# Patient Record
Sex: MALE | Race: WHITE | NOT HISPANIC OR LATINO | Employment: UNEMPLOYED | URBAN - METROPOLITAN AREA
[De-identification: names, ages, dates, MRNs, and addresses within clinical notes are randomized per-mention and may not be internally consistent; named-entity substitution may affect disease eponyms.]

---

## 2019-05-14 ENCOUNTER — APPOINTMENT (OUTPATIENT)
Dept: RADIOLOGY | Facility: MEDICAL CENTER | Age: 49
DRG: 292 | End: 2019-05-14
Attending: EMERGENCY MEDICINE
Payer: MEDICAID

## 2019-05-14 ENCOUNTER — APPOINTMENT (OUTPATIENT)
Dept: RADIOLOGY | Facility: MEDICAL CENTER | Age: 49
DRG: 292 | End: 2019-05-14
Attending: STUDENT IN AN ORGANIZED HEALTH CARE EDUCATION/TRAINING PROGRAM
Payer: MEDICAID

## 2019-05-14 ENCOUNTER — HOSPITAL ENCOUNTER (INPATIENT)
Facility: MEDICAL CENTER | Age: 49
LOS: 3 days | DRG: 292 | End: 2019-05-17
Attending: EMERGENCY MEDICINE | Admitting: INTERNAL MEDICINE
Payer: MEDICAID

## 2019-05-14 ENCOUNTER — APPOINTMENT (OUTPATIENT)
Dept: CARDIOLOGY | Facility: MEDICAL CENTER | Age: 49
DRG: 292 | End: 2019-05-14
Attending: STUDENT IN AN ORGANIZED HEALTH CARE EDUCATION/TRAINING PROGRAM
Payer: MEDICAID

## 2019-05-14 DIAGNOSIS — F15.10 AMPHETAMINE ABUSE (HCC): ICD-10-CM

## 2019-05-14 DIAGNOSIS — R79.89 ELEVATED TROPONIN: Primary | ICD-10-CM

## 2019-05-14 DIAGNOSIS — R06.02 SOB (SHORTNESS OF BREATH): ICD-10-CM

## 2019-05-14 DIAGNOSIS — I51.7 CARDIOMEGALY: ICD-10-CM

## 2019-05-14 PROBLEM — D69.6 THROMBOCYTOPENIA (HCC): Status: ACTIVE | Noted: 2019-05-14

## 2019-05-14 PROBLEM — D64.9 ANEMIA: Status: ACTIVE | Noted: 2019-05-14

## 2019-05-14 PROBLEM — R74.8 ELEVATED LIVER ENZYMES: Status: ACTIVE | Noted: 2019-05-14

## 2019-05-14 PROBLEM — E87.20 METABOLIC ACIDOSIS: Status: ACTIVE | Noted: 2019-05-14

## 2019-05-14 PROBLEM — E87.5 HYPERKALEMIA: Status: ACTIVE | Noted: 2019-05-14

## 2019-05-14 LAB
ALBUMIN SERPL BCP-MCNC: 3.3 G/DL (ref 3.2–4.9)
ALBUMIN/GLOB SERPL: 1.1 G/DL
ALP SERPL-CCNC: 104 U/L (ref 30–99)
ALT SERPL-CCNC: 92 U/L (ref 2–50)
AMPHET UR QL SCN: POSITIVE
AMPHET UR QL SCN: POSITIVE
ANION GAP SERPL CALC-SCNC: 11 MMOL/L (ref 0–11.9)
ANION GAP SERPL CALC-SCNC: 13 MMOL/L (ref 0–11.9)
AST SERPL-CCNC: 49 U/L (ref 12–45)
BARBITURATES UR QL SCN: NEGATIVE
BARBITURATES UR QL SCN: NEGATIVE
BASOPHILS # BLD AUTO: 0.7 % (ref 0–1.8)
BASOPHILS # BLD: 0.06 K/UL (ref 0–0.12)
BENZODIAZ UR QL SCN: NEGATIVE
BENZODIAZ UR QL SCN: NEGATIVE
BILIRUB SERPL-MCNC: 1 MG/DL (ref 0.1–1.5)
BNP SERPL-MCNC: 1382 PG/ML (ref 0–100)
BNP SERPL-MCNC: 1427 PG/ML (ref 0–100)
BUN SERPL-MCNC: 15 MG/DL (ref 8–22)
BUN SERPL-MCNC: 16 MG/DL (ref 8–22)
BZE UR QL SCN: NEGATIVE
BZE UR QL SCN: NEGATIVE
CALCIUM SERPL-MCNC: 8.6 MG/DL (ref 8.5–10.5)
CALCIUM SERPL-MCNC: 8.7 MG/DL (ref 8.5–10.5)
CANNABINOIDS UR QL SCN: NEGATIVE
CANNABINOIDS UR QL SCN: NEGATIVE
CHLORIDE SERPL-SCNC: 110 MMOL/L (ref 96–112)
CHLORIDE SERPL-SCNC: 110 MMOL/L (ref 96–112)
CHOLEST SERPL-MCNC: 146 MG/DL (ref 100–199)
CO2 SERPL-SCNC: 16 MMOL/L (ref 20–33)
CO2 SERPL-SCNC: 17 MMOL/L (ref 20–33)
COMMENT 1642: NORMAL
CREAT SERPL-MCNC: 1.09 MG/DL (ref 0.5–1.4)
CREAT SERPL-MCNC: 1.14 MG/DL (ref 0.5–1.4)
D DIMER PPP IA.FEU-MCNC: 3.28 UG/ML (FEU) (ref 0–0.5)
EKG IMPRESSION: NORMAL
EOSINOPHIL # BLD AUTO: 0.11 K/UL (ref 0–0.51)
EOSINOPHIL NFR BLD: 1.2 % (ref 0–6.9)
ERYTHROCYTE [DISTWIDTH] IN BLOOD BY AUTOMATED COUNT: 50.3 FL (ref 35.9–50)
EST. AVERAGE GLUCOSE BLD GHB EST-MCNC: 128 MG/DL
GLOBULIN SER CALC-MCNC: 2.9 G/DL (ref 1.9–3.5)
GLUCOSE SERPL-MCNC: 134 MG/DL (ref 65–99)
GLUCOSE SERPL-MCNC: 90 MG/DL (ref 65–99)
HBA1C MFR BLD: 6.1 % (ref 0–5.6)
HCT VFR BLD AUTO: 40 % (ref 42–52)
HDLC SERPL-MCNC: 34 MG/DL
HGB BLD-MCNC: 13 G/DL (ref 14–18)
IMM GRANULOCYTES # BLD AUTO: 0.01 K/UL (ref 0–0.11)
IMM GRANULOCYTES NFR BLD AUTO: 0.1 % (ref 0–0.9)
INR PPP: 1.05 (ref 0.87–1.13)
LDLC SERPL CALC-MCNC: 72 MG/DL
LV EJECT FRACT  99904: 15
LV EJECT FRACT MOD 2C 99903: 15
LV EJECT FRACT MOD 4C 99902: 24.56
LV EJECT FRACT MOD BP 99901: 10
LYMPHOCYTES # BLD AUTO: 2.54 K/UL (ref 1–4.8)
LYMPHOCYTES NFR BLD: 27.9 % (ref 22–41)
MCH RBC QN AUTO: 32.8 PG (ref 27–33)
MCHC RBC AUTO-ENTMCNC: 32.5 G/DL (ref 33.7–35.3)
MCV RBC AUTO: 101 FL (ref 81.4–97.8)
METHADONE UR QL SCN: NEGATIVE
METHADONE UR QL SCN: NEGATIVE
MONOCYTES # BLD AUTO: 0.69 K/UL (ref 0–0.85)
MONOCYTES NFR BLD AUTO: 7.6 % (ref 0–13.4)
MORPHOLOGY BLD-IMP: NORMAL
NEUTROPHILS # BLD AUTO: 5.7 K/UL (ref 1.82–7.42)
NEUTROPHILS NFR BLD: 62.5 % (ref 44–72)
NRBC # BLD AUTO: 0 K/UL
NRBC BLD-RTO: 0 /100 WBC
OPIATES UR QL SCN: NEGATIVE
OPIATES UR QL SCN: NEGATIVE
OXYCODONE UR QL SCN: NEGATIVE
OXYCODONE UR QL SCN: NEGATIVE
PCP UR QL SCN: NEGATIVE
PCP UR QL SCN: NEGATIVE
PLATELET # BLD AUTO: 102 K/UL (ref 164–446)
PMV BLD AUTO: 11.2 FL (ref 9–12.9)
POTASSIUM SERPL-SCNC: 4.4 MMOL/L (ref 3.6–5.5)
POTASSIUM SERPL-SCNC: 5.8 MMOL/L (ref 3.6–5.5)
PROPOXYPH UR QL SCN: NEGATIVE
PROPOXYPH UR QL SCN: NEGATIVE
PROT SERPL-MCNC: 6.2 G/DL (ref 6–8.2)
PROTHROMBIN TIME: 13.8 SEC (ref 12–14.6)
RBC # BLD AUTO: 3.96 M/UL (ref 4.7–6.1)
SODIUM SERPL-SCNC: 138 MMOL/L (ref 135–145)
SODIUM SERPL-SCNC: 139 MMOL/L (ref 135–145)
TRIGL SERPL-MCNC: 198 MG/DL (ref 0–149)
TROPONIN I SERPL-MCNC: 0.06 NG/ML (ref 0–0.04)
TROPONIN I SERPL-MCNC: 0.09 NG/ML (ref 0–0.04)
TSH SERPL DL<=0.005 MIU/L-ACNC: 1.68 UIU/ML (ref 0.38–5.33)
WBC # BLD AUTO: 9.1 K/UL (ref 4.8–10.8)

## 2019-05-14 PROCEDURE — 94760 N-INVAS EAR/PLS OXIMETRY 1: CPT

## 2019-05-14 PROCEDURE — 770020 HCHG ROOM/CARE - TELE (206)

## 2019-05-14 PROCEDURE — 36415 COLL VENOUS BLD VENIPUNCTURE: CPT

## 2019-05-14 PROCEDURE — 700102 HCHG RX REV CODE 250 W/ 637 OVERRIDE(OP): Performed by: EMERGENCY MEDICINE

## 2019-05-14 PROCEDURE — 99223 1ST HOSP IP/OBS HIGH 75: CPT | Mod: GC | Performed by: INTERNAL MEDICINE

## 2019-05-14 PROCEDURE — 80061 LIPID PANEL: CPT

## 2019-05-14 PROCEDURE — 85025 COMPLETE CBC W/AUTO DIFF WBC: CPT

## 2019-05-14 PROCEDURE — 93970 EXTREMITY STUDY: CPT

## 2019-05-14 PROCEDURE — 93005 ELECTROCARDIOGRAM TRACING: CPT | Performed by: EMERGENCY MEDICINE

## 2019-05-14 PROCEDURE — 84443 ASSAY THYROID STIM HORMONE: CPT

## 2019-05-14 PROCEDURE — 84484 ASSAY OF TROPONIN QUANT: CPT

## 2019-05-14 PROCEDURE — 71275 CT ANGIOGRAPHY CHEST: CPT

## 2019-05-14 PROCEDURE — 83880 ASSAY OF NATRIURETIC PEPTIDE: CPT

## 2019-05-14 PROCEDURE — 93306 TTE W/DOPPLER COMPLETE: CPT | Mod: 26 | Performed by: INTERNAL MEDICINE

## 2019-05-14 PROCEDURE — 93005 ELECTROCARDIOGRAM TRACING: CPT

## 2019-05-14 PROCEDURE — 80307 DRUG TEST PRSMV CHEM ANLYZR: CPT

## 2019-05-14 PROCEDURE — 700111 HCHG RX REV CODE 636 W/ 250 OVERRIDE (IP): Performed by: EMERGENCY MEDICINE

## 2019-05-14 PROCEDURE — 80053 COMPREHEN METABOLIC PANEL: CPT

## 2019-05-14 PROCEDURE — 83036 HEMOGLOBIN GLYCOSYLATED A1C: CPT

## 2019-05-14 PROCEDURE — 85379 FIBRIN DEGRADATION QUANT: CPT

## 2019-05-14 PROCEDURE — 700102 HCHG RX REV CODE 250 W/ 637 OVERRIDE(OP): Performed by: STUDENT IN AN ORGANIZED HEALTH CARE EDUCATION/TRAINING PROGRAM

## 2019-05-14 PROCEDURE — 99285 EMERGENCY DEPT VISIT HI MDM: CPT

## 2019-05-14 PROCEDURE — 700117 HCHG RX CONTRAST REV CODE 255: Performed by: EMERGENCY MEDICINE

## 2019-05-14 PROCEDURE — A9270 NON-COVERED ITEM OR SERVICE: HCPCS | Performed by: STUDENT IN AN ORGANIZED HEALTH CARE EDUCATION/TRAINING PROGRAM

## 2019-05-14 PROCEDURE — 96374 THER/PROPH/DIAG INJ IV PUSH: CPT

## 2019-05-14 PROCEDURE — 93306 TTE W/DOPPLER COMPLETE: CPT

## 2019-05-14 PROCEDURE — 80048 BASIC METABOLIC PNL TOTAL CA: CPT

## 2019-05-14 PROCEDURE — 71045 X-RAY EXAM CHEST 1 VIEW: CPT

## 2019-05-14 PROCEDURE — 99255 IP/OBS CONSLTJ NEW/EST HI 80: CPT | Performed by: INTERNAL MEDICINE

## 2019-05-14 PROCEDURE — 85610 PROTHROMBIN TIME: CPT

## 2019-05-14 PROCEDURE — A9270 NON-COVERED ITEM OR SERVICE: HCPCS | Performed by: EMERGENCY MEDICINE

## 2019-05-14 RX ORDER — ENALAPRILAT 1.25 MG/ML
1.25 INJECTION INTRAVENOUS EVERY 6 HOURS PRN
Status: DISCONTINUED | OUTPATIENT
Start: 2019-05-14 | End: 2019-05-17 | Stop reason: HOSPADM

## 2019-05-14 RX ORDER — ASPIRIN 81 MG/1
324 TABLET, CHEWABLE ORAL ONCE
Status: COMPLETED | OUTPATIENT
Start: 2019-05-14 | End: 2019-05-14

## 2019-05-14 RX ORDER — FUROSEMIDE 10 MG/ML
20 INJECTION INTRAMUSCULAR; INTRAVENOUS ONCE
Status: COMPLETED | OUTPATIENT
Start: 2019-05-14 | End: 2019-05-14

## 2019-05-14 RX ORDER — POLYETHYLENE GLYCOL 3350 17 G/17G
1 POWDER, FOR SOLUTION ORAL
Status: DISCONTINUED | OUTPATIENT
Start: 2019-05-14 | End: 2019-05-17 | Stop reason: HOSPADM

## 2019-05-14 RX ORDER — ASPIRIN 81 MG/1
81 TABLET, CHEWABLE ORAL DAILY
Status: DISCONTINUED | OUTPATIENT
Start: 2019-05-15 | End: 2019-05-17 | Stop reason: HOSPADM

## 2019-05-14 RX ORDER — FUROSEMIDE 10 MG/ML
40 INJECTION INTRAMUSCULAR; INTRAVENOUS
Status: DISCONTINUED | OUTPATIENT
Start: 2019-05-15 | End: 2019-05-16

## 2019-05-14 RX ORDER — AMOXICILLIN 250 MG
2 CAPSULE ORAL 2 TIMES DAILY
Status: DISCONTINUED | OUTPATIENT
Start: 2019-05-15 | End: 2019-05-17 | Stop reason: HOSPADM

## 2019-05-14 RX ORDER — BISACODYL 10 MG
10 SUPPOSITORY, RECTAL RECTAL
Status: DISCONTINUED | OUTPATIENT
Start: 2019-05-14 | End: 2019-05-17 | Stop reason: HOSPADM

## 2019-05-14 RX ORDER — CARVEDILOL 3.12 MG/1
3.12 TABLET ORAL 2 TIMES DAILY WITH MEALS
Status: DISCONTINUED | OUTPATIENT
Start: 2019-05-14 | End: 2019-05-14

## 2019-05-14 RX ORDER — LISINOPRIL 5 MG/1
5 TABLET ORAL
Status: DISCONTINUED | OUTPATIENT
Start: 2019-05-14 | End: 2019-05-15

## 2019-05-14 RX ORDER — SPIRONOLACTONE 25 MG/1
25 TABLET ORAL
Status: DISCONTINUED | OUTPATIENT
Start: 2019-05-15 | End: 2019-05-17

## 2019-05-14 RX ORDER — ATORVASTATIN CALCIUM 80 MG/1
80 TABLET, FILM COATED ORAL EVERY EVENING
Status: DISCONTINUED | OUTPATIENT
Start: 2019-05-14 | End: 2019-05-17 | Stop reason: HOSPADM

## 2019-05-14 RX ADMIN — IOHEXOL 72 ML: 350 INJECTION, SOLUTION INTRAVENOUS at 17:50

## 2019-05-14 RX ADMIN — FUROSEMIDE 20 MG: 10 INJECTION, SOLUTION INTRAMUSCULAR; INTRAVENOUS at 17:05

## 2019-05-14 RX ADMIN — LISINOPRIL 5 MG: 5 TABLET ORAL at 23:04

## 2019-05-14 RX ADMIN — ASPIRIN 81 MG 324 MG: 81 TABLET ORAL at 16:38

## 2019-05-14 RX ADMIN — ATORVASTATIN CALCIUM 80 MG: 80 TABLET, FILM COATED ORAL at 20:36

## 2019-05-14 ASSESSMENT — ENCOUNTER SYMPTOMS
SPUTUM PRODUCTION: 0
PND: 1
COUGH: 0
SHORTNESS OF BREATH: 1
ABDOMINAL PAIN: 0
HEMOPTYSIS: 0
VOMITING: 0
FEVER: 0
HEADACHES: 0
ORTHOPNEA: 1
DIAPHORESIS: 0
BLURRED VISION: 0
CHILLS: 0
DOUBLE VISION: 0
PALPITATIONS: 0
DIZZINESS: 0
MYALGIAS: 0
NECK PAIN: 0
WEIGHT LOSS: 0
HEARTBURN: 0
WHEEZING: 0
NAUSEA: 0
DEPRESSION: 0

## 2019-05-14 ASSESSMENT — LIFESTYLE VARIABLES
EVER_SMOKED: YES
ALCOHOL_USE: NO
EVER_SMOKED: YES

## 2019-05-14 ASSESSMENT — PATIENT HEALTH QUESTIONNAIRE - PHQ9
1. LITTLE INTEREST OR PLEASURE IN DOING THINGS: NOT AT ALL
2. FEELING DOWN, DEPRESSED, IRRITABLE, OR HOPELESS: NOT AT ALL
SUM OF ALL RESPONSES TO PHQ9 QUESTIONS 1 AND 2: 0

## 2019-05-14 ASSESSMENT — COPD QUESTIONNAIRES
HAVE YOU SMOKED AT LEAST 100 CIGARETTES IN YOUR ENTIRE LIFE: YES
DURING THE PAST 4 WEEKS HOW MUCH DID YOU FEEL SHORT OF BREATH: NONE/LITTLE OF THE TIME
DO YOU EVER COUGH UP ANY MUCUS OR PHLEGM?: NO/ONLY WITH OCCASIONAL COLDS OR INFECTIONS
COPD SCREENING SCORE: 2

## 2019-05-14 ASSESSMENT — COGNITIVE AND FUNCTIONAL STATUS - GENERAL
DAILY ACTIVITIY SCORE: 24
SUGGESTED CMS G CODE MODIFIER MOBILITY: CH
SUGGESTED CMS G CODE MODIFIER DAILY ACTIVITY: CH
MOBILITY SCORE: 24

## 2019-05-14 NOTE — ED NOTES
"Pt presents c/o SOB and PANDYA x 1 month.  Denies any CP.  Hx of IV meth use and family hx of CHF.  \"I feel like I'm filling up with fluid\".  Lungs diminished in bases, no imelda crackles heard.  Very difficult IV stick.  Placed on cardiac monitor.   "

## 2019-05-14 NOTE — ED PROVIDER NOTES
ED Provider Note    Scribed for Gaviota Henson M.D. by Micky Ascencio. 5/14/2019, 3:53 PM.    Primary care provider: No primary care provider on file.  Means of arrival: Walk-in  History obtained from: Patient  History limited by: None    CHIEF COMPLAINT  Chief Complaint   Patient presents with   • Shortness of Breath     patient reports SOB x 2 weeks though worse x 3 days.    • Fatigue     x 2 weeks       HPI  Lucas Agee is a 48 y.o. male who presents to the Emergency Department for evaluation of shortness of breath onset 2 weeks ago with symptoms worsening 3 days ago. His symptoms fluctuate in severity but are exacerbated by ambulation. The patient states his shortness of breath is sometimes worsened by laying on his back. The patient states he has been having difficulty sleeping secondary to his shortness of breath. The patient affirms an additional symptom of increased fatigue, mild cough, chest tightness, mild bilateral feet swelling, and mild lightheadedness over the past 2 weeks. The patient denies any fevers, palpitations, nausea, vomiting, arm pain, or leg pain. The patient is a current daily tobacco and marijuana smoker. He denies any history of alcohol use. The patient has a history of injected methamphetamine use and last use was one year ago. The patient has a family history of cardiac problems. The patient has a history of hypertension. The patient notes he has not seen a doctor in many years. He has never had a stress test. No history of diagnosed hepatitis.The patient takes no daily medications. He has not taken aspirin today.     REVIEW OF SYSTEMS  Pertinent positives include shortness of breath, fatigue, mild cough, chest tightness, mild bilateral feet swelling, and lightheadedness. Pertinent negatives include no fevers, palpitations, nausea, vomiting, arm pain, or leg pain. As above, all other systems reviewed and are negative.   See HPI for further details.     PAST MEDICAL  "HISTORY  History reviewed. Hypertension    SURGICAL HISTORY  History reviewed. No pertinent surgical history.    SOCIAL HISTORY  Social History   Substance Use Topics   • Smoking status: Current Every Day Smoker     Packs/day: 0.50     Types: Cigarettes   • Smokeless tobacco: Never Used   • Alcohol use No      History   Drug Use   • Types: Inhaled     Comment: marijuana, hx of meth       FAMILY HISTORY  Family history of cardiac problems.     CURRENT MEDICATIONS  Home Medications     Reviewed by Sera Cheng (Pharmacy Tech) on 05/14/19 at 1759  Med List Status: Complete   Medication Last Dose Status        Patient Lewis Taking any Medications                       ALLERGIES  No Known Allergies    PHYSICAL EXAM  VITAL SIGNS: /116   Pulse (!) 116   Temp 36.8 °C (98.3 °F) (Oral)   Resp 14   Ht 1.702 m (5' 7\")   Wt 95.9 kg (211 lb 6.7 oz)   SpO2 98%   BMI 33.11 kg/m²   Vitals reviewed.    Consitutional: Well-developed, well-nourished. Negative for: distress. Sweating.  HENT: Normocephalic, right external ear normal, left external ear normal, oropharynx clear and moist.  Eyes: Conjunctivae normal, extraocular movements normal. Negative for: discharge in right and left eye, icterus.  Neck: Range of motion normal, supple. Negative for cervical adenopathy.  Cardiovascular: Sweaty. Tachycardic, regular rhythm, heart sounds normal, intact distal pulses. Negative for: murmur, rub, gallop.  Pulmonary/Chest Wall: Pulse Oximetry was obtained. It showed a reading of 98%.  I interpreted this as normal. Effort normal, breath sounds normal. Negative for: respiratory distress, wheezes, rales, rhonchi.   Abdominal: Soft, bowel sounds normal. Negative for: distention, tenderness, rebound, guarding.  Musculoskeletal: Normal range of motion. Trace edema to bilateral ankles. No tenderness or warmth to bilateral calves.   Neurological: Alert and oriented x3. No focal deficits.  Skin: Warm, dry. Negative for rash.  Psych: " Anxious. Mildly tearful. Affect normal, behavior normal, judgment normal.      DIAGNOSTIC STUDIES / PROCEDURES    LABS  Results for orders placed or performed during the hospital encounter of 05/14/19   EC-ECHOCARDIOGRAM COMPLETE W/O CONT   Result Value Ref Range    Eject.Frac. MOD BP 10     Eject.Frac. MOD 4C 24.56     Eject.Frac. MOD 2C 15     Left Ventrical Ejection Fraction 15    Complete Metabolic Panel (CMP)   Result Value Ref Range    Sodium 138 135 - 145 mmol/L    Potassium 5.8 (H) 3.6 - 5.5 mmol/L    Chloride 110 96 - 112 mmol/L    Co2 17 (L) 20 - 33 mmol/L    Anion Gap 11.0 0.0 - 11.9    Glucose 134 (H) 65 - 99 mg/dL    Bun 15 8 - 22 mg/dL    Creatinine 1.14 0.50 - 1.40 mg/dL    Calcium 8.7 8.5 - 10.5 mg/dL    AST(SGOT) 49 (H) 12 - 45 U/L    ALT(SGPT) 92 (H) 2 - 50 U/L    Alkaline Phosphatase 104 (H) 30 - 99 U/L    Total Bilirubin 1.0 0.1 - 1.5 mg/dL    Albumin 3.3 3.2 - 4.9 g/dL    Total Protein 6.2 6.0 - 8.2 g/dL    Globulin 2.9 1.9 - 3.5 g/dL    A-G Ratio 1.1 g/dL   Troponin   Result Value Ref Range    Troponin I 0.06 (H) 0.00 - 0.04 ng/mL   CBC WITH DIFFERENTIAL   Result Value Ref Range    WBC 9.1 4.8 - 10.8 K/uL    RBC 3.96 (L) 4.70 - 6.10 M/uL    Hemoglobin 13.0 (L) 14.0 - 18.0 g/dL    Hematocrit 40.0 (L) 42.0 - 52.0 %    .0 (H) 81.4 - 97.8 fL    MCH 32.8 27.0 - 33.0 pg    MCHC 32.5 (L) 33.7 - 35.3 g/dL    RDW 50.3 (H) 35.9 - 50.0 fL    Platelet Count 102 (L) 164 - 446 K/uL    MPV 11.2 9.0 - 12.9 fL    Neutrophils-Polys 62.50 44.00 - 72.00 %    Lymphocytes 27.90 22.00 - 41.00 %    Monocytes 7.60 0.00 - 13.40 %    Eosinophils 1.20 0.00 - 6.90 %    Basophils 0.70 0.00 - 1.80 %    Immature Granulocytes 0.10 0.00 - 0.90 %    Nucleated RBC 0.00 /100 WBC    Neutrophils (Absolute) 5.70 1.82 - 7.42 K/uL    Lymphs (Absolute) 2.54 1.00 - 4.80 K/uL    Monos (Absolute) 0.69 0.00 - 0.85 K/uL    Eos (Absolute) 0.11 0.00 - 0.51 K/uL    Baso (Absolute) 0.06 0.00 - 0.12 K/uL    Immature Granulocytes (abs) 0.01  0.00 - 0.11 K/uL    NRBC (Absolute) 0.00 K/uL   ESTIMATED GFR   Result Value Ref Range    GFR If African American >60 >60 mL/min/1.73 m 2    GFR If Non African American >60 >60 mL/min/1.73 m 2   TROPONIN   Result Value Ref Range    Troponin I 0.09 (H) 0.00 - 0.04 ng/mL   BTYPE NATRIURETIC PEPTIDE   Result Value Ref Range    B Natriuretic Peptide 1427 (H) 0 - 100 pg/mL   PERIPHERAL SMEAR REVIEW   Result Value Ref Range    Peripheral Smear Review see below    DIFFERENTIAL COMMENT   Result Value Ref Range    Comments-Diff see below    URINE DRUG SCREEN   Result Value Ref Range    Amphetamines Urine Positive (A) Negative    Barbiturates Negative Negative    Benzodiazepines Negative Negative    Cocaine Metabolite Negative Negative    Methadone Negative Negative    Opiates Negative Negative    Oxycodone Negative Negative    Phencyclidine -Pcp Negative Negative    Propoxyphene Negative Negative    Cannabinoid Metab Negative Negative   URINE DRUG SCREEN   Result Value Ref Range    Amphetamines Urine Positive (A) Negative    Barbiturates Negative Negative    Benzodiazepines Negative Negative    Cocaine Metabolite Negative Negative    Methadone Negative Negative    Opiates Negative Negative    Oxycodone Negative Negative    Phencyclidine -Pcp Negative Negative    Propoxyphene Negative Negative    Cannabinoid Metab Negative Negative   D-DIMER   Result Value Ref Range    D-Dimer Screen 3.28 (H) 0.00 - 0.50 ug/mL (FEU)   Prothrombin Time   Result Value Ref Range    PT 13.8 12.0 - 14.6 sec    INR 1.05 0.87 - 1.13   EKG (NOW)   Result Value Ref Range    Report       [facility] Emergency Dept.    Test Date:  2019  Pt Name:    EDMUNDO OROPEZA                 Department: ER  MRN:        [id number]                      Room:  Gender:     Male                         Technician: [id number]  :        1970                   Requested By:ER TRIAGE PROTOCOL  Order #:    [id number]                    Urmila MD: ALLEN  LINNEA MEDINA MD    Measurements  Intervals                                Axis  Rate:       119                          P:          65  TX:         140                          QRS:        25  QRSD:       90                           T:          98  QT:         340  QTc:        479    Interpretive Statements  SINUS TACHYCARDIA  NONSPECIFIC T ABNORMALITIES, LATERAL LEADS  BORDERLINE PROLONGED QT INTERVAL  No previous ECG available for comparison    Electronically Signed On 5- 16:09:12 PDT by ALLEN MEDINA MD         All labs reviewed by me.    EKG Interpretation:  Twelve-lead EKG by my interpretation is as above.  No ST or T wave changes to indicate ischemia or infarct    RADIOLOGY  CT-CTA CHEST PULMONARY ARTERY W/ RECONS   Final Result      1.  Limited study due to extensive patient motion artifact as well as a poor contrast bolus. Only large main pulmonary artery emboli are excluded with this examination.      2.  Cardiomegaly.      3.  Fatty liver.      DX-CHEST-PORTABLE (1 VIEW)   Final Result      No pulmonary consolidation.        The radiologist's interpretation of all radiological studies have been reviewed by me.    COURSE & MEDICAL DECISION MAKING  Nursing notes, VS, PMSFHx reviewed in chart.    Obtained and reviewed past medical records but it appears the patient has never been here    3:53 PM Patient seen and examined at bedside. The patient presents with dyspnea and tachycardia and the differential diagnosis includes but is not limited to cardiomyopathy, CHF, PE, pleural effusions. Ordered DX-Chest, CT-CTA Chest Pulmonary Artery, BType Natriuretic peptide, CMP, CBC, and EKG. Patient will be treated with 20 mg Lasix and 324 mg aspirin for his symptoms.    4:19 PM - Ordered Troponin    5:50 PM - Paged [facility] internal Medicine    6:30 PM - I spoke with Dr Cornell ([facility]) who accepts the patient for admission.    6:45 PM - Patient was reevaluated at bedside. Discussed lab and radiology  results with the patient and informed them that they had an elevated troponin. I informed the patient of my plan for admission. Patient verbalizes understanding and agreement.    DISPOSITION:  Patient will be admitted to Dr Cornell in guarded condition.      FINAL IMPRESSION  1. Elevated troponin    2. SOB (shortness of breath)    3. Amphetamine abuse (HCC)    4. Cardiomegaly          Micky ANDERSON (Scribe), am scribing for, and in the presence of, Gaviota Henson M.D..    Electronically signed by: Micky Ascencio (Scribe), 5/14/2019    Gaviota ANDERSON M.D. personally performed the services described in this documentation, as scribed by Micky Ascencio in my presence, and it is both accurate and complete.    The note accurately reflects work and decisions made by me.  Gaviota Henson  5/14/2019  8:45 PM

## 2019-05-14 NOTE — ED TRIAGE NOTES
".Lucas Agee  .  Chief Complaint   Patient presents with   • Shortness of Breath     patient reports SOB x 2 weeks though worse x 3 days.    • Fatigue     x 2 weeks     Patient to triage with above complaint. Patient also c/o swelling to bilateral hands and feet x 2 weeks. Patient reports dry non-productive cough.EKG completed prior to triage. ./111   Pulse (!) 130   Temp 36.7 °C (98.1 °F) (Temporal)   Resp (!) 22   Ht 1.702 m (5' 7\")   Wt 95.9 kg (211 lb 6.7 oz)   SpO2 96%   BMI 33.11 kg/m²     Patient to lobby and instructed to inform staff of any needs.  "

## 2019-05-15 ENCOUNTER — APPOINTMENT (OUTPATIENT)
Dept: RADIOLOGY | Facility: MEDICAL CENTER | Age: 49
DRG: 292 | End: 2019-05-15
Attending: STUDENT IN AN ORGANIZED HEALTH CARE EDUCATION/TRAINING PROGRAM
Payer: MEDICAID

## 2019-05-15 PROBLEM — F19.90 SUBSTANCE USE DISORDER: Status: ACTIVE | Noted: 2019-05-15

## 2019-05-15 PROBLEM — Z72.0 TOBACCO USE: Status: ACTIVE | Noted: 2019-05-15

## 2019-05-15 PROBLEM — I42.0 DILATED CARDIOMYOPATHY (HCC): Status: ACTIVE | Noted: 2019-05-15

## 2019-05-15 PROBLEM — I07.1 TRICUSPID REGURGITATION: Status: ACTIVE | Noted: 2019-05-15

## 2019-05-15 PROBLEM — R79.89 ELEVATED TROPONIN: Status: ACTIVE | Noted: 2019-05-15

## 2019-05-15 PROBLEM — R73.03 PREDIABETES: Status: ACTIVE | Noted: 2019-05-15

## 2019-05-15 PROBLEM — R74.01 TRANSAMINITIS: Status: ACTIVE | Noted: 2019-05-15

## 2019-05-15 PROBLEM — I50.9 ACUTE DECOMPENSATED HEART FAILURE (HCC): Status: ACTIVE | Noted: 2019-05-15

## 2019-05-15 PROBLEM — K76.0 HEPATIC STEATOSIS: Status: ACTIVE | Noted: 2019-05-15

## 2019-05-15 LAB
ALBUMIN SERPL BCP-MCNC: 3.6 G/DL (ref 3.2–4.9)
ALBUMIN/GLOB SERPL: 1.4 G/DL
ALP SERPL-CCNC: 96 U/L (ref 30–99)
ALT SERPL-CCNC: 83 U/L (ref 2–50)
ANION GAP SERPL CALC-SCNC: 9 MMOL/L (ref 0–11.9)
APTT PPP: 25.3 SEC (ref 24.7–36)
AST SERPL-CCNC: 45 U/L (ref 12–45)
BASOPHILS # BLD AUTO: 0.5 % (ref 0–1.8)
BASOPHILS # BLD: 0.08 K/UL (ref 0–0.12)
BILIRUB SERPL-MCNC: 0.9 MG/DL (ref 0.1–1.5)
BUN SERPL-MCNC: 16 MG/DL (ref 8–22)
CALCIUM SERPL-MCNC: 8.6 MG/DL (ref 8.5–10.5)
CHLORIDE SERPL-SCNC: 110 MMOL/L (ref 96–112)
CO2 SERPL-SCNC: 19 MMOL/L (ref 20–33)
CREAT SERPL-MCNC: 1.03 MG/DL (ref 0.5–1.4)
EOSINOPHIL # BLD AUTO: 0.18 K/UL (ref 0–0.51)
EOSINOPHIL NFR BLD: 1.2 % (ref 0–6.9)
ERYTHROCYTE [DISTWIDTH] IN BLOOD BY AUTOMATED COUNT: 48.6 FL (ref 35.9–50)
ETHANOL BLD-MCNC: 0.01 G/DL
FERRITIN SERPL-MCNC: 68.9 NG/ML (ref 22–322)
FOLATE SERPL-MCNC: 19.1 NG/ML
GLOBULIN SER CALC-MCNC: 2.5 G/DL (ref 1.9–3.5)
GLUCOSE SERPL-MCNC: 99 MG/DL (ref 65–99)
HAV IGM SERPL QL IA: NEGATIVE
HBV CORE IGM SER QL: NEGATIVE
HBV SURFACE AG SER QL: NEGATIVE
HCT VFR BLD AUTO: 46.5 % (ref 42–52)
HCV AB SER QL: NEGATIVE
HGB BLD-MCNC: 16 G/DL (ref 14–18)
HIV 1+2 AB+HIV1 P24 AG SERPL QL IA: NON REACTIVE
IMM GRANULOCYTES # BLD AUTO: 0.06 K/UL (ref 0–0.11)
IMM GRANULOCYTES NFR BLD AUTO: 0.4 % (ref 0–0.9)
IRON SATN MFR SERPL: 23 % (ref 15–55)
IRON SERPL-MCNC: 79 UG/DL (ref 50–180)
LYMPHOCYTES # BLD AUTO: 3.02 K/UL (ref 1–4.8)
LYMPHOCYTES NFR BLD: 19.6 % (ref 22–41)
MAGNESIUM SERPL-MCNC: 2.1 MG/DL (ref 1.5–2.5)
MCH RBC QN AUTO: 33.2 PG (ref 27–33)
MCHC RBC AUTO-ENTMCNC: 34.4 G/DL (ref 33.7–35.3)
MCV RBC AUTO: 96.5 FL (ref 81.4–97.8)
MONOCYTES # BLD AUTO: 1.17 K/UL (ref 0–0.85)
MONOCYTES NFR BLD AUTO: 7.6 % (ref 0–13.4)
NEUTROPHILS # BLD AUTO: 10.89 K/UL (ref 1.82–7.42)
NEUTROPHILS NFR BLD: 70.7 % (ref 44–72)
NRBC # BLD AUTO: 0 K/UL
NRBC BLD-RTO: 0 /100 WBC
PHOSPHATE SERPL-MCNC: 3.7 MG/DL (ref 2.5–4.5)
PLATELET # BLD AUTO: 238 K/UL (ref 164–446)
PMV BLD AUTO: 10.4 FL (ref 9–12.9)
POTASSIUM SERPL-SCNC: 4 MMOL/L (ref 3.6–5.5)
PROT SERPL-MCNC: 6.1 G/DL (ref 6–8.2)
RBC # BLD AUTO: 4.82 M/UL (ref 4.7–6.1)
SODIUM SERPL-SCNC: 138 MMOL/L (ref 135–145)
TIBC SERPL-MCNC: 350 UG/DL (ref 250–450)
TROPONIN I SERPL-MCNC: 0.12 NG/ML (ref 0–0.04)
VIT B12 SERPL-MCNC: 832 PG/ML (ref 211–911)
WBC # BLD AUTO: 15.4 K/UL (ref 4.8–10.8)

## 2019-05-15 PROCEDURE — 82746 ASSAY OF FOLIC ACID SERUM: CPT

## 2019-05-15 PROCEDURE — 700111 HCHG RX REV CODE 636 W/ 250 OVERRIDE (IP): Performed by: STUDENT IN AN ORGANIZED HEALTH CARE EDUCATION/TRAINING PROGRAM

## 2019-05-15 PROCEDURE — 80053 COMPREHEN METABOLIC PANEL: CPT

## 2019-05-15 PROCEDURE — 700102 HCHG RX REV CODE 250 W/ 637 OVERRIDE(OP): Performed by: STUDENT IN AN ORGANIZED HEALTH CARE EDUCATION/TRAINING PROGRAM

## 2019-05-15 PROCEDURE — 76705 ECHO EXAM OF ABDOMEN: CPT

## 2019-05-15 PROCEDURE — 82607 VITAMIN B-12: CPT

## 2019-05-15 PROCEDURE — 85025 COMPLETE CBC W/AUTO DIFF WBC: CPT

## 2019-05-15 PROCEDURE — 770020 HCHG ROOM/CARE - TELE (206)

## 2019-05-15 PROCEDURE — 80074 ACUTE HEPATITIS PANEL: CPT

## 2019-05-15 PROCEDURE — 84484 ASSAY OF TROPONIN QUANT: CPT

## 2019-05-15 PROCEDURE — 82728 ASSAY OF FERRITIN: CPT

## 2019-05-15 PROCEDURE — 83735 ASSAY OF MAGNESIUM: CPT

## 2019-05-15 PROCEDURE — 80307 DRUG TEST PRSMV CHEM ANLYZR: CPT

## 2019-05-15 PROCEDURE — A9270 NON-COVERED ITEM OR SERVICE: HCPCS | Performed by: STUDENT IN AN ORGANIZED HEALTH CARE EDUCATION/TRAINING PROGRAM

## 2019-05-15 PROCEDURE — 85730 THROMBOPLASTIN TIME PARTIAL: CPT

## 2019-05-15 PROCEDURE — 99231 SBSQ HOSP IP/OBS SF/LOW 25: CPT | Performed by: INTERNAL MEDICINE

## 2019-05-15 PROCEDURE — A9270 NON-COVERED ITEM OR SERVICE: HCPCS | Performed by: INTERNAL MEDICINE

## 2019-05-15 PROCEDURE — 87389 HIV-1 AG W/HIV-1&-2 AB AG IA: CPT

## 2019-05-15 PROCEDURE — 36415 COLL VENOUS BLD VENIPUNCTURE: CPT

## 2019-05-15 PROCEDURE — 93005 ELECTROCARDIOGRAM TRACING: CPT | Performed by: STUDENT IN AN ORGANIZED HEALTH CARE EDUCATION/TRAINING PROGRAM

## 2019-05-15 PROCEDURE — 84100 ASSAY OF PHOSPHORUS: CPT

## 2019-05-15 PROCEDURE — 83540 ASSAY OF IRON: CPT

## 2019-05-15 PROCEDURE — 700102 HCHG RX REV CODE 250 W/ 637 OVERRIDE(OP): Performed by: INTERNAL MEDICINE

## 2019-05-15 PROCEDURE — 83550 IRON BINDING TEST: CPT

## 2019-05-15 RX ORDER — CARVEDILOL 3.12 MG/1
3.12 TABLET ORAL 2 TIMES DAILY WITH MEALS
Status: DISCONTINUED | OUTPATIENT
Start: 2019-05-15 | End: 2019-05-16

## 2019-05-15 RX ORDER — ENALAPRIL MALEATE 2.5 MG/1
5 TABLET ORAL 4 TIMES DAILY
Status: DISCONTINUED | OUTPATIENT
Start: 2019-05-15 | End: 2019-05-17

## 2019-05-15 RX ORDER — NICOTINE 21 MG/24HR
14 PATCH, TRANSDERMAL 24 HOURS TRANSDERMAL
Status: DISCONTINUED | OUTPATIENT
Start: 2019-05-15 | End: 2019-05-17 | Stop reason: HOSPADM

## 2019-05-15 RX ADMIN — ENALAPRILAT 1.25 MG: 1.25 INJECTION INTRAVENOUS at 00:29

## 2019-05-15 RX ADMIN — ATORVASTATIN CALCIUM 80 MG: 80 TABLET, FILM COATED ORAL at 17:24

## 2019-05-15 RX ADMIN — ENOXAPARIN SODIUM 40 MG: 100 INJECTION SUBCUTANEOUS at 05:55

## 2019-05-15 RX ADMIN — ASPIRIN 81 MG 81 MG: 81 TABLET ORAL at 05:55

## 2019-05-15 RX ADMIN — CARVEDILOL 3.12 MG: 3.12 TABLET, FILM COATED ORAL at 17:24

## 2019-05-15 RX ADMIN — ENALAPRIL MALEATE 5 MG: 2.5 TABLET ORAL at 20:27

## 2019-05-15 RX ADMIN — FUROSEMIDE 40 MG: 10 INJECTION, SOLUTION INTRAMUSCULAR; INTRAVENOUS at 05:56

## 2019-05-15 RX ADMIN — ENALAPRIL MALEATE 5 MG: 2.5 TABLET ORAL at 17:24

## 2019-05-15 RX ADMIN — ENALAPRIL MALEATE 5 MG: 2.5 TABLET ORAL at 11:51

## 2019-05-15 RX ADMIN — SPIRONOLACTONE 25 MG: 25 TABLET ORAL at 05:55

## 2019-05-15 RX ADMIN — CARVEDILOL 3.12 MG: 3.12 TABLET, FILM COATED ORAL at 11:52

## 2019-05-15 ASSESSMENT — ENCOUNTER SYMPTOMS
MYALGIAS: 0
SPUTUM PRODUCTION: 0
SINUS PAIN: 0
PHOTOPHOBIA: 0
NERVOUS/ANXIOUS: 0
VOMITING: 0
HALLUCINATIONS: 0
PALPITATIONS: 0
NECK PAIN: 0
CHILLS: 0
NAUSEA: 0
HEADACHES: 0
HEMOPTYSIS: 0
ABDOMINAL PAIN: 0
DIZZINESS: 0
FEVER: 0
ORTHOPNEA: 1
SORE THROAT: 0
EYE PAIN: 0

## 2019-05-15 ASSESSMENT — PATIENT HEALTH QUESTIONNAIRE - PHQ9
2. FEELING DOWN, DEPRESSED, IRRITABLE, OR HOPELESS: NOT AT ALL
1. LITTLE INTEREST OR PLEASURE IN DOING THINGS: NOT AT ALL
SUM OF ALL RESPONSES TO PHQ9 QUESTIONS 1 AND 2: 0

## 2019-05-15 NOTE — SENIOR ADMIT NOTE
Senior Admit Note     CC; Shortness of breath, fatigue x 2 weeks       Mr. Agee is a pleasant 47 yo male with no known PMHx aside from hx of substance use and tobacco use who presents with 2 weeks of sudden onset of dyspnea that has progressively worsening over the last 3 days. He has worsening dyspnea on exertion and even now at rest, he also admits to chest tightness. He denies palpitations. He admit to occasional methamphetamine use with his last use 6 months ago.   He has orthopnea, PND and some mild edema in his feet. He denies unilateral leg swelling, recent long travel or personal hx of blood clots but does state he has family hx of DVT and PE. He also has a strong family hx of heart disease in his brother and father.     In Ed, Temp 98.1, , RR 22, /111, 96% on RA. Labs remarkable for H/H 13.0/40, , Plt 102, K 5.8, CO2 17, Glucose 134, AST/ALT 49/92, Alk phos 104, Trop 0.06 -> 0.09, BNP 1427. CXR shows cardiomegaly without significant pulmonary edema. CTPA was done but limited due to patients respirations however no large main pulmonary artery emboli were seen.     Physical exam  Non-ill appearing male, appears stated age, in mild respiratory distress   EOMI, PERRL, asymmetry of eyes   Mild JVD appreciated no LAD   Very mild bibasilar crackles, no wheezing or rales   Tachycardic, systolic mumur heard best at left 4th intercostal space with displaced PMI, pulses full b/l   Abdomen  Soft, non-distended, bowel sounds normal   No LE edema, pulses full     Assessment and Plan     47 yo male with no known PMHx presents with acute worsening dyspnea, orthopnea, mildly elevated troponin and elevated BNP concerning for new onset acute heart failure vs possible PE. CTPE negative for large PE but limited study does not rule out segmental or subsegmental PE. Drug screen positive for amphetamines making HF more likley.     # Acute Dyspnea - suspect new onset HF   # Elevated troponin - likely demand   #  elevated BNP   # Substance abuse   # macrocytic anemia   # thrombocytopenia   # Non-anion gap metabolic acidosis - suspect type 4 RTA  # Hyperkalemia -  suspect type 4 RTA  # Transaminitis - possibly due to hepatic congestion     - admit to telemetry   - trend troponin, repeat EKG in AM   - cardiology consulted, appreciate recommendations   - echocardiogram shows severe reduction in EF to 15% with biventricular failure   - start aspirin, statin, lisinopril 5 mg, lasix 40 mg Qday IV, hold BB,   - monitor Is/Os, fluid and Na restriction   - repeat BMP in 6 hours to monitor asymptomatic hyperkalemia   - suspect transaminitis may be due to congestion but will do hepatic US, Hep and HIV panel   - check Vitamin B12, folate, Iron panel to evaluate anemia   -D.dimer elevated however DVT study negative, do not suspect PE as cause of symptoms at this time given  - educate on substance cessation     Full Code   Lovenox   No Abx  No Ulcer prophylaxis     Farida Cornell  PGY 2 [facility]

## 2019-05-15 NOTE — ASSESSMENT & PLAN NOTE
"-As demonstrated on echo.  In setting of meth abuse.  -See \"acute decompensated heart failure\" for details.  "

## 2019-05-15 NOTE — ASSESSMENT & PLAN NOTE
-Mild transaminitis on admission, patient asymptomatic, lipase negative.  -In setting of congestive hepatopathy.  -Improving with diuresis.  -PCP to follow up.

## 2019-05-15 NOTE — ASSESSMENT & PLAN NOTE
-Macrocytic anemia on admission, no baseline on file.  No apparent bleed.  -TSH, B12, folate WNL.  Ferritin low normal.  -PCP to follow up.

## 2019-05-15 NOTE — PROGRESS NOTES
CARDIOLOGY PROGRESS NOTE     Attending: Dr. Jason MD  Resident: Jermain Bear DO (PGY-2)  PATIENT: Lucas Agee; [id number]; 1970    ID: 48 y.o. male admitted for worsening shortness of breath and acute decompensated heart failure thought to be secondary to methamphetamine use.    SUBJECTIVE: No acute events overnight, continued shortness of breath.  Denies difficulty with laying flat.  States last methamphetamine use several months ago and expresses that he wants no additional help to quit and wants to leave the hospital as soon as possible.    OBJECTIVE:     Vitals:    05/14/19 2109 05/15/19 0011 05/15/19 0131 05/15/19 0410   BP: 156/117 (!) 173/125 153/113 146/98   Pulse: (!) 116 (!) 128 (!) 121 (!) 124   Resp: 20 (!) 21 19   Temp: 36.6 °C (97.9 °F) 36.7 °C (98 °F)  36.6 °C (97.9 °F)   TempSrc: Temporal Temporal  Temporal   SpO2: 96% 95%  95%   Weight: 92.7 kg (204 lb 5.9 oz)      Height:           Intake/Output Summary (Last 24 hours) at 05/15/19 0754  Last data filed at 05/14/19 2115   Gross per 24 hour   Intake              360 ml   Output             2500 ml   Net            -2140 ml       PE:   General: No acute distress, resting comfortably in bed.  HEENT: NC/AT. EOMI. MMM  Cardiovascular: regular rhythm, tachycardic, with 1-2/6 holosystolic murmur  Respiratory: Symmetrical chest. CTAB with no adventitious breath sounds   Abdomen: soft, NT/ND, no masses, +BS   EXT:  GARCIA, 5/5 strength,1+ pitting edema B/L LE, 2+ pulses   Neuro: non focal with no numbness, tingling or changes in sensation    LABS:  Recent Labs      05/14/19   1535  05/15/19   0601   WBC  9.1  15.4*   RBC  3.96*  4.82   HEMOGLOBIN  13.0*  16.0   HEMATOCRIT  40.0*  46.5   MCV  101.0*  96.5   MCH  32.8  33.2*   RDW  50.3*  48.6   PLATELETCT  102*  238   MPV  11.2  10.4   NEUTSPOLYS  62.50  70.70   LYMPHOCYTES  27.90  19.60*   MONOCYTES  7.60  7.60   EOSINOPHILS  1.20  1.20   BASOPHILS  0.70  0.50     Recent Labs      05/14/19   1436  05/14/19   2040  05/15/19   0222   SODIUM  138  139  138   POTASSIUM  5.8*  4.4  4.0   CHLORIDE  110  110  110   CO2  17*  16*  19*   BUN  15  16  16   CREATININE  1.14  1.09  1.03   CALCIUM  8.7  8.6  8.6   ALBUMIN  3.3   --   3.6     Estimated GFR/CRCL = Estimated Creatinine Clearance: 97.2 mL/min (by C-G formula based on SCr of 1.03 mg/dL).  Recent Labs      05/14/19   1436  05/14/19   2040  05/15/19   0222   GLUCOSE  134*  90  99     Recent Labs      05/14/19 1436 05/14/19   1535  05/15/19   0222   ASTSGOT  49*   --   45   ALTSGPT  92*   --   83*   TBILIRUBIN  1.0   --   0.9   ALKPHOSPHAT  104*   --   96   GLOBULIN  2.9   --   2.5   INR   --   1.05   --      Recent Labs      05/14/19   1436  05/14/19   1535  05/14/19   2040  05/15/19   0601   TROPONINI  0.06*  0.09*   --   0.12*   BNPBTYPENAT   --   1427*  1382*   --          Recent Labs      05/14/19   1535   INR  1.05       IMAGING:   US-EXTREMITY VENOUS LOWER BILAT   Final Result      EC-ECHOCARDIOGRAM COMPLETE W/O CONT   Final Result      CT-CTA CHEST PULMONARY ARTERY W/ RECONS   Final Result      1.  Limited study due to extensive patient motion artifact as well as a poor contrast bolus. Only large main pulmonary artery emboli are excluded with this examination.      2.  Cardiomegaly.      3.  Fatty liver.      DX-CHEST-PORTABLE (1 VIEW)   Final Result      No pulmonary consolidation.      US-RUQ    (Results Pending)         MEDS:  Current Facility-Administered Medications   Medication Last Dose   • nicotine (NICODERM) 14 MG/24HR 14 mg     • senna-docusate (PERICOLACE or SENOKOT S) 8.6-50 MG per tablet 2 Tab      And   • polyethylene glycol/lytes (MIRALAX) PACKET 1 Packet      And   • magnesium hydroxide (MILK OF MAGNESIA) suspension 30 mL      And   • bisacodyl (DULCOLAX) suppository 10 mg     • Respiratory Care per Protocol     • enoxaparin (LOVENOX) inj 40 mg 40 mg at 05/15/19 0555   • enalaprilat (VASOTEC) injection 1.25 mg 1.25 mg at 05/15/19  0029   • furosemide (LASIX) injection 40 mg 40 mg at 05/15/19 0556   • atorvastatin (LIPITOR) tablet 80 mg 80 mg at 05/14/19 2036   • aspirin (ASA) chewable tab 81 mg 81 mg at 05/15/19 0555   • lisinopril (PRINIVIL) tablet 5 mg 5 mg at 05/14/19 2304   • spironolactone (ALDACTONE) tablet 25 mg 25 mg at 05/15/19 0555       PROBLEM LIST:  No problems updated.    ASSESSMENT/PLAN:   Mr. Agee is a 49 yo M with no significant PMH with worsening SOB, found to have acute decompensated biventricular heart failure and dilated cardiomyopathy.    Acute Decompensated Bventricular Heart Failure  Dilated Cardiomyopathy  Moderate to Severe Tricuspid Regurgitation  EF 15%-20%  Pt with severely dilated cardiomyopathy and bi-v HF, likely 2/2 heavy methamphetamine use.  Pt denies recent use within last 6-12 months, however utox positive for amphetamines.  - continue IV diuresis  - continue 25 mg spironolactone daily  - cardiac diet and 2g sodium restriction  - stop lisinopril, switch to 5 mg enalapril QID  - start 3.125 mg carvedilol daily  - plan for MPI in a few days after improvement in acute failure    Methamphetamine abuse  Poor prognosis given severely dilated cardiomyopathy and HF with EF 15-20% with continued methamphetamine use.  Extensive discussion with pt regarding amphetamine use, pt very tearful and agitated when cessation was discussed.  Denies extra help for quitting at this time.    Transaminitis  Thrombocytopenia  Anemia      Jermain Bear DO PGY-2  [facility]   [phone]

## 2019-05-15 NOTE — ASSESSMENT & PLAN NOTE
Liver enzymes elevated on admission, T Lauro normal.   - Might have some undiagnosed Liver disease process.  - Ordered PT/INR, Hepatitis panel, US hepato-billiary system

## 2019-05-15 NOTE — PROGRESS NOTES
Bedside report from ED RN. Transported Pt on zoll to room 811-1. Tele box on and monitor room notified, ST. Pt oriented to room, POC discussed with verbal understanding, all safety measures in place.

## 2019-05-15 NOTE — ASSESSMENT & PLAN NOTE
"-In setting of acute decompensated heart failure per echo.  CT PE, US Dopper bilateral LE negative for DVT / PE, CXR negative for acute process.  -See \"acute decompensated heart failure\" for details.  "

## 2019-05-15 NOTE — DISCHARGE PLANNING
Anticipated Discharge Disposition: D/C to Shelter/Self-Care    Action: After assessing pt it was determined he will not be able to afford medications that he will need related to his HF. LSW informed pt that [facility] will assist with first month of medication and he will need to be diligent about f/u with [facility] or [facility] to ensure he can access his meds the following months. Pt is pending [place] Medicaid.     Barriers to Discharge: Medications.     Plan: LSW to request DAVE SIMMONS send pt's scripts to the [facility] for filling.

## 2019-05-15 NOTE — ASSESSMENT & PLAN NOTE
-Mildly elevated troponin, likely in setting of demand.  -No chest pain / ischemic cardiac symptoms, no acute ST-T changes on EKG.  -Address acute decompensated heart failure.

## 2019-05-15 NOTE — ASSESSMENT & PLAN NOTE
-Dyspnea, peripheral edema, elevated BNP in setting of acute decompensated heart failure likely 2/2 methamphetamine abuse.  -Echo showed LVEF 15-20%, global hypokinesis, dilated cardiomyopathy, biventricular dysfunction.  -Discussed substance abuse cessation, resources provided for outpatient rehab - PCP to continue counseling.  -Unable to perform inpatient MPI due to hospital policy regarding no MPI until clean UDS, patient also declined IV reinsertion for MPI.  Cards felt appropriate to discharge with outpatient follow-up and MPI.  -Patient has no insurance, information provided for patient to establish PCP with [facility] includes the [facility] and obtain referral to [facility].  -Discharged on Lasix, lisinopril, Coreg, ASA, atorvastatin.  Aldactone discontinued given uncertainty regarding compliance / follow-up, to be restarted as appropriate in outpatient setting.

## 2019-05-15 NOTE — PROGRESS NOTES
· 2 RN skin check complete with MANISH Horne.  · Devices in place n/a.  · Skin assessed under devices n/a.  · Confirmed pressure ulcers found on n/a.  · New potential pressure ulcers noted on n/a.   · The following interventions in place n/a    Sacrum: pink and blanching  Bilateral feet/heels: pink and blanching, dry and flaky.

## 2019-05-15 NOTE — ASSESSMENT & PLAN NOTE
-K elevated, HCO3 decreased, high normal Cl on admission, later did develop slight anion gap - 2/2 ?meth use, ?RTA 4.  -Electrolytes normalized.

## 2019-05-15 NOTE — CONSULTS
Reason for Consult:  Asked by Dr Cornell to see this patient with  Heart failure  Patient's PCP: No primary care provider on file.    CC:   Chief Complaint   Patient presents with   • Shortness of Breath     patient reports SOB x 2 weeks though worse x 3 days.    • Fatigue     x 2 weeks       HPI: 48-year-old male patient with no significant past medical history presented to the hospital for shortness of breath.  Insidious in onset, gradually worsening, associated with fatigue, trouble sleeping, dizziness.  History of drug abuse in the past with methamphetamine, heavy user, have not used in the past 6 months.  No family history of dilated cardiomyopathy.  Patient has no prior cardiovascular history.      Current list of administered Medications:    Current Facility-Administered Medications:   •  [START ON 5/15/2019] senna-docusate (PERICOLACE or SENOKOT S) 8.6-50 MG per tablet 2 Tab, 2 Tab, Oral, BID **AND** polyethylene glycol/lytes (MIRALAX) PACKET 1 Packet, 1 Packet, Oral, QDAY PRN **AND** magnesium hydroxide (MILK OF MAGNESIA) suspension 30 mL, 30 mL, Oral, QDAY PRN **AND** bisacodyl (DULCOLAX) suppository 10 mg, 10 mg, Rectal, QDAY PRN, Aliza Delacruz M.D.  •  Respiratory Care per Protocol, , Nebulization, Continuous RT, Aliza Delacruz M.D.  •  [START ON 5/15/2019] enoxaparin (LOVENOX) inj 40 mg, 40 mg, Subcutaneous, DAILY, Aliza Delacruz M.D.  •  enalaprilat (VASOTEC) injection 1.25 mg, 1.25 mg, Intravenous, Q6HRS PRN, Aliza Delacruz M.D.  •  [START ON 5/15/2019] furosemide (LASIX) injection 40 mg, 40 mg, Intravenous, Q DAY, Aliza Delacruz M.D.  •  atorvastatin (LIPITOR) tablet 80 mg, 80 mg, Oral, Q EVENING, Farida Cornell M.D., 80 mg at 05/14/19 2036  •  [START ON 5/15/2019] aspirin (ASA) chewable tab 81 mg, 81 mg, Oral, DAILY, Farida M Cornell, M.D.  •  lisinopril (PRINIVIL) tablet 5 mg, 5 mg, Oral, Q DAY, Farida Cornell M.D.    History reviewed. No pertinent past medical history.    Past  "Surgical History:   Procedure Laterality Date   • HERNIA REPAIR         Family History   Problem Relation Age of Onset   • Diabetes Mother    • Heart Disease Father    • Blood Clots Brother    • Heart Disease Brother    • Blood Clots Paternal Grandmother    • Heart Disease Paternal Grandmother        Social History     Social History   • Marital status: Single     Spouse name: N/A   • Number of children: N/A   • Years of education: N/A     Occupational History   • Not on file.     Social History Main Topics   • Smoking status: Current Every Day Smoker     Packs/day: 0.50     Types: Cigarettes   • Smokeless tobacco: Never Used   • Alcohol use No   • Drug use: Yes     Types: Inhaled      Comment: marijuana, hx of meth   • Sexual activity: Not on file     Other Topics Concern   • Not on file     Social History Narrative   • No narrative on file       ALLERGIES:  No Known Allergies    Review of systems:  A detailed review of symptoms was reviewed with patient. This is reviewed in H&P and PMH. ALL OTHERS reviewed and negative    Physical exam:  Patient Vitals for the past 24 hrs:   BP Temp Temp src Pulse Resp SpO2 Height Weight   05/14/19 2109 156/117 36.6 °C (97.9 °F) Temporal (!) 116 20 96 % - 92.7 kg (204 lb 5.9 oz)   05/14/19 2043 - - - (!) 118 18 95 % - -   05/14/19 2000 - - - (!) 110 - 96 % - -   05/14/19 1900 - - - (!) 118 (!) 23 95 % - -   05/14/19 1708 - - - (!) 118 (!) 26 94 % - -   05/14/19 1518 147/116 36.8 °C (98.3 °F) Oral (!) 116 14 98 % - -   05/14/19 1340 - - - - - - - 95.9 kg (211 lb 6.7 oz)   05/14/19 1325 154/111 36.7 °C (98.1 °F) Temporal (!) 130 (!) 22 96 % 1.702 m (5' 7\") -       General: No acute distress.   EYES: no jaundice  HEENT: OP clear   Neck: No bruits No JVD.   CVS:   RRR. S1 + S2. No M/R/G  Resp: Basilar Rales  Abdomen: Soft, NT, ND,  Skin: Grossly nothing acute no obvious rashes  Neurological: Alert, Moves all extremities, no cranial nerve defects on limited exam  Extremities: Pulse 2+ " in b/l LE.  no edema. No cyanosis.       Data:  Laboratory studies:  Recent Results (from the past 24 hour(s))   EKG (NOW)    Collection Time: 19  1:38 PM   Result Value Ref Range    Report       [facility] Emergency Dept.    Test Date:  2019  Pt Name:    EDMUNDO OROPEZA                 Department: ER  MRN:        [id number]                      Room:  Gender:     Male                         Technician: [id number]  :        1970                   Requested By:ER TRIAGE PROTOCOL  Order #:    [id number]                    Reading MD: ALLEN MEDINA MD    Measurements  Intervals                                Axis  Rate:       119                          P:          65  NE:         140                          QRS:        25  QRSD:       90                           T:          98  QT:         340  QTc:        479    Interpretive Statements  SINUS TACHYCARDIA  NONSPECIFIC T ABNORMALITIES, LATERAL LEADS  BORDERLINE PROLONGED QT INTERVAL  No previous ECG available for comparison    Electronically Signed On 2019 16:09:12 PDT by ALLEN MEDINA MD     Complete Metabolic Panel (CMP)    Collection Time: 19  2:36 PM   Result Value Ref Range    Sodium 138 135 - 145 mmol/L    Potassium 5.8 (H) 3.6 - 5.5 mmol/L    Chloride 110 96 - 112 mmol/L    Co2 17 (L) 20 - 33 mmol/L    Anion Gap 11.0 0.0 - 11.9    Glucose 134 (H) 65 - 99 mg/dL    Bun 15 8 - 22 mg/dL    Creatinine 1.14 0.50 - 1.40 mg/dL    Calcium 8.7 8.5 - 10.5 mg/dL    AST(SGOT) 49 (H) 12 - 45 U/L    ALT(SGPT) 92 (H) 2 - 50 U/L    Alkaline Phosphatase 104 (H) 30 - 99 U/L    Total Bilirubin 1.0 0.1 - 1.5 mg/dL    Albumin 3.3 3.2 - 4.9 g/dL    Total Protein 6.2 6.0 - 8.2 g/dL    Globulin 2.9 1.9 - 3.5 g/dL    A-G Ratio 1.1 g/dL   Troponin    Collection Time: 19  2:36 PM   Result Value Ref Range    Troponin I 0.06 (H) 0.00 - 0.04 ng/mL   ESTIMATED GFR    Collection Time: 19  2:36 PM   Result Value Ref Range    GFR If  African American >60 >60 mL/min/1.73 m 2    GFR If Non African American >60 >60 mL/min/1.73 m 2   CBC WITH DIFFERENTIAL    Collection Time: 05/14/19  3:35 PM   Result Value Ref Range    WBC 9.1 4.8 - 10.8 K/uL    RBC 3.96 (L) 4.70 - 6.10 M/uL    Hemoglobin 13.0 (L) 14.0 - 18.0 g/dL    Hematocrit 40.0 (L) 42.0 - 52.0 %    .0 (H) 81.4 - 97.8 fL    MCH 32.8 27.0 - 33.0 pg    MCHC 32.5 (L) 33.7 - 35.3 g/dL    RDW 50.3 (H) 35.9 - 50.0 fL    Platelet Count 102 (L) 164 - 446 K/uL    MPV 11.2 9.0 - 12.9 fL    Neutrophils-Polys 62.50 44.00 - 72.00 %    Lymphocytes 27.90 22.00 - 41.00 %    Monocytes 7.60 0.00 - 13.40 %    Eosinophils 1.20 0.00 - 6.90 %    Basophils 0.70 0.00 - 1.80 %    Immature Granulocytes 0.10 0.00 - 0.90 %    Nucleated RBC 0.00 /100 WBC    Neutrophils (Absolute) 5.70 1.82 - 7.42 K/uL    Lymphs (Absolute) 2.54 1.00 - 4.80 K/uL    Monos (Absolute) 0.69 0.00 - 0.85 K/uL    Eos (Absolute) 0.11 0.00 - 0.51 K/uL    Baso (Absolute) 0.06 0.00 - 0.12 K/uL    Immature Granulocytes (abs) 0.01 0.00 - 0.11 K/uL    NRBC (Absolute) 0.00 K/uL   TROPONIN    Collection Time: 05/14/19  3:35 PM   Result Value Ref Range    Troponin I 0.09 (H) 0.00 - 0.04 ng/mL   BTYPE NATRIURETIC PEPTIDE    Collection Time: 05/14/19  3:35 PM   Result Value Ref Range    B Natriuretic Peptide 1427 (H) 0 - 100 pg/mL   PERIPHERAL SMEAR REVIEW    Collection Time: 05/14/19  3:35 PM   Result Value Ref Range    Peripheral Smear Review see below    DIFFERENTIAL COMMENT    Collection Time: 05/14/19  3:35 PM   Result Value Ref Range    Comments-Diff see below    HEMOGLOBIN A1C    Collection Time: 05/14/19  3:35 PM   Result Value Ref Range    Glycohemoglobin 6.1 (H) 0.0 - 5.6 %    Est Avg Glucose 128 mg/dL   TSH WITH REFLEX TO FT4    Collection Time: 05/14/19  3:35 PM   Result Value Ref Range    TSH 1.680 0.380 - 5.330 uIU/mL   D-DIMER    Collection Time: 05/14/19  3:35 PM   Result Value Ref Range    D-Dimer Screen 3.28 (H) 0.00 - 0.50 ug/mL (FEU)    Prothrombin Time    Collection Time: 05/14/19  3:35 PM   Result Value Ref Range    PT 13.8 12.0 - 14.6 sec    INR 1.05 0.87 - 1.13   URINE DRUG SCREEN    Collection Time: 05/14/19  6:41 PM   Result Value Ref Range    Amphetamines Urine Positive (A) Negative    Barbiturates Negative Negative    Benzodiazepines Negative Negative    Cocaine Metabolite Negative Negative    Methadone Negative Negative    Opiates Negative Negative    Oxycodone Negative Negative    Phencyclidine -Pcp Negative Negative    Propoxyphene Negative Negative    Cannabinoid Metab Negative Negative   URINE DRUG SCREEN    Collection Time: 05/14/19  7:03 PM   Result Value Ref Range    Amphetamines Urine Positive (A) Negative    Barbiturates Negative Negative    Benzodiazepines Negative Negative    Cocaine Metabolite Negative Negative    Methadone Negative Negative    Opiates Negative Negative    Oxycodone Negative Negative    Phencyclidine -Pcp Negative Negative    Propoxyphene Negative Negative    Cannabinoid Metab Negative Negative   EC-ECHOCARDIOGRAM COMPLETE W/O CONT    Collection Time: 05/14/19  8:08 PM   Result Value Ref Range    Eject.Frac. MOD BP 10     Eject.Frac. MOD 4C 24.56     Eject.Frac. MOD 2C 15     Left Ventrical Ejection Fraction 15    Basic Metabolic Panel    Collection Time: 05/14/19  8:40 PM   Result Value Ref Range    Sodium 139 135 - 145 mmol/L    Potassium 4.4 3.6 - 5.5 mmol/L    Chloride 110 96 - 112 mmol/L    Co2 16 (L) 20 - 33 mmol/L    Glucose 90 65 - 99 mg/dL    Bun 16 8 - 22 mg/dL    Creatinine 1.09 0.50 - 1.40 mg/dL    Calcium 8.6 8.5 - 10.5 mg/dL    Anion Gap 13.0 (H) 0.0 - 11.9   BTYPE NATRIURETIC PEPTIDE    Collection Time: 05/14/19  8:40 PM   Result Value Ref Range    B Natriuretic Peptide 1382 (H) 0 - 100 pg/mL   Lipid Profile    Collection Time: 05/14/19  8:40 PM   Result Value Ref Range    Cholesterol,Tot 146 100 - 199 mg/dL    Triglycerides 198 (H) 0 - 149 mg/dL    HDL 34 (A) >=40 mg/dL    LDL 72 <100 mg/dL    ESTIMATED GFR    Collection Time: 05/14/19  8:40 PM   Result Value Ref Range    GFR If African American >60 >60 mL/min/1.73 m 2    GFR If Non African American >60 >60 mL/min/1.73 m 2       Imaging:  US-EXTREMITY VENOUS LOWER BILAT         EC-ECHOCARDIOGRAM COMPLETE W/O CONT   Final Result      CT-CTA CHEST PULMONARY ARTERY W/ RECONS   Final Result      1.  Limited study due to extensive patient motion artifact as well as a poor contrast bolus. Only large main pulmonary artery emboli are excluded with this examination.      2.  Cardiomegaly.      3.  Fatty liver.      DX-CHEST-PORTABLE (1 VIEW)   Final Result      No pulmonary consolidation.      US-RUQ    (Results Pending)     Echo 5/14/2019  No prior study is available for comparison.   Dilated cardiomyopathy, biventricular dysfunction.  LVEF 15-20%.  Moderate to severe tricuspid regurgitation.  Estimated right ventricular systolic pressure  is 70 mmHg.      EKG : personally reviewed by me  Sinus tachycardia.    All pertinent features of laboratory and imaging reviewed including primary images where applicable    Assessment / Plan:   1.  Acute decompensated heart failure, biventricular failure with severe LV dysfunction likely related to his drug abuse    -Recommend intravenous diuresis  Trend troponins.  We will start lisinopril 5 mg, Aldactone 25 mg.  Wait 1 or 2 days prior to starting beta-blocker therapy.  His cardiomyopathy is likely related to his drug abuse, will likely need coronary angiogram for ischemia evaluation, keep n.p.o. after midnight.    It is my pleasure to participate in the care of Mr. Agee.  Please do not hesitate to contact me with questions or concerns.    Howie Tao    5/14/2019

## 2019-05-15 NOTE — ASSESSMENT & PLAN NOTE
"-As demonstrated on echo, no evidence of endocarditis on imaging or exam.  -See \"acute decompensated heart failure\" for details.  "

## 2019-05-15 NOTE — H&P
Internal Medicine Admitting History and Physical    Note Author: Aliza Delacruz M.D.       Name Lucas Agee     1970   Age/Sex 48 y.o. male   MRN [id number]   Code Status FULL     After 5PM or if no immediate response to page, please call for cross-coverage  Attending/Team: Dr. Figueroa/[facility] Yellow See Patient List for primary contact information  Call [phone] to page    1st Call - Day Intern (R1):   Dr. Cortes 2nd Call - Day Sr. Resident (R2/R3):   Dr. Vargas       Chief Complaint:   SOB x 2 weeks    HPI:  This is a 48 years old male with no significant past medical history, no visit to any Healthcare providers in recent years came to the ER today for shortness of breath which acutely started 2 weeks ago. Patient states he was doing completely fine until about 2 weeks ago when he started having shortness of breath and fatigue which acutely worsened about 2-3 days ago. SOB is both at rest and exertion, wakes up at night feling short of breath. He also feels tightness around his chest when he feels short of breath.  But no chest pain, palpitation, nausea, vomiting, perspiration, recent weight gain/loss.  No personal history of CAD, blood clots, diabetes, HLD, lung problems or any other other chronic medical problems.  No fever, chills cough, respiratory symptoms, acid reflux complaints.  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 No personal history of cancer, prolonged immobilization.  Denies any calf pain.     He does have strong family history of blood clots (brother: 3 times), grandmother, coronary artery disease in brother, father, paternal uncle and grandmother.  He was never diagnosed with any heart disease/arrhythmias/blood clots.    ER course: Vitals : /116, pulse 116, afebrile, RR 14, room air saturation 98%.  Pertinent labs hemoglobin 13 with MCV of 101, platelet 102, creatinine 1.14 with more than 60 GFR, potassium  5.8, glucose 134, bicarb 17, AST 49, ALT 92, alkaline phosphatase 104, troponin 0 0.06> 0.09.  BNP in 1400s.  CT a pulmonary artery did not show any large main pulmonary artery emboli however was nondiagnostic for distal small emboli, cardiomegaly, fatty liver.  Chest x-ray was unremarkable.  He received 324 mg aspirin, 20 mg IV Lasix in the ER and was admitted to the hospital for further evaluation.    Review of Systems   Constitutional: Positive for malaise/fatigue. Negative for chills, diaphoresis, fever and weight loss.   HENT: Negative for hearing loss and tinnitus.    Eyes: Negative for blurred vision and double vision.   Respiratory: Positive for shortness of breath. Negative for cough, hemoptysis, sputum production and wheezing.    Cardiovascular: Positive for orthopnea and PND. Negative for chest pain, palpitations and leg swelling.   Gastrointestinal: Negative for abdominal pain, heartburn, nausea and vomiting.   Genitourinary: Negative for dysuria and urgency.   Musculoskeletal: Negative for myalgias and neck pain.   Skin: Negative for itching and rash.   Neurological: Negative for dizziness and headaches.   Psychiatric/Behavioral: Negative for depression and suicidal ideas.             Past Medical History (Chronic medical problem, known complications and current treatment)    No significant PMH known to patient, not taking any medication as outpatient     Past Surgical History:  Past Surgical History:   Procedure Laterality Date   • HERNIA REPAIR         Current Outpatient Medications:  Home Medications     Reviewed by Sera Cheng (Pharmacy Tech) on 05/14/19 at 1759  Med List Status: Complete   Medication Last Dose Status        Patient Lewis Taking any Medications                       Medication Allergy/Sensitivities:  No Known Allergies      Family History (mandatory)   Family History   Problem Relation Age of Onset   • Diabetes Mother    • Heart Disease Father    • Blood Clots Brother    •  "Heart Disease Brother    • Blood Clots Paternal Grandmother    • Heart Disease Paternal Grandmother        Social History (mandatory)   Social History     Social History   • Marital status: Single     Spouse name: N/A   • Number of children: N/A   • Years of education: N/A     Occupational History   • Not on file.     Social History Main Topics   • Smoking status: Current Every Day Smoker     Packs/day: 0.50     Types: Cigarettes   • Smokeless tobacco: Never Used   • Alcohol use No   • Drug use: Yes     Types: Inhaled      Comment: marijuana, hx of meth   • Sexual activity: Not on file     Other Topics Concern   • Not on file     Social History Narrative   • No narrative on file     Living situation: Patients states currently he doesn't have any home   PCP : No primary care provider on file.    Physical Exam     Vitals:    05/14/19 1340 05/14/19 1518 05/14/19 1708 05/14/19 1900   BP:  147/116     Pulse:  (!) 116 (!) 118 (!) 118   Resp:  14 (!) 26 (!) 23   Temp:  36.8 °C (98.3 °F)     TempSrc:  Oral     SpO2:  98% 94% 95%   Weight: 95.9 kg (211 lb 6.7 oz)      Height:         Body mass index is 33.11 kg/m².  /116   Pulse (!) 118   Temp 36.8 °C (98.3 °F) (Oral)   Resp (!) 23   Ht 1.702 m (5' 7\")   Wt 95.9 kg (211 lb 6.7 oz)   SpO2 95%   BMI 33.11 kg/m²   O2 therapy: Pulse Oximetry: 95 %, O2 Delivery: None (Room Air)    Physical Exam   Constitutional: He is oriented to person, place, and time.   Obese male lying propped up in bed, breathing in room air, appears distressed.    HENT:   Head: Normocephalic and atraumatic.   Eyes: Pupils are equal, round, and reactive to light. Conjunctivae and EOM are normal. Right eye exhibits no discharge. Left eye exhibits no discharge.   Neck: Normal range of motion. Neck supple. JVD present.   Cardiovascular: Normal rate, regular rhythm and intact distal pulses.  Exam reveals no friction rub.    Murmur (systolic murmur, best heard at left 4 intercostal space) " heard.  Pulmonary/Chest: Effort normal. No respiratory distress. He has rales (Fine crackles in bilateral lung base).   Abdominal: Soft. Bowel sounds are normal. He exhibits distension. There is no tenderness.   No fluid shift   Musculoskeletal: Normal range of motion. He exhibits no edema or tenderness.   Neurological: He is alert and oriented to person, place, and time.   No focal signs   Skin: Skin is warm. He is not diaphoretic. No erythema.   Psychiatric: Affect and judgment normal.         Data Review       Old Records Request:   Completed  Current Records review/summary: Completed    Lab Data Review:  Recent Results (from the past 24 hour(s))   EKG (NOW)    Collection Time: 19  1:38 PM   Result Value Ref Range    Report       [facility] Emergency Dept.    Test Date:  2019  Pt Name:    EDMUNDO OROPEZA                 Department: ER  MRN:        [id number]                      Room:  Gender:     Male                         Technician: [id number]  :        1970                   Requested By:ER TRIAGE PROTOCOL  Order #:    [id number]                    Reading MD: ALLEN MEDINA MD    Measurements  Intervals                                Axis  Rate:       119                          P:          65  NE:         140                          QRS:        25  QRSD:       90                           T:          98  QT:         340  QTc:        479    Interpretive Statements  SINUS TACHYCARDIA  NONSPECIFIC T ABNORMALITIES, LATERAL LEADS  BORDERLINE PROLONGED QT INTERVAL  No previous ECG available for comparison    Electronically Signed On 2019 16:09:12 PDT by ALLEN MEDINA MD     Complete Metabolic Panel (CMP)    Collection Time: 19  2:36 PM   Result Value Ref Range    Sodium 138 135 - 145 mmol/L    Potassium 5.8 (H) 3.6 - 5.5 mmol/L    Chloride 110 96 - 112 mmol/L    Co2 17 (L) 20 - 33 mmol/L    Anion Gap 11.0 0.0 - 11.9    Glucose 134 (H) 65 - 99 mg/dL    Bun 15 8 - 22  mg/dL    Creatinine 1.14 0.50 - 1.40 mg/dL    Calcium 8.7 8.5 - 10.5 mg/dL    AST(SGOT) 49 (H) 12 - 45 U/L    ALT(SGPT) 92 (H) 2 - 50 U/L    Alkaline Phosphatase 104 (H) 30 - 99 U/L    Total Bilirubin 1.0 0.1 - 1.5 mg/dL    Albumin 3.3 3.2 - 4.9 g/dL    Total Protein 6.2 6.0 - 8.2 g/dL    Globulin 2.9 1.9 - 3.5 g/dL    A-G Ratio 1.1 g/dL   Troponin    Collection Time: 05/14/19  2:36 PM   Result Value Ref Range    Troponin I 0.06 (H) 0.00 - 0.04 ng/mL   ESTIMATED GFR    Collection Time: 05/14/19  2:36 PM   Result Value Ref Range    GFR If African American >60 >60 mL/min/1.73 m 2    GFR If Non African American >60 >60 mL/min/1.73 m 2   CBC WITH DIFFERENTIAL    Collection Time: 05/14/19  3:35 PM   Result Value Ref Range    WBC 9.1 4.8 - 10.8 K/uL    RBC 3.96 (L) 4.70 - 6.10 M/uL    Hemoglobin 13.0 (L) 14.0 - 18.0 g/dL    Hematocrit 40.0 (L) 42.0 - 52.0 %    .0 (H) 81.4 - 97.8 fL    MCH 32.8 27.0 - 33.0 pg    MCHC 32.5 (L) 33.7 - 35.3 g/dL    RDW 50.3 (H) 35.9 - 50.0 fL    Platelet Count 102 (L) 164 - 446 K/uL    MPV 11.2 9.0 - 12.9 fL    Neutrophils-Polys 62.50 44.00 - 72.00 %    Lymphocytes 27.90 22.00 - 41.00 %    Monocytes 7.60 0.00 - 13.40 %    Eosinophils 1.20 0.00 - 6.90 %    Basophils 0.70 0.00 - 1.80 %    Immature Granulocytes 0.10 0.00 - 0.90 %    Nucleated RBC 0.00 /100 WBC    Neutrophils (Absolute) 5.70 1.82 - 7.42 K/uL    Lymphs (Absolute) 2.54 1.00 - 4.80 K/uL    Monos (Absolute) 0.69 0.00 - 0.85 K/uL    Eos (Absolute) 0.11 0.00 - 0.51 K/uL    Baso (Absolute) 0.06 0.00 - 0.12 K/uL    Immature Granulocytes (abs) 0.01 0.00 - 0.11 K/uL    NRBC (Absolute) 0.00 K/uL   TROPONIN    Collection Time: 05/14/19  3:35 PM   Result Value Ref Range    Troponin I 0.09 (H) 0.00 - 0.04 ng/mL   BTYPE NATRIURETIC PEPTIDE    Collection Time: 05/14/19  3:35 PM   Result Value Ref Range    B Natriuretic Peptide 1427 (H) 0 - 100 pg/mL   PERIPHERAL SMEAR REVIEW    Collection Time: 05/14/19  3:35 PM   Result Value Ref Range     Peripheral Smear Review see below    DIFFERENTIAL COMMENT    Collection Time: 05/14/19  3:35 PM   Result Value Ref Range    Comments-Diff see below    URINE DRUG SCREEN    Collection Time: 05/14/19  6:41 PM   Result Value Ref Range    Amphetamines Urine Positive (A) Negative    Barbiturates Negative Negative    Benzodiazepines Negative Negative    Cocaine Metabolite Negative Negative    Methadone Negative Negative    Opiates Negative Negative    Oxycodone Negative Negative    Phencyclidine -Pcp Negative Negative    Propoxyphene Negative Negative    Cannabinoid Metab Negative Negative   URINE DRUG SCREEN    Collection Time: 05/14/19  7:03 PM   Result Value Ref Range    Amphetamines Urine Positive (A) Negative    Barbiturates Negative Negative    Benzodiazepines Negative Negative    Cocaine Metabolite Negative Negative    Methadone Negative Negative    Opiates Negative Negative    Oxycodone Negative Negative    Phencyclidine -Pcp Negative Negative    Propoxyphene Negative Negative    Cannabinoid Metab Negative Negative       Imaging/Procedures Review:    Independant Imaging Review: Completed  EC-ECHOCARDIOGRAM COMPLETE W/O CONT         CT-CTA CHEST PULMONARY ARTERY W/ RECONS   Final Result      1.  Limited study due to extensive patient motion artifact as well as a poor contrast bolus. Only large main pulmonary artery emboli are excluded with this examination.      2.  Cardiomegaly.      3.  Fatty liver.      DX-CHEST-PORTABLE (1 VIEW)   Final Result      No pulmonary consolidation.      US-EXTREMITY VENOUS LOWER BILAT    (Results Pending)   US-RUQ    (Results Pending)       EKG:   EKG Independent Review: Completed  HR:119, QTc: 479, Sinus tach, nonspecific T changes     Records reviewed and summarized in current documentation :  Yes  [employer] teaching service handout given to patient:  No         Assessment/Plan     * SOB (shortness of breath)   Assessment & Plan    Acute on set SOB 2 weeks ago, no previous h/o similar  symptoms. No chest pain/ palpitation. No personal h/o any chronic diagnosed condition/ h/o CAD/ blood clots. Has strong family h/o for both blood clots and heart disease. BNP elevated on admission. Differentials include but not limited to Acute exacerbation of undiagnosed CHF vs PE   - Admission under telemetry  - CT PE done in ER, big main pulmonary artery clots was ruled out, however non-diagnostic for distal clots due to artifacts. Ordered US bilateral venous to look for DVT and ECHO for both LV function and Right heart strain.   - Cardiology consulted on admission, recommended Statin, Aspirin and Lasix. Avoid BB for now. They will follow, appreciate help.  - Trend trops, and EKG  - O2 per protocol.   - Continue Lasix 40 IV daily, Lipitor and Aspirin  - Diet with no added salt, 1.5 L water restrictions.  - Daily weight  - Monitor I/O   - UDS ordered, pending  - TSH, HbA1c, Lipid panel ordered to risk stratify     Thrombocytopenia (HCC)   Assessment & Plan    Platelet low on admission, not sure of baseline. No previous records, no known chronic medical problem.  Can be idiopathic vs undiagnosed liver disease (Liver enzymes elevated on admission) vs infectious process like HIV (he has h/o IV drug use)  - Ordered Hepatitis panel and US liver to check for Liver and HIV to screen     Elevated liver enzymes   Assessment & Plan    Liver enzymes elevated on admission, T Lauro normal.   - Might have some undiagnosed Liver disease process.  - Ordered PT/INR, Hepatitis panel, US hepato-billiary system      Anemia   Assessment & Plan    Macrocytic anemia presented on admission, not sure of baseline, no previous records. No active bleeding.   - Ordered Anemia panel, TSH  - F/U on results     Non Anion Gap Metabolic acidosis   Assessment & Plan    In setting of Hyperkalemia, can be RTA type 4, kidney function normal  Monitor for now, consider further testing for hypoaldo if MATTHEW with hyperkalemia persists.      Hyperkalemia      Assessment & Plan    K 5.8 on admission, no EKG changes, patient asymptomatic.  -Not sure of baseline, normal kidney function. Not on any prescribed/ OTC medications.   -Due acute SOB which is likely acute decompensated HF will avoid IVF  -Monitor in telemetry, trend K level, if continues to increase will start hyperkalemia protocol.          Anticipated Hospital stay:  >2 midnights        Quality Measures  Quality-Core Measures   Reviewed items::  EKG reviewed, Labs reviewed, Medications reviewed and Radiology images reviewed  Wang catheter::  No Wang  DVT prophylaxis pharmacological::  Enoxaparin (Lovenox)    PCP: No primary care provider on file.

## 2019-05-15 NOTE — DISCHARGE PLANNING
"Care Transition Team Assessment    LSW met with pt at bedside to complete assessment. Pt is currently homeless and bounces from place to place, his address on his facesheet is his mother's address where he gets mail. Pt is unemployed, he has a $0 income. Pt reports he did have Medicaid and food stamps, however, these  and he needs to get an ID and get them reinstated. Pt's primary supports are his mother, Mary, and his brother, Alvarado. For transportation pt takes the bus or he rides his bike.     LSW discussed with pt his meth use. Pt became tearful and stated he \"has to stop living like this,\" which he said is because of his newly diagnosed HF. Pt reported he does not have a plan on how he will quit, he just expressed he needs to and intends to.    Pt was provided with information about [facility] and [facility] for on-going medical care and medication assistance. PFA confirmed pt was screened for Medicaid and is pending at this time. Once pt's Medicaid becomes active he will have access to MT to get to medical appointments. LSW provided pt with self-pay/low-income resources.    Information Source  Orientation : Oriented x 4  Information Given By: Patient  Informant's Name: Lucas Agee  Who is responsible for making decisions for patient? : Patient    Elopement Risk  Legal Hold: No  Ambulatory or Self Mobile in Wheelchair: Yes  Disoriented: No  Psychiatric Symptoms: None  History of Wandering: No  Elopement this Admit: No  Vocalizing Wanting to Leave: No  Displays Behaviors, Body Language Wanting to Leave: No-Not at Risk for Elopement  Elopement Risk: Not at Risk for Elopement    Interdisciplinary Discharge Planning  Patient or legal guardian wants to designate a caregiver (see row info): No    Discharge Preparedness  What is your plan after discharge?: Other (comment) (Shelter/Homeless)  What are your discharge supports?: Parent, Sibling (Brother and mom. )  Prior Functional Level: Ambulatory, Independent with " Activities of Daily Living, Independent with Medication Management  Difficulity with ADLs: None  Difficulity with IADLs: None    Functional Assesment  Prior Functional Level: Ambulatory, Independent with Activities of Daily Living, Independent with Medication Management    Finances  Financial Barriers to Discharge: Yes  Average Monthly Income: $0  Source of Income: None  Prescription Coverage: No  Prescription Coverage Comments: Pending Medicaid, referred to [facility].    Vision / Hearing Impairment  Vision Impairment : No  Hearing Impairment : No    Advance Directive  Advance Directive?: None    Domestic Abuse  Have you ever been the victim of abuse or violence?: No  Physical Abuse or Sexual Abuse: No  Verbal Abuse or Emotional Abuse: No  Possible Abuse Reported to:: Not Applicable    Psychological Assessment  History of Substance Abuse: Methamphetamine  Date Last Used - Methamphetamine: 5/14/19  Substance Abuse Comments: Pt expresses desire to quit.     Discharge Risks or Barriers  Patient risk factors: Homeless, Lack of outside supports, Lives alone and no community support, Substance abuse, Uninsured or underinsured    Anticipated Discharge Information  Anticipated discharge disposition: Shelter  Discharge Address: [address] [place] [address] [place]  Discharge Contact Phone Number: [phone]

## 2019-05-15 NOTE — ASSESSMENT & PLAN NOTE
-Mild thrombocytopenia noted on admission, no baseline on file.  Possibly 2/2 hemodilution in setting of volume overload 2/2 decompensated heart failure.  Hep panel, HIV negative, not indicative of infectious etiology.  No significant renal impairment.  -Resolved with diuresis.

## 2019-05-15 NOTE — ED NOTES
Assumed care of pt at this time. Pt resting in bed. Call light within reach. Denies any needs. Pt updated on POC. Pt to be admitted upstairs. Waiting on bed assignment.

## 2019-05-15 NOTE — PROGRESS NOTES
Internal Medicine Interval Note  Note Author: Stephie Cortes M.D.     Name Lucas Agee     1970   Age/Sex 48 y.o. male   MRN [id number]   Code Status Full     After 5PM or if no immediate response to page, please call for cross-coverage  Attending/Team: Dr. Figueroa / Chad See Patient List for primary contact information  Call [phone] to page    1st Call - Day Intern (R1):   Dr. Cortes 2nd Call - Day Sr. Resident (R2/R3):   Dr. Vargas         Reason for interval visit  (Principal Problem)   Shortness of breath  Acute decompensated heart failure    Interval Problem Daily Status Update  (24 hours, problem oriented, brief subjective history, new lab/imaging data pertinent to that problem)   -No acute events overnight.  Patient endorses subjection shortness of breath still, though reports feeling improved, sating well on room air.  Denies chest pain.  Patient again denies recent meth use despite being asked about positive screen for amphetamines on UDS, states last use 6 months ago.  Tachycardia (sinus), hypertension likely related to meth.  Patient denies alcohol use, though BAL 0.01 - possibly 2/2 alcohol swab upon obtaining sample.  Emphasized importance of cessation of substance use as most likely cause of current cardiac dysfunction, patient verbalized understanding.  Spoke to HELEN, HELEN to provide resources for outpatient drug rehab.  -Per cards, no cath; MPI in few days when acute symptoms more under control.  On ASA, atorvastatin, Coreg, enalapril, Lasix, Aldactone.  SW to assist with determining insurance information, coverage for meds.    Review of Systems   Constitutional: Negative for chills and fever.   HENT: Negative for sinus pain and sore throat.    Eyes: Negative for photophobia and pain.   Respiratory: Negative for hemoptysis and sputum production.         Shortness of breath, improving.   Cardiovascular: Positive for orthopnea and leg swelling. Negative for chest pain and  palpitations.   Gastrointestinal: Negative for abdominal pain, nausea and vomiting.   Genitourinary: Negative for dysuria and hematuria.        Frequency in setting of diuresis   Musculoskeletal: Negative for myalgias and neck pain.   Skin: Negative for itching and rash.   Neurological: Negative for dizziness and headaches.   Psychiatric/Behavioral: Negative for hallucinations. The patient is not nervous/anxious.        Disposition/Barriers to discharge:   Pending clinical improvement, MPI    Consultants/Specialty  Cardiology  PCP: No primary care provider on file.      Quality Measures  Quality-Core Measures   Reviewed items::  EKG reviewed, Labs reviewed, Medications reviewed and Radiology images reviewed  Wang catheter::  No Wang  DVT prophylaxis pharmacological::  Enoxaparin (Lovenox)          Physical Exam       Vitals:    05/15/19 0131 05/15/19 0410 05/15/19 0847 05/15/19 1201   BP: 153/113 146/98 158/111 133/99   Pulse: (!) 121 (!) 124 (!) 118 (!) 115   Resp:  19 18 16   Temp:  36.6 °C (97.9 °F) 36.7 °C (98 °F) 36.7 °C (98 °F)   TempSrc:  Temporal Temporal Temporal   SpO2:  95% 91% 95%   Weight:       Height:         Body mass index is 32.01 kg/m². Weight: 92.7 kg (204 lb 5.9 oz)  Oxygen Therapy:  Pulse Oximetry: 95 %, O2 (LPM): 0, O2 Delivery: None (Room Air)    Physical Exam   Constitutional: He is well-developed, well-nourished, and in no distress.   HENT:   Head: Normocephalic and atraumatic.   Eyes: Conjunctivae and EOM are normal.   Neck: Normal range of motion. Neck supple. No JVD present.   Cardiovascular: Regular rhythm.    Difficult to appreciate murmur due to tachycardia.   Pulmonary/Chest: Effort normal and breath sounds normal. No respiratory distress. He has no wheezes. He has no rales.   Abdominal: Soft. There is no tenderness. There is no rebound and no guarding.   Musculoskeletal: He exhibits no tenderness.   Bilateral LE edema 1+   Skin: Skin is warm and dry.   Erythema in knee regions  "without warmth or tenderness.  Petechiae in bilateral LE below knees.  All chronic per patient.   Psychiatric: Mood and affect normal.             Assessment/Plan     * SOB (shortness of breath)   Assessment & Plan    -In setting of acute decompensated heart failure per echo.  CT PE, US Dopper bilateral LE negative for DVT / PE, CXR negative for acute process.  -See \"acute decompensated heart failure\" for details.     Acute decompensated heart failure (HCC)- (present on admission)   Assessment & Plan    -Dyspnea, peripheral edema in setting of acute decompensated heart failure likely 2/2 methamphetamine abuse.  -Echo showed LVEF 15-20%, global hypokinesis, dilated cardiomyopathy, biventricular dysfunction.    Plan:  -Tele, optimize electrolytes.  -On Lasix, Aldactone, enalapril, Coreg, ASA, atorvastatin.  -Cards following - possible MPI in a few days after improvement of acute symptoms.  -Discussed substance abuse cessation, resources provided for outpatient rehab - PCP to continue counseling.     Substance use disorder- (present on admission)   Assessment & Plan    -Endorsed quitting meth 6 months ago, continued use of marijuana, but UDS positive for amphetamines.  -Discussed importance of cessation with patient given new diagnose of heart failure, outpatient drug rehab resources provided.  -PCP to continue counseling.     Tricuspid regurgitation- (present on admission)   Assessment & Plan    -As demonstrated on echo, no evidence of endocarditis on imaging or exam.  -See \"acute decompensated heart failure\" for details.     Dilated cardiomyopathy (HCC)- (present on admission)   Assessment & Plan    -As demonstrated on echo.  In setting of meth abuse.  -See \"acute decompensated heart failure\" for details.     Elevated troponin- (present on admission)   Assessment & Plan    -Mildly elevated troponin, likely in setting of demand.  -No chest pain / ischemic cardiac symptoms, no acute ST-T changes on EKG.  -Address acute " decompensated heart failure.     Transaminitis- (present on admission)   Assessment & Plan    -Mild transaminitis on admission, patient asymptomatic, lipase negative.  -In setting of congestive hepatopathy.  -Improving with diuresis.  -Monitor.     Non Anion Gap Metabolic acidosis   Assessment & Plan    -K elevated, HCO3 decreased, high normal Cl on admission, later did develop slight anion gap - 2/2 ?meth use, ?RTA 4.  -Improving, anion gap closed.  -Monitor.     Hyperkalemia   Assessment & Plan    -K 5.8 on admission, no EKG changes, patient asymptomatic.  -Unclear baseline, no significant renal impairment.  Not on any prescribed / OTC medications.   -Resolved with diuresis.     Thrombocytopenia (HCC)   Assessment & Plan    -Mild thrombocytopenia noted on admission, no baseline on file.  Possibly 2/2 hemodilution in setting of volume overload 2/2 decompensated heart failure.  Hep panel, HIV negative, not indicative of infectious etiology.  No significant renal impairment.  -Resolved with diuresis.     Anemia   Assessment & Plan    -Macrocytic anemia on admission, no baseline on file.  No apparent bleed.  -TSH, B12, folate WNL.  Ferritin low normal.  -PCP to follow up.     Hepatic steatosis- (present on admission)   Assessment & Plan    -Mild transaminitis on admission, patient asymptomatic.  Hep panel negative.  -Hepatic steatosis on CT PE.  -PCP to continue monitoring, counseling.     Prediabetes- (present on admission)   Assessment & Plan    -a1c 6.1, consistent with prediabetes.  -PCP to continue monitoring, lifestyle / diet counseling.

## 2019-05-15 NOTE — ASSESSMENT & PLAN NOTE
-K 5.8 on admission, no EKG changes, patient asymptomatic.  -Unclear baseline, no significant renal impairment.  Not on any prescribed / OTC medications.   -Resolved with diuresis.

## 2019-05-15 NOTE — PROGRESS NOTES
Assumed care of patient, bedside report received from Yana. Updated on POC, call light within reach and fall precautions in place. Bed locked and in lowest position. Patient instructed to call for assistance before getting out of bed. All questions answered, no other needs at this time.

## 2019-05-15 NOTE — DISCHARGE SUMMARY
Internal Medicine Discharge Summary  Note Author: Stephie Cortes M.D.       Name Lucas Agee     1970   Age/Sex 48 y.o. male   MRN [id number]         Admit Date:  2019       Discharge Date:   2019    Service:   [facility] Internal Medicine Yellow Team  Attending Physician(s):   Dr. Figueroa       Senior Resident(s):   Dr. Vargas  Johnie Resident(s):   Dr. Cortes  PCP: No primary care provider on file.      Primary Diagnosis:   Acute decompensated heart failure    Secondary Diagnoses:                Principal Problem:    SOB (shortness of breath) POA: Unknown  Active Problems:    Acute decompensated heart failure (HCC) POA: Yes    Anemia POA: Unknown    Thrombocytopenia (HCC) POA: Unknown    Hyperkalemia POA: Unknown    Non Anion Gap Metabolic acidosis POA: Unknown    Transaminitis POA: Yes    Elevated troponin POA: Yes    Dilated cardiomyopathy (HCC) POA: Yes    Tricuspid regurgitation POA: Yes    Substance use disorder POA: Yes    Prediabetes POA: Yes    Hepatic steatosis POA: Yes    Tobacco use POA: Yes  Resolved Problems:    * No resolved hospital problems. *      Hospital Summary (Brief Narrative):       48-year-old man with past medical history of substance abuse (methamphetamine, marijuana) and tobacco use presented with 2-week history of progressive shortness of breath, peripheral edema in setting of acute decompensated heart failure.    Shortness of breath, acute decompensated heart failure, dilated cardiomyopathy, elevated troponin: Labs significant for BNP 1427, positive amphetamines on urine drug screen.  CXR, CT PE, US Doppler bilateral negative for acute infectious process, pulmonary embolism, deep venous thrombosis.  Troponins mildly elevated, 0.06 to 0.12, but no ischemic cardiac symptoms, associated acute ST-T changes on EKG; likely demand-related.  Echo showed dilated cardiomyopathy, biventricular dysfunction with global hypokinesis, ejection fraction 15-20%, moderate to  "severe tricuspid regurgitation.  Cardiology felt cardiac dysfunction likely secondary to methamphetamine use.  Initiated on aspirin, atorvastatin, Lasix, Aldactone, Coreg, enalapril with improvement of symptoms. He was counseled to refrain from using methamphetamines and follow up with cardiology in 1-2 weeks post-discharge. Social work was consulted to help patient with drug rehab programs and cost of medications.    Substance use disorder, tobacco use: Discussed extensively with patient regarding importance of substance cessation given associated cardiac dysfunction.  Outpatient resources for drug rehabilitation provided.  PCP to continue counseling.    Prediabetes, hepatic steatosis: a1c 6.1, CT PE showed hepatic steatosis.  PCP to follow up with monitoring, counseling.    Macrocytic anemia: No active bleed, stable hemoglobin during hospitalization.  Ferritin low normal, otherwise TSH, B12, folate within normal limits.  PCP to follow up.    Patient /Hospital Summary (Details -- Problem Oriented) :          Acute decompensated heart failure (HCC)   Assessment & Plan    -Dyspnea, peripheral edema, elevated BNP in setting of acute decompensated heart failure likely 2/2 methamphetamine abuse.  -Echo showed LVEF 15-20%, global hypokinesis, dilated cardiomyopathy, biventricular dysfunction.    Plan:  -Tele, optimize electrolytes.  -On Lasix, Aldactone, enalapril, Coreg, ASA, atorvastatin.  -Cards following - possible MPI in a few days after improvement of acute symptoms.  -Discussed substance abuse cessation, resources provided for outpatient rehab - PCP to continue counseling.     * SOB (shortness of breath)   Assessment & Plan    -In setting of acute decompensated heart failure per echo.  CT PE, US Dopper bilateral LE negative for DVT / PE, CXR negative for acute process.  -See \"acute decompensated heart failure\" for details.     Substance use disorder   Assessment & Plan    -Endorsed quitting meth 6 months ago, " "continued use of marijuana, but UDS positive for amphetamines.  -Discussed importance of cessation with patient given new diagnose of heart failure, outpatient drug rehab resources provided.  -PCP to continue counseling.     Tricuspid regurgitation   Assessment & Plan    -As demonstrated on echo, no evidence of endocarditis on imaging or exam.  -See \"acute decompensated heart failure\" for details.     Dilated cardiomyopathy (HCC)   Assessment & Plan    -As demonstrated on echo.  In setting of meth abuse.  -See \"acute decompensated heart failure\" for details.     Elevated troponin   Assessment & Plan    -Mildly elevated troponin, likely in setting of demand.  -No chest pain / ischemic cardiac symptoms, no acute ST-T changes on EKG.  -Address acute decompensated heart failure.     Transaminitis   Assessment & Plan    -Mild transaminitis on admission, patient asymptomatic, lipase negative.  -In setting of congestive hepatopathy.  -Improving with diuresis.  -Monitor.     Non Anion Gap Metabolic acidosis   Assessment & Plan    -K elevated, HCO3 decreased, high normal Cl on admission, later did develop slight anion gap - 2/2 ?meth use, ?RTA 4.  -Improving, anion gap closed.  -Monitor.     Hyperkalemia   Assessment & Plan    -K 5.8 on admission, no EKG changes, patient asymptomatic.  -Unclear baseline, no significant renal impairment.  Not on any prescribed / OTC medications.   -Resolved with diuresis.     Thrombocytopenia (HCC)   Assessment & Plan    -Mild thrombocytopenia noted on admission, no baseline on file.  Possibly 2/2 hemodilution in setting of volume overload 2/2 decompensated heart failure.  Hep panel, HIV negative, not indicative of infectious etiology.  No significant renal impairment.  -Resolved with diuresis.     Anemia   Assessment & Plan    -Macrocytic anemia on admission, no baseline on file.  No apparent bleed.  -TSH, B12, folate WNL.  Ferritin low normal.  -PCP to follow up.     Tobacco use   "   Assessment & Plan    -Active smoker.  -Inpatient nicotine replacement.  -PCP to continue cessation counseling.     Hepatic steatosis   Assessment & Plan    -Mild transaminitis on admission, patient asymptomatic.  Hep panel negative.  -Hepatic steatosis on CT PE.  -PCP to continue monitoring, counseling.     Prediabetes   Assessment & Plan    -a1c 6.1, consistent with prediabetes.  -PCP to continue monitoring, lifestyle / diet counseling.         Consultants:     Cardiology    Procedures:        None    Imaging/ Testing:      US-RUQ   Final Result      Negative gallbladder/right upper quadrant ultrasound      US-EXTREMITY VENOUS LOWER BILAT   Final Result      EC-ECHOCARDIOGRAM COMPLETE W/O CONT   Final Result      CT-CTA CHEST PULMONARY ARTERY W/ RECONS   Final Result      1.  Limited study due to extensive patient motion artifact as well as a poor contrast bolus. Only large main pulmonary artery emboli are excluded with this examination.      2.  Cardiomegaly.      3.  Fatty liver.      DX-CHEST-PORTABLE (1 VIEW)   Final Result      No pulmonary consolidation.          Discharge Medications:         Medication Reconciliation: Completed       Medication List      You have not been prescribed any medications.           Disposition:   Discharge home    Diet:   Heart healthy    Activity:   As tolerated    Instructions:        The patient was instructed to return to the ER in the event of worsening symptoms. I have counseled the patient on the importance of compliance and the patient has agreed to proceed with all medical recommendations and follow up plan indicated above.   The patient understands that all medications come with benefits and risks. Risks may include permanent injury or death and these risks can be minimized with close reassessment and monitoring.        Primary Care Provider:    No primary care provider on file.  Discharge summary faxed to primary care provider:  Deferred  Copy of discharge summary  given to the patient: Deferred      Follow up appointment details :      No future appointments.  No follow-up provider specified.    Pending Studies:        None    Time spent on discharge day patient visit, preparing discharge paperwork and arranging for patient follow up.    Summary of follow up issues:   Heart failure, dilated cardiomyopathy - reassess for possible improvement  Substance use, tobacco use - Cessation counseling  Prediabetes, hepatic steatosis - monitoring, counseling  Anemia - monitoring    Discharge Time (Minutes) :    45 minutes  Hospital Course Type:  Inpatient Stay >2 midnights      Condition on Discharge    ______________________________________________________________________    Interval history/exam for day of discharge:    -No acute events overnight.  Patient feels improved, no complaints.  Lungs clear, bilateral LE edema minimal.  Cr stable.  -Unable to do inpatient MPI due to hospital policy of requiring clean UDS prior to exam.  Patient also declining reinsertion of IV for inpatient MPI.  Cardiology felt appropriate to discharge at this time with outpatient follow-up and MPI.  -Patient has no insurance - information provided to establish PCP with [facility] and to obtain cardiology referral.  -All cardiac meds except Aldactone continued on discharge per cards, as uncertain of patient compliance with follow-up, [facility] covering for this initial supply.  -Discharge today.      Most Recent Labs:    Lab Results   Component Value Date/Time    WBC 15.4 (H) 05/15/2019 06:01 AM    RBC 4.82 05/15/2019 06:01 AM    HEMOGLOBIN 16.0 05/15/2019 06:01 AM    HEMATOCRIT 46.5 05/15/2019 06:01 AM    MCV 96.5 05/15/2019 06:01 AM    MCH 33.2 (H) 05/15/2019 06:01 AM    MCHC 34.4 05/15/2019 06:01 AM    MPV 10.4 05/15/2019 06:01 AM    NEUTSPOLYS 70.70 05/15/2019 06:01 AM    LYMPHOCYTES 19.60 (L) 05/15/2019 06:01 AM    MONOCYTES 7.60 05/15/2019 06:01 AM    EOSINOPHILS 1.20 05/15/2019 06:01 AM     BASOPHILS 0.50 05/15/2019 06:01 AM      Lab Results   Component Value Date/Time    SODIUM 138 05/15/2019 02:22 AM    POTASSIUM 4.0 05/15/2019 02:22 AM    CHLORIDE 110 05/15/2019 02:22 AM    CO2 19 (L) 05/15/2019 02:22 AM    GLUCOSE 99 05/15/2019 02:22 AM    BUN 16 05/15/2019 02:22 AM    CREATININE 1.03 05/15/2019 02:22 AM      Lab Results   Component Value Date/Time    ALTSGPT 83 (H) 05/15/2019 02:22 AM    ASTSGOT 45 05/15/2019 02:22 AM    ALKPHOSPHAT 96 05/15/2019 02:22 AM    TBILIRUBIN 0.9 05/15/2019 02:22 AM    ALBUMIN 3.6 05/15/2019 02:22 AM    GLOBULIN 2.5 05/15/2019 02:22 AM    INR 1.05 05/14/2019 03:35 PM     Lab Results   Component Value Date/Time    PROTHROMBTM 13.8 05/14/2019 03:35 PM    INR 1.05 05/14/2019 03:35 PM

## 2019-05-15 NOTE — HEART FAILURE PROGRAM
"Cardiovascular Nurse Navigator () Advanced Heart Failure Program Inpatient Consult Note:     Patient presented on 5/14/19 with SOB. I found him on the C Rounding list for cardiology.    Patient stated that he hasn't used methamphetamine in 6 months. UDS amphetamine positive x 2.     EF found to be 15%. Initial cardiology consult note says [facility]. Today's note indicates that they would prefer for patient to have MPI. MPI is contraindicated until patient has a negative drug test. I messaged Dr. Bear to make aware of this for planning purposes.     HFrEF (15%)  NYHA: not being documented  De Magdiel/Exacerbation: de magdiel  Consider for CardioMEMS commercial or GUIDE HF? No, new diagnosis and drug use.  Diuresis: IV furosemide daily    Demographics:    · Insurance: none listed, I have ordered SW to assess  · Residence: [facility] Secondary Prevention interventions:    Pneumococcal vaccine: RN who completed admit profile answered \"no\" to the High Risk Conditions question. HF is a high risk condition, this needs to be corrected to finish the PNA Vax assessment correctly.  · Influenza vaccine: refused per admit colin    HFrEF GDMT:    · CARLOS - I: enalapril  · Evidence Based BB (bisoprolol, carvedilol, or Toprol XL): carvedilol  · Aldosterone receptor antagonist: spironolactone   · AC for AF: n/a      BEDSIDE NURSING Daily Weights and Intake and Output already ordered? yes    · Every HF patient should have daily weights and I's and O's  · Please weigh patient daily on a standing scale if not clinically contraindicated.   · While doing this, teach patient to write weight down on the Symptom Tracker in the HF book - and ask them what they would do with that weight at home (ie: call for 3# weight gain). This is an excellent opportunity for impactful, in the moment teaching.  Providers rely on your complete documentation of weights and I's and O's to decide whether or not our treatment is working and whether or not a HF " "patient is ready to discharge!    If pt does not own a working scale and cannot afford to purchase one, please provide one. Scales can be found in the Care Coordination Rooms on T-7&T-8.    [facility] Plan Notes:   none  Therapy Notes:   none  Advanced Care Planning:  No AD on file. Full code status at the time of this note's filing.    The ACC recommends engaging palliative care as part of optimization of HFrEF treatment to solicit goals of care and focus on quality of life throughout the clinical course of HF.    Once all diagnostics are in, please consider an order for palliative to discuss Advanced Care Planning.      Speaking with patients frankly about their end-of-life wishes is one of the most important things a palliative care team can do. This is especially important in the context of heart failure, since it’s such an unpredictable disease.    Follow up appointment:   If discharged from acute care to home (exception hospice discharge), pt must have an appointment scheduled within 7 days of discharge (Cardiology, PCP, or DC Clinic).    If discharged to Transitional Care Facility (LTAC, SNF, IRH), appointment should be made about a month out for after TCF.     Bedside Nursing Education:  Please provide HF booklet and repeated, ongoing education while administering medications, weighing patient, discussing management of symptoms, diet and need to follow up and act on changes.    Bedside Nursing Discharge:  When completing the after visit summary (discharge instructions) please select \"Cardiac Diagnosis, and Heart Failure\" in the special instructions section to populate the heart failure specific discharge instructions.     Referrals/Orders Placed:    Hospital Schedulers for HF f/u?  yes  Social Work   yes  Registered Dietician  yes  REMSA CP Program for patients with Medicaid, [facility], or alf Plus coverage?  unknown  Outpatient Care Coordination for patients with [employer] coverage and with 3 " or more admissions within a year?  unknown    Tila TAPIA RN, [facility] ext. [phone] M-F

## 2019-05-15 NOTE — ASSESSMENT & PLAN NOTE
-Endorsed quitting meth 6 months ago, continued use of marijuana, but UDS positive for amphetamines.  -Discussed importance of cessation with patient given new diagnose of heart failure, outpatient drug rehab resources provided.  -PCP to continue counseling.

## 2019-05-15 NOTE — ASSESSMENT & PLAN NOTE
-Mild transaminitis on admission, patient asymptomatic.  Hep panel negative.  -Hepatic steatosis on CT PE.  -PCP to continue monitoring, counseling.

## 2019-05-16 ENCOUNTER — PATIENT OUTREACH (OUTPATIENT)
Dept: HEALTH INFORMATION MANAGEMENT | Facility: OTHER | Age: 49
End: 2019-05-16

## 2019-05-16 PROBLEM — I27.20 PULMONARY HYPERTENSION (HCC): Status: ACTIVE | Noted: 2019-05-16

## 2019-05-16 LAB
ALBUMIN SERPL BCP-MCNC: 3.8 G/DL (ref 3.2–4.9)
ALBUMIN/GLOB SERPL: 1.3 G/DL
ALP SERPL-CCNC: 99 U/L (ref 30–99)
ALT SERPL-CCNC: 67 U/L (ref 2–50)
ANION GAP SERPL CALC-SCNC: 13 MMOL/L (ref 0–11.9)
AST SERPL-CCNC: 37 U/L (ref 12–45)
BASOPHILS # BLD AUTO: 1.1 % (ref 0–1.8)
BASOPHILS # BLD: 0.14 K/UL (ref 0–0.12)
BILIRUB SERPL-MCNC: 0.9 MG/DL (ref 0.1–1.5)
BUN SERPL-MCNC: 24 MG/DL (ref 8–22)
CALCIUM SERPL-MCNC: 9 MG/DL (ref 8.5–10.5)
CHLORIDE SERPL-SCNC: 104 MMOL/L (ref 96–112)
CO2 SERPL-SCNC: 22 MMOL/L (ref 20–33)
CREAT SERPL-MCNC: 1.35 MG/DL (ref 0.5–1.4)
EKG IMPRESSION: NORMAL
EKG IMPRESSION: NORMAL
EOSINOPHIL # BLD AUTO: 0.25 K/UL (ref 0–0.51)
EOSINOPHIL NFR BLD: 2 % (ref 0–6.9)
ERYTHROCYTE [DISTWIDTH] IN BLOOD BY AUTOMATED COUNT: 48.8 FL (ref 35.9–50)
GLOBULIN SER CALC-MCNC: 3 G/DL (ref 1.9–3.5)
GLUCOSE SERPL-MCNC: 94 MG/DL (ref 65–99)
HCT VFR BLD AUTO: 57.2 % (ref 42–52)
HGB BLD-MCNC: 18.6 G/DL (ref 14–18)
IMM GRANULOCYTES # BLD AUTO: 0.06 K/UL (ref 0–0.11)
IMM GRANULOCYTES NFR BLD AUTO: 0.5 % (ref 0–0.9)
LYMPHOCYTES # BLD AUTO: 3.89 K/UL (ref 1–4.8)
LYMPHOCYTES NFR BLD: 31.4 % (ref 22–41)
MCH RBC QN AUTO: 32.4 PG (ref 27–33)
MCHC RBC AUTO-ENTMCNC: 32.5 G/DL (ref 33.7–35.3)
MCV RBC AUTO: 99.7 FL (ref 81.4–97.8)
MONOCYTES # BLD AUTO: 1.12 K/UL (ref 0–0.85)
MONOCYTES NFR BLD AUTO: 9 % (ref 0–13.4)
NEUTROPHILS # BLD AUTO: 6.93 K/UL (ref 1.82–7.42)
NEUTROPHILS NFR BLD: 56 % (ref 44–72)
NRBC # BLD AUTO: 0 K/UL
NRBC BLD-RTO: 0 /100 WBC
PLATELET # BLD AUTO: 219 K/UL (ref 164–446)
PMV BLD AUTO: 9.8 FL (ref 9–12.9)
POTASSIUM SERPL-SCNC: 4.8 MMOL/L (ref 3.6–5.5)
PROT SERPL-MCNC: 6.8 G/DL (ref 6–8.2)
RBC # BLD AUTO: 5.74 M/UL (ref 4.7–6.1)
SODIUM SERPL-SCNC: 139 MMOL/L (ref 135–145)
WBC # BLD AUTO: 12.4 K/UL (ref 4.8–10.8)

## 2019-05-16 PROCEDURE — 93010 ELECTROCARDIOGRAM REPORT: CPT | Mod: 77 | Performed by: INTERNAL MEDICINE

## 2019-05-16 PROCEDURE — 99231 SBSQ HOSP IP/OBS SF/LOW 25: CPT | Performed by: INTERNAL MEDICINE

## 2019-05-16 PROCEDURE — A9270 NON-COVERED ITEM OR SERVICE: HCPCS | Performed by: STUDENT IN AN ORGANIZED HEALTH CARE EDUCATION/TRAINING PROGRAM

## 2019-05-16 PROCEDURE — A9270 NON-COVERED ITEM OR SERVICE: HCPCS | Performed by: INTERNAL MEDICINE

## 2019-05-16 PROCEDURE — 93005 ELECTROCARDIOGRAM TRACING: CPT | Performed by: STUDENT IN AN ORGANIZED HEALTH CARE EDUCATION/TRAINING PROGRAM

## 2019-05-16 PROCEDURE — 93010 ELECTROCARDIOGRAM REPORT: CPT | Performed by: INTERNAL MEDICINE

## 2019-05-16 PROCEDURE — 700102 HCHG RX REV CODE 250 W/ 637 OVERRIDE(OP): Performed by: INTERNAL MEDICINE

## 2019-05-16 PROCEDURE — 36415 COLL VENOUS BLD VENIPUNCTURE: CPT

## 2019-05-16 PROCEDURE — 85025 COMPLETE CBC W/AUTO DIFF WBC: CPT

## 2019-05-16 PROCEDURE — 700102 HCHG RX REV CODE 250 W/ 637 OVERRIDE(OP): Performed by: STUDENT IN AN ORGANIZED HEALTH CARE EDUCATION/TRAINING PROGRAM

## 2019-05-16 PROCEDURE — 700111 HCHG RX REV CODE 636 W/ 250 OVERRIDE (IP): Performed by: STUDENT IN AN ORGANIZED HEALTH CARE EDUCATION/TRAINING PROGRAM

## 2019-05-16 PROCEDURE — 99233 SBSQ HOSP IP/OBS HIGH 50: CPT | Mod: GC | Performed by: INTERNAL MEDICINE

## 2019-05-16 PROCEDURE — 80053 COMPREHEN METABOLIC PANEL: CPT

## 2019-05-16 PROCEDURE — 770020 HCHG ROOM/CARE - TELE (206)

## 2019-05-16 RX ORDER — FUROSEMIDE 40 MG/1
80 TABLET ORAL
Status: DISCONTINUED | OUTPATIENT
Start: 2019-05-17 | End: 2019-05-17 | Stop reason: HOSPADM

## 2019-05-16 RX ORDER — CARVEDILOL 12.5 MG/1
12.5 TABLET ORAL 2 TIMES DAILY WITH MEALS
Status: DISCONTINUED | OUTPATIENT
Start: 2019-05-16 | End: 2019-05-17

## 2019-05-16 RX ADMIN — CARVEDILOL 12.5 MG: 12.5 TABLET, FILM COATED ORAL at 17:31

## 2019-05-16 RX ADMIN — ENALAPRIL MALEATE 5 MG: 2.5 TABLET ORAL at 17:31

## 2019-05-16 RX ADMIN — FUROSEMIDE 40 MG: 10 INJECTION, SOLUTION INTRAMUSCULAR; INTRAVENOUS at 05:37

## 2019-05-16 RX ADMIN — ENALAPRIL MALEATE 5 MG: 2.5 TABLET ORAL at 13:51

## 2019-05-16 RX ADMIN — CARVEDILOL 3.12 MG: 3.12 TABLET, FILM COATED ORAL at 05:37

## 2019-05-16 RX ADMIN — ASPIRIN 81 MG 81 MG: 81 TABLET ORAL at 05:37

## 2019-05-16 RX ADMIN — ENALAPRIL MALEATE 5 MG: 2.5 TABLET ORAL at 10:14

## 2019-05-16 RX ADMIN — NICOTINE 14 MG: 14 PATCH, EXTENDED RELEASE TRANSDERMAL at 05:37

## 2019-05-16 RX ADMIN — ATORVASTATIN CALCIUM 80 MG: 80 TABLET, FILM COATED ORAL at 17:31

## 2019-05-16 RX ADMIN — ENALAPRIL MALEATE 5 MG: 2.5 TABLET ORAL at 21:09

## 2019-05-16 RX ADMIN — ENOXAPARIN SODIUM 40 MG: 100 INJECTION SUBCUTANEOUS at 05:37

## 2019-05-16 RX ADMIN — SPIRONOLACTONE 25 MG: 25 TABLET ORAL at 05:37

## 2019-05-16 ASSESSMENT — ENCOUNTER SYMPTOMS
MYALGIAS: 0
SPUTUM PRODUCTION: 0
NECK PAIN: 0
SINUS PAIN: 0
ABDOMINAL PAIN: 0
PHOTOPHOBIA: 0
NERVOUS/ANXIOUS: 0
NAUSEA: 0
VOMITING: 0
EYE PAIN: 0
DIZZINESS: 0
SORE THROAT: 0
HEADACHES: 0
ORTHOPNEA: 0
PALPITATIONS: 0
HALLUCINATIONS: 0
CHILLS: 0
HEMOPTYSIS: 0
FEVER: 0

## 2019-05-16 NOTE — PROGRESS NOTES
CARDIOLOGY PROGRESS NOTE     Attending: Dr. Jason MD  Resident: Manolo Clements  PATIENT: Lucas Agee; [id number]; 1970    ID: 48 y.o. male admitted for worsening shortness of breath and acute decompensated heart failure thought to be secondary to methamphetamine use.    SUBJECTIVE:   No acute events events, states he feels tired this morning due to lack of sleep  -ve 2L since admit, -ve 7 pounds since admit  HR elevated to 100-130s, -160/70-80      OBJECTIVE:  Vitals:    05/15/19 1651 05/15/19 1937 05/15/19 2359 05/16/19 0405   BP: 132/96 132/69 117/87 124/89   Pulse: 100 (!) 105 (!) 106 (!) 112   Resp: 18 17 16 18   Temp: 36.4 °C (97.6 °F) 36.6 °C (97.9 °F) 36.6 °C (97.8 °F) 36.6 °C (97.9 °F)   TempSrc: Temporal Temporal Temporal Temporal   SpO2: 95% 92% 93% 92%   Weight:  92.6 kg (204 lb 2.3 oz)     Height:           Intake/Output Summary (Last 24 hours) at 05/15/19 0754  Last data filed at 05/14/19 2115   Gross per 24 hour   Intake              360 ml   Output             2500 ml   Net            -2140 ml       PE:   General: No acute distress, resting comfortably in bed.  HEENT: NC/AT. EOMI. MMM  Cardiovascular: regular rhythm, tachycardic, with 1-2/6 holosystolic murmur  Respiratory: Symmetrical chest. CTAB with no adventitious breath sounds   Abdomen: soft, NT/ND, no masses, +BS   EXT:  GARCIA, 5/5 strength,1+ pitting edema B/L LE, 2+ pulses   Neuro: non focal with no numbness, tingling or changes in sensation    LABS:  Recent Labs      05/14/19   1535  05/15/19   0601  05/16/19   0209   WBC  9.1  15.4*  12.4*   RBC  3.96*  4.82  5.74   HEMOGLOBIN  13.0*  16.0  18.6*   HEMATOCRIT  40.0*  46.5  57.2*   MCV  101.0*  96.5  99.7*   MCH  32.8  33.2*  32.4   RDW  50.3*  48.6  48.8   PLATELETCT  102*  238  219   MPV  11.2  10.4  9.8   NEUTSPOLYS  62.50  70.70  56.00   LYMPHOCYTES  27.90  19.60*  31.40   MONOCYTES  7.60  7.60  9.00   EOSINOPHILS  1.20  1.20  2.00   BASOPHILS  0.70  0.50  1.10          Recent Labs      05/14/19   1436  05/14/19   2040  05/15/19   0222  05/15/19   1324  05/16/19   0209   SODIUM  138  139  138   --   139   POTASSIUM  5.8*  4.4  4.0   --   4.8   CHLORIDE  110  110  110   --   104   CO2  17*  16*  19*   --   22   BUN  15  16  16   --   24*   CREATININE  1.14  1.09  1.03   --   1.35   CALCIUM  8.7  8.6  8.6   --   9.0   MAGNESIUM   --    --    --   2.1   --    PHOSPHORUS   --    --    --   3.7   --    ALBUMIN  3.3   --   3.6   --   3.8     Estimated GFR/CRCL = Estimated Creatinine Clearance: 74.2 mL/min (by C-G formula based on SCr of 1.35 mg/dL).  Recent Labs      05/14/19   2040  05/15/19   0222  05/16/19   0209   GLUCOSE  90  99  94     Recent Labs      05/14/19 1436 05/14/19   1535  05/15/19   0222  05/16/19   0209   ASTSGOT  49*   --   45  37   ALTSGPT  92*   --   83*  67*   TBILIRUBIN  1.0   --   0.9  0.9   ALKPHOSPHAT  104*   --   96  99   GLOBULIN  2.9   --   2.5  3.0   INR   --   1.05   --    --      Recent Labs      05/14/19   1436  05/14/19   1535  05/14/19   2040  05/15/19   0601   TROPONINI  0.06*  0.09*   --   0.12*   BNPBTYPENAT   --   1427*  1382*   --          Recent Labs      05/14/19   1535  05/15/19   0818   INR  1.05   --    APTT   --   25.3       IMAGING:   US-RUQ   Final Result      Negative gallbladder/right upper quadrant ultrasound      US-EXTREMITY VENOUS LOWER BILAT   Final Result      EC-ECHOCARDIOGRAM COMPLETE W/O CONT   Final Result      CT-CTA CHEST PULMONARY ARTERY W/ RECONS   Final Result      1.  Limited study due to extensive patient motion artifact as well as a poor contrast bolus. Only large main pulmonary artery emboli are excluded with this examination.      2.  Cardiomegaly.      3.  Fatty liver.      DX-CHEST-PORTABLE (1 VIEW)   Final Result      No pulmonary consolidation.            MEDS:  Current Facility-Administered Medications   Medication Last Dose   • nicotine (NICODERM) 14 MG/24HR 14 mg 14 mg at 05/16/19 0537   • carvedilol  (COREG) tablet 3.125 mg 3.125 mg at 05/16/19 0537   • enalapril (VASOTEC) tablet 5 mg 5 mg at 05/15/19 2027   • senna-docusate (PERICOLACE or SENOKOT S) 8.6-50 MG per tablet 2 Tab      And   • polyethylene glycol/lytes (MIRALAX) PACKET 1 Packet      And   • magnesium hydroxide (MILK OF MAGNESIA) suspension 30 mL      And   • bisacodyl (DULCOLAX) suppository 10 mg     • Respiratory Care per Protocol     • enoxaparin (LOVENOX) inj 40 mg 40 mg at 05/16/19 0537   • enalaprilat (VASOTEC) injection 1.25 mg 1.25 mg at 05/15/19 0029   • furosemide (LASIX) injection 40 mg 40 mg at 05/16/19 0537   • atorvastatin (LIPITOR) tablet 80 mg 80 mg at 05/15/19 1724   • aspirin (ASA) chewable tab 81 mg 81 mg at 05/16/19 0537   • spironolactone (ALDACTONE) tablet 25 mg 25 mg at 05/16/19 0537       ASSESSMENT/PLAN:   Mr. Agee is a 49 yo M with no significant PMH with worsening SOB, found to have acute decompensated biventricular heart failure and dilated cardiomyopathy.    Acute Decompensated Bventricular Heart Failure  Dilated Cardiomyopathy with EF 15%-20% - Likely Toxic  Moderate to Severe Tricuspid Regurgitation  NSTEMI - likely Type II  Pt with severely dilated cardiomyopathy and bi-v HF, likely 2/2 heavy methamphetamine use.  Pt denies recent use within last 6-12 months, however utox positive for amphetamines.  - transition from IV to PO diuresis  - continue 25 mg spironolactone daily  - stop lisinopril, switch to 5 mg enalapril QID  - increase carvedilol to 12.5  - cardiac diet and 2g sodium restriction  - plan for MPI when UDS negative, no indication for cardiac cath at this time      Methamphetamine abuse  - UDS on 5/14 positive for meth  - Extensive discussion with pt regarding amphetamine use, pt very tearful and agitated when cessation was discussed.  Denies extra help for quitting at this time.      Transaminitis  Thrombocytopenia  Anemia  - as managed by primary tear        Thank you for the consult. We will continue to  follow.

## 2019-05-16 NOTE — PROGRESS NOTES
Chart Check Complete    Monitor Summary:    Rhythm- ST   Rate-   Ectopy- n/a  Measurements- 0.12/0.08/0.34

## 2019-05-16 NOTE — PROGRESS NOTES
Patient still refusing IV that was pulled out during night shift. [facility] Resident team notified at 10:37 am, okay to keep patient without IV for now.     If patient goes for stress test, will need IV, but will address later.

## 2019-05-16 NOTE — PROGRESS NOTES
Internal Medicine Interval Note  Note Author: Stephie Cortes M.D.     Name Lucas Agee     1970   Age/Sex 48 y.o. male   MRN [id number]   Code Status Full     After 5PM or if no immediate response to page, please call for cross-coverage  Attending/Team: Dr. Figueroa / Chad See Patient List for primary contact information  Call [phone] to page    1st Call - Day Intern (R1):   Dr. Cortes 2nd Call - Day Sr. Resident (R2/R3):   Dr. Vargas         Reason for interval visit  (Principal Problem)   Shortness of breath  Acute decompensated heart failure    Interval Problem Daily Status Update  (24 hours, problem oriented, brief subjective history, new lab/imaging data pertinent to that problem)   -No acute events overnight.  Patient reports feeling improved.  Still tachycardic, but BP trending down, patient denies palpitations, chest pain.  Sinus tach per tele.  Lungs clear, still has bilateral LE edema.  -Cr noted to increase, continue monitoring given recent initiation of ACE-I.  -SW spoke with patient yesterday - patient homeless without income, hospital will cover 1st month of meds via [facility].  Patient understands importance of establishing with PCP at [facility] or [facility] in order to obtain future meds.  Pending [place] Medicaid.    Review of Systems   Constitutional: Negative for chills and fever.   HENT: Negative for sinus pain and sore throat.    Eyes: Negative for photophobia and pain.   Respiratory: Negative for hemoptysis and sputum production.         Shortness of breath, improving.   Cardiovascular: Negative for chest pain, palpitations and orthopnea.        Leg swelling, improving   Gastrointestinal: Negative for abdominal pain, nausea and vomiting.   Genitourinary: Negative for dysuria and hematuria.        Frequency in setting of diuresis   Musculoskeletal: Negative for myalgias and neck pain.   Skin: Negative for itching and rash.   Neurological:  Negative for dizziness and headaches.   Psychiatric/Behavioral: Negative for hallucinations. The patient is not nervous/anxious.        Disposition/Barriers to discharge:   Pending clinical improvement, MPI    Consultants/Specialty  Cardiology  PCP: No primary care provider on file.      Quality Measures  Quality-Core Measures   Reviewed items::  EKG reviewed, Labs reviewed, Medications reviewed and Radiology images reviewed  Wang catheter::  No Wang  DVT prophylaxis pharmacological::  Enoxaparin (Lovenox)          Physical Exam       Vitals:    05/15/19 1937 05/15/19 2359 05/16/19 0405 05/16/19 0751   BP: 132/69 117/87 124/89 119/86   Pulse: (!) 105 (!) 106 (!) 112 (!) 109   Resp: 17 16 18 17   Temp: 36.6 °C (97.9 °F) 36.6 °C (97.8 °F) 36.6 °C (97.9 °F) 36.4 °C (97.5 °F)   TempSrc: Temporal Temporal Temporal Temporal   SpO2: 92% 93% 92% 92%   Weight: 92.6 kg (204 lb 2.3 oz)      Height:         Body mass index is 31.97 kg/m². Weight: 92.6 kg (204 lb 2.3 oz)  Oxygen Therapy:  Pulse Oximetry: 92 %, O2 (LPM): 0, O2 Delivery: None (Room Air)    Physical Exam   Constitutional: He is well-developed, well-nourished, and in no distress.   HENT:   Head: Normocephalic and atraumatic.   Nose: Nose normal.   Eyes: Conjunctivae and EOM are normal.   Neck: Normal range of motion. Neck supple. No JVD present.   Cardiovascular: Regular rhythm.    Difficult to appreciate murmur due to tachycardia.   Pulmonary/Chest: Effort normal and breath sounds normal. No respiratory distress. He has no wheezes. He has no rales.   Abdominal: Soft. There is no tenderness. There is no rebound and no guarding.   Musculoskeletal: He exhibits no tenderness.   Bilateral LE edema 1+   Skin: Skin is warm and dry.   Erythema in knee regions without warmth or tenderness.  Petechiae in bilateral LE below knees.  All chronic per patient.   Psychiatric: Mood and affect normal.       Assessment/Plan     * SOB (shortness of breath)   Assessment & Plan    -In  "setting of acute decompensated heart failure per echo.  CT PE, US Dopper bilateral LE negative for DVT / PE, CXR negative for acute process.  -See \"acute decompensated heart failure\" for details.     Acute decompensated heart failure (HCC)- (present on admission)   Assessment & Plan    -Dyspnea, peripheral edema, elevated BNP in setting of acute decompensated heart failure likely 2/2 methamphetamine abuse.  -Echo showed LVEF 15-20%, global hypokinesis, dilated cardiomyopathy, biventricular dysfunction.    Plan:  -Tele, optimize electrolytes.  -On Lasix, Aldactone, enalapril, Coreg, ASA, atorvastatin.  -Cards following - possible MPI in a few days after improvement of acute symptoms.  -Discussed substance abuse cessation, resources provided for outpatient rehab - PCP to continue counseling.     Pulmonary hypertension (HCC)- (present on admission)   Assessment & Plan    -RVSP 70 mmHg on echo.  -In setting of biventricular dysfunction.     Substance use disorder- (present on admission)   Assessment & Plan    -Endorsed quitting meth 6 months ago, continued use of marijuana, but UDS positive for amphetamines.  -Discussed importance of cessation with patient given new diagnose of heart failure, outpatient drug rehab resources provided.  -PCP to continue counseling.     Tricuspid regurgitation- (present on admission)   Assessment & Plan    -As demonstrated on echo, no evidence of endocarditis on imaging or exam.  -See \"acute decompensated heart failure\" for details.     Dilated cardiomyopathy (HCC)- (present on admission)   Assessment & Plan    -As demonstrated on echo.  In setting of meth abuse.  -See \"acute decompensated heart failure\" for details.     Elevated troponin- (present on admission)   Assessment & Plan    -Mildly elevated troponin, likely in setting of demand.  -No chest pain / ischemic cardiac symptoms, no acute ST-T changes on EKG.  -Address acute decompensated heart failure.     Transaminitis- (present on " admission)   Assessment & Plan    -Mild transaminitis on admission, patient asymptomatic, lipase negative.  -In setting of congestive hepatopathy.  -Improving with diuresis.  -Monitor.     Non Anion Gap Metabolic acidosis   Assessment & Plan    -K elevated, HCO3 decreased, high normal Cl on admission, later did develop slight anion gap - 2/2 ?meth use, ?RTA 4.  -Improving, anion gap closed.  -Monitor.     Hyperkalemia   Assessment & Plan    -K 5.8 on admission, no EKG changes, patient asymptomatic.  -Unclear baseline, no significant renal impairment.  Not on any prescribed / OTC medications.   -Resolved with diuresis.     Thrombocytopenia (HCC)   Assessment & Plan    -Mild thrombocytopenia noted on admission, no baseline on file.  Possibly 2/2 hemodilution in setting of volume overload 2/2 decompensated heart failure.  Hep panel, HIV negative, not indicative of infectious etiology.  No significant renal impairment.  -Resolved with diuresis.     Anemia   Assessment & Plan    -Macrocytic anemia on admission, no baseline on file.  No apparent bleed.  -TSH, B12, folate WNL.  Ferritin low normal.  -PCP to follow up.     Tobacco use- (present on admission)   Assessment & Plan    -Active smoker.  -Inpatient nicotine replacement.  -PCP to continue cessation counseling.     Hepatic steatosis- (present on admission)   Assessment & Plan    -Mild transaminitis on admission, patient asymptomatic.  Hep panel negative.  -Hepatic steatosis on CT PE.  -PCP to continue monitoring, counseling.     Prediabetes- (present on admission)   Assessment & Plan    -a1c 6.1, consistent with prediabetes.  -PCP to continue monitoring, lifestyle / diet counseling.

## 2019-05-17 VITALS
TEMPERATURE: 98 F | WEIGHT: 199.08 LBS | HEART RATE: 98 BPM | SYSTOLIC BLOOD PRESSURE: 138 MMHG | OXYGEN SATURATION: 96 % | HEIGHT: 67 IN | DIASTOLIC BLOOD PRESSURE: 74 MMHG | BODY MASS INDEX: 31.25 KG/M2 | RESPIRATION RATE: 19 BRPM

## 2019-05-17 LAB
ALBUMIN SERPL BCP-MCNC: 3.8 G/DL (ref 3.2–4.9)
ALBUMIN/GLOB SERPL: 1.2 G/DL
ALP SERPL-CCNC: 98 U/L (ref 30–99)
ALT SERPL-CCNC: 54 U/L (ref 2–50)
ANION GAP SERPL CALC-SCNC: 15 MMOL/L (ref 0–11.9)
AST SERPL-CCNC: 23 U/L (ref 12–45)
BASOPHILS # BLD AUTO: 1 % (ref 0–1.8)
BASOPHILS # BLD: 0.13 K/UL (ref 0–0.12)
BILIRUB SERPL-MCNC: 0.9 MG/DL (ref 0.1–1.5)
BUN SERPL-MCNC: 25 MG/DL (ref 8–22)
CALCIUM SERPL-MCNC: 9.1 MG/DL (ref 8.5–10.5)
CHLORIDE SERPL-SCNC: 101 MMOL/L (ref 96–112)
CO2 SERPL-SCNC: 23 MMOL/L (ref 20–33)
CREAT SERPL-MCNC: 1.31 MG/DL (ref 0.5–1.4)
EOSINOPHIL # BLD AUTO: 0.26 K/UL (ref 0–0.51)
EOSINOPHIL NFR BLD: 2.1 % (ref 0–6.9)
ERYTHROCYTE [DISTWIDTH] IN BLOOD BY AUTOMATED COUNT: 46.6 FL (ref 35.9–50)
GLOBULIN SER CALC-MCNC: 3.1 G/DL (ref 1.9–3.5)
GLUCOSE SERPL-MCNC: 98 MG/DL (ref 65–99)
HCT VFR BLD AUTO: 56.4 % (ref 42–52)
HGB BLD-MCNC: 19.4 G/DL (ref 14–18)
IMM GRANULOCYTES # BLD AUTO: 0.07 K/UL (ref 0–0.11)
IMM GRANULOCYTES NFR BLD AUTO: 0.6 % (ref 0–0.9)
LYMPHOCYTES # BLD AUTO: 4.24 K/UL (ref 1–4.8)
LYMPHOCYTES NFR BLD: 34 % (ref 22–41)
MCH RBC QN AUTO: 33.2 PG (ref 27–33)
MCHC RBC AUTO-ENTMCNC: 34.4 G/DL (ref 33.7–35.3)
MCV RBC AUTO: 96.6 FL (ref 81.4–97.8)
MONOCYTES # BLD AUTO: 1.4 K/UL (ref 0–0.85)
MONOCYTES NFR BLD AUTO: 11.2 % (ref 0–13.4)
NEUTROPHILS # BLD AUTO: 6.36 K/UL (ref 1.82–7.42)
NEUTROPHILS NFR BLD: 51.1 % (ref 44–72)
NRBC # BLD AUTO: 0 K/UL
NRBC BLD-RTO: 0 /100 WBC
PLATELET # BLD AUTO: 239 K/UL (ref 164–446)
PMV BLD AUTO: 10.1 FL (ref 9–12.9)
POTASSIUM SERPL-SCNC: 3.9 MMOL/L (ref 3.6–5.5)
PROT SERPL-MCNC: 6.9 G/DL (ref 6–8.2)
RBC # BLD AUTO: 5.84 M/UL (ref 4.7–6.1)
SODIUM SERPL-SCNC: 139 MMOL/L (ref 135–145)
WBC # BLD AUTO: 12.5 K/UL (ref 4.8–10.8)

## 2019-05-17 PROCEDURE — A9270 NON-COVERED ITEM OR SERVICE: HCPCS | Performed by: STUDENT IN AN ORGANIZED HEALTH CARE EDUCATION/TRAINING PROGRAM

## 2019-05-17 PROCEDURE — 85025 COMPLETE CBC W/AUTO DIFF WBC: CPT

## 2019-05-17 PROCEDURE — 36415 COLL VENOUS BLD VENIPUNCTURE: CPT

## 2019-05-17 PROCEDURE — 700102 HCHG RX REV CODE 250 W/ 637 OVERRIDE(OP): Performed by: STUDENT IN AN ORGANIZED HEALTH CARE EDUCATION/TRAINING PROGRAM

## 2019-05-17 PROCEDURE — 80053 COMPREHEN METABOLIC PANEL: CPT

## 2019-05-17 PROCEDURE — 99232 SBSQ HOSP IP/OBS MODERATE 35: CPT | Mod: GC | Performed by: INTERNAL MEDICINE

## 2019-05-17 PROCEDURE — 700102 HCHG RX REV CODE 250 W/ 637 OVERRIDE(OP): Performed by: INTERNAL MEDICINE

## 2019-05-17 PROCEDURE — 99239 HOSP IP/OBS DSCHRG MGMT >30: CPT | Mod: GC | Performed by: INTERNAL MEDICINE

## 2019-05-17 PROCEDURE — A9270 NON-COVERED ITEM OR SERVICE: HCPCS | Performed by: INTERNAL MEDICINE

## 2019-05-17 PROCEDURE — 700111 HCHG RX REV CODE 636 W/ 250 OVERRIDE (IP): Performed by: STUDENT IN AN ORGANIZED HEALTH CARE EDUCATION/TRAINING PROGRAM

## 2019-05-17 RX ORDER — CARVEDILOL 25 MG/1
25 TABLET ORAL 2 TIMES DAILY WITH MEALS
Status: DISCONTINUED | OUTPATIENT
Start: 2019-05-17 | End: 2019-05-17 | Stop reason: HOSPADM

## 2019-05-17 RX ORDER — ATORVASTATIN CALCIUM 80 MG/1
80 TABLET, FILM COATED ORAL EVERY EVENING
Qty: 30 TAB | Refills: 1 | Status: SHIPPED | OUTPATIENT
Start: 2019-05-17 | End: 2019-07-18 | Stop reason: SDUPTHER

## 2019-05-17 RX ORDER — CARVEDILOL 25 MG/1
25 TABLET ORAL 2 TIMES DAILY WITH MEALS
Qty: 60 TAB | Refills: 1 | Status: SHIPPED | OUTPATIENT
Start: 2019-05-17 | End: 2019-05-21

## 2019-05-17 RX ORDER — ASPIRIN 81 MG/1
81 TABLET, CHEWABLE ORAL DAILY
Qty: 30 TAB | Refills: 1 | Status: SHIPPED | OUTPATIENT
Start: 2019-05-18 | End: 2019-07-18 | Stop reason: SDUPTHER

## 2019-05-17 RX ORDER — LISINOPRIL 20 MG/1
20 TABLET ORAL DAILY
Qty: 30 TAB | Refills: 1 | Status: SHIPPED | OUTPATIENT
Start: 2019-05-17 | End: 2019-07-18 | Stop reason: SDUPTHER

## 2019-05-17 RX ORDER — LISINOPRIL 20 MG/1
20 TABLET ORAL
Status: DISCONTINUED | OUTPATIENT
Start: 2019-05-17 | End: 2019-05-17 | Stop reason: HOSPADM

## 2019-05-17 RX ORDER — FUROSEMIDE 80 MG
80 TABLET ORAL DAILY
Qty: 30 TAB | Refills: 1 | Status: SHIPPED | OUTPATIENT
Start: 2019-05-18 | End: 2019-05-31

## 2019-05-17 RX ORDER — SPIRONOLACTONE 25 MG/1
25 TABLET ORAL DAILY
Qty: 30 TAB | Refills: 1 | Status: SHIPPED | OUTPATIENT
Start: 2019-05-18 | End: 2019-05-17

## 2019-05-17 RX ORDER — CARVEDILOL 12.5 MG/1
12.5 TABLET ORAL ONCE
Status: COMPLETED | OUTPATIENT
Start: 2019-05-17 | End: 2019-05-17

## 2019-05-17 RX ORDER — IBUPROFEN 200 MG
200 TABLET ORAL ONCE
Status: COMPLETED | OUTPATIENT
Start: 2019-05-17 | End: 2019-05-17

## 2019-05-17 RX ADMIN — ENOXAPARIN SODIUM 40 MG: 100 INJECTION SUBCUTANEOUS at 05:37

## 2019-05-17 RX ADMIN — IBUPROFEN 200 MG: 200 TABLET, FILM COATED ORAL at 02:49

## 2019-05-17 RX ADMIN — CARVEDILOL 12.5 MG: 12.5 TABLET, FILM COATED ORAL at 05:37

## 2019-05-17 RX ADMIN — LISINOPRIL 20 MG: 20 TABLET ORAL at 10:49

## 2019-05-17 RX ADMIN — FUROSEMIDE 80 MG: 40 TABLET ORAL at 05:37

## 2019-05-17 RX ADMIN — SPIRONOLACTONE 25 MG: 25 TABLET ORAL at 05:37

## 2019-05-17 RX ADMIN — CARVEDILOL 12.5 MG: 12.5 TABLET, FILM COATED ORAL at 10:57

## 2019-05-17 RX ADMIN — ASPIRIN 81 MG 81 MG: 81 TABLET ORAL at 05:37

## 2019-05-17 ASSESSMENT — ENCOUNTER SYMPTOMS
VOMITING: 0
HALLUCINATIONS: 0
PHOTOPHOBIA: 0
NAUSEA: 0
CHILLS: 0
EYE PAIN: 0
ORTHOPNEA: 0
NECK PAIN: 0
SINUS PAIN: 0
FEVER: 0
SHORTNESS OF BREATH: 0
SPUTUM PRODUCTION: 0
HEMOPTYSIS: 0
PALPITATIONS: 0
DIZZINESS: 0
MYALGIAS: 0
SORE THROAT: 0
NERVOUS/ANXIOUS: 0
HEADACHES: 0
ABDOMINAL PAIN: 0

## 2019-05-17 NOTE — PROGRESS NOTES
Patient discharged home. Discharge paperwork, HF booklet, and new medication education reviewed and discussed with patient and brother at bedside.     All questions answered, patient verbalized understanding on importance of taking medications as prescribed and following up as scheduled.     IV and tele box removed    Patient refused wheelchair and hospital transport to front entrance.

## 2019-05-17 NOTE — HEART FAILURE PROGRAM
Discussed with Dr. Clements. They will recommend outpatient f/u for MPI. Let Dr. Clements know that we have given patient information about [facility] for f/u.     I also asked Dr. Clements if he thinks patient will get his lab work done and go to follow up. Let Dr. Clements know that if he's at all worried about this, it is okay to state this in a note as a reason for not prescribing aldosterone blockade.    Tila TAPIA RN, [employer] ext. [phone] M-F

## 2019-05-17 NOTE — PROGRESS NOTES
Report received from MANISH Bello. Plan of care reviewed with patient, denies any questions. No needs voiced at this time. Call light within reach, bed locked and in lowest position.

## 2019-05-17 NOTE — PROGRESS NOTES
Internal Medicine Interval Note  Note Author: Stephie Cortes M.D.     Name Lucas Agee     1970   Age/Sex 48 y.o. male   MRN [id number]   Code Status Full     After 5PM or if no immediate response to page, please call for cross-coverage  Attending/Team: Dr. Figueroa / Chad See Patient List for primary contact information  Call [phone] to page    1st Call - Day Intern (R1):   Dr. Cortes 2nd Call - Day Sr. Resident (R2/R3):   Dr. Vargas         Reason for interval visit  (Principal Problem)   Shortness of breath  Acute decompensated heart failure    Interval Problem Daily Status Update  (24 hours, problem oriented, brief subjective history, new lab/imaging data pertinent to that problem)   -No acute events overnight.  Patient feels improved, no complaints.  Lungs clear, bilateral LE edema minimal.  Cr stable.  -Unable to do inpatient MPI due to hospital policy of requiring clean UDS prior to exam.  Patient also declining reinsertion of IV for inpatient MPI.  Cardiology felt appropriate to discharge at this time with outpatient follow-up and MPI.  -Patient has no insurance - information provided to establish PCP with [facility] and to obtain cardiology referral.  -All cardiac meds except Aldactone continued on discharge per cards, as uncertain of patient compliance with follow-up, [facility] covering for this initial supply.  -Discharge today.    Review of Systems   Constitutional: Negative for chills and fever.   HENT: Negative for sinus pain and sore throat.    Eyes: Negative for photophobia and pain.   Respiratory: Negative for hemoptysis, sputum production and shortness of breath.    Cardiovascular: Negative for chest pain, palpitations, orthopnea and leg swelling.   Gastrointestinal: Negative for abdominal pain, nausea and vomiting.   Genitourinary: Negative for dysuria and hematuria.        Frequency in setting of diuresis   Musculoskeletal: Negative for myalgias and neck  pain.   Skin: Negative for itching and rash.   Neurological: Negative for dizziness and headaches.   Psychiatric/Behavioral: Negative for hallucinations. The patient is not nervous/anxious.        Disposition/Barriers to discharge:   Discharge today    Consultants/Specialty  Cardiology  PCP: No primary care provider on file.      Quality Measures  Quality-Core Measures   Reviewed items::  EKG reviewed, Labs reviewed, Medications reviewed and Radiology images reviewed  Wang catheter::  No Wang  DVT prophylaxis pharmacological::  Enoxaparin (Lovenox)          Physical Exam       Vitals:    05/17/19 0453 05/17/19 0850 05/17/19 1049 05/17/19 1141   BP: 119/81 105/59 110/75 138/74   Pulse: 92 88  98   Resp: 20 19 19   Temp: 36.1 °C (97 °F) 36.8 °C (98.2 °F)  36.7 °C (98 °F)   TempSrc: Temporal Temporal  Temporal   SpO2: 96% 90%  96%   Weight:       Height:         Body mass index is 31.18 kg/m². Weight: 90.3 kg (199 lb 1.2 oz)  Oxygen Therapy:  Pulse Oximetry: 96 %, O2 (LPM): 0, O2 Delivery: None (Room Air)    Physical Exam   Constitutional: He is well-developed, well-nourished, and in no distress.   HENT:   Head: Normocephalic and atraumatic.   Nose: Nose normal.   Eyes: Conjunctivae and EOM are normal.   Neck: Normal range of motion. Neck supple. No JVD present.   Cardiovascular: Normal rate and regular rhythm.    Pulmonary/Chest: Effort normal and breath sounds normal. No respiratory distress. He has no wheezes. He has no rales.   Abdominal: Soft. There is no tenderness. There is no rebound and no guarding.   Musculoskeletal: He exhibits no tenderness.   Minimal bilateral LE edema   Skin: Skin is warm and dry.   Erythema in knee regions without warmth or tenderness.  Petechiae in bilateral LE below knees.  All chronic per patient.   Psychiatric: Mood and affect normal.       Assessment/Plan     * SOB (shortness of breath)   Assessment & Plan    -In setting of acute decompensated heart failure per echo.  CT PE, US  "Dopper bilateral LE negative for DVT / PE, CXR negative for acute process.  -See \"acute decompensated heart failure\" for details.     Acute decompensated heart failure (HCC)- (present on admission)   Assessment & Plan    -Dyspnea, peripheral edema, elevated BNP in setting of acute decompensated heart failure likely 2/2 methamphetamine abuse.  -Echo showed LVEF 15-20%, global hypokinesis, dilated cardiomyopathy, biventricular dysfunction.  -Discussed substance abuse cessation, resources provided for outpatient rehab - PCP to continue counseling.  -Unable to perform inpatient MPI due to hospital policy regarding no MPI until clean UDS, patient also declined IV reinsertion for MPI.  Cards felt appropriate to discharge with outpatient follow-up and MPI.  -Patient has no insurance, information provided for patient to establish PCP with [facility] and obtain referral to [facility].  -Discharged on Lasix, lisinopril, Coreg, ASA, atorvastatin.  Aldactone discontinued given uncertainty regarding compliance / follow-up, to be restarted as appropriate in outpatient setting.     Pulmonary hypertension (HCC)- (present on admission)   Assessment & Plan    -RVSP 70 mmHg on echo.  -In setting of biventricular dysfunction.     Substance use disorder- (present on admission)   Assessment & Plan    -Endorsed quitting meth 6 months ago, continued use of marijuana, but UDS positive for amphetamines.  -Discussed importance of cessation with patient given new diagnose of heart failure, outpatient drug rehab resources provided.  -PCP to continue counseling.     Tricuspid regurgitation- (present on admission)   Assessment & Plan    -As demonstrated on echo, no evidence of endocarditis on imaging or exam.  -See \"acute decompensated heart failure\" for details.     Dilated cardiomyopathy (HCC)- (present on admission)   Assessment & Plan    -As demonstrated on echo.  In setting of meth abuse.  -See \"acute decompensated heart failure\" " for details.     Elevated troponin- (present on admission)   Assessment & Plan    -Mildly elevated troponin, likely in setting of demand.  -No chest pain / ischemic cardiac symptoms, no acute ST-T changes on EKG.  -Address acute decompensated heart failure.     Transaminitis- (present on admission)   Assessment & Plan    -Mild transaminitis on admission, patient asymptomatic, lipase negative.  -In setting of congestive hepatopathy.  -Improving with diuresis.  -PCP to follow up.     Non Anion Gap Metabolic acidosis   Assessment & Plan    -K elevated, HCO3 decreased, high normal Cl on admission, later did develop slight anion gap - 2/2 ?meth use, ?RTA 4.  -Electrolytes normalized.     Hyperkalemia   Assessment & Plan    -K 5.8 on admission, no EKG changes, patient asymptomatic.  -Unclear baseline, no significant renal impairment.  Not on any prescribed / OTC medications.   -Resolved with diuresis.     Thrombocytopenia (HCC)   Assessment & Plan    -Mild thrombocytopenia noted on admission, no baseline on file.  Possibly 2/2 hemodilution in setting of volume overload 2/2 decompensated heart failure.  Hep panel, HIV negative, not indicative of infectious etiology.  No significant renal impairment.  -Resolved with diuresis.     Anemia   Assessment & Plan    -Macrocytic anemia on admission, no baseline on file.  No apparent bleed.  -TSH, B12, folate WNL.  Ferritin low normal.  -PCP to follow up.     Tobacco use- (present on admission)   Assessment & Plan    -Active smoker.  -PCP to continue cessation counseling.     Hepatic steatosis- (present on admission)   Assessment & Plan    -Mild transaminitis on admission, patient asymptomatic.  Hep panel negative.  -Hepatic steatosis on CT PE.  -PCP to continue monitoring, counseling.     Prediabetes- (present on admission)   Assessment & Plan    -a1c 6.1, consistent with prediabetes.  -PCP to continue monitoring, lifestyle / diet counseling.

## 2019-05-17 NOTE — DISCHARGE INSTRUCTIONS
Discharge Instructions    Discharged to home by car with relative. Discharged via walking, hospital escort: Refused.  Special equipment needed: Not Applicable    Be sure to schedule a follow-up appointment with your primary care doctor or any specialists as instructed.     Discharge Plan:   Smoking Cessation Offered: Patient Refused  Pneumococcal Vaccine Administered/Refused: Not given - Patient refused pneumococcal vaccine  Influenza Vaccine Indication: Patient Refuses    I understand that a diet low in cholesterol, fat, and sodium is recommended for good health. Unless I have been given specific instructions below for another diet, I accept this instruction as my diet prescription.   Other diet: Low Fat, Low salt, Low cholesterol      Special Instructions:   HF Patient Discharge Instructions  · Monitor your weight daily, and maintain a weight chart, to track your weight changes.   · Activity as tolerated, unless your Doctor has ordered otherwise. Other activity order: Activity as Tolerated.  · Follow a low fat, low cholesterol, low salt diet unless instructed otherwise by your Doctor. Read the labels on the back of food products and track your intake of fat, cholesterol and salt.   · Fluid Restriction Yes. If a Fluid Restriction has been ordered by your Doctor, measure fluids with a measuring cup to ensure that you are not exceeding the restriction.   · No smoking.  · Oxygen No. If your Doctor has ordered that you wear Oxygen at home, it is important to wear it as ordered.  · Did you receive an explanation from staff on the importance of taking each of your medications and why it is necessary to keep taking them unless your doctor says to stop? Yes  · Were all of your questions answered about how to manage your heart failure and what to do if you have increased signs and symptoms after you go home? Yes  · Do you feel like your heart failure care team involved you in the care treatment plan and allowed you to make  decisions regarding your care while in the hospital and addressed any discharge needs you might have? Yes    See the educational handout provided at discharge for more information on monitoring your daily weight, activity and diet. This also explains more about Heart Failure, symptoms of a flare-up and some of the tests that you have undergone.     Warning Signs of a Flare-Up include:  · Swelling in the ankles or lower legs.  · Shortness of breath, while at rest, or while doing normal activities.   · Shortness of breath at night when in bed, or coughing in bed.   · Requiring more pillows to sleep at night, or needing to sit up at night to sleep.  · Feeling weak, dizzy or fatigued.     When to call your Doctor:  · Call [facility] seven days a week from 8:00 a.m. to 8:00 p.m. for medical questions [phone].  · Call your Primary Care Physician or Cardiologist if:   1. You experience any pain radiating to your jaw or neck.  2. You have any difficulty breathing.  3. You experience weight gain of 3 lbs in a day or 5 lbs in a week.   4. You feel any palpitations or irregular heartbeats.  5. You become dizzy or lose consciousness.   If you have had an angiogram or had a pacemaker or AICD placed, and experience:  1. Bleeding, drainage or swelling at the surgical / puncture site.  2. Fever greater than 100.0 F  3. Shock from internal defibrillator.  4. Cool and / or numb extremities.      · Is patient discharged on Warfarin / Coumadin?   No     Depression / Suicide Risk    As you are discharged from this [facility], it is important to learn how to keep safe from harming yourself.    Recognize the warning signs:  · Abrupt changes in personality, positive or negative- including increase in energy   · Giving away possessions  · Change in eating patterns- significant weight changes-  positive or negative  · Change in sleeping patterns- unable to sleep or sleeping all the time   · Unwillingness or  inability to communicate  · Depression  · Unusual sadness, discouragement and loneliness  · Talk of wanting to die  · Neglect of personal appearance   · Rebelliousness- reckless behavior  · Withdrawal from people/activities they love  · Confusion- inability to concentrate     If you or a loved one observes any of these behaviors or has concerns about self-harm, here's what you can do:  · Talk about it- your feelings and reasons for harming yourself  · Remove any means that you might use to hurt yourself (examples: pills, rope, extension cords, firearm)  · Get professional help from the community (Mental Health, Substance Abuse, psychological counseling)  · Do not be alone:Call your Safe Contact- someone whom you trust who will be there for you.  · Call your local CRISIS HOTLINE 876-2824 or 008-245-4655  · Call your local Children's Mobile Crisis Response Team [place] (887) 015-7115 or www.[employer]  · Call the toll free National Suicide Prevention Hotlines   · National Suicide Prevention Lifeline 985-511-NSWT (9854)  · [employer] Line Network 800-SUICIDE (426-7372)      Heart Failure  Heart failure means your heart has trouble pumping blood. This makes it hard for your body to work well. Heart failure is usually a long-term (chronic) condition. You must take good care of yourself and follow your doctor's treatment plan.  HOME CARE  · Take your heart medicine as told by your doctor.  ¨ Do not stop taking medicine unless your doctor tells you to.  ¨ Do not skip any dose of medicine.  ¨ Refill your medicines before they run out.  ¨ Take other medicines only as told by your doctor or pharmacist.  · Stay active if told by your doctor. The elderly and people with severe heart failure should talk with a doctor about physical activity.  · Eat heart-healthy foods. Choose foods that are without trans fat and are low in saturated fat, cholesterol, and salt (sodium). This includes fresh or frozen fruits and  vegetables, fish, lean meats, fat-free or low-fat dairy foods, whole grains, and high-fiber foods. Lentils and dried peas and beans (legumes) are also good choices.  · Limit salt if told by your doctor.  · Cook in a healthy way. Roast, grill, broil, bake, poach, steam, or stir-maravilla foods.  · Limit fluids as told by your doctor.  · Weigh yourself every morning. Do this after you pee (urinate) and before you eat breakfast. Write down your weight to give to your doctor.  · Take your blood pressure and write it down if your doctor tells you to.  · Ask your doctor how to check your pulse. Check your pulse as told.  · Lose weight if told by your doctor.  · Stop smoking or chewing tobacco. Do not use gum or patches that help you quit without your doctor's approval.  · Schedule and go to doctor visits as told.  · Nonpregnant women should have no more than 1 drink a day. Men should have no more than 2 drinks a day. Talk to your doctor about drinking alcohol.  · Stop illegal drug use.  · Stay current with shots (immunizations).  · Manage your health conditions as told by your doctor.  · Learn to manage your stress.  · Rest when you are tired.  · If it is really hot outside:  ¨ Avoid intense activities.  ¨ Use air conditioning or fans, or get in a cooler place.  ¨ Avoid caffeine and alcohol.  ¨ Wear loose-fitting, lightweight, and light-colored clothing.  · If it is really cold outside:  ¨ Avoid intense activities.  ¨ Layer your clothing.  ¨ Wear mittens or gloves, a hat, and a scarf when going outside.  ¨ Avoid alcohol.  · Learn about heart failure and get support as needed.  · Get help to maintain or improve your quality of life and your ability to care for yourself as needed.  GET HELP IF:   · You gain weight quickly.  · You are more short of breath than usual.  · You cannot do your normal activities.  · You tire easily.  · You cough more than normal, especially with activity.  · You have any or more puffiness (swelling) in  areas such as your hands, feet, ankles, or belly (abdomen).  · You cannot sleep because it is hard to breathe.  · You feel like your heart is beating fast (palpitations).  · You get dizzy or light-headed when you stand up.  GET HELP RIGHT AWAY IF:   · You have trouble breathing.  · There is a change in mental status, such as becoming less alert or not being able to focus.  · You have chest pain or discomfort.  · You faint.  MAKE SURE YOU:   · Understand these instructions.  · Will watch your condition.  · Will get help right away if you are not doing well or get worse.  This information is not intended to replace advice given to you by your health care provider. Make sure you discuss any questions you have with your health care provider.  Document Released: 09/26/2009 Document Revised: 01/08/2016 Document Reviewed: 02/03/2014  Elsevier Interactive Patient Education © 2017 [employer]

## 2019-05-17 NOTE — PROGRESS NOTES
CARDIOLOGY PROGRESS NOTE     Attending: Dr. Jason MD  Resident: Manolo Clements  PATIENT: Lucas Agee; [id number]; 1970    ID: 48 y.o. male admitted for worsening shortness of breath and acute decompensated heart failure thought to be secondary to methamphetamine use.      SUBJECTIVE:   No acute events events, states that he feels well, anxious to go home  HR elevated to 90-100s, -140/70-80  Unable to obtain MPI given positive UDS      OBJECTIVE:  Vitals:    05/17/19 0453 05/17/19 0850 05/17/19 1049 05/17/19 1141   BP: 119/81 105/59 110/75 138/74   Pulse: 92 88  98   Resp: 20 19 19   Temp: 36.1 °C (97 °F) 36.8 °C (98.2 °F)  36.7 °C (98 °F)   TempSrc: Temporal Temporal  Temporal   SpO2: 96% 90%  96%   Weight:       Height:           Intake/Output Summary (Last 24 hours) at 05/15/19 0754  Last data filed at 05/14/19 2115   Gross per 24 hour   Intake              360 ml   Output             2500 ml   Net            -2140 ml       PE:   General: No acute distress, resting comfortably in bed.  HEENT: NC/AT. EOMI. MMM  Cardiovascular: regular rhythm, tachycardic, with 1-2/6 holosystolic murmur  Respiratory: Symmetrical chest. CTAB with no adventitious breath sounds   Abdomen: soft, NT/ND, no masses, +BS   EXT:  GARCIA, 5/5 strength,1+ pitting edema B/L LE, 2+ pulses   Neuro: non focal with no numbness, tingling or changes in sensation    LABS:  Recent Labs      05/15/19   0601  05/16/19   0209  05/17/19   0212   WBC  15.4*  12.4*  12.5*   RBC  4.82  5.74  5.84   HEMOGLOBIN  16.0  18.6*  19.4*   HEMATOCRIT  46.5  57.2*  56.4*   MCV  96.5  99.7*  96.6   MCH  33.2*  32.4  33.2*   RDW  48.6  48.8  46.6   PLATELETCT  238  219  239   MPV  10.4  9.8  10.1   NEUTSPOLYS  70.70  56.00  51.10   LYMPHOCYTES  19.60*  31.40  34.00   MONOCYTES  7.60  9.00  11.20   EOSINOPHILS  1.20  2.00  2.10   BASOPHILS  0.50  1.10  1.00     Recent Labs      05/15/19   0222  05/15/19   1324  05/16/19   0209  05/17/19   0213   SODIUM   138   --   139  139   POTASSIUM  4.0   --   4.8  3.9   CHLORIDE  110   --   104  101   CO2  19*   --   22  23   BUN  16   --   24*  25*   CREATININE  1.03   --   1.35  1.31   CALCIUM  8.6   --   9.0  9.1   MAGNESIUM   --   2.1   --    --    PHOSPHORUS   --   3.7   --    --    ALBUMIN  3.6   --   3.8  3.8     Estimated GFR/CRCL = Estimated Creatinine Clearance: 75.5 mL/min (by C-G formula based on SCr of 1.31 mg/dL).  Recent Labs      05/15/19   0222  05/16/19   0209  05/17/19   0213   GLUCOSE  99  94  98     Recent Labs      05/14/19   1535  05/15/19   0222  05/16/19   0209  05/17/19   0213   ASTSGOT   --   45  37  23   ALTSGPT   --   83*  67*  54*   TBILIRUBIN   --   0.9  0.9  0.9   ALKPHOSPHAT   --   96  99  98   GLOBULIN   --   2.5  3.0  3.1   INR  1.05   --    --    --      Recent Labs      05/14/19   1436  05/14/19   1535  05/14/19   2040  05/15/19   0601   TROPONINI  0.06*  0.09*   --   0.12*   BNPBTYPENAT   --   1427*  1382*   --          Recent Labs      05/14/19   1535  05/15/19   0818   INR  1.05   --    APTT   --   25.3       IMAGING:   US-RUQ   Final Result      Negative gallbladder/right upper quadrant ultrasound      US-EXTREMITY VENOUS LOWER BILAT   Final Result      EC-ECHOCARDIOGRAM COMPLETE W/O CONT   Final Result      CT-CTA CHEST PULMONARY ARTERY W/ RECONS   Final Result      1.  Limited study due to extensive patient motion artifact as well as a poor contrast bolus. Only large main pulmonary artery emboli are excluded with this examination.      2.  Cardiomegaly.      3.  Fatty liver.      DX-CHEST-PORTABLE (1 VIEW)   Final Result      No pulmonary consolidation.            MEDS:  Current Facility-Administered Medications   Medication Last Dose   • carvedilol (COREG) tablet 25 mg     • lisinopril (PRINIVIL) tablet 20 mg 20 mg at 05/17/19 1049   • furosemide (LASIX) tablet 80 mg 80 mg at 05/17/19 0537   • nicotine (NICODERM) 14 MG/24HR 14 mg 14 mg at 05/16/19 0537   • senna-docusate (PERICOLACE  or SENOKOT S) 8.6-50 MG per tablet 2 Tab      And   • polyethylene glycol/lytes (MIRALAX) PACKET 1 Packet      And   • magnesium hydroxide (MILK OF MAGNESIA) suspension 30 mL      And   • bisacodyl (DULCOLAX) suppository 10 mg     • Respiratory Care per Protocol     • enoxaparin (LOVENOX) inj 40 mg 40 mg at 05/17/19 0537   • enalaprilat (VASOTEC) injection 1.25 mg 1.25 mg at 05/15/19 0029   • atorvastatin (LIPITOR) tablet 80 mg 80 mg at 05/16/19 1731   • aspirin (ASA) chewable tab 81 mg 81 mg at 05/17/19 0537       ASSESSMENT/PLAN:   Mr. Agee is a 47 yo M with no significant PMH with worsening SOB, found to have acute decompensated biventricular heart failure and dilated cardiomyopathy.    Acute Decompensated Bventricular Heart Failure  Dilated Cardiomyopathy with EF 15%-20% - Likely Toxic  Moderate to Severe Tricuspid Regurgitation  NSTEMI - likely Type II  Patient with severely dilated cardiomyopathy and bi-v HF, likely 2/2 heavy methamphetamine use.  Pt denies recent use within last 6-12 months, however utox positive for amphetamines.  - Continue diuresis with PO lasix  - Increase carvedilol to 25mg BID at discharge  - Continue Lisinopril 20mg daily  - Will hold spironolactone on discharge as patient has questionable compliance with medications and concern regarding follow up/lab work, consider starting as outpatient   - Cardiac diet and 2g sodium restriction  - Plan for MPI as outpatient      Methamphetamine abuse  - UDS on 5/14 positive for meth  - Extensive discussion with pt regarding amphetamine use, pt very tearful and agitated when cessation was discussed.  Denies extra help for quitting at this time.      Transaminitis  Thrombocytopenia  Anemia  - as managed by primary team      Stable for discharge from cardiology standpoint with outpatient follow up

## 2019-05-17 NOTE — DISCHARGE PLANNING
Anticipated Discharge Disposition: D/C to Home/Self-Care    Action: LSW contacted DAVE SIMMONS and requested pt's cardiac medication be sent to the [facility] for filling. DAVE SIMMONS agrees and reported pt will likely d/c today.    Barriers to Discharge: Medications.     Plan: LSW to complete approved services once pt's medications have been sent to the [facility].

## 2019-05-17 NOTE — PROGRESS NOTES
Report received, patient care assumed. Patient sleeping in bed, visible chest rise/fall. Vss. Tele monitor on. Bed low, locked position. Call light in reach.

## 2019-05-17 NOTE — PROGRESS NOTES
Patient educated on plan of care. Refusing stress test and IV access at this time. [employer] Yellow Team resident Dr Cortes notified.

## 2019-05-21 ENCOUNTER — HOSPITAL ENCOUNTER (INPATIENT)
Facility: MEDICAL CENTER | Age: 49
LOS: 3 days | DRG: 253 | End: 2019-05-24
Attending: EMERGENCY MEDICINE | Admitting: SURGERY
Payer: MEDICAID

## 2019-05-21 ENCOUNTER — APPOINTMENT (OUTPATIENT)
Dept: RADIOLOGY | Facility: MEDICAL CENTER | Age: 49
DRG: 253 | End: 2019-05-21
Attending: EMERGENCY MEDICINE
Payer: MEDICAID

## 2019-05-21 ENCOUNTER — APPOINTMENT (OUTPATIENT)
Dept: RADIOLOGY | Facility: MEDICAL CENTER | Age: 49
DRG: 253 | End: 2019-05-21
Attending: SURGERY
Payer: MEDICAID

## 2019-05-21 ENCOUNTER — ANESTHESIA EVENT (OUTPATIENT)
Dept: SURGERY | Facility: MEDICAL CENTER | Age: 49
DRG: 253 | End: 2019-05-21
Payer: MEDICAID

## 2019-05-21 ENCOUNTER — ANESTHESIA (OUTPATIENT)
Dept: SURGERY | Facility: MEDICAL CENTER | Age: 49
DRG: 253 | End: 2019-05-21
Payer: MEDICAID

## 2019-05-21 DIAGNOSIS — Z86.79 HISTORY OF CONGESTIVE HEART FAILURE: ICD-10-CM

## 2019-05-21 DIAGNOSIS — R79.89 ELEVATED TROPONIN: ICD-10-CM

## 2019-05-21 DIAGNOSIS — I70.209 ARTERIAL OCCLUSION, LOWER EXTREMITY (HCC): ICD-10-CM

## 2019-05-21 PROBLEM — E87.20 METABOLIC ACIDOSIS: Status: RESOLVED | Noted: 2019-05-14 | Resolved: 2019-05-21

## 2019-05-21 PROBLEM — R06.02 SOB (SHORTNESS OF BREATH): Status: RESOLVED | Noted: 2019-05-14 | Resolved: 2019-05-21

## 2019-05-21 PROBLEM — I95.9 HYPOTENSION: Status: ACTIVE | Noted: 2019-05-21

## 2019-05-21 PROBLEM — E87.5 HYPERKALEMIA: Status: RESOLVED | Noted: 2019-05-14 | Resolved: 2019-05-21

## 2019-05-21 PROBLEM — D69.6 THROMBOCYTOPENIA (HCC): Status: RESOLVED | Noted: 2019-05-14 | Resolved: 2019-05-21

## 2019-05-21 LAB
ALBUMIN SERPL BCP-MCNC: 4.3 G/DL (ref 3.2–4.9)
ALBUMIN/GLOB SERPL: 1.4 G/DL
ALP SERPL-CCNC: 85 U/L (ref 30–99)
ALT SERPL-CCNC: 51 U/L (ref 2–50)
ANION GAP SERPL CALC-SCNC: 10 MMOL/L (ref 0–11.9)
APTT PPP: 23.6 SEC (ref 24.7–36)
AST SERPL-CCNC: 32 U/L (ref 12–45)
BASOPHILS # BLD AUTO: 0.9 % (ref 0–1.8)
BASOPHILS # BLD: 0.13 K/UL (ref 0–0.12)
BILIRUB SERPL-MCNC: 0.5 MG/DL (ref 0.1–1.5)
BUN SERPL-MCNC: 34 MG/DL (ref 8–22)
CALCIUM SERPL-MCNC: 9 MG/DL (ref 8.5–10.5)
CHLORIDE SERPL-SCNC: 94 MMOL/L (ref 96–112)
CO2 SERPL-SCNC: 27 MMOL/L (ref 20–33)
CREAT SERPL-MCNC: 1.36 MG/DL (ref 0.5–1.4)
EKG IMPRESSION: NORMAL
EOSINOPHIL # BLD AUTO: 0.32 K/UL (ref 0–0.51)
EOSINOPHIL NFR BLD: 2.1 % (ref 0–6.9)
ERYTHROCYTE [DISTWIDTH] IN BLOOD BY AUTOMATED COUNT: 45.3 FL (ref 35.9–50)
GLOBULIN SER CALC-MCNC: 3 G/DL (ref 1.9–3.5)
GLUCOSE SERPL-MCNC: 109 MG/DL (ref 65–99)
HCT VFR BLD AUTO: 55.3 % (ref 42–52)
HGB BLD-MCNC: 18.3 G/DL (ref 14–18)
IMM GRANULOCYTES # BLD AUTO: 0.11 K/UL (ref 0–0.11)
IMM GRANULOCYTES NFR BLD AUTO: 0.7 % (ref 0–0.9)
INR PPP: 0.96 (ref 0.87–1.13)
LYMPHOCYTES # BLD AUTO: 4.81 K/UL (ref 1–4.8)
LYMPHOCYTES NFR BLD: 31.7 % (ref 22–41)
MCH RBC QN AUTO: 31.9 PG (ref 27–33)
MCHC RBC AUTO-ENTMCNC: 33.1 G/DL (ref 33.7–35.3)
MCV RBC AUTO: 96.3 FL (ref 81.4–97.8)
MONOCYTES # BLD AUTO: 1.7 K/UL (ref 0–0.85)
MONOCYTES NFR BLD AUTO: 11.2 % (ref 0–13.4)
NEUTROPHILS # BLD AUTO: 8.12 K/UL (ref 1.82–7.42)
NEUTROPHILS NFR BLD: 53.4 % (ref 44–72)
NRBC # BLD AUTO: 0 K/UL
NRBC BLD-RTO: 0 /100 WBC
PLATELET # BLD AUTO: 280 K/UL (ref 164–446)
PMV BLD AUTO: 10.1 FL (ref 9–12.9)
POTASSIUM SERPL-SCNC: 4.3 MMOL/L (ref 3.6–5.5)
PROT SERPL-MCNC: 7.3 G/DL (ref 6–8.2)
PROTHROMBIN TIME: 12.9 SEC (ref 12–14.6)
RBC # BLD AUTO: 5.74 M/UL (ref 4.7–6.1)
SODIUM SERPL-SCNC: 131 MMOL/L (ref 135–145)
TROPONIN I SERPL-MCNC: 0.05 NG/ML (ref 0–0.04)
WBC # BLD AUTO: 15.2 K/UL (ref 4.8–10.8)

## 2019-05-21 PROCEDURE — 700105 HCHG RX REV CODE 258: Performed by: ANESTHESIOLOGY

## 2019-05-21 PROCEDURE — 85025 COMPLETE CBC W/AUTO DIFF WBC: CPT

## 2019-05-21 PROCEDURE — 700117 HCHG RX CONTRAST REV CODE 255: Performed by: EMERGENCY MEDICINE

## 2019-05-21 PROCEDURE — 700105 HCHG RX REV CODE 258: Performed by: STUDENT IN AN ORGANIZED HEALTH CARE EDUCATION/TRAINING PROGRAM

## 2019-05-21 PROCEDURE — 85730 THROMBOPLASTIN TIME PARTIAL: CPT

## 2019-05-21 PROCEDURE — 160002 HCHG RECOVERY MINUTES (STAT): Performed by: SURGERY

## 2019-05-21 PROCEDURE — 160009 HCHG ANES TIME/MIN: Performed by: SURGERY

## 2019-05-21 PROCEDURE — 501837 HCHG SUTURE CV: Performed by: SURGERY

## 2019-05-21 PROCEDURE — 73706 CT ANGIO LWR EXTR W/O&W/DYE: CPT | Mod: RT

## 2019-05-21 PROCEDURE — 160028 HCHG SURGERY MINUTES - 1ST 30 MINS LEVEL 3: Performed by: SURGERY

## 2019-05-21 PROCEDURE — 04CP0ZZ EXTIRPATION OF MATTER FROM RIGHT ANTERIOR TIBIAL ARTERY, OPEN APPROACH: ICD-10-PCS | Performed by: SURGERY

## 2019-05-21 PROCEDURE — 700101 HCHG RX REV CODE 250: Performed by: ANESTHESIOLOGY

## 2019-05-21 PROCEDURE — 700111 HCHG RX REV CODE 636 W/ 250 OVERRIDE (IP)

## 2019-05-21 PROCEDURE — 770022 HCHG ROOM/CARE - ICU (200)

## 2019-05-21 PROCEDURE — 501445 HCHG STAPLER, SKIN DISP: Performed by: SURGERY

## 2019-05-21 PROCEDURE — A9270 NON-COVERED ITEM OR SERVICE: HCPCS | Performed by: ANESTHESIOLOGY

## 2019-05-21 PROCEDURE — 84484 ASSAY OF TROPONIN QUANT: CPT

## 2019-05-21 PROCEDURE — 700111 HCHG RX REV CODE 636 W/ 250 OVERRIDE (IP): Performed by: ANESTHESIOLOGY

## 2019-05-21 PROCEDURE — 501838 HCHG SUTURE GENERAL: Performed by: SURGERY

## 2019-05-21 PROCEDURE — 160048 HCHG OR STATISTICAL LEVEL 1-5: Performed by: SURGERY

## 2019-05-21 PROCEDURE — 99291 CRITICAL CARE FIRST HOUR: CPT

## 2019-05-21 PROCEDURE — 700102 HCHG RX REV CODE 250 W/ 637 OVERRIDE(OP): Performed by: ANESTHESIOLOGY

## 2019-05-21 PROCEDURE — 04CM0ZZ EXTIRPATION OF MATTER FROM RIGHT POPLITEAL ARTERY, OPEN APPROACH: ICD-10-PCS | Performed by: SURGERY

## 2019-05-21 PROCEDURE — C1757 CATH, THROMBECTOMY/EMBOLECT: HCPCS | Performed by: SURGERY

## 2019-05-21 PROCEDURE — 93005 ELECTROCARDIOGRAM TRACING: CPT | Performed by: EMERGENCY MEDICINE

## 2019-05-21 PROCEDURE — 160035 HCHG PACU - 1ST 60 MINS PHASE I: Performed by: SURGERY

## 2019-05-21 PROCEDURE — 85610 PROTHROMBIN TIME: CPT

## 2019-05-21 PROCEDURE — 160036 HCHG PACU - EA ADDL 30 MINS PHASE I: Performed by: SURGERY

## 2019-05-21 PROCEDURE — 36415 COLL VENOUS BLD VENIPUNCTURE: CPT

## 2019-05-21 PROCEDURE — 93926 LOWER EXTREMITY STUDY: CPT | Mod: RT

## 2019-05-21 PROCEDURE — 99291 CRITICAL CARE FIRST HOUR: CPT | Performed by: INTERNAL MEDICINE

## 2019-05-21 PROCEDURE — 80053 COMPREHEN METABOLIC PANEL: CPT

## 2019-05-21 PROCEDURE — 160039 HCHG SURGERY MINUTES - EA ADDL 1 MIN LEVEL 3: Performed by: SURGERY

## 2019-05-21 RX ORDER — DIPHENHYDRAMINE HYDROCHLORIDE 50 MG/ML
12.5 INJECTION INTRAMUSCULAR; INTRAVENOUS
Status: DISCONTINUED | OUTPATIENT
Start: 2019-05-21 | End: 2019-05-21 | Stop reason: HOSPADM

## 2019-05-21 RX ORDER — SODIUM CHLORIDE, SODIUM LACTATE, POTASSIUM CHLORIDE, CALCIUM CHLORIDE 600; 310; 30; 20 MG/100ML; MG/100ML; MG/100ML; MG/100ML
INJECTION, SOLUTION INTRAVENOUS
Status: DISCONTINUED | OUTPATIENT
Start: 2019-05-21 | End: 2019-05-21 | Stop reason: SURG

## 2019-05-21 RX ORDER — CELECOXIB 200 MG/1
200 CAPSULE ORAL 2 TIMES DAILY
Status: DISCONTINUED | OUTPATIENT
Start: 2019-05-22 | End: 2019-05-24 | Stop reason: HOSPADM

## 2019-05-21 RX ORDER — ACETAMINOPHEN 500 MG
1000 TABLET ORAL EVERY 6 HOURS
Status: DISCONTINUED | OUTPATIENT
Start: 2019-05-22 | End: 2019-05-23

## 2019-05-21 RX ORDER — OXYCODONE HYDROCHLORIDE 5 MG/1
5 TABLET ORAL
Status: DISCONTINUED | OUTPATIENT
Start: 2019-05-21 | End: 2019-05-23

## 2019-05-21 RX ORDER — IODIXANOL 270 MG/ML
INJECTION, SOLUTION INTRAVASCULAR
Status: DISCONTINUED | OUTPATIENT
Start: 2019-05-21 | End: 2019-05-21 | Stop reason: HOSPADM

## 2019-05-21 RX ORDER — DIPHENHYDRAMINE HYDROCHLORIDE 50 MG/ML
25 INJECTION INTRAMUSCULAR; INTRAVENOUS EVERY 6 HOURS PRN
Status: DISCONTINUED | OUTPATIENT
Start: 2019-05-21 | End: 2019-05-23

## 2019-05-21 RX ORDER — HEPARIN SODIUM,PORCINE 1000/ML
VIAL (ML) INJECTION PRN
Status: DISCONTINUED | OUTPATIENT
Start: 2019-05-21 | End: 2019-05-21 | Stop reason: SURG

## 2019-05-21 RX ORDER — HALOPERIDOL 5 MG/ML
1 INJECTION INTRAMUSCULAR
Status: DISCONTINUED | OUTPATIENT
Start: 2019-05-21 | End: 2019-05-21 | Stop reason: HOSPADM

## 2019-05-21 RX ORDER — CEFAZOLIN SODIUM 1 G/3ML
INJECTION, POWDER, FOR SOLUTION INTRAMUSCULAR; INTRAVENOUS PRN
Status: DISCONTINUED | OUTPATIENT
Start: 2019-05-21 | End: 2019-05-21 | Stop reason: SURG

## 2019-05-21 RX ORDER — ONDANSETRON 2 MG/ML
4 INJECTION INTRAMUSCULAR; INTRAVENOUS EVERY 4 HOURS PRN
Status: DISCONTINUED | OUTPATIENT
Start: 2019-05-21 | End: 2019-05-23

## 2019-05-21 RX ORDER — BUPIVACAINE HYDROCHLORIDE AND EPINEPHRINE 5; 5 MG/ML; UG/ML
INJECTION, SOLUTION EPIDURAL; INTRACAUDAL; PERINEURAL
Status: DISCONTINUED | OUTPATIENT
Start: 2019-05-21 | End: 2019-05-21 | Stop reason: HOSPADM

## 2019-05-21 RX ORDER — HEPARIN SODIUM 1000 [USP'U]/ML
3200 INJECTION, SOLUTION INTRAVENOUS; SUBCUTANEOUS PRN
Status: DISCONTINUED | OUTPATIENT
Start: 2019-05-21 | End: 2019-05-24 | Stop reason: HOSPADM

## 2019-05-21 RX ORDER — PHENYLEPHRINE HYDROCHLORIDE 10 MG/ML
INJECTION, SOLUTION INTRAMUSCULAR; INTRAVENOUS; SUBCUTANEOUS PRN
Status: DISCONTINUED | OUTPATIENT
Start: 2019-05-21 | End: 2019-05-21 | Stop reason: SURG

## 2019-05-21 RX ORDER — CARVEDILOL 25 MG/1
25 TABLET ORAL 2 TIMES DAILY
COMMUNITY
End: 2019-07-18 | Stop reason: SDUPTHER

## 2019-05-21 RX ORDER — EPINEPHRINE 1 MG/ML(1)
AMPUL (ML) INJECTION PRN
Status: DISCONTINUED | OUTPATIENT
Start: 2019-05-21 | End: 2019-05-21 | Stop reason: SURG

## 2019-05-21 RX ORDER — SCOLOPAMINE TRANSDERMAL SYSTEM 1 MG/1
1 PATCH, EXTENDED RELEASE TRANSDERMAL
Status: DISCONTINUED | OUTPATIENT
Start: 2019-05-21 | End: 2019-05-23

## 2019-05-21 RX ORDER — ONDANSETRON 2 MG/ML
4 INJECTION INTRAMUSCULAR; INTRAVENOUS
Status: DISCONTINUED | OUTPATIENT
Start: 2019-05-21 | End: 2019-05-21 | Stop reason: HOSPADM

## 2019-05-21 RX ORDER — SODIUM CHLORIDE 9 MG/ML
500 INJECTION, SOLUTION INTRAVENOUS ONCE
Status: COMPLETED | OUTPATIENT
Start: 2019-05-21 | End: 2019-05-21

## 2019-05-21 RX ADMIN — ROCURONIUM BROMIDE 50 MG: 10 INJECTION, SOLUTION INTRAVENOUS at 19:54

## 2019-05-21 RX ADMIN — CEFAZOLIN 2 G: 330 INJECTION, POWDER, FOR SOLUTION INTRAMUSCULAR; INTRAVENOUS at 19:50

## 2019-05-21 RX ADMIN — LIDOCAINE HYDROCHLORIDE 100 MG: 20 INJECTION, SOLUTION INTRAVENOUS at 19:54

## 2019-05-21 RX ADMIN — HEPARIN SODIUM 7000 UNITS: 1000 INJECTION, SOLUTION INTRAVENOUS; SUBCUTANEOUS at 20:17

## 2019-05-21 RX ADMIN — PHENYLEPHRINE HYDROCHLORIDE 100 MCG: 10 INJECTION INTRAVENOUS at 21:00

## 2019-05-21 RX ADMIN — EPINEPHRINE 100 MCG: 1 INJECTION INTRAMUSCULAR; INTRAVENOUS; SUBCUTANEOUS at 20:08

## 2019-05-21 RX ADMIN — PHENYLEPHRINE HYDROCHLORIDE 100 MCG/MIN: 10 INJECTION INTRAVENOUS at 22:52

## 2019-05-21 RX ADMIN — EPHEDRINE SULFATE 25 MG: 50 INJECTION INTRAMUSCULAR; INTRAVENOUS; SUBCUTANEOUS at 20:00

## 2019-05-21 RX ADMIN — PHENYLEPHRINE HYDROCHLORIDE 100 MCG: 10 INJECTION INTRAVENOUS at 19:55

## 2019-05-21 RX ADMIN — EPINEPHRINE 50 MCG: 1 INJECTION INTRAMUSCULAR; INTRAVENOUS; SUBCUTANEOUS at 20:15

## 2019-05-21 RX ADMIN — HYDROCODONE BITARTRATE AND ACETAMINOPHEN 30 ML: 7.5; 325 SOLUTION ORAL at 22:55

## 2019-05-21 RX ADMIN — ROCURONIUM BROMIDE 20 MG: 10 INJECTION, SOLUTION INTRAVENOUS at 20:45

## 2019-05-21 RX ADMIN — MIDAZOLAM HYDROCHLORIDE 2 MG: 1 INJECTION, SOLUTION INTRAMUSCULAR; INTRAVENOUS at 20:10

## 2019-05-21 RX ADMIN — PHENYLEPHRINE HYDROCHLORIDE 100 MCG: 10 INJECTION INTRAVENOUS at 21:30

## 2019-05-21 RX ADMIN — PHENYLEPHRINE HYDROCHLORIDE 100 MCG: 10 INJECTION INTRAVENOUS at 20:50

## 2019-05-21 RX ADMIN — FENTANYL CITRATE 25 MCG: 50 INJECTION, SOLUTION INTRAMUSCULAR; INTRAVENOUS at 22:20

## 2019-05-21 RX ADMIN — PHENYLEPHRINE HYDROCHLORIDE 100 MCG: 10 INJECTION INTRAVENOUS at 21:20

## 2019-05-21 RX ADMIN — PROPOFOL 50 MG: 10 INJECTION, EMULSION INTRAVENOUS at 20:20

## 2019-05-21 RX ADMIN — IOHEXOL 100 ML: 350 INJECTION, SOLUTION INTRAVENOUS at 18:45

## 2019-05-21 RX ADMIN — FENTANYL CITRATE 25 MCG: 50 INJECTION INTRAMUSCULAR; INTRAVENOUS at 22:20

## 2019-05-21 RX ADMIN — EPHEDRINE SULFATE 25 MG: 50 INJECTION INTRAMUSCULAR; INTRAVENOUS; SUBCUTANEOUS at 19:55

## 2019-05-21 RX ADMIN — PROPOFOL 100 MG: 10 INJECTION, EMULSION INTRAVENOUS at 19:54

## 2019-05-21 RX ADMIN — SUGAMMADEX 200 MG: 100 INJECTION, SOLUTION INTRAVENOUS at 21:46

## 2019-05-21 RX ADMIN — FENTANYL CITRATE 25 MCG: 50 INJECTION, SOLUTION INTRAMUSCULAR; INTRAVENOUS at 23:00

## 2019-05-21 RX ADMIN — SODIUM CHLORIDE, POTASSIUM CHLORIDE, SODIUM LACTATE AND CALCIUM CHLORIDE: 600; 310; 30; 20 INJECTION, SOLUTION INTRAVENOUS at 19:50

## 2019-05-21 RX ADMIN — FENTANYL CITRATE 25 MCG: 50 INJECTION, SOLUTION INTRAMUSCULAR; INTRAVENOUS at 22:50

## 2019-05-21 RX ADMIN — SODIUM CHLORIDE 500 ML: 9 INJECTION, SOLUTION INTRAVENOUS at 22:50

## 2019-05-21 RX ADMIN — PHENYLEPHRINE HYDROCHLORIDE 100 MCG: 10 INJECTION INTRAVENOUS at 20:52

## 2019-05-21 RX ADMIN — FENTANYL CITRATE 25 MCG: 50 INJECTION, SOLUTION INTRAMUSCULAR; INTRAVENOUS at 23:15

## 2019-05-21 NOTE — ED PROVIDER NOTES
ED Provider Note    Scribed for Olivia Singh M.D. by Ariel Bradford. 5/21/2019  4:09 PM    Means of arrival: Walk in  History obtained from: Patient  History limited by: None      CHIEF COMPLAINT  Chief Complaint   Patient presents with   • Numbness       HPI  Lucas Agee is a 48 y.o. 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 When the patient woke up from a nap, he had numbness from the mid right shin down. The patient reports that he felt at baseline prior to his nap. He endorses slight ankle pain, but denies any chest pain, shortness of breath, fever, emesis, diarrhea, abdominal pain, weakness, other numbness, and altered speech. He denies any trauma to the foot. The patient reports that his heart is only working at 10-15%. The patient is on baby aspirin, but no other blood thinners. The patient has been taking his medications as prescribed, aside from his blood pressure medication, which he does not take at night because it drops too low when he sleeps. He denies any allergies to medications.    REVIEW OF SYSTEMS  Pertinent positive include numbness and ankle pain. Pertinent negative include chest pain, shortness of breath, fever, emesis, diarrhea, abdominal pain, weakness, other numbness, and altered speech. All other systems reviewed and are negative.      PAST MEDICAL HISTORY  History of CHF    SOCIAL HISTORY  Social History     Social History Main Topics   • Smoking status: Current Every Day Smoker     Packs/day: 0.50     Types: Cigarettes   • Smokeless tobacco: Never Used   • Alcohol use No   • Drug use: Yes     Types: Inhaled      Comment: marijuana, hx of meth       SURGICAL HISTORY   has a past surgical history that includes hernia repair; hernia repair (Right, 1990); and hand surgery (Right, 1995).    CURRENT MEDICATIONS  No current  "facility-administered medications for this encounter.     ALLERGIES  No Known Allergies    PHYSICAL EXAM   VITAL SIGNS: /70   Pulse 93   Temp 36.4 °C (97.6 °F) (Temporal)   Resp 16   Ht 1.753 m (5' 9\")   Wt 88.7 kg (195 lb 8.8 oz)   SpO2 94%   BMI 28.88 kg/m²    Constitutional: Nontoxic-appearing middle-age male, Alert in no apparent distress.  HENT: Normocephalic, Atraumatic. Bilateral external ears normal. Nose normal.  Moist mucous membranes.  Oropharynx clear.  Eyes: Pupils are equal and reactive. Conjunctiva normal.   Neck: Supple, full range of motion  Heart: Regular rate and rhythm.  No murmurs.    Lungs: No respiratory distress, normal work of breathing. Lungs clear to auscultation bilaterally.  Abdomen Soft, no distention.  No tenderness to palpation.  Musculoskeletal: Atraumatic. No obvious deformities noted.  No lower extremity edema.  Extremities: Right foot is cool to the touch, Able to Doppler popliteal pulse on the right, Unable to doppler DP or PT on the right. Doppler pulses obtained in left DP  Skin: Dry.  No erythema, No rash.   Neurologic: Alert and oriented x3. Moving all extremities spontaneously without focal deficits. Intact motor and sensation to bilateral lower extremities however with reported parasthesias to the right foot.  Psychiatric: Affect normal, Mood normal, Appears appropriate and not intoxicated.      DIAGNOSTIC STUDIES    EKG  Results for orders placed or performed during the hospital encounter of 19   EKG (NOW)   Result Value Ref Range    Report       [facility]    Test Date:  2019  Pt Name:    EDMUNDO OROPEZA                 Department: ER  MRN:        [id number]                      Room:       ORTHO  Gender:     Male                         Technician: [id number]  :        1970                   Requested By:OLIVIA BARILLAS  Order #:    [id number]                    Reading MD: Olivia Barillas, " MD    Measurements  Intervals                                Axis  Rate:       84                           P:          70  DE:         140                          QRS:        29  QRSD:       102                          T:          145  QT:         404  QTc:        478    Interpretive Statements  SINUS RHYTHM  PROBABLE LVH WITH SECONDARY REPOL ABNRM  BORDERLINE PROLONGED QT INTERVAL  T wave inversions in lateral leads  No ST change or STEMI  Compared to ECG 05/16/2019 14:52:53  No significant change from prior    Electronically Signed On 5- 18:19:44 PDT by Olivia Singh MD           LABS  Personally reviewed by me  Labs Reviewed   CBC WITH DIFFERENTIAL - Abnormal; Notable for the following:        Result Value    WBC 15.2 (*)     Hemoglobin 18.3 (*)     Hematocrit 55.3 (*)     MCHC 33.1 (*)     Neutrophils (Absolute) 8.12 (*)     Lymphs (Absolute) 4.81 (*)     Monos (Absolute) 1.70 (*)     Baso (Absolute) 0.13 (*)     All other components within normal limits    Narrative:     Indicate which anticoagulants the patient is on:->UNKNOWN   COMP METABOLIC PANEL - Abnormal; Notable for the following:     Sodium 131 (*)     Chloride 94 (*)     Glucose 109 (*)     Bun 34 (*)     ALT(SGPT) 51 (*)     All other components within normal limits    Narrative:     Indicate which anticoagulants the patient is on:->UNKNOWN   TROPONIN - Abnormal; Notable for the following:     Troponin I 0.05 (*)     All other components within normal limits    Narrative:     Indicate which anticoagulants the patient is on:->UNKNOWN   APTT - Abnormal; Notable for the following:     APTT 23.6 (*)     All other components within normal limits    Narrative:     Indicate which anticoagulants the patient is on:->UNKNOWN   ESTIMATED GFR - Abnormal; Notable for the following:     GFR If Non  56 (*)     All other components within normal limits    Narrative:     Indicate which anticoagulants the patient is on:->UNKNOWN    PROTHROMBIN TIME    Narrative:     Indicate which anticoagulants the patient is on:->UNKNOWN         RADIOLOGY  Personally reviewed by me  DX-PORTABLE FLUORO > 1 HOUR   Final Result      Fluoroscopic image(s) obtained during right lower extremity angiography. Please see the patient's chart for full procedural details.      Fluoroscopy time 6 seconds.         CT-CTA LOWER EXT WITH & W/O-POST PROCESS RIGHT   Final Result      1.  Abrupt arterial cutoff of the tibial peroneal trunk just after its origin, the anterior tibial artery in the distal leg and the proximal profunda femoral artery. These findings are worrisome for a central embolic source given the degree of    atherosclerotic change is relatively mild      2.  Moderate atherosclerotic plaque involving the right common femoral greater than superficial femoral arteries with some positive remodeling but no significant stenosis      US-EXTREMITY ARTERY LOWER UNILAT RIGHT   Final Result          ED COURSE  Vitals:    05/21/19 1700 05/21/19 1900 05/21/19 1929 05/21/19 1930   BP: (!) 98/68   101/76   Pulse: 88 94  86   Resp: 16 16  17   Temp:   36.9 °C (98.4 °F)    TempSrc:   Temporal    SpO2: 91% 97%  94%   Weight:       Height:             Medications administered:  Medications   iohexol (OMNIPAQUE) 350 mg/mL (100 mL Intravenous Given 5/21/19 1845)         Old records personally reviewed:  Admitted on 5/14/19 and diagnosed with non ischemic dilated cardiomyopathy secondary to methamphetamine abuse. An echocardiogram was done showing an EF of 15-20% and global hypokinesis.    4:09 PM Patient seen and examined at bedside. The patient presents with right lower extremity numbness. Ordered for US-Extremity artery lower right, CT-CTA lower extremity, CBC, CMP, troponin, INR aPTT, and EKG to evaluate.     MEDICAL DECISION MAKING  Patient with recent diagnosis of nonischemic cardiomyopathy with an EF of 10 to 15%, thought to be due to methamphetamine abuse who now  presents with acute onset of numbness to the right lower extremity.  Patient with reassuring vitals on arrival.  He has no other focal neurologic deficits concerning for CVA, intracranial hemorrhage or mass.  Foot is cool to the touch and pulses are unable to be obtained.  Vascular surgery was emergently consulted and imaging was performed demonstrating concern for acute arterial occlusion.  He has no history of trauma to the lower extremity.  Labs show evidence of leukocytosis and borderline elevated troponin which is consistent with prior.  EKG is unchanged.  Patient is not having any symptoms concerning for ACS or pulmonary embolism.    7:02 PM I reevaluated the patient and he was resting comfortably. I informed him of his lab results and imaging results. I informed the patient that he will need to go to surgery due to a blood clot. The patient understands and agrees to admission.    7:06 PM Dr. Mccartney, Vascular Surgery was updated on the results.  She was at bedside evaluating the patient for emergent thrombectomy.    7:32 PM [facility] consulted and agrees to admit the patient after surgery.    CRITICAL CARE TIME  Upon my evaluation, this patient had a high probability of imminent or life-threatening deterioration due to acute limb ischemia due to arterial embolism necessitating emergent thrombectomy which required my direct attention, intervention, and personal management.     I personally provided 36 minutes of total critical care time outside of time spent on separately billable/documented procedures. Time includes: review of laboratory data, review of radiology studies, discussion with consultants, discussion with family/patient, monitoring for potential decompensation.  Interventions were performed as documented above.       DISPOSITION:  Patient will be admitted to [facility] in guarded condition.    IMPRESSION  (I70.209) Arterial occlusion, lower extremity (HCC)  (Z86.79) History of congestive heart  failure  (R74.8) Elevated troponin    Critical care time of 36 minutes.    Results, diagnoses, and treatment options were discussed with the patient and/or family. Patient verbalized understanding of plan of care.    Current Discharge Medication List               Ariel ANDERSON (Scribe), am scribing for, and in the presence of, Olivia Singh M.D..    Electronically signed by: Ariel Bradford (Scribe), 5/21/2019    IOlivia M.D. personally performed the services described in this documentation, as scribed by Ariel Bradford in my presence, and it is both accurate and complete. C    The note accurately reflects work and decisions made by me.  Olivia Singh  5/21/2019  11:54 PM

## 2019-05-21 NOTE — ED TRIAGE NOTES
Pt ambulatory to triage, pt c/o right foot numbness started 1 hr ago. Pt was recently dc'd from the hospital for heart problems. Charge RN aware of pt

## 2019-05-21 NOTE — PROGRESS NOTES
Called for stroke alert code  At the time of my arrival patient tells me he had LKW at 3 pm he went to have a nap and woke up with R foot numbness pain, and cold   For which stroke code was activated.  At the time of my evaluation his R foot was pale, cold to touch, and with barely palpable any pulses.  Therefore this is embolism to the leg, or R arterial occlusion to the leg and NO STROKE.  Stroke was cancelled.  This was explained to patient and to the ER.

## 2019-05-21 NOTE — ED NOTES
"Pt w/c to ORT 2. Pt changed into gown and placed on monitor.   Agree with triage note. Pt's RIGHT foot cold to touch. NO pulses felt upon palpation. NO pulses heard with doppler. Pt denies pain upon palpation. Pt reports \"pins and needle\" in triage, but states it has decreased since arriving in assigned room.    "

## 2019-05-22 ENCOUNTER — APPOINTMENT (OUTPATIENT)
Dept: CARDIOLOGY | Facility: MEDICAL CENTER | Age: 49
DRG: 253 | End: 2019-05-22
Attending: STUDENT IN AN ORGANIZED HEALTH CARE EDUCATION/TRAINING PROGRAM
Payer: MEDICAID

## 2019-05-22 PROBLEM — D64.9 ANEMIA: Status: RESOLVED | Noted: 2019-05-14 | Resolved: 2019-05-22

## 2019-05-22 PROBLEM — I50.22 CHRONIC SYSTOLIC HEART FAILURE (HCC): Status: ACTIVE | Noted: 2019-05-15

## 2019-05-22 PROBLEM — I74.3 ARTERIAL EMBOLISM AND THROMBOSIS OF LOWER EXTREMITY (HCC): Status: ACTIVE | Noted: 2019-05-22

## 2019-05-22 LAB
ANION GAP SERPL CALC-SCNC: 10 MMOL/L (ref 0–11.9)
APTT PPP: 22.8 SEC (ref 24.7–36)
APTT PPP: 24.7 SEC (ref 24.7–36)
APTT PPP: 29.3 SEC (ref 24.7–36)
APTT PPP: 59.7 SEC (ref 24.7–36)
BASOPHILS # BLD AUTO: 0.5 % (ref 0–1.8)
BASOPHILS # BLD: 0.08 K/UL (ref 0–0.12)
BUN SERPL-MCNC: 30 MG/DL (ref 8–22)
CALCIUM SERPL-MCNC: 8.2 MG/DL (ref 8.5–10.5)
CHLORIDE SERPL-SCNC: 101 MMOL/L (ref 96–112)
CO2 SERPL-SCNC: 22 MMOL/L (ref 20–33)
CREAT SERPL-MCNC: 1.23 MG/DL (ref 0.5–1.4)
EOSINOPHIL # BLD AUTO: 0.23 K/UL (ref 0–0.51)
EOSINOPHIL NFR BLD: 1.6 % (ref 0–6.9)
ERYTHROCYTE [DISTWIDTH] IN BLOOD BY AUTOMATED COUNT: 45.2 FL (ref 35.9–50)
GLUCOSE SERPL-MCNC: 159 MG/DL (ref 65–99)
HCT VFR BLD AUTO: 47 % (ref 42–52)
HGB BLD-MCNC: 15.6 G/DL (ref 14–18)
IMM GRANULOCYTES # BLD AUTO: 0.08 K/UL (ref 0–0.11)
IMM GRANULOCYTES NFR BLD AUTO: 0.5 % (ref 0–0.9)
INR PPP: 1.06 (ref 0.87–1.13)
LV EJECT FRACT MOD 2C 99903: 10.8
LV EJECT FRACT MOD 4C 99902: 24.38
LV EJECT FRACT MOD BP 99901: 20.85
LYMPHOCYTES # BLD AUTO: 4.09 K/UL (ref 1–4.8)
LYMPHOCYTES NFR BLD: 27.7 % (ref 22–41)
MCH RBC QN AUTO: 32 PG (ref 27–33)
MCHC RBC AUTO-ENTMCNC: 33.2 G/DL (ref 33.7–35.3)
MCV RBC AUTO: 96.3 FL (ref 81.4–97.8)
MONOCYTES # BLD AUTO: 1.22 K/UL (ref 0–0.85)
MONOCYTES NFR BLD AUTO: 8.3 % (ref 0–13.4)
NEUTROPHILS # BLD AUTO: 9.07 K/UL (ref 1.82–7.42)
NEUTROPHILS NFR BLD: 61.4 % (ref 44–72)
NRBC # BLD AUTO: 0 K/UL
NRBC BLD-RTO: 0 /100 WBC
PLATELET # BLD AUTO: 233 K/UL (ref 164–446)
PLATELET # BLD AUTO: 255 K/UL (ref 164–446)
PMV BLD AUTO: 10.2 FL (ref 9–12.9)
POTASSIUM SERPL-SCNC: 4.3 MMOL/L (ref 3.6–5.5)
PROTHROMBIN TIME: 13.9 SEC (ref 12–14.6)
RBC # BLD AUTO: 4.88 M/UL (ref 4.7–6.1)
SODIUM SERPL-SCNC: 133 MMOL/L (ref 135–145)
TROPONIN I SERPL-MCNC: 0.24 NG/ML (ref 0–0.04)
WBC # BLD AUTO: 14.8 K/UL (ref 4.8–10.8)

## 2019-05-22 PROCEDURE — 36415 COLL VENOUS BLD VENIPUNCTURE: CPT

## 2019-05-22 PROCEDURE — 85049 AUTOMATED PLATELET COUNT: CPT

## 2019-05-22 PROCEDURE — 700102 HCHG RX REV CODE 250 W/ 637 OVERRIDE(OP): Performed by: SURGERY

## 2019-05-22 PROCEDURE — A9270 NON-COVERED ITEM OR SERVICE: HCPCS | Performed by: SURGERY

## 2019-05-22 PROCEDURE — 770006 HCHG ROOM/CARE - MED/SURG/GYN SEMI*

## 2019-05-22 PROCEDURE — 85730 THROMBOPLASTIN TIME PARTIAL: CPT

## 2019-05-22 PROCEDURE — 700117 HCHG RX CONTRAST REV CODE 255: Performed by: STUDENT IN AN ORGANIZED HEALTH CARE EDUCATION/TRAINING PROGRAM

## 2019-05-22 PROCEDURE — 99233 SBSQ HOSP IP/OBS HIGH 50: CPT | Performed by: INTERNAL MEDICINE

## 2019-05-22 PROCEDURE — 93325 DOPPLER ECHO COLOR FLOW MAPG: CPT

## 2019-05-22 PROCEDURE — 700111 HCHG RX REV CODE 636 W/ 250 OVERRIDE (IP): Performed by: SURGERY

## 2019-05-22 PROCEDURE — 3E043XZ INTRODUCTION OF VASOPRESSOR INTO CENTRAL VEIN, PERCUTANEOUS APPROACH: ICD-10-PCS | Performed by: INTERNAL MEDICINE

## 2019-05-22 PROCEDURE — 84484 ASSAY OF TROPONIN QUANT: CPT

## 2019-05-22 PROCEDURE — 80048 BASIC METABOLIC PNL TOTAL CA: CPT

## 2019-05-22 PROCEDURE — 85610 PROTHROMBIN TIME: CPT

## 2019-05-22 PROCEDURE — 85025 COMPLETE CBC W/AUTO DIFF WBC: CPT

## 2019-05-22 RX ADMIN — HEPARIN SODIUM 1200 UNITS/HR: 5000 INJECTION, SOLUTION INTRAVENOUS at 00:23

## 2019-05-22 RX ADMIN — ACETAMINOPHEN 1000 MG: 500 TABLET ORAL at 12:11

## 2019-05-22 RX ADMIN — ACETAMINOPHEN 1000 MG: 500 TABLET ORAL at 17:42

## 2019-05-22 RX ADMIN — HEPARIN SODIUM 3200 UNITS: 1000 INJECTION, SOLUTION INTRAVENOUS; SUBCUTANEOUS at 13:29

## 2019-05-22 RX ADMIN — ACETAMINOPHEN 1000 MG: 500 TABLET ORAL at 05:24

## 2019-05-22 RX ADMIN — CELECOXIB 200 MG: 200 CAPSULE ORAL at 17:42

## 2019-05-22 RX ADMIN — HUMAN ALBUMIN MICROSPHERES AND PERFLUTREN 3 ML: 10; .22 INJECTION, SOLUTION INTRAVENOUS at 15:00

## 2019-05-22 RX ADMIN — HEPARIN SODIUM 3200 UNITS: 1000 INJECTION, SOLUTION INTRAVENOUS; SUBCUTANEOUS at 06:02

## 2019-05-22 RX ADMIN — CELECOXIB 200 MG: 200 CAPSULE ORAL at 05:24

## 2019-05-22 RX ADMIN — ACETAMINOPHEN 1000 MG: 500 TABLET ORAL at 00:19

## 2019-05-22 RX ADMIN — HEPARIN SODIUM 1700 UNITS/HR: 5000 INJECTION, SOLUTION INTRAVENOUS at 19:34

## 2019-05-22 ASSESSMENT — ENCOUNTER SYMPTOMS
CHILLS: 0
DIZZINESS: 0
CLAUDICATION: 1
BRUISES/BLEEDS EASILY: 0
SPEECH CHANGE: 0
DIARRHEA: 0
VOMITING: 0
SENSORY CHANGE: 0
FLANK PAIN: 0
PALPITATIONS: 0
NERVOUS/ANXIOUS: 0
DIAPHORESIS: 0
NECK PAIN: 0
EYES NEGATIVE: 1
FEVER: 0
HEADACHES: 0
DEPRESSION: 0
NAUSEA: 0
ABDOMINAL PAIN: 0
FOCAL WEAKNESS: 0
COUGH: 0
SORE THROAT: 0
SPUTUM PRODUCTION: 0
BACK PAIN: 0
SHORTNESS OF BREATH: 0
BLURRED VISION: 0

## 2019-05-22 ASSESSMENT — LIFESTYLE VARIABLES
EVER_SMOKED: YES
SUBSTANCE_ABUSE: 0

## 2019-05-22 ASSESSMENT — PAIN SCALES - GENERAL: PAIN_LEVEL: 3

## 2019-05-22 NOTE — ASSESSMENT & PLAN NOTE
-Extensive arterial thrombosis / arterial stasis noted on CTA RLE, US arterial Doppler RLE.  -S/p R anterior tibial artery thrombectomy 5/21/2019 with restoration of perfusion.  -Repeat echo with contrast did not show thrombus, no evidence of shunt, no h/o atrial fibrillation.  Likely cardiac emboli in setting of severe cardiac dysfunction, but cannot rule out underlying hypercoagulopathy given family h/o.    Plan:  -Anticoagulation:  Hospitalized, on heparin.    Warfarin started 5/23.  No NOAC at this time due to medicaid.   Hospital paying for SC Lovenox and Warfarin.  Patient discharged with Lovenox and warfarin.  Lovenox to continue till AC clinic.  He has AC clinic visit 4 days following discharge.  He will need trial warfarin before DOAC.  Will need minimum 3 months anticoagulation therapy pending antiphospholipid syndrome work-up.  -Optimize cardiac status - cardiac meds restarted.  -Continued substance cessation counseling.

## 2019-05-22 NOTE — ASSESSMENT & PLAN NOTE
Without acute decompensation at this time, monitor closely  Monitor pulmonary status and lower extremity swelling  Hep-Lock IV fluids, monitor p.o. intake  Unable to tolerate beta-blockade nor ACE inhibitor at this time in the setting of shock, resume when clinically appropriate, BP improved  Resume forced diuresis later today or tomorrow as clinically appropriate and as needed

## 2019-05-22 NOTE — ASSESSMENT & PLAN NOTE
Avoid hepatotoxins, hepatically dose medicines as clinically appropriate  Serial CMP and monitor hepatic synthetic function

## 2019-05-22 NOTE — ANESTHESIA QCDR
2019 [facility] Clinical Data Registry (for Quality Improvement)     Postoperative nausea/vomiting risk protocol (Adult = 18 yrs and Pediatric 3-17 yrs)- (430 and 463)  General inhalation anesthetic (NOT TIVA) with PONV risk factors: Yes  Provision of anti-emetic therapy with at least 2 different classes of agents: Yes   Patient DID NOT receive anti-emetic therapy and reason is documented in Medical Record:  N/A    Multimodal Pain Management- (AQI59)  Patient undergoing Elective Surgery (i.e. Outpatient, or ASC, or Prescheduled Surgery prior to Hospital Admission): No  Use of Multimodal Pain Management, two or more drugs and/or interventions, NOT including systemic opioids: N/A  Exception: Documented allergy to multiple classes of analgesics: N/A    PACU assessment of acute postoperative pain prior to Anesthesia Care End- Applies to Patients Age = 18- (ABG7)  Initial PACU pain score is which of the following: < 7/10  Patient unable to report pain score: N/A    Post-anesthetic transfer of care checklist/protocol to PACU/ICU- (426 and 427)  Upon conclusion of case, patient transferred to which of the following locations: PACU/Non-ICU  Use of transfer checklist/protocol: Yes  Exclusion: Service Performed in Patient Hospital Room (and thus did not require transfer): N/A    PACU Reintubation- (AQI31)  General anesthesia requiring endotracheal intubation (ETT) along with subsequent extubation in OR or PACU: Yes  Required reintubation in the PACU: No   Extubation was a planned trial documented in the medical record prior to removal of the original airway device:  N/A    Unplanned admission to ICU related to anesthesia service up through end of PACU care- (MD51)  Unplanned admission to ICU (not initially anticipated at anesthesia start time): No

## 2019-05-22 NOTE — CONSULTS
Vascular Consult Note:    Deidra Dominguez MD  Date & Time note created:    5/21/2019   7:22 PM     Referred by: Inga Singh MD    Patient ID:   Name:             Lucas Agee   YOB: 1970  Age:                 48 y.o.  male   MRN:               [id number]                                                             Reason for Consult:      Numb right foot    History of Present Illness:    Lucas Agee is a 48-year-old gentleman who presents with a numb right foot.  He was recently hospitalized and treated for congestive heart failure where an ejection fraction of 10 to 15% was identified, presumably due to drug abuse.  Today, he awoke from a nap midafternoon, likely around 2 PM and experienced pin and needles to the right foot.  He walked into the emergency room.  He denies any palpitations and has no shortness of breath or chest pain today.    Mr. Agee's significant other is at the bedside and relates that he has not been tolerating his antihypertensive medicines at home.  She has the impression that this is what caused today's event.    Review of Systems:      Constitutional: Denies fevers, denies weight changes  Eyes: Denies changes in vision, no eye pain  Ears/Nose/Throat/Mouth: Denies nasal congestion or sore throat   Cardiovascular: Denies chest pain or  palpitations   Respiratory: Denies shortness of breath , Denies cough  Gastrointestinal/Hepatic: Denies abdominal pain, nausea, vomiting, diarrhea, constipation or GI bleeding   Genitourinary: Denies dysuria or frequency  Musculoskeletal/Rheum: Denies  joint pain and swelling, no edema, right foot pain with walking  Skin: No rash, no history of open wounds  Neurological: Denies headache, confusion, memory loss or focal weakness/parasthesias  Psychiatric: Denies mood disorder   Endocrine: Denies thyroid problems  Heme/Oncology/Lymph Nodes: Denies enlarged lymph nodes, denies brusing or known bleeding disorder  All other systems were  "reviewed and are negative (AMA/CMS criteria)                Past Medical History:   Past Medical History:   Diagnosis Date   • Congestive heart failure (HCC)    • Hypertension        Past Surgical History:  Past Surgical History:   Procedure Laterality Date   • HAND SURGERY Right 1995    \"metal plate placed\"   • HERNIA REPAIR Right 1990    groin   • HERNIA REPAIR         Current Outpatient Medications:  No current facility-administered medications for this encounter.        Medication Allergy:  No Known Allergies    Family History:  Family History   Problem Relation Age of Onset   • Diabetes Mother    • Heart Disease Father    • Blood Clots Brother    • Heart Disease Brother    • Blood Clots Paternal Grandmother    • Heart Disease Paternal Grandmother        Social History:  Social History     Social History   • Marital status: Single     Spouse name: N/A   • Number of children: N/A   • Years of education: N/A     Occupational History   • Not on file.     Social History Main Topics   • Smoking status: Current Every Day Smoker     Packs/day: 0.50     Types: Cigarettes   • Smokeless tobacco: Never Used   • Alcohol use No   • Drug use: Yes     Types: Inhaled, Intravenous      Comment: marijuana; meth use    • Sexual activity: Not on file     Other Topics Concern   • Not on file     Social History Narrative   • No narrative on file         Physical Exam:  Vitals/ General Appearance:   Weight/BMI: Body mass index is 28.88 kg/m².  BP (!) 98/68   Pulse 94   Temp 36.4 °C (97.6 °F) (Temporal)   Resp 16   Ht 1.753 m (5' 9\")   Wt 88.7 kg (195 lb 8.8 oz)   SpO2 97%   Vitals:    05/21/19 1557 05/21/19 1630 05/21/19 1700 05/21/19 1900   BP:  100/71 (!) 98/68    Pulse:  87 88 94   Resp:  16 16 16   Temp:       TempSrc:       SpO2:  94% 91% 97%   Weight: 88.7 kg (195 lb 8.8 oz)      Height:         Oxygen Therapy:  Pulse Oximetry: 97 %, O2 (LPM): 0, O2 Delivery: None (Room Air)    Constitutional:   Well developed, Well " nourished, No acute distress  HENMT:  Normocephalic, Atraumatic, Oropharynx moist mucous membranes, No oral exudates, Nose normal.  No thyromegaly.  Eyes:  EOMI, Conjunctiva normal, No discharge.  Neck:  Normal range of motion, No cervical tenderness,  no JVD.  Cardiovascular:  Normal heart rate, Normal rhythm, No murmur.   left foot has palpable PTA and DPA, right foot is cold and pulseless, but  He has intact sensation and motor function.    Lungs:  Breath sounds clear to auscultation bilaterally,  no crackles, no wheezing.   Abdomen: Bowel sounds normal, Soft, No tenderness, No guarding, No rebound, No masses.   Skin: Warm, Dry, No erythema, No rash, no induration.  Neurologic: Alert & oriented x 3, No focal deficits noted, cranial nerves II through X are grossly intact.  Psychiatric: Affect normal, Judgment normal, Mood normal.    MDM (Data Review):     Records reviewed and summarized in current documentation    Lab Data Review:  Recent Results (from the past 24 hour(s))   EKG (NOW)    Collection Time: 19  5:16 PM   Result Value Ref Range    Report       [facility] Emergency Dept.    Test Date:  2019  Pt Name:    EDMUNDO OROPEZA                 Department: ER  MRN:        [id number]                      Room:       ORTHO  Gender:     Male                         Technician: [id number]  :        1970                   Requested By:OLIVIA BARILLAS  Order #:    [id number]                    Reading MD: Olivia Barillas MD    Measurements  Intervals                                Axis  Rate:       84                           P:          70  TN:         140                          QRS:        29  QRSD:       102                          T:          145  QT:         404  QTc:        478    Interpretive Statements  SINUS RHYTHM  PROBABLE LVH WITH SECONDARY REPOL ABNRM  BORDERLINE PROLONGED QT INTERVAL  T wave inversions in lateral leads  No ST change or STEMI  Compared to ECG 2019  14:52:53  No significant change from prior    Electronically Signed On 5- 18:19:44 PDT by Olivia Singh MD     CBC WITH DIFFERENTIAL    Collection Time: 05/21/19  5:40 PM   Result Value Ref Range    WBC 15.2 (H) 4.8 - 10.8 K/uL    RBC 5.74 4.70 - 6.10 M/uL    Hemoglobin 18.3 (H) 14.0 - 18.0 g/dL    Hematocrit 55.3 (H) 42.0 - 52.0 %    MCV 96.3 81.4 - 97.8 fL    MCH 31.9 27.0 - 33.0 pg    MCHC 33.1 (L) 33.7 - 35.3 g/dL    RDW 45.3 35.9 - 50.0 fL    Platelet Count 280 164 - 446 K/uL    MPV 10.1 9.0 - 12.9 fL    Neutrophils-Polys 53.40 44.00 - 72.00 %    Lymphocytes 31.70 22.00 - 41.00 %    Monocytes 11.20 0.00 - 13.40 %    Eosinophils 2.10 0.00 - 6.90 %    Basophils 0.90 0.00 - 1.80 %    Immature Granulocytes 0.70 0.00 - 0.90 %    Nucleated RBC 0.00 /100 WBC    Neutrophils (Absolute) 8.12 (H) 1.82 - 7.42 K/uL    Lymphs (Absolute) 4.81 (H) 1.00 - 4.80 K/uL    Monos (Absolute) 1.70 (H) 0.00 - 0.85 K/uL    Eos (Absolute) 0.32 0.00 - 0.51 K/uL    Baso (Absolute) 0.13 (H) 0.00 - 0.12 K/uL    Immature Granulocytes (abs) 0.11 0.00 - 0.11 K/uL    NRBC (Absolute) 0.00 K/uL   COMP METABOLIC PANEL    Collection Time: 05/21/19  5:40 PM   Result Value Ref Range    Sodium 131 (L) 135 - 145 mmol/L    Potassium 4.3 3.6 - 5.5 mmol/L    Chloride 94 (L) 96 - 112 mmol/L    Co2 27 20 - 33 mmol/L    Anion Gap 10.0 0.0 - 11.9    Glucose 109 (H) 65 - 99 mg/dL    Bun 34 (H) 8 - 22 mg/dL    Creatinine 1.36 0.50 - 1.40 mg/dL    Calcium 9.0 8.5 - 10.5 mg/dL    AST(SGOT) 32 12 - 45 U/L    ALT(SGPT) 51 (H) 2 - 50 U/L    Alkaline Phosphatase 85 30 - 99 U/L    Total Bilirubin 0.5 0.1 - 1.5 mg/dL    Albumin 4.3 3.2 - 4.9 g/dL    Total Protein 7.3 6.0 - 8.2 g/dL    Globulin 3.0 1.9 - 3.5 g/dL    A-G Ratio 1.4 g/dL   TROPONIN    Collection Time: 05/21/19  5:40 PM   Result Value Ref Range    Troponin I 0.05 (H) 0.00 - 0.04 ng/mL   PROTHROMBIN TIME (INR)    Collection Time: 05/21/19  5:40 PM   Result Value Ref Range    PT 12.9 12.0 - 14.6 sec     INR 0.96 0.87 - 1.13   APTT    Collection Time: 05/21/19  5:40 PM   Result Value Ref Range    APTT 23.6 (L) 24.7 - 36.0 sec   ESTIMATED GFR    Collection Time: 05/21/19  5:40 PM   Result Value Ref Range    GFR If African American >60 >60 mL/min/1.73 m 2    GFR If Non  56 (A) >60 mL/min/1.73 m 2       Imaging/Procedures Review:    Duplex exam of the right lower extremity  FINDINGS   Right.    There is triphasic flow in the femoral artery olaf n to the popliteal artery    .   No flow seen in the posterior tibial and peroneal artery as well as distal    ARRON. possible clot occluded the tibial and peroneal arteries.  MDM (Assessment and Plan):     Patient Active Problem List    Diagnosis Date Noted   • Acute decompensated heart failure (HCC) 05/15/2019     Priority: High   • SOB (shortness of breath) 05/14/2019     Priority: High   • Pulmonary hypertension (HCC) 05/16/2019     Priority: Medium   • Transaminitis 05/15/2019     Priority: Medium   • Elevated troponin 05/15/2019     Priority: Medium   • Dilated cardiomyopathy (HCC) 05/15/2019     Priority: Medium   • Tricuspid regurgitation 05/15/2019     Priority: Medium   • Substance use disorder 05/15/2019     Priority: Medium   • Anemia 05/14/2019     Priority: Medium   • Thrombocytopenia (HCC) 05/14/2019     Priority: Medium   • Hyperkalemia 05/14/2019     Priority: Medium   • Non Anion Gap Metabolic acidosis 05/14/2019     Priority: Medium   • Prediabetes 05/15/2019     Priority: Low   • Hepatic steatosis 05/15/2019     Priority: Low   • Tobacco use 05/15/2019     Priority: Low       Impression: Mr. Agee is a 48-year-old gentleman who presents with a cold right foot.  Presumably, this is a cardiac embolus and would not do well with thrombolysis.  At present, he has intact sensation and motor function and despite his accelerated risk for anesthesia I think the best option is surgical thrombectomy.  I discussed the risks and benefits with the patient as  well as his significant other at bedside.  I described my approach through the medial calf as the most direct and expeditious for thrombectomy.    Plan:  Right lower extremity thrombectomy    Deidra Dominguez M.D.

## 2019-05-22 NOTE — ANESTHESIA PROCEDURE NOTES
Arterial Line  Performed by: LUIS GENAO  Authorized by: LUIS GENAO     Start Time:  5/21/2019 8:03 PM  End Time:  5/21/2019 8:05 PM  Localization: ultrasound guidance and surface landmarks  Image captured, interpreted and electronically stored.  Patient Location:  OB  Indication: continuous blood pressure monitoring    Catheter Size:  20 G  Seldinger Technique?: Yes    Laterality:  Left  Site:  Radial artery

## 2019-05-22 NOTE — OR NURSING
Report given to MANISH Ram.     Pt via stephanie, accompanied by ACLS RN, was transferred to S126 at 2337. Pt's girlfriend, virgilio White.

## 2019-05-22 NOTE — OR SURGEON
Immediate Post OP Note    PreOp Diagnosis: Right lower extremity ischemia    PostOp Diagnosis: Same    Procedure(s):  THROMBECTOMY - lower extremity, anterior tibial artery  - Wound Class: Clean    Surgeon(s):  Deidra Dominguez M.D.    Anesthesiologist/Type of Anesthesia:  Anesthesiologist: Humberto Ashton M.D./General    Surgical Staff:  Circulator: NAOMI Cooney Scrub: Leopold Von C Garcia  Scrub Person: Marii Garland; Jamal Alonzo  Radiology Technologist: Jelani Chatterjee    Specimens removed if any:thrombus    Estimated Blood Loss: 150cc    Findings: images suggest some chronic emboli to the peroneal artery    Complications: none    [id number]    5/21/2019 10:04 PM Deidra Dominguez M.D.

## 2019-05-22 NOTE — ANESTHESIA POSTPROCEDURE EVALUATION
Patient: Lucas Agee    Procedure Summary     Date:  05/21/19 Room / Location:  [facility] OR 08 / SURGERY [facility]    Anesthesia Start:  1950 Anesthesia Stop:  2201    Procedure:  THROMBECTOMY - lower extremity, anterior tibial artery  (Right Leg Lower) Diagnosis:  (Right lower extremity thrombectomy)    Surgeon:  Deidra Dominguez M.D. Responsible Provider:  Humberto Ashton M.D.    Anesthesia Type:  general ASA Status:  3 - Emergent          Final Anesthesia Type: general  Last vitals  BP   Blood Pressure: 101/76, NIBP: (!) 98/63, Arterial BP: 105/67    Temp   36.3 °C (97.3 °F)    Pulse   Pulse: 87, Heart Rate (Monitored): 86   Resp   (!) 21    SpO2   92 %      Anesthesia Post Evaluation    Patient location during evaluation: bedside  Patient participation: complete - patient participated  Level of consciousness: awake  Pain score: 3    Airway patency: patent  Anesthetic complications: no  Cardiovascular status: hypotensive  Respiratory status: acceptable  Hydration status: acceptable  Comments: Report given to intensivist.    PONV: none           Nurse Pain Score: 3 (NPRS)

## 2019-05-22 NOTE — ASSESSMENT & PLAN NOTE
Due to methamphetamine abuse, encouraged cessation  Eventual need to resume heart failure medications, over the next 24 hours  Echocardiogram negative for thrombus from TTE E view, may need KWAME

## 2019-05-22 NOTE — ASSESSMENT & PLAN NOTE
Mild transaminitis on admission, patient asymptomatic.    Hep panel negative.  Hepatic steatosis on CT PE.  PCP to continue monitoring, counseling.

## 2019-05-22 NOTE — ANESTHESIA PROCEDURE NOTES
Airway  Date/Time: 5/21/2019 7:55 PM  Performed by: LUIS GENAO  Authorized by: LUIS GENAO     Location:  OR  Urgency:  Elective  Indications for Airway Management:  Anesthesia  Spontaneous Ventilation: absent    Sedation Level:  Deep  Preoxygenated: Yes    Patient Position:  Sniffing  Final Airway Type:  Endotracheal airway  Final Endotracheal Airway:  ETT  Cuffed: Yes    Technique Used for Successful ETT Placement:  Video laryngoscopy  Insertion Site:  Oral  Laryngoscope Blade/Videolaryngoscope Blade Size:  3  ETT Size (mm):  8.0  Measured from:  Teeth  Placement Verified by: auscultation and capnometry    Cormack-Lehane Classification:  Grade I - full view of glottis  Number of Attempts at Approach:  1   glidescope used due tyo very poor dentition.

## 2019-05-22 NOTE — PROGRESS NOTES
Critical Care Progress Note    Date of admission  5/21/2019    Chief Complaint  48 y.o. male admitted 5/21/2019 with shock post op after RLE thrombectomy    Hospital Course    48 y.o. 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  He went to [facility] today to initiate outpatient primary care but was declined as he did not have identification.  He subsequently noticed increasing numbness in his right lower extremity that was worse after taking a nap with associated pain.  He presented to the emergency department where on imaging he was found to have arterial cut off of the tibial peroneal trunk worrisome for possible central embolic source. Dr. Dominguez was consulted and graciously agreed to take him to the OR for right lower extremity thrombectomy which was completed this evening.  Postoperatively patient has been hypotensive.  He apparently received 250 mL's of crystalloid and was started on a phenylephrine infusion by anesthesia.  Patient has also been on heparin drip since surgery.  Patient is asymptomatic from his hypotension and has an arterial catheter in his left wrist monitoring blood pressure closely.  He does not know what his baseline blood pressure is.      Interval Problem Update  Reviewed last 24 hour events:    POD#1 thrombectomy last night - foot back to baseline  Heparin drip  Low BP in PACU  BRENDA drip weaned off after small fluid bolus  No CVC  A&O x4  SR 80s  SBp 90-130s  UO adequate  Tm 98.4  2 lpm NC  Taking PO  LAb reviewed      IS  RCC to sign off  Clean MAR      Review of Systems  Review of Systems   Constitutional: Negative for chills, diaphoresis, fever and malaise/fatigue.   HENT: Negative for congestion and sore throat.    Eyes: Negative.    Respiratory: Negative for cough, sputum production and shortness of  breath.    Cardiovascular: Positive for leg swelling. Negative for chest pain and palpitations.   Gastrointestinal: Negative for abdominal pain, diarrhea, nausea and vomiting.   Genitourinary: Negative.    Musculoskeletal:        Lower extremity pain and tingling on the right markedly improved post thrombectomy in the OR last night   Skin: Negative for rash.   Neurological: Negative for sensory change, speech change, focal weakness and headaches.   Endo/Heme/Allergies: Does not bruise/bleed easily.   Psychiatric/Behavioral: Negative for depression and substance abuse. The patient is not nervous/anxious.         Vital Signs for last 24 hours   Temp:  [36.1 °C (96.9 °F)-36.4 °C (97.5 °F)] 36.4 °C (97.5 °F)  Pulse:  [] 99  Resp:  [8-66] 66  SpO2:  [92 %-98 %] 94 %    Hemodynamic parameters for last 24 hours       Respiratory Information for the last 24 hours       Physical Exam   Physical Exam   Constitutional: He is oriented to person, place, and time. He appears well-developed and well-nourished. He is cooperative.  Non-toxic appearance. No distress. Nasal cannula in place.   Appears older than stated age   HENT:   Head: Normocephalic and atraumatic.   Mouth/Throat: Oropharynx is clear and moist.   Eyes: Pupils are equal, round, and reactive to light. EOM are normal. No scleral icterus.   Neck: Neck supple. No JVD present.   Cardiovascular: Normal rate and regular rhythm.  Exam reveals no gallop and no friction rub.    No murmur heard.  Diminished pulses and capillary refill in lower extremity, improved post thrombectomy in particular in the right lower extremity, very similar to findings in the left now   Pulmonary/Chest: Effort normal and breath sounds normal. No respiratory distress. He has no wheezes. He has no rales.   Abdominal: Soft. Bowel sounds are normal. He exhibits no distension. There is no tenderness. There is no guarding.   Musculoskeletal: He exhibits edema and tenderness.   Neurological: He is  alert and oriented to person, place, and time. No cranial nerve deficit. He exhibits normal muscle tone.   Skin: Skin is warm and dry. He is not diaphoretic. There is erythema. No cyanosis. Nails show no clubbing.   Psychiatric: He has a normal mood and affect. His behavior is normal. Thought content normal.   Vitals reviewed.      Medications  Current Facility-Administered Medications   Medication Dose Route Frequency Provider Last Rate Last Dose   • Pharmacy Consult Request ...Pain Management Review 1 Each  1 Each Other PHARMACY TO DOSE Deidra Dominguez M.D.       • ondansetron (ZOFRAN) syringe/vial injection 4 mg  4 mg Intravenous Q4HRS PRN Deidra Dominguez M.D.       • diphenhydrAMINE (BENADRYL) injection 25 mg  25 mg Intravenous Q6HRS PRN Deidra Dominguez M.D.       • scopolamine (TRANSDERM-SCOP) patch 1 Patch  1 Patch Transdermal Q72HRS PRN Deidra Dominguez M.D.       • acetaminophen (TYLENOL) tablet 1,000 mg  1,000 mg Oral Q6HRS Deidra Dominguez M.D.   1,000 mg at 05/22/19 1742   • celecoxib (CELEBREX) capsule 200 mg  200 mg Oral BID Deidra Dominguez M.D.   200 mg at 05/22/19 1742   • oxyCODONE immediate-release (ROXICODONE) tablet 5 mg  5 mg Oral Q3HRS PRN Deidra Dominguez M.D.       • heparin injection 3,200 Units  3,200 Units Intravenous PRN Deidra Dominguez M.D.   3,200 Units at 05/22/19 1329    And   • heparin infusion 25,000 units in 500 ml 0.45% nacl   Intravenous Continuous Deidra Dominguez M.D. 34 mL/hr at 05/22/19 1934 1,700 Units/hr at 05/22/19 1934       Fluids    Intake/Output Summary (Last 24 hours) at 05/22/19 2003  Last data filed at 05/22/19 1800   Gross per 24 hour   Intake          4778.07 ml   Output             2950 ml   Net          1828.07 ml       Laboratory      Recent Labs      05/21/19   1740  05/22/19   0249   TROPONINI  0.05*  0.24*     Recent Labs      05/21/19   1740  05/22/19   0249   SODIUM  131*  133*   POTASSIUM  4.3  4.3   CHLORIDE  94*  101   CO2  27  22   BUN   34*  30*   CREATININE  1.36  1.23   CALCIUM  9.0  8.2*     Recent Labs      05/21/19 1740 05/22/19 0249   ALTSGPT  51*   --    ASTSGOT  32   --    ALKPHOSPHAT  85   --    TBILIRUBIN  0.5   --    GLUCOSE  109*  159*     Recent Labs      05/21/19 1740 05/22/19 0249   WBC  15.2*  14.8*   NEUTSPOLYS  53.40  61.40   LYMPHOCYTES  31.70  27.70   MONOCYTES  11.20  8.30   EOSINOPHILS  2.10  1.60   BASOPHILS  0.90  0.50   ASTSGOT  32   --    ALTSGPT  51*   --    ALKPHOSPHAT  85   --    TBILIRUBIN  0.5   --      Recent Labs      05/21/19 1740 05/22/19   0000  05/22/19 0249 05/22/19 0527 05/22/19   1215   RBC  5.74   --   4.88   --    --    HEMOGLOBIN  18.3*   --   15.6   --    --    HEMATOCRIT  55.3*   --   47.0   --    --    PLATELETCT  280  255  233   --    --    PROTHROMBTM  12.9  13.9   --    --    --    APTT  23.6*  29.3   --   24.7  22.8*   INR  0.96  1.06   --    --    --        Imaging  X-Ray:  I have personally reviewed the images and compared with prior images.  EKG:  I have personally reviewed the images and compared with prior images.  CT:    Reviewed  Echo:   Reviewed    Assessment/Plan  Arterial embolism and thrombosis of lower extremity (HCC)   Assessment & Plan    Involving the right lower extremity, presumed to be cardioembolic in nature status post right lower extremity thrombectomy 5/21, successful with improvement in perfusion and resolution of acute symptoms and right lower extremity  Heparin drip to continue, initiate oral anticoagulation when okay with vascular surgery, antiplatelet therapy as well  Evaluation for possible cardiac source ongoing, limited TTE -5/22 for cardiac thrombi  Continue to close lower extremity monitoring including pulse, sensation, analgesia  Vascular surgery consultation noted  Remove arterial line  Mobilize     Hypotension   Assessment & Plan    Postoperatively, undifferentiated, resolved  Weaned off Wilder-Synephrine, no epi drip never started  Status post  additional fluid bolus given findings on bedside echo and no evidence of decompensation of heart failure, blood pressure improved and pressors weaned off  Following serial CBC, no evidence of bleeding clinically, hematocrit now normal     Pulmonary hypertension (HCC)- (present on admission)   Assessment & Plan    Avoid hypoxemia/hypercarbia  Caution with fluid status     Substance use disorder- (present on admission)   Assessment & Plan    Drug cessation education provided again, encouraged patient     Tricuspid regurgitation- (present on admission)   Assessment & Plan          Dilated cardiomyopathy (HCC)- (present on admission)   Assessment & Plan    Due to methamphetamine abuse, encouraged cessation  Eventual need to resume heart failure medications, over the next 24 hours  Echocardiogram negative for thrombus from TTE E view, may need KWAME     Elevated troponin- (present on admission)   Assessment & Plan    Likely secondary to demand ischemia  Repeat check postoperatively mildly elevated  Continue aspirin and heparin  Limited echo okay  Cardiac monitoring negative     Chronic systolic heart failure (HCC)- (present on admission)   Assessment & Plan    Without acute decompensation at this time, monitor closely  Monitor pulmonary status and lower extremity swelling  Hep-Lock IV fluids, monitor p.o. intake  Unable to tolerate beta-blockade nor ACE inhibitor at this time in the setting of shock, resume when clinically appropriate, BP improved  Resume forced diuresis later today or tomorrow as clinically appropriate and as needed     Tobacco use- (present on admission)   Assessment & Plan    Nicotine patch  Tobacco cessation education ongoing     Hepatic steatosis- (present on admission)   Assessment & Plan    Avoid hepatotoxins, hepatically dose medicines as clinically appropriate  Serial CMP and monitor hepatic synthetic function          VTE:  Heparin  Ulcer: Not Indicated  Lines: Central Line  Ongoing indication  addressed    I have performed a physical exam and reviewed and updated ROS and Plan today (5/22/2019). In review of yesterday's note (5/21/2019), there are no changes except as documented above.    RCC will sign off to [facility] internal medicine, if additional pulmonary input needed consult pulmonary service    Discussed patient condition and risk of morbidity and/or mortality with RN, RT, Pharmacy, [facility] resident, Charge nurse / hot rounds, Patient and vascular surgery     .

## 2019-05-22 NOTE — ASSESSMENT & PLAN NOTE
Involving the right lower extremity, presumed to be cardioembolic in nature status post right lower extremity thrombectomy 5/21, successful with improvement in perfusion and resolution of acute symptoms and right lower extremity  Heparin drip to continue, initiate oral anticoagulation when okay with vascular surgery, antiplatelet therapy as well  Evaluation for possible cardiac source ongoing, limited TTE -5/22 for cardiac thrombi  Continue to close lower extremity monitoring including pulse, sensation, analgesia  Vascular surgery consultation noted  Remove arterial line  Mobilize

## 2019-05-22 NOTE — PROGRESS NOTES
ICU transfer note     Attending: Dr Gonda   Resident: Dr. Dmitry Kaur  Date of admission: 5/21/2019  Date of transferring out from ICU:  05/21/19    Primary diagnosis:   - Hypotension     Secondary diagnoses:  Acute limb ischemia      HPI and ICU Course Summary (Brief Narrative):  Lucas Agee is 48 y.o. with a past medical history of HFrEF, meth abuse, prediabetes and anemia presented to the emergency department today complaining of acute right LE pain, numbness and pallor.  Duplex exam of right lower extremity showed no flow in the posterior tibial and peroneal artery as well as ARRON, emergent thrombectomy was done, during the procedure patient blood pressure dropped and he was started on brenda-drip.  Patient received 1200 mL of fluid during the procedure, on examination patient has no signs of overload, lungs clear, no JVD and no edema, ordered another bolus of 500 mL of NS, will transfer the patient to the ICU overnight.         Acute decompensated heart failure (HCC)  - Echo showed LVEF 15-20%, global hypokinesis, dilated cardiomyopathy, biventricular dysfunction.  -Cardiac monitor, optimize electrolytes.  -Continue BRENDA DRIP, CONSIDER switching to Levo if needed given his hx of HF.  - hold off Lasix, Aldactone, enalapril and Coreg.      Anemia  - Macrocytic anemia.  -TSH, B12, folate WNL.  Ferritin low normal.  -PCP to follow up.       Dilated cardiomyopathy (HCC)  As demonstrated on ECHO  2/2 meth abuse     Elevated troponin  Mostly demand  Asymptomatic       Hepatic steatosis  -Mild transaminitis on admission, patient asymptomatic.  Hep panel negative.  -Hepatic steatosis on CT PE.  -PCP to continue monitoring, counseling.    Substance use disorder  Meth and marijuana  Last meth dose possible 2 weeks ago   Continue counseling        Tricuspid regurgitation  Sown on Echo       Hypotension  Post op, possible 2/2 HF vs anesthesia   Started  on Wilder drip   Might consider switching to levo   Received 1200 ml LR in OR and another 500ml NS bolus   Goal map 65.   Hold off BP meds

## 2019-05-22 NOTE — HEART FAILURE PROGRAM
Uninsured patient who was just discharged four days prior to this admission after being diagnosed with heart failure.     He apparently tried to go to the [facility] to get his medications and establish care but he was turned away for lack of identification.    Patient presented to the hospital yesterday because of numbness in his RLE.    Dr. Dominguez performed a thrombectomy yesterday.    Notes indicating that patient is not currently in acute HF exacerbation. With that said, because he was just discharged for HF, this admission, even though not for HF, will be a 30 day all cause HF readmission.    With all of that said, I've placed an order for SW to assist patient with not having an ID. He must take his meds and establish care and now he not only needs to be on HF meds but will also require outpatient anticoagulation.    Tila TAPIA RN, [employer] ext. [phone] M-F

## 2019-05-22 NOTE — PROGRESS NOTES
Pt to room S126 from PACU. Pt moved to ICU bed and monitor placed. Orders verified and implemented. Pedal pulses palpable, posterior tibial heard via doppler. R foot warm, CMS intact.    2 RN skin check complete. 2 surgical incisions to RLE, dressings C/D/I.

## 2019-05-22 NOTE — DISCHARGE PLANNING
Care Transition Team Assessment  In the case of an emergency, pt's legal NOK is brother Alvarado Agee     RNCM met with pt at bedside and obtained the information used in this assessment. Pt verified accuracy of facesheet.Pt is homeless, is couch-surfing, currently staying with friend in [place]. Address on faces sheet is mother's residence, his mailing address.   Prior to current hospitalization, pt was completely independent in ADLS/IADLS. Pt rides bike. Pt has no job, no income, uses community respources for food and personal items. Pt has a limited support system. Pt admits any hx of substance use and denies any dx of mh.     Information Source pt  Orientation : Oriented x 4  Information Given By: Patient  Informant's Name:  (Lucas)  Who is responsible for making decisions for patient? : Patient    Readmission Evaluation  Is this a readmission?: Yes - unplanned readmission  Was an appointment arranged for you prior to discharge?: Yes, did not attend appointment  Why didn't you go to your appointment?: Other (comment)  Were there new prescriptions you were supposed to fill after you were discharged?: Yes, prescriptions filled  Did you understand your discharge instructions?: Yes  Did you have enough support after your last discharge?: No    Elopement Risk  Legal Hold: No  Ambulatory or Self Mobile in Wheelchair: No-Not an Elopement Risk  Elopement Risk: Not at Risk for Elopement    Interdisciplinary Discharge Planning  Does Admitting Nurse Feel This Could be a Complex Discharge?: Yes  Primary Care Physician:  (none, will assign when Medicaid in effect)  Lives with - Patient's Self Care Capacity: Friends  Support Systems: Family Member(s), Friends / Neighbors  Housing / Facility: [address]  Do You Take your Prescribed Medications Regularly: Yes  Mobility Issues: No  Prior Services: None    Discharge Preparedness  What is your plan after discharge?: Uncertain - pending medical team  "collaboration  What are your discharge supports?: Parent, Sibling  Prior Functional Level: Ambulatory, Independent with Activities of Daily Living, Independent with Medication Management  Difficulity with ADLs: None  Difficulity with IADLs: None    Functional Assesment  Prior Functional Level: Ambulatory, Independent with Activities of Daily Living, Independent with Medication Management    Finances  Financial Barriers to Discharge: Yes  Average Monthly Income:  (none)  Source of Income: None  Prescription Coverage: Yes                   Domestic Abuse  Have you ever been the victim of abuse or violence?: No    Psychological Assessment  History of Substance Abuse: Methamphetamine  Date Last Used - Methamphetamine:  (unknown)  Substance Abuse Comments:  (states he's \"done with that\")  History of Psychiatric Problems: No    Discharge Risks or Barriers  Discharge risks or barriers?: No PCP, Transportation, Substance abuse, Complex medical needs, Homeless / couch surfing  Patient risk factors: Complex medical needs, Homeless, Substance abuse             "

## 2019-05-22 NOTE — ANESTHESIA PREPROCEDURE EVALUATION
Relevant Problems   (+) Acute decompensated heart failure (HCC)   (+) Hepatic steatosis   (+) SOB (shortness of breath)   Hx of IV drug use. Smoked methaphetamine 2 weeks ago.   EF 15-20%    Physical Exam    Airway   Mallampati: III  TM distance: <3 FB  Neck ROM: full       Cardiovascular   Rhythm: regular  Rate: normal     Dental       Very poor dentition   Pulmonary    Abdominal   Abdomen: soft     Neurological - normal exam               Anesthesia Plan    ASA 3 - emergent   ASA physical status 3 criteria: alcohol and/or substance dependence or abuseASA physical status emergent criteria: other (comment)    Plan - general       Airway plan will be ETT        Induction: intravenous          Informed Consent:    Anesthetic plan and risks discussed with patient.

## 2019-05-22 NOTE — ASSESSMENT & PLAN NOTE
-H/o meth, marijuana use - UDS had been positive for meth on last admission earlier in May 2019.  -Substance use resources provided, PCP to continue cessation counseling.

## 2019-05-22 NOTE — ED NOTES
Last ate at 1200. Had gatorade and grapes. Pt's right pedal pulse remains unpalpable. Right lower leg and foot cold compared to left.

## 2019-05-22 NOTE — PROGRESS NOTES
Vascular Surgery Progress Note  Reason for Visit:  THROMBECTOMY - lower extremity, anterior tibial artery    Date of Surgery: 5/21/2019 - 5/22/2019  Post-op Day: 1 Day Post-Op    Interval Events:  Decompensated heart failure currently off pressors    Exam:  AVSS  Incision dressings clean and dry  Palpable DP  Warm foot    Assessment and Plan:  Improved  Excellent arterial perfusion to the RLE  On heparin will need anticoagulation    Micaela Gaspar P.A.-C.

## 2019-05-22 NOTE — ANESTHESIA TIME REPORT
Anesthesia Start and Stop Event Times     Date Time Event    5/21/2019 1950 Anesthesia Start     2201 Anesthesia Stop        Responsible Staff  05/21/19    Name Role Begin End    Humberto Ashton M.D. Anesth 1950 2201        Preop Diagnosis (Free Text):  Pre-op Diagnosis     Right lower extremity thrombectomy        Preop Diagnosis (Codes):  Diagnosis Information     Diagnosis Code(s):         Post op Diagnosis  Ischemia of right lower extremity      Premium Reason  A. 3PM - 7AM    Comments:

## 2019-05-22 NOTE — OP REPORT
DATE OF SERVICE:  05/21/2019    PREOPERATIVE DIAGNOSIS:  Right lower extremity ischemia.    POSTPROCEDURE DIAGNOSIS:  Right lower extremity ischemia.    PROCEDURES:  1.  Thrombectomy, right lower extremity.  2.  Exploration and thrombectomy, right anterior tibial artery.    SURGEON:  Deidra Dominguez MD    ANESTHESIOLOGIST:  Humberto Ashton MD    INDICATIONS:  The patient is a 48-year-old gentleman who presents with a cold   right leg.  Noninvasive studies and CT angiogram were performed preoperatively   and demonstrate thrombus in the tibial arteries with a patent popliteal   artery.  He is taken to the operating room for thrombectomy with the thought   that this is likely of cardiac origin.  The option of chemical thrombolysis   was discussed, but I believe that it will be less responsive to thrombolysis   and prompt surgical treatment is indicated.  Risks and benefits were explained   and include bleeding, infection, cardiac risks of general anesthesia given   his low ejection fraction and further surgery.  He understands and agrees to   proceed.    DESCRIPTION OF PROCEDURE:  The patient was taken to the operating room and   placed in supine position.  After adequate general anesthesia, the right leg   was prepped and draped in the usual sterile fashion with Chloraseptic prep,   cloth towels and paper drapes.  A medial calf incision was made and dissection   taken down through the calf muscles to the popliteal artery.  Several veins   were taken down in my approach to the popliteal artery.  The patient was given   7000 units of heparin and this was allowed to circulate.  Proximal and distal   control was obtained and the popliteal artery with vessel loops and the   artery opened with an 11 blade followed by Jackman scissors.  I performed   thrombectomy of the lower extremity extracting a substantial amount of clot   from the posterior tibial artery.  My hand feeling the catheter as it   traversed all the way into  the foot at 50 cm.  Trials I made, I was unable to   get a Criselda catheter into the anterior tibial artery and traverse into the   peroneal artery was met with some resistance.  I performed an on-table   angiogram, which showed evidence of old thrombus to the peroneal artery with   body perfusion.  The anterior tibial artery shows slow flow indicative of   distal emboli.  I repaired the popliteal artery with interrupted 6-0 Prolene   suture and then made a separate incision at the ankle, dissecting down to the   distal anterior tibial artery.  I encircled this with vessel loops and   performed an arteriotomy with an 11 blade followed by a very small arteriotomy   with Jackman scissors.  I then passed a #2 Criselda catheter proximally and   distally and obtained a substantial amount of thrombus from this artery as   well.  I passed a Criselda catheter until pulsatile flow was obtained.  Back   bleeding was obtained after passing the catheter into the foot several times.    A 7-0 Prolene suture was used to close this artery.  Hemostasis was achieved   and the wounds were closed with interrupted 3-0 Vicryl followed by running 3-0   Vicryl and finally, skin clips on the medial calf followed by a 4-0 nylon in   the ankle.  There were no apparent complications.  Sponge, needle and   instrument counts were correct x2.       ____________________________________     MD VINCE Young / TANYA    DD:  05/21/2019 22:10:26  DT:  05/22/2019 00:05:01    D#:  [id number]  Job#:  [id number]

## 2019-05-22 NOTE — ED NOTES
Med rec updated and complete.  Allergies reviewed. Pt is currently only taking   His carvedilol daily. Prescription indicates that pt should be taking it  BID. Pt reports per the suggestion of his brother to stop the evening dose  As his blood pressure was to low.  No oral antibiotic use in last 30 days at home.

## 2019-05-22 NOTE — PROGRESS NOTES
[facility] ICU Transfer Note                                                                                       ICU Admit Date: 05/21/19      ICU Discharge Date:   5/22/19    Primary Diagnosis:   Right lower extremity ischemia secondary to anterior tibial artery thrombus.    ICU Course Summary (Brief Narrative):  48 y.o male with HFrEF, meth abuse, prediabetes and anemia presented to the emergency department today complaining of acute right LE pain, numbness and pallor. Duplex exam of right lower extremity showed no flow in the posterior tibial and peroneal artery as well as ARRON, emergent thrombectomy was done. During the procedure, patient's blood pressure dropped and he was started on daniela-drip.  Patient received small fluid bolus; no signs of overload, lungs clear, no JVD and no edema on exam. Patient weaned off pressor support 12:23 AM and has not required them since. Patient hemodynamically stable.    Important Events in the ICU:   - Central Line: NA  - Intubation: NA  - Pressors: Neosynephrine 5/21/19 @ 22:52 PM - 5/22/19 @ 12:23 AM  - Wang catheter: NA  - Tube feeding: NA  - Antibiotics: NA  - Other procedure: NA    Labs and imaging studies to be continued with their indications:  - CBC: Leukocytosis  - CMP or BMP: Electrolytes  - Magnesium: Electrolytes  - Phosphorus: Electrolytes  - Chest Xray: NA  - Other studies: NA    Things to follow:   - Surgery recs  - PT/OT  - set up outpatient follow up  - patient will need outpatient anticoagulation

## 2019-05-22 NOTE — CARE PLAN
Problem: Pain Management  Goal: Pain level will decrease to patient's comfort goal  Assess pts pain level and treat as needed.    Problem: Communication  Goal: The ability to communicate needs accurately and effectively will improve  Pt updated on POC

## 2019-05-22 NOTE — PROGRESS NOTES
[facility] ICU Progress Note      Admit Date: 5/21/2019  ICU Day: 1  LOS: 1    Resident(s): Smita Garcia   Attending: DAVE PACK/ Dr. Saini    Date & Time:   5/22/2019   10:24 AM       Patient ID:    Name:             Lucas Agee   YOB: 1970  Age:                 48 y.o.  male   MRN:               [id number]    Diagnosis:  Active Problems:    Hypotension POA: Unknown    Arterial embolism and thrombosis of lower extremity (HCC) POA: Unknown    Chronic systolic heart failure (HCC) POA: Yes    Elevated troponin POA: Yes    Dilated cardiomyopathy (HCC) POA: Yes    Tricuspid regurgitation POA: Yes    Substance use disorder POA: Yes    Pulmonary hypertension (HCC) POA: Yes    Hepatic steatosis POA: Yes    Tobacco use POA: Yes  Resolved Problems:    SOB (shortness of breath) POA: Yes    Anemia POA: Yes    Thrombocytopenia (HCC) POA: Yes    Hyperkalemia POA: Yes    Non Anion Gap Metabolic acidosis POA: Yes      HPI:  48 y.o male with HFrEF, meth abuse, prediabetes and anemia presented to the emergency department today complaining of acute right LE pain, numbness and pallor. Duplex exam of right lower extremity showed no flow in the posterior tibial and peroneal artery as well as ARRON, emergent thrombectomy was done. During the procedure, patient's blood pressure dropped and he was started on wilder-drip.  Patient received small fluid bolus; no signs of overload, lungs clear, no JVD and no edema on exam.Admitted to ICU for further management.     Consultants:  Vascular surgery.   PMA    Interval Events:  No acute overnight events.  Wilder off since 12:30 AM.  BP has been stable.  Neurologically intact.  Some tenderness in LE.    Review of Systems   Musculoskeletal:        RLE tenderness.   All other systems reviewed and are negative.      Physical Exam   Constitutional: He is oriented to person, place, and time and well-developed, well-nourished, and in no distress. No distress.   HENT:   Head:  Normocephalic and atraumatic.   Right Ear: External ear normal.   Left Ear: External ear normal.   Eyes: Pupils are equal, round, and reactive to light. Conjunctivae are normal.   Neck: No JVD present.   Cardiovascular: Normal rate, regular rhythm and normal heart sounds.  Exam reveals no gallop and no friction rub.    No murmur heard.  Pulmonary/Chest: Effort normal. He has no wheezes. He has no rales.   Abdominal: Soft. There is no tenderness. There is no guarding.   Musculoskeletal: He exhibits no edema.   Lymphadenopathy:     He has no cervical adenopathy.   Neurological: He is alert and oriented to person, place, and time.   Skin: He is not diaphoretic.         Respiratory:     Respiration: 14, Pulse Oximetry: 94 %    Chest Tube Drains:          HemoDynamics:  Pulse: 80, Heart Rate (Monitored): 80 Blood Pressure: 101/76, Arterial BP: 112/68, NIBP: (!) 95/61      Neuro:      Fluids:    Date 05/22/19 0700 - 05/23/19 0659   Shift 4508-0737 7285-7776 4634-9328 24 Hour Total   I  N  T  A  K  E   P.O. 240   240      P.O. 240   240    I.V. 57.8   57.8      Heparin Volume 57.8   57.8    Shift Total 297.8   297.8   O  U  T  P  U  T   Urine 400   400      Number of Times Voided 1 x   1 x      Urine Void (mL) 400   400    Shift Total 400   400   NET -102.2   -102.2        Intake/Output Summary (Last 24 hours) at 05/22/19 1024  Last data filed at 05/22/19 0800   Gross per 24 hour   Intake          3625.57 ml   Output             2000 ml   Net          1625.57 ml       Weight: 91 kg (200 lb 9.9 oz)  Body mass index is 29.63 kg/m².    Recent Labs      05/21/19   1740  05/22/19   0249   SODIUM  131*  133*   POTASSIUM  4.3  4.3   CHLORIDE  94*  101   CO2  27  22   BUN  34*  30*   CREATININE  1.36  1.23   CALCIUM  9.0  8.2*       GI/Nutrition:  Recent Labs      05/21/19   1740  05/22/19   0249   ALTSGPT  51*   --    ASTSGOT  32   --    ALKPHOSPHAT  85   --    TBILIRUBIN  0.5   --    GLUCOSE  109*  159*       Heme:  Recent Labs       19   0000  199  19   0527   RBC  5.74   --   4.88   --    HEMOGLOBIN  18.3*   --   15.6   --    HEMATOCRIT  55.3*   --   47.0   --    PLATELETCT  280  255  233   --    PROTHROMBTM  12.9  13.9   --    --    APTT  23.6*  29.3   --   24.7   INR  0.96  1.06   --    --        Infectious Disease:  Temp  Av.3 °C (97.4 °F)  Min: 36.1 °C (96.9 °F)  Max: 36.9 °C (98.4 °F)  Recent Labs      19   WBC  15.2*  14.8*   NEUTSPOLYS  53.40  61.40   LYMPHOCYTES  31.70  27.70   MONOCYTES  11.20  8.30   EOSINOPHILS  2.10  1.60   BASOPHILS  0.90  0.50   ASTSGOT  32   --    ALTSGPT  51*   --    ALKPHOSPHAT  85   --    TBILIRUBIN  0.5   --        Meds:  • Pharmacy Consult Request  1 Each     • ondansetron  4 mg     • diphenhydrAMINE  25 mg     • scopolamine  1 Patch     • acetaminophen  1,000 mg     • celecoxib  200 mg     • oxyCODONE immediate-release  5 mg     • heparin  3,200 Units      And   • heparin   1,450 Units/hr (19)        Imaging:  DX-PORTABLE FLUORO > 1 HOUR   Final Result      Fluoroscopic image(s) obtained during right lower extremity angiography. Please see the patient's chart for full procedural details.      Fluoroscopy time 6 seconds.         CT-CTA LOWER EXT WITH & W/O-POST PROCESS RIGHT   Final Result      1.  Abrupt arterial cutoff of the tibial peroneal trunk just after its origin, the anterior tibial artery in the distal leg and the proximal profunda femoral artery. These findings are worrisome for a central embolic source given the degree of    atherosclerotic change is relatively mild      2.  Moderate atherosclerotic plaque involving the right common femoral greater than superficial femoral arteries with some positive remodeling but no significant stenosis      US-EXTREMITY ARTERY LOWER UNILAT RIGHT   Final Result      EC-ECHOCARDIOGRAM COMPLETE W/ CONT    (Results Pending)       Procedures:  Thrombectomy      Problem and  Plan:  Arterial embolism and thrombosis of lower extremity (HCC)   Assessment & Plan    S/p thrombectomy 5/21/19       Hypotension   Assessment & Plan    RESOLVED.  Post op, possible 2/2 HF vs anesthesia   Started on Wilder drip, STOPPED AT 12:30 am.  Goal map 65.      Substance use disorder- (present on admission)   Assessment & Plan    Meth and marijuana  Last meth dose possible 2 weeks ago   Continue counseling         Dilated cardiomyopathy (HCC)- (present on admission)   Assessment & Plan    As demonstrated on ECHO  2/2 meth abuse      Elevated troponin- (present on admission)   Assessment & Plan    Mostly demand  Asymptomatic        Chronic systolic heart failure (HCC)- (present on admission)   Assessment & Plan    Echo showed LVEF 15-20%, global hypokinesis, dilated cardiomyopathy, biventricular dysfunction.  Cardiac monitor, optimize electrolytes.  Restart home medications as appropriate.       Hepatic steatosis- (present on admission)   Assessment & Plan    Mild transaminitis on admission, patient asymptomatic.    Hep panel negative.  Hepatic steatosis on CT PE.  PCP to continue monitoring, counseling.         DISPO: ICU with possible transfer if cleared by surg.    CODE STATUS: FULL    Quality Measures:  Wang Catheter: no.  DVT Prophylaxis: SCD  Ulcer Prophylaxis: not indicated  Antibiotics: not indicated  Lines: PIV

## 2019-05-22 NOTE — PROGRESS NOTES
RN spoke with Vascular PA regarding POC. Okay to transfer pt out of ICU, mobilize pt and d/c art line.

## 2019-05-22 NOTE — ASSESSMENT & PLAN NOTE
-Echo showed LVEF 15-20%, global hypokinesis, dilated cardiomyopathy, biventricular dysfunction.  -Likely 2/2 meth use.  -No acute exacerbation at this time.  -On ASA, atorvastatin, carvedilol, lisinopril, Lasix.  -Outpatient cards follow-up, MPI.  -Substance cessation resources provided, PCP to continue substance cessation counseling.

## 2019-05-22 NOTE — ASSESSMENT & PLAN NOTE
Recheck CBC postoperatively, minimal reported EBL  Conservative transfusion strategy with a goal hemoglobin greater than 7.0

## 2019-05-22 NOTE — ASSESSMENT & PLAN NOTE
Likely secondary to demand ischemia  Repeat check postoperatively mildly elevated  Continue aspirin and heparin  Limited echo okay  Cardiac monitoring negative

## 2019-05-22 NOTE — CARE PLAN
Problem: Pain Management  Goal: Pain level will decrease to patient's comfort goal  Outcome: PROGRESSING AS EXPECTED  Pain assessment completed using appropriate scale and PRN medications administered per MAR    Problem: Venous Thromboembolism (VTW)/Deep Vein Thrombosis (DVT) Prevention:  Goal: Patient will participate in Venous Thrombosis (VTE)/Deep Vein Thrombosis (DVT)Prevention Measures  Outcome: PROGRESSING AS EXPECTED  SCD's are in place and inflating, heparin gtt infusing

## 2019-05-22 NOTE — DISCHARGE PLANNING
"Spoke to KERRI Villanueva. She states pt has been approved for [employer]. It takes 48-72 hours to \"show in system.\" Will not be able to schedule any outpt follow up until active medicaid documented. Pt will have RX coverage and be able to get established with PCP when [employer] verified.  "

## 2019-05-23 LAB
ANION GAP SERPL CALC-SCNC: 9 MMOL/L (ref 0–11.9)
APTT PPP: 58.3 SEC (ref 24.7–36)
BASOPHILS # BLD AUTO: 1.2 % (ref 0–1.8)
BASOPHILS # BLD: 0.15 K/UL (ref 0–0.12)
BUN SERPL-MCNC: 24 MG/DL (ref 8–22)
CALCIUM SERPL-MCNC: 8.4 MG/DL (ref 8.5–10.5)
CHLORIDE SERPL-SCNC: 105 MMOL/L (ref 96–112)
CO2 SERPL-SCNC: 24 MMOL/L (ref 20–33)
CREAT SERPL-MCNC: 1.02 MG/DL (ref 0.5–1.4)
EOSINOPHIL # BLD AUTO: 0.31 K/UL (ref 0–0.51)
EOSINOPHIL NFR BLD: 2.5 % (ref 0–6.9)
ERYTHROCYTE [DISTWIDTH] IN BLOOD BY AUTOMATED COUNT: 46.3 FL (ref 35.9–50)
GLUCOSE SERPL-MCNC: 101 MG/DL (ref 65–99)
HCT VFR BLD AUTO: 47.1 % (ref 42–52)
HGB BLD-MCNC: 15.8 G/DL (ref 14–18)
IMM GRANULOCYTES # BLD AUTO: 0.05 K/UL (ref 0–0.11)
IMM GRANULOCYTES NFR BLD AUTO: 0.4 % (ref 0–0.9)
INR PPP: 1.05 (ref 0.87–1.13)
LYMPHOCYTES # BLD AUTO: 5.17 K/UL (ref 1–4.8)
LYMPHOCYTES NFR BLD: 41.8 % (ref 22–41)
MCH RBC QN AUTO: 33.2 PG (ref 27–33)
MCHC RBC AUTO-ENTMCNC: 33.5 G/DL (ref 33.7–35.3)
MCV RBC AUTO: 98.9 FL (ref 81.4–97.8)
MONOCYTES # BLD AUTO: 1.31 K/UL (ref 0–0.85)
MONOCYTES NFR BLD AUTO: 10.6 % (ref 0–13.4)
NEUTROPHILS # BLD AUTO: 5.37 K/UL (ref 1.82–7.42)
NEUTROPHILS NFR BLD: 43.5 % (ref 44–72)
NRBC # BLD AUTO: 0 K/UL
NRBC BLD-RTO: 0 /100 WBC
PLATELET # BLD AUTO: 211 K/UL (ref 164–446)
PMV BLD AUTO: 10.2 FL (ref 9–12.9)
POTASSIUM SERPL-SCNC: 4.5 MMOL/L (ref 3.6–5.5)
PROTHROMBIN TIME: 13.8 SEC (ref 12–14.6)
RBC # BLD AUTO: 4.76 M/UL (ref 4.7–6.1)
SODIUM SERPL-SCNC: 138 MMOL/L (ref 135–145)
WBC # BLD AUTO: 12.4 K/UL (ref 4.8–10.8)

## 2019-05-23 PROCEDURE — 85730 THROMBOPLASTIN TIME PARTIAL: CPT

## 2019-05-23 PROCEDURE — A9270 NON-COVERED ITEM OR SERVICE: HCPCS | Performed by: SURGERY

## 2019-05-23 PROCEDURE — 97162 PT EVAL MOD COMPLEX 30 MIN: CPT

## 2019-05-23 PROCEDURE — 80048 BASIC METABOLIC PNL TOTAL CA: CPT

## 2019-05-23 PROCEDURE — A9270 NON-COVERED ITEM OR SERVICE: HCPCS | Performed by: STUDENT IN AN ORGANIZED HEALTH CARE EDUCATION/TRAINING PROGRAM

## 2019-05-23 PROCEDURE — 770006 HCHG ROOM/CARE - MED/SURG/GYN SEMI*

## 2019-05-23 PROCEDURE — 99233 SBSQ HOSP IP/OBS HIGH 50: CPT | Mod: GC | Performed by: INTERNAL MEDICINE

## 2019-05-23 PROCEDURE — 85025 COMPLETE CBC W/AUTO DIFF WBC: CPT

## 2019-05-23 PROCEDURE — 82232 ASSAY OF BETA-2 PROTEIN: CPT

## 2019-05-23 PROCEDURE — 700102 HCHG RX REV CODE 250 W/ 637 OVERRIDE(OP): Performed by: SURGERY

## 2019-05-23 PROCEDURE — 36415 COLL VENOUS BLD VENIPUNCTURE: CPT

## 2019-05-23 PROCEDURE — 97165 OT EVAL LOW COMPLEX 30 MIN: CPT

## 2019-05-23 PROCEDURE — 700111 HCHG RX REV CODE 636 W/ 250 OVERRIDE (IP): Performed by: SURGERY

## 2019-05-23 PROCEDURE — 700102 HCHG RX REV CODE 250 W/ 637 OVERRIDE(OP): Performed by: STUDENT IN AN ORGANIZED HEALTH CARE EDUCATION/TRAINING PROGRAM

## 2019-05-23 PROCEDURE — 85610 PROTHROMBIN TIME: CPT

## 2019-05-23 PROCEDURE — 86147 CARDIOLIPIN ANTIBODY EA IG: CPT | Mod: 91

## 2019-05-23 RX ORDER — DEXTROSE MONOHYDRATE, SODIUM CHLORIDE, AND POTASSIUM CHLORIDE 50; 1.49; 4.5 G/1000ML; G/1000ML; G/1000ML
INJECTION, SOLUTION INTRAVENOUS CONTINUOUS
Status: DISCONTINUED | OUTPATIENT
Start: 2019-05-23 | End: 2019-05-23

## 2019-05-23 RX ORDER — ACETAMINOPHEN 325 MG/1
650 TABLET ORAL EVERY 6 HOURS
Status: DISCONTINUED | OUTPATIENT
Start: 2019-05-23 | End: 2019-05-24 | Stop reason: HOSPADM

## 2019-05-23 RX ORDER — ASPIRIN 81 MG/1
81 TABLET, CHEWABLE ORAL DAILY
Status: DISCONTINUED | OUTPATIENT
Start: 2019-05-23 | End: 2019-05-24 | Stop reason: HOSPADM

## 2019-05-23 RX ORDER — ATORVASTATIN CALCIUM 40 MG/1
80 TABLET, FILM COATED ORAL EVERY EVENING
Status: DISCONTINUED | OUTPATIENT
Start: 2019-05-23 | End: 2019-05-24 | Stop reason: HOSPADM

## 2019-05-23 RX ORDER — WARFARIN SODIUM 5 MG/1
5 TABLET ORAL
Status: DISCONTINUED | OUTPATIENT
Start: 2019-05-23 | End: 2019-05-24

## 2019-05-23 RX ORDER — FUROSEMIDE 20 MG/1
80 TABLET ORAL DAILY
Status: DISCONTINUED | OUTPATIENT
Start: 2019-05-23 | End: 2019-05-24 | Stop reason: HOSPADM

## 2019-05-23 RX ORDER — NICOTINE 21 MG/24HR
14 PATCH, TRANSDERMAL 24 HOURS TRANSDERMAL
Status: DISCONTINUED | OUTPATIENT
Start: 2019-05-23 | End: 2019-05-24 | Stop reason: HOSPADM

## 2019-05-23 RX ORDER — ACETAMINOPHEN 325 MG/1
325 TABLET ORAL EVERY 6 HOURS PRN
Status: DISCONTINUED | OUTPATIENT
Start: 2019-05-23 | End: 2019-05-24 | Stop reason: HOSPADM

## 2019-05-23 RX ORDER — CARVEDILOL 6.25 MG/1
12.5 TABLET ORAL 2 TIMES DAILY WITH MEALS
Status: DISCONTINUED | OUTPATIENT
Start: 2019-05-23 | End: 2019-05-24 | Stop reason: HOSPADM

## 2019-05-23 RX ORDER — LISINOPRIL 20 MG/1
20 TABLET ORAL DAILY
Status: DISCONTINUED | OUTPATIENT
Start: 2019-05-23 | End: 2019-05-24 | Stop reason: HOSPADM

## 2019-05-23 RX ORDER — ACETAMINOPHEN 325 MG/1
650 TABLET ORAL EVERY 6 HOURS
Status: DISCONTINUED | OUTPATIENT
Start: 2019-05-23 | End: 2019-05-23

## 2019-05-23 RX ADMIN — ASPIRIN 81 MG 81 MG: 81 TABLET ORAL at 11:16

## 2019-05-23 RX ADMIN — CARVEDILOL 12.5 MG: 6.25 TABLET, FILM COATED ORAL at 17:14

## 2019-05-23 RX ADMIN — LISINOPRIL 20 MG: 20 TABLET ORAL at 11:16

## 2019-05-23 RX ADMIN — WARFARIN SODIUM 5 MG: 5 TABLET ORAL at 17:14

## 2019-05-23 RX ADMIN — ATORVASTATIN CALCIUM 80 MG: 40 TABLET, FILM COATED ORAL at 17:14

## 2019-05-23 RX ADMIN — HEPARIN SODIUM 1700 UNITS/HR: 5000 INJECTION, SOLUTION INTRAVENOUS at 11:20

## 2019-05-23 RX ADMIN — CELECOXIB 200 MG: 200 CAPSULE ORAL at 17:14

## 2019-05-23 RX ADMIN — FUROSEMIDE 80 MG: 20 TABLET ORAL at 11:17

## 2019-05-23 RX ADMIN — CELECOXIB 200 MG: 200 CAPSULE ORAL at 04:50

## 2019-05-23 RX ADMIN — CARVEDILOL 12.5 MG: 6.25 TABLET, FILM COATED ORAL at 11:16

## 2019-05-23 ASSESSMENT — ENCOUNTER SYMPTOMS
PALPITATIONS: 0
SPUTUM PRODUCTION: 0
EYE PAIN: 0
HEMOPTYSIS: 0
PHOTOPHOBIA: 0
FEVER: 0
NAUSEA: 0
SHORTNESS OF BREATH: 0
NERVOUS/ANXIOUS: 0
SORE THROAT: 0
CHILLS: 0
ABDOMINAL PAIN: 0
HEADACHES: 0
VOMITING: 0
DIZZINESS: 0
HALLUCINATIONS: 0

## 2019-05-23 ASSESSMENT — COGNITIVE AND FUNCTIONAL STATUS - GENERAL
MOVING TO AND FROM BED TO CHAIR: A LITTLE
CLIMB 3 TO 5 STEPS WITH RAILING: A LITTLE
SUGGESTED CMS G CODE MODIFIER DAILY ACTIVITY: CJ
WALKING IN HOSPITAL ROOM: A LITTLE
SUGGESTED CMS G CODE MODIFIER DAILY ACTIVITY: CI
HELP NEEDED FOR BATHING: A LITTLE
CLIMB 3 TO 5 STEPS WITH RAILING: A LITTLE
DAILY ACTIVITIY SCORE: 22
DRESSING REGULAR LOWER BODY CLOTHING: A LITTLE
SUGGESTED CMS G CODE MODIFIER MOBILITY: CJ
DRESSING REGULAR LOWER BODY CLOTHING: A LITTLE
MOVING FROM LYING ON BACK TO SITTING ON SIDE OF FLAT BED: A LITTLE
STANDING UP FROM CHAIR USING ARMS: A LITTLE
MOBILITY SCORE: 18
SUGGESTED CMS G CODE MODIFIER MOBILITY: CK
MOBILITY SCORE: 20
STANDING UP FROM CHAIR USING ARMS: A LITTLE
DAILY ACTIVITIY SCORE: 23
MOVING FROM LYING ON BACK TO SITTING ON SIDE OF FLAT BED: UNABLE

## 2019-05-23 ASSESSMENT — GAIT ASSESSMENTS
DISTANCE (FEET): 120
ASSISTIVE DEVICE: CRUTCHES
DEVIATION: ANTALGIC;STEP TO;DECREASED HEEL STRIKE;DECREASED TOE OFF
GAIT LEVEL OF ASSIST: STAND BY ASSIST

## 2019-05-23 ASSESSMENT — ACTIVITIES OF DAILY LIVING (ADL): TOILETING: INDEPENDENT

## 2019-05-23 NOTE — CARE PLAN
Problem: Pain Management  Goal: Pain level will decrease to patient's comfort goal  Outcome: PROGRESSING AS EXPECTED  Pain well controlled with current regimen. Pt educated on regimen and to call for intervention as needed.     Problem: Communication  Goal: The ability to communicate needs accurately and effectively will improve  Outcome: PROGRESSING AS EXPECTED  POC reviewed with pt. All questions answered at this time.

## 2019-05-23 NOTE — PROGRESS NOTES
Inpatient Anticoagulation Service Note    Date: 2019  Reason for Anticoagulation: Arterial thrombus      Hemoglobin Value: 15.8  Hematocrit Value: 47.1  Lab Platelet Value: 211  Target INR: 2.0 to 3.0    INR from last 7 days     Date/Time INR Value    19 0000  1.06    19 1740  0.96        Dose from last 7 days     Date/Time Dose (mg)    19 1246  5        Average Dose (mg): New start  Significant Interactions: Aspirin, NSAID, Statin  Bridge Therapy: Yes (HWBP)  Date of Last VTE Event: 19  Bridge Therapy Start Date: 19  Days of Overlap Therapy: 0   INR Value Greater than 2 Prior to Discontinuation of Parenteral Anticoagulation: Not Applicable   Reversal Agent Administered: Not Applicable    Comments: New warfarin start for RLE arterial thrombus. Pt is s/p thrombectomy on  which significantly improved perfusion. Pt's H/H is stable and WNL. No bleeding noted. He does have newly dx CHF, but is not currently in an exacerbation. Will start pt on 5 mg daily and trend INR.     Plan: Warfarin 5 mg and INR tomorrow AM  Education Material Provided?: No (Will need before discharge)  Pharmacist suggested discharge dosin mg daily and INR within 48-72 hours of discharge     Meenakshi Bazan, ChandlerD, BCPS

## 2019-05-23 NOTE — THERAPY
"Occupational Therapy Evaluation completed.   Functional Status:  Spv w/bed mobility, CGA w/LB dressing, Spv w/sit>Stand, took a few steps away from EOB, declined to use fww preferred SPC (did not have available at time of eval), txf BTB. Pt reports pain w/WB through leg RN aware of session   Plan of Care: Will benefit from Occupational Therapy 2 times per week  Discharge Recommendations:  Equipment: Will Continue to Assess for Equipment Needs. Post-acute therapy Anticipate that the patient will have no further occupational therapy needs after discharge from the hospital.       See \"Rehab Therapy-Acute\" Patient Summary Report for complete documentation.    48 yr old male admitted for R LE ischemia, s/p thrombectomy and exploration of R anterior tibial artery. Pt is demonstrating decreased balance and pain related to post op pain, mildly impacting ADL's. Anticipate pt will progress in this setting w/increasing daily activity, will be available for 1 more tx as needed  "

## 2019-05-23 NOTE — PROGRESS NOTES
Internal Medicine Interval Note  Note Author: Stephie Cortes M.D.     Name Lucas Agee     1970   Age/Sex 48 y.o. male   MRN [id number]   Code Status Full     After 5PM or if no immediate response to page, please call for cross-coverage  Attending/Team: Dr. Figueroa / Chad See Patient List for primary contact information  Call [phone] to page    1st Call - Day Intern (R1):   Dr. Cortes 2nd Call - Day Sr. Resident (R2/R3):   Dr. Watson         Reason for interval visit  (Principal Problem)   RLE ischemia 2/2 anterior tibial artery thrombus      Interval Problem Daily Status Update  (24 hours, problem oriented, brief subjective history, new lab/imaging data pertinent to that problem)   -No acute events overnight, patient transferred from ICU to floors in stable condition.  Patient states feeling okay, pain controlled, movement and sensation of RLE back to baseline, denies numbness or coolness of RLE.  States that brother has had 3 episodes of PE, unsure of etiology, denies personal h/o of DVT / PE / thrombus prior to current admission.  Repeat echo with contrast did not show thrombus.  Current presentation likely cardiac emboli in setting of severe cardiac dysfunction, but cannot rule out underlying hypercoagulopathy - antiphospholipid syndrome work-up pending.  -BP improved, restart cardiac meds with lower-dose carvedilol (patient reports hypotension down to SBP 80s at home with evening dose of carvedilol).  -Per vascular, okay to bridge to warfarin - bridge initiated.  Per SW, will need to trial warfarin before any DOACs due to Medicaid.  -Pending PT / OT recs.    Review of Systems   Constitutional: Negative for chills and fever.   HENT: Negative for ear pain and sore throat.    Eyes: Negative for photophobia and pain.   Respiratory: Negative for hemoptysis, sputum production and shortness of breath.    Cardiovascular: Negative for chest pain and palpitations.   Gastrointestinal: Negative  for abdominal pain, nausea and vomiting.   Genitourinary: Negative for dysuria and hematuria.   Musculoskeletal: Negative for joint pain.        RLE pain from incision sites, controlled   Skin: Negative for itching and rash.   Neurological: Negative for dizziness and headaches.   Psychiatric/Behavioral: Negative for hallucinations. The patient is not nervous/anxious.        Disposition/Barriers to discharge:   Clinical improvement  Pending PT / OT recs    Consultants/Specialty  Vascular surgery - Dr. Dominguez  Critical care  PCP: No primary care provider on file.      Quality Measures  Quality-Core Measures   Reviewed items::  Labs reviewed, Medications reviewed, EKG reviewed and Radiology images reviewed  Wang catheter::  No Wang  DVT prophylaxis pharmacological::  Heparin and Warfarin (Coumadin) (Bridging to warfarin)          Physical Exam       Vitals:    05/23/19 0032 05/23/19 0332 05/23/19 0724 05/23/19 1117   BP: 110/76 111/78 128/84 125/89   Pulse: 98 (!) 110 68 (!) 110   Resp: 17 16 15    Temp: 36.5 °C (97.7 °F) 36.9 °C (98.5 °F) 36.4 °C (97.6 °F)    TempSrc: Temporal Temporal Temporal    SpO2: 94% 95% 93%    Weight:       Height:         Body mass index is 29.63 kg/m².    Oxygen Therapy:  Pulse Oximetry: 93 %, O2 (LPM): 0, O2 Delivery: None (Room Air)    Physical Exam   Constitutional: He is oriented to person, place, and time and well-developed, well-nourished, and in no distress.   HENT:   Head: Normocephalic and atraumatic.   Eyes: Conjunctivae and EOM are normal.   Neck: Normal range of motion. Neck supple.   Cardiovascular: Normal rate and regular rhythm.    Pulmonary/Chest: Effort normal and breath sounds normal. No respiratory distress. He has no wheezes. He has no rales.   Abdominal: Soft. There is no tenderness. There is no rebound and no guarding.   Musculoskeletal:   Clean dressing over RLE thrombectomy incision sites.  No significant surrounding edema or erythema.  No coolness, numbness of  "either LE, freely moving all extremities.   Neurological: He is alert and oriented to person, place, and time.   Skin: Skin is warm and dry.   Psychiatric: Mood and affect normal.             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  -S/p R anterior tibial artery thrombectomy 5/21/2019 with restoration of perfusion.  -Repeat echo with contrast did not show thrombus, no evidence of shunt, no h/o atrial fibrillation.  Likely cardiac emboli in setting of severe cardiac dysfunction, but cannot rule out underlying hypercoagulopathy given family h/o.    Plan:  -Bridge from heparin drip to warfarin - will need minimum 3 months anticoagulation therapy pending antiphospholipid syndrome work-up.  -Optimize cardiac status - cardiac meds restarted.  -Continued substance cessation counseling.         Hypotension   Assessment & Plan    RESOLVED.  Post op, possible 2/2 HF vs anesthesia.  Off Wilder drip.     Pulmonary hypertension (HCC)- (present on admission)   Assessment & Plan    -RVSP 70 mmHg on echo.  -In setting of significant biventricular dysfunction.     Substance use disorder- (present on admission)   Assessment & Plan    -H/o meth, marijuana use - UDS had been positive for meth on last admission earlier in May 2019.  -Substance use resources provided, PCP to continue cessation counseling.        Tricuspid regurgitation- (present on admission)   Assessment & Plan    -Moderate to severe tricuspid regurgitation demonstrated on echo.     Dilated cardiomyopathy (HCC)- (present on admission)   Assessment & Plan    -See \"chronic systolic heart failure.\"     Elevated troponin- (present on admission)   Assessment & Plan    Likely demand, patient asymptomatic, no acute ST-T changes on EKG.       Chronic systolic heart failure (HCC)- (present on admission)   Assessment & Plan    -Echo " showed LVEF 15-20%, global hypokinesis, dilated cardiomyopathy, biventricular dysfunction.  -Likely 2/2 meth use.  -No acute exacerbation at this time.  -On ASA, atorvastatin, carvedilol, lisinopril, Lasix.  -Outpatient cards follow-up, MPI.  -Substance cessation resources provided, PCP to continue substance cessation counseling.       Transaminitis- (present on admission)   Assessment & Plan    -Improving from previous admission - likely 2/2 combination of congestive hepatopathy, hepatic steatosis.  -PCP to follow up.     Tobacco use- (present on admission)   Assessment & Plan    -Active smoker.  -PCP to continue cessation counseling.     Hepatic steatosis- (present on admission)   Assessment & Plan    Mild transaminitis on admission, patient asymptomatic.    Hep panel negative.  Hepatic steatosis on CT PE.  PCP to continue monitoring, counseling.     Prediabetes- (present on admission)   Assessment & Plan    -a1c 6.1, consistent with prediabetes.  -PCP to continue monitoring, lifestyle / diet counseling.

## 2019-05-23 NOTE — ASSESSMENT & PLAN NOTE
-Improving from previous admission - likely 2/2 combination of congestive hepatopathy, hepatic steatosis.  -PCP to follow up.

## 2019-05-23 NOTE — PROGRESS NOTES
"Pt A&O x 4.     Vitals: /76   Pulse 98   Temp 36.5 °C (97.7 °F) (Temporal)   Resp 17   Ht 1.753 m (5' 9\")   Wt 91 kg (200 lb 9.9 oz)   SpO2 94%   BMI 29.63 kg/m²      Pt rates pain 1 out of 10. Declines additional interventions at this time.      Neuro: GARCIA. Denies new onset of numbness/ tingling.     Cardiac: Denies new onset of chest pain.     Vascular: Pulses 2+ BUE, 1+  BLE. No edema noted.     Respiratory: Lungs sound clear/diminished to auscultation. On room air.  on, satting in 90's. Denies SOB.     GI: Abdomen soft, non-tender. Normoactive bowel sounds, + flatus, - BM. Denies nausea/ vomiting.     : Pt voiding adequately.      MSK: Pt up to bathroom with stand by assist, tolerating well.     Integumentary: RLE incisions x2, dressings in place, both CDI.     Labs noted. Heparin gtt running, labs being monitored, heparin protocol being followed.     Fall precautions in place: Bed locked in lowest position, Upper bed rails up, treaded socks in place, personal belongings within reach, call light within reach, appropriate mobility signs in place, - bed alarm. Pt calls appropriately.      Pt updated on POC.    "

## 2019-05-23 NOTE — PROGRESS NOTES
"    Progress Note    POD #2 s/p R pop and AT thrombectomy for acute ischemia.    Doing well today, no complaints. Denies numbness/weakness in the foot.    /89   Pulse (!) 110   Temp 36.4 °C (97.6 °F) (Temporal)   Resp 15   Ht 1.753 m (5' 9\")   Wt 91 kg (200 lb 9.9 oz)   SpO2 93%     NAD  Resting comfortably  RLE dressing changed. Incision c/d/i  Palpable DP/PT in the foot    Recent Labs      05/21/19   1740  05/22/19   0000  05/22/19   0249  05/23/19   0300   WBC  15.2*   --   14.8*  12.4*   RBC  5.74   --   4.88  4.76   HEMOGLOBIN  18.3*   --   15.6  15.8   HEMATOCRIT  55.3*   --   47.0  47.1   MCV  96.3   --   96.3  98.9*   MCH  31.9   --   32.0  33.2*   RDW  45.3   --   45.2  46.3   PLATELETCT  280  255  233  211   MPV  10.1   --   10.2  10.2   NEUTSPOLYS  53.40   --   61.40  43.50*   LYMPHOCYTES  31.70   --   27.70  41.80*   MONOCYTES  11.20   --   8.30  10.60   EOSINOPHILS  2.10   --   1.60  2.50   BASOPHILS  0.90   --   0.50  1.20     A/P  48M POD #2 s/p R pop and AT thrombectomy w/ acute ischemia.    Ok for OOB/ambulation. No weight bearing restrictions.  On hep gtt. Ok to transition to PO anticoagulation.    Pt would like to go home. Ok to use a NOAC if able, otherwise will need coumadin bridge.    F/u in 2-3wks for staple removal, REEMA and RLE arterial duplex at [facility] office.    Derrick Covington MD  Vascular Surgeon  [facility]  Office: [phone]                    "

## 2019-05-23 NOTE — PROGRESS NOTES
Assumed care of pt.  AAOx4.  No c/o pain this morning.  x2 incisions with dressings to RLE.  Pedal pulse 2+, RLE warm to touch.  2 gram sodium, cardiac diet in place, no c/o n/v.  LBM PTA, + flatus, + void.  POC reviewed with pt.  Call light within reach, pt educated to call for assistance as needed.  Hourly rounding in place.

## 2019-05-24 VITALS
DIASTOLIC BLOOD PRESSURE: 81 MMHG | SYSTOLIC BLOOD PRESSURE: 121 MMHG | BODY MASS INDEX: 29.71 KG/M2 | HEIGHT: 69 IN | HEART RATE: 69 BPM | RESPIRATION RATE: 15 BRPM | WEIGHT: 200.62 LBS | OXYGEN SATURATION: 95 % | TEMPERATURE: 98.1 F

## 2019-05-24 LAB
ANION GAP SERPL CALC-SCNC: 10 MMOL/L (ref 0–11.9)
APTT PPP: 69.6 SEC (ref 24.7–36)
B2 MICROGLOB SERPL-MCNC: 2.6 MG/L (ref 1.1–2.4)
BUN SERPL-MCNC: 21 MG/DL (ref 8–22)
CALCIUM SERPL-MCNC: 8.9 MG/DL (ref 8.5–10.5)
CARDIOLIPIN IGA SER IA-ACNC: 3 APL (ref 0–11)
CARDIOLIPIN IGG SER IA-ACNC: 0 GPL (ref 0–14)
CARDIOLIPIN IGM SER IA-ACNC: 0 MPL (ref 0–12)
CHLORIDE SERPL-SCNC: 104 MMOL/L (ref 96–112)
CO2 SERPL-SCNC: 25 MMOL/L (ref 20–33)
CREAT SERPL-MCNC: 1.06 MG/DL (ref 0.5–1.4)
GLUCOSE SERPL-MCNC: 126 MG/DL (ref 65–99)
INR PPP: 1.01 (ref 0.87–1.13)
POTASSIUM SERPL-SCNC: 4.2 MMOL/L (ref 3.6–5.5)
PROTHROMBIN TIME: 13.4 SEC (ref 12–14.6)
SODIUM SERPL-SCNC: 139 MMOL/L (ref 135–145)

## 2019-05-24 PROCEDURE — 700102 HCHG RX REV CODE 250 W/ 637 OVERRIDE(OP): Performed by: STUDENT IN AN ORGANIZED HEALTH CARE EDUCATION/TRAINING PROGRAM

## 2019-05-24 PROCEDURE — 85730 THROMBOPLASTIN TIME PARTIAL: CPT

## 2019-05-24 PROCEDURE — 85610 PROTHROMBIN TIME: CPT

## 2019-05-24 PROCEDURE — 700102 HCHG RX REV CODE 250 W/ 637 OVERRIDE(OP): Performed by: SURGERY

## 2019-05-24 PROCEDURE — A9270 NON-COVERED ITEM OR SERVICE: HCPCS | Performed by: STUDENT IN AN ORGANIZED HEALTH CARE EDUCATION/TRAINING PROGRAM

## 2019-05-24 PROCEDURE — 36415 COLL VENOUS BLD VENIPUNCTURE: CPT

## 2019-05-24 PROCEDURE — A9270 NON-COVERED ITEM OR SERVICE: HCPCS | Performed by: SURGERY

## 2019-05-24 PROCEDURE — 80048 BASIC METABOLIC PNL TOTAL CA: CPT

## 2019-05-24 PROCEDURE — 700111 HCHG RX REV CODE 636 W/ 250 OVERRIDE (IP): Performed by: SURGERY

## 2019-05-24 PROCEDURE — 99239 HOSP IP/OBS DSCHRG MGMT >30: CPT | Mod: GC | Performed by: INTERNAL MEDICINE

## 2019-05-24 RX ORDER — WARFARIN SODIUM 5 MG/1
5 TABLET ORAL
Status: DISCONTINUED | OUTPATIENT
Start: 2019-05-25 | End: 2019-05-24 | Stop reason: HOSPADM

## 2019-05-24 RX ORDER — ONDANSETRON 4 MG/1
4 TABLET, ORALLY DISINTEGRATING ORAL EVERY 4 HOURS PRN
Status: DISCONTINUED | OUTPATIENT
Start: 2019-05-24 | End: 2019-05-24 | Stop reason: HOSPADM

## 2019-05-24 RX ORDER — POLYETHYLENE GLYCOL 3350 17 G/17G
1 POWDER, FOR SOLUTION ORAL
Status: DISCONTINUED | OUTPATIENT
Start: 2019-05-24 | End: 2019-05-24 | Stop reason: HOSPADM

## 2019-05-24 RX ORDER — WARFARIN SODIUM 10 MG/1
10 TABLET ORAL
Status: DISCONTINUED | OUTPATIENT
Start: 2019-05-24 | End: 2019-05-24 | Stop reason: HOSPADM

## 2019-05-24 RX ORDER — WARFARIN SODIUM 5 MG/1
5 TABLET ORAL
Qty: 30 TAB | Refills: 3 | Status: SHIPPED | OUTPATIENT
Start: 2019-05-25 | End: 2019-05-24

## 2019-05-24 RX ORDER — CELECOXIB 200 MG/1
200 CAPSULE ORAL 2 TIMES DAILY
Qty: 60 CAP | Refills: 1 | Status: SHIPPED | OUTPATIENT
Start: 2019-05-24 | End: 2019-05-28

## 2019-05-24 RX ORDER — WARFARIN SODIUM 5 MG/1
5 TABLET ORAL
Qty: 30 TAB | Refills: 3 | Status: SHIPPED | OUTPATIENT
Start: 2019-05-25 | End: 2019-06-07 | Stop reason: SDUPTHER

## 2019-05-24 RX ADMIN — ACETAMINOPHEN 650 MG: 325 TABLET, FILM COATED ORAL at 05:18

## 2019-05-24 RX ADMIN — HEPARIN SODIUM 1700 UNITS/HR: 5000 INJECTION, SOLUTION INTRAVENOUS at 00:39

## 2019-05-24 RX ADMIN — ASPIRIN 81 MG 81 MG: 81 TABLET ORAL at 05:17

## 2019-05-24 RX ADMIN — LISINOPRIL 20 MG: 20 TABLET ORAL at 05:17

## 2019-05-24 RX ADMIN — CELECOXIB 200 MG: 200 CAPSULE ORAL at 05:17

## 2019-05-24 RX ADMIN — FUROSEMIDE 80 MG: 20 TABLET ORAL at 05:17

## 2019-05-24 RX ADMIN — POLYETHYLENE GLYCOL 3350 1 PACKET: 17 POWDER, FOR SOLUTION ORAL at 06:43

## 2019-05-24 RX ADMIN — CARVEDILOL 12.5 MG: 6.25 TABLET, FILM COATED ORAL at 07:52

## 2019-05-24 ASSESSMENT — ENCOUNTER SYMPTOMS
ABDOMINAL PAIN: 0
SORE THROAT: 0
NAUSEA: 0
PHOTOPHOBIA: 0
SPUTUM PRODUCTION: 0
FEVER: 0
NERVOUS/ANXIOUS: 0
HALLUCINATIONS: 0
HEADACHES: 0
PALPITATIONS: 0
HEMOPTYSIS: 0
VOMITING: 0
SHORTNESS OF BREATH: 0
EYE PAIN: 0
CHILLS: 0
DIZZINESS: 0

## 2019-05-24 NOTE — THERAPY
"Physical Therapy Evaluation completed.   Bed Mobility:  Supine to Sit: Modified Independent (raised HOB)  Transfers: Sit to Stand: Supervised (no device; )  Gait: Level Of Assist: Stand by Assist with left crutch x 120ft    Plan of Care: Will benefit from Physical Therapy 3 times per week  Discharge Recommendations: Equipment: Will Continue to Assess for Equipment Needs  (crutches vs SPC)     See \"Rehab Therapy-Acute\" Patient Summary Report for complete documentation.       Pt presents with impaired ROM, gait deviations and skin integrity associated with c/c of right LE swelling, found to have right tibial artery clot requiring thrombectomy. Pt is most limited by acute pain, though able to initiate functional ambulation with use of crutch. Educated on acute to subacute activity restrictions and exercise progression; anticipate once medically appropriate pt will be able to return home. Will follow.   "

## 2019-05-24 NOTE — PROGRESS NOTES
2225 Pt refusing IVF. Dr gorman paged with update by this RN.   2229 Call back received from Dr Gorman. Verbal order received to cancel fluid order.

## 2019-05-24 NOTE — PROGRESS NOTES
Internal Medicine Interval Note  Note Author: Jelani Weinstein M.D.     Name Lucas Agee     1970   Age/Sex 48 y.o. male   MRN [id number]   Code Status Full     After 5PM or if no immediate response to page, please call for cross-coverage  Attending/Team: Dr. Figueroa / Chad See Patient List for primary contact information  Call [phone] to page    1st Call - Day Intern (R1):   Dr. Cortes 2nd Call - Day Sr. Resident (R2/R3):   Dr. Watson         Reason for interval visit  (Principal Problem)   RLE ischemia 2/2 anterior tibial artery thrombus      Interval Problem Daily Status Update  (24 hours, problem oriented, brief subjective history, new lab/imaging data pertinent to that problem)   -No acute events overnight  -R leg art thromb:  Patient states feeling okay, pain controlled, movement and sensation of RLE back to baseline, denies numbness or coolness of RLE.  S/P thrombectomy.   Patient on heparin.  Per vascular, okay to start warfarin/NOAC.   Per SW, will need to trial warfarin before any DOACs due to Medicaid.  -FX positive for coagulopathy:  Antiphospholipid syndrome work-up pending (anticardiolipin, beta2 microglobulin.  -Hypotension:  SBP 100s-120s.  Improved.  Carvedilol at home dose (12.5mg BID).  -DC plan:  DCed home today.  Lovenox and warfarin were to be paid for by the hospital      Review of Systems   Constitutional: Negative for chills and fever.   HENT: Negative for ear pain and sore throat.    Eyes: Negative for photophobia and pain.   Respiratory: Negative for hemoptysis, sputum production and shortness of breath.    Cardiovascular: Negative for chest pain and palpitations.   Gastrointestinal: Negative for abdominal pain, nausea and vomiting.   Genitourinary: Negative for dysuria and hematuria.   Musculoskeletal: Negative for joint pain.        RLE pain from incision sites, controlled   Skin: Negative for itching and rash.   Neurological: Negative for dizziness and  headaches.   Psychiatric/Behavioral: Negative for hallucinations. The patient is not nervous/anxious.        Disposition/Barriers to discharge:   Clinical improvement  Pending PT / OT recs    Consultants/Specialty  Vascular surgery - Dr. Dominguez  Critical care  PCP: No primary care provider on file.      Quality Measures  Quality-Core Measures   Reviewed items::  Labs reviewed, Medications reviewed, EKG reviewed and Radiology images reviewed  Wang catheter::  No Wang  DVT prophylaxis pharmacological::  Heparin and Warfarin (Coumadin) (Bridging to warfarin)          Physical Exam       Vitals:    05/23/19 1117 05/23/19 1529 05/23/19 1919 05/24/19 0305   BP: 125/89 109/65 102/60 114/78   Pulse: (!) 110 92 93 87   Resp:  15 17 16   Temp:  36.9 °C (98.4 °F) 36.8 °C (98.3 °F) 36.6 °C (97.8 °F)   TempSrc:  Temporal Temporal Temporal   SpO2:  94% 92% 94%   Weight:       Height:         Body mass index is 29.63 kg/m².    Oxygen Therapy:  Pulse Oximetry: 94 %, O2 (LPM): 0, O2 Delivery: None (Room Air)    Physical Exam   Constitutional: He is oriented to person, place, and time and well-developed, well-nourished, and in no distress.   HENT:   Head: Normocephalic and atraumatic.   Eyes: Conjunctivae and EOM are normal.   Neck: Normal range of motion. Neck supple.   Cardiovascular: Normal rate and regular rhythm.    Pulmonary/Chest: Effort normal and breath sounds normal. No respiratory distress. He has no wheezes. He has no rales.   Abdominal: Soft. There is no tenderness. There is no rebound and no guarding.   Musculoskeletal:   Clean dressing over RLE thrombectomy incision sites.  No significant surrounding edema or erythema.  No coolness, numbness of either LE, freely moving all extremities.   Neurological: He is alert and oriented to person, place, and time.   Skin: Skin is warm and dry.   Psychiatric: Mood and affect normal.             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  -S/p R anterior tibial artery thrombectomy 5/21/2019 with restoration of perfusion.  -Repeat echo with contrast did not show thrombus, no evidence of shunt, no h/o atrial fibrillation.  Likely cardiac emboli in setting of severe cardiac dysfunction, but cannot rule out underlying hypercoagulopathy given family h/o.    Plan:  -AC:  Hospitalized, on heparin.    Warfarin started 5/23.  No NOAC at this time due to medicaid.   Hospital paying for SC Lovenox and Warfarin.  Patient discharged with Lovenox and warfarin.  He has AC clinic visit 4 days following discharge.  He will need trial warfarin before DOAC.  Will need minimum 3 months anticoagulation therapy pending antiphospholipid syndrome work-up.  -Optimize cardiac status - cardiac meds restarted.  -Continued substance cessation counseling.         Hypotension   Assessment & Plan    RESOLVED.  Post op, possible 2/2 HF vs anesthesia.  Off Wilder drip.     Pulmonary hypertension (HCC)- (present on admission)   Assessment & Plan    -RVSP 70 mmHg on echo.  -In setting of significant biventricular dysfunction.     Substance use disorder- (present on admission)   Assessment & Plan    -H/o meth, marijuana use - UDS had been positive for meth on last admission earlier in May 2019.  -Substance use resources provided, PCP to continue cessation counseling.        Tricuspid regurgitation- (present on admission)   Assessment & Plan    -Moderate to severe tricuspid regurgitation demonstrated on echo.     Dilated cardiomyopathy (HCC)- (present on admission)   Assessment & Plan    -See \"chronic systolic heart failure.\"     Elevated troponin- (present on admission)   Assessment & Plan    Likely demand, patient asymptomatic, no acute ST-T changes on EKG.       Chronic systolic heart failure (HCC)- (present on admission)   Assessment & Plan    -Echo showed LVEF 15-20%, " global hypokinesis, dilated cardiomyopathy, biventricular dysfunction.  -Likely 2/2 meth use.  -No acute exacerbation at this time.  -On ASA, atorvastatin, carvedilol, lisinopril, Lasix.  -Outpatient cards follow-up, MPI.  -Substance cessation resources provided, PCP to continue substance cessation counseling.       Transaminitis- (present on admission)   Assessment & Plan    -Improving from previous admission - likely 2/2 combination of congestive hepatopathy, hepatic steatosis.  -PCP to follow up.     Tobacco use- (present on admission)   Assessment & Plan    -Active smoker.  -PCP to continue cessation counseling.     Hepatic steatosis- (present on admission)   Assessment & Plan    Mild transaminitis on admission, patient asymptomatic.    Hep panel negative.  Hepatic steatosis on CT PE.  PCP to continue monitoring, counseling.     Prediabetes- (present on admission)   Assessment & Plan    -a1c 6.1, consistent with prediabetes.  -PCP to continue monitoring, lifestyle / diet counseling.

## 2019-05-24 NOTE — PROGRESS NOTES
Report received   Assumed care of patient at 1900.    Pt is A&O x 4.  Pain reported at 0/10. Medication declined  Nausea denied  Tolerating Diet   Surgical incisions x 2 to RLE. CDI. No drainage noted,  Heparin drip running per weight based protocol.  + Urine output  + BM PTA   + Flatus  Up self with steady gait and crutches   Family at bedside during shift change   Bed in lowest position and locked.  Bed alarm NA per Jacki Ron   Pt resting comfortably now.  Review plan of care with patient  Call light within reach  Hourly rounds in place  All needs met at this time

## 2019-05-24 NOTE — DISCHARGE SUMMARY
Internal Medicine Discharge Summary  Note Author: Jelani Weinstein M.D.       Admit Date:  5/21/2019       Discharge Date:   5/24/2019    Service:   [facility] Internal Medicine Yellow Team  Attending Physician(s):   Henry       Senior Resident(s):   Walter  Johnie Resident(s):   Sophia Castañeda)  PCP: No primary care provider on file.      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) POA: Unknown    Transaminitis POA: Yes    Chronic systolic heart failure (HCC) POA: Yes    Elevated troponin POA: Yes    Dilated cardiomyopathy (HCC) POA: Yes    Tricuspid regurgitation POA: Yes    Substance use disorder POA: Yes    Pulmonary hypertension (HCC) POA: Yes    Hypotension POA: Unknown    Prediabetes POA: Yes    Hepatic steatosis POA: Yes    Tobacco use POA: Yes  Resolved Problems:    SOB (shortness of breath) POA: Yes    Anemia POA: Yes    Thrombocytopenia (HCC) POA: Yes    Hyperkalemia POA: Yes    Non Anion Gap Metabolic acidosis POA: Yes      Hospital Summary (Brief Narrative):       The patient is a 47 yo male with history of HFrEF (EF 10-15%), meth abuse, prediabetes and anemia that presented to the ED with acute right lower extremity pain, numbness, and pallor.  Emergency thrombectomy performed 5/21/2019 of anterior tibial artery.  Intraoperatively he was hypotensive and daniela-drip started.  Heparin was started after surgery.  He was then maintained in the ICU for neosynephrine.  Once pressors were weaned, he was transferred to the floor.  The patient recovered at an expected rate.  His heart failure meds were restarted when systolic blood pressure was amenable.  Surgery oked initation of oral anticoagulation.  Warfarin was started 5/23.  By 5/24, he was clear to discharge.  Discharge INR was 1.01.  He was discharged with Lovenox 80mg BID and warfarin 5mg.  He was scheduled for anticoagulation clinic 4  "days after discharge.  He was as scheduled for heart failure followup and surgery followup.  Patient had stable blood pressures on home dose Coreg.     Patient /Hospital Summary (Details -- Problem Oriented) :          Arterial embolism and thrombosis of lower extremity (HCC)   Assessment & Plan    -Extensive arterial thrombosis / arterial stasis noted on CTA RLE, US arterial Doppler RLE.  -S/p R anterior tibial artery thrombectomy 5/21/2019 with restoration of perfusion.  -Repeat echo with contrast did not show thrombus, no evidence of shunt, no h/o atrial fibrillation.  Likely cardiac emboli in setting of severe cardiac dysfunction, but cannot rule out underlying hypercoagulopathy given family h/o.    Plan:  -Anticoagulation:  Hospitalized, on heparin.    Warfarin started 5/23.  No NOAC at this time due to medicaid.   Hospital paying for SC Lovenox and Warfarin.  Patient discharged with Lovenox and warfarin.  Lovenox to continue till anticoagulation clinic.  AC clinic scheduled  4 days following discharge.   Will need minimum 3 months anticoagulation therapy. pending antiphospholipid syndrome work-up pending.  -Home heart failure meds after he was hemodynamically stable  -Continued substance cessation counseling.         Hypotension   Assessment & Plan    RESOLVED.  Post op, possible 2/2 HF vs anesthesia.  Off Wilder drip.  Continued home heart failure meds.     Pulmonary hypertension (HCC)   Assessment & Plan    -RVSP 70 mmHg on echo.  -In setting of significant biventricular dysfunction.     Substance use disorder   Assessment & Plan    -H/o meth, marijuana use - UDS had been positive for meth on last admission earlier in May 2019.  -Substance use resources provided, PCP to continue cessation counseling.        Tricuspid regurgitation   Assessment & Plan    -Moderate to severe tricuspid regurgitation demonstrated on echo.     Dilated cardiomyopathy (HCC)   Assessment & Plan    -See \"chronic systolic heart " "failure.\"     Elevated troponin   Assessment & Plan    Likely demand, patient asymptomatic, no acute ST-T changes on EKG.       Chronic systolic heart failure (HCC)   Assessment & Plan    -Echo showed LVEF 15-20%, global hypokinesis, dilated cardiomyopathy, biventricular dysfunction.  -Likely 2/2 meth use.  -No acute exacerbation at this time.  -On ASA, atorvastatin, carvedilol, lisinopril, Lasix.  -Outpatient cards follow-up, MPI.  -Substance cessation resources provided, PCP to continue substance cessation counseling.       Transaminitis   Assessment & Plan    -Improving from previous admission - likely 2/2 combination of congestive hepatopathy, hepatic steatosis.  -PCP to follow up.     Anemia-resolved as of 5/22/2019   Assessment & Plan    Macrocytic anemia.  TSH, B12, folate WNL.    Ferritin low normal.  PCP to follow up.        Tobacco use   Assessment & Plan    -Active smoker.  -PCP to continue cessation counseling.     Hepatic steatosis   Assessment & Plan    Mild transaminitis on admission, patient asymptomatic.    Hep panel negative.  Hepatic steatosis on CT PE.  PCP to continue monitoring, counseling.     Prediabetes   Assessment & Plan    -a1c 6.1, consistent with prediabetes.  -PCP to continue monitoring, lifestyle / diet counseling.         Consultants:     Vascular surgery, pulmonary    Procedures:        -5/21 - right lower extremity thrombectomy, exlopration and thrombectomy of right anterior tibial artery.    Imaging/ Testing:      -5/21 - US, right leg.  No flow seen in the posterior tibial and peroneal artery as well as distal ARRON.  Possible clot occluded the tibial and peroneal artery  -5/21 - CTA of right leg.  Abrupt arterial cutoff of the tibial peroneal  Trunk just after its origin, the anterior tibial artery in the distal leg and the proximal profunda femoral artery.  Findings worrisome for central embolic source.  Moderate atherosclerotic plaque involving the right common femoral greter " than superficial femoral arteries.  -5/22 - Echocardiogram.  EF 10-15%.  Severely reduced LV systolic function.  Global hypokinesis.      Discharge Medications:         Medication Reconciliation: Completed       Medication List      START taking these medications      Instructions   celecoxib 200 MG Caps  Commonly known as:  CELEBREX   Take 1 Cap by mouth 2 Times a Day.  Dose:  200 mg     enoxaparin 80 MG/0.8ML Soln inj  Commonly known as:  LOVENOX   Inject 80 mg as instructed every 12 hours.  Dose:  80 mg     warfarin 5 MG Tabs  Start taking on:  5/25/2019  Commonly known as:  COUMADIN   Take 1 Tab by mouth COUMADIN-DAILY.  Dose:  5 mg        CONTINUE taking these medications      Instructions   aspirin 81 MG Chew chewable tablet  Commonly known as:  ASA   Take 1 Tab by mouth every day.  Dose:  81 mg     atorvastatin 80 MG tablet  Commonly known as:  LIPITOR   Take 1 Tab by mouth every evening.  Dose:  80 mg     carvedilol 25 MG Tabs  Commonly known as:  COREG   Take 25 mg by mouth every day.  Dose:  25 mg     furosemide 80 MG Tabs  Commonly known as:  LASIX   Take 1 Tab by mouth every day.  Dose:  80 mg     lisinopril 20 MG Tabs  Commonly known as:  PRINIVIL   Take 1 Tab by mouth every day.  Dose:  20 mg            Can use .DISCHARGEMEDSLIST if going to another facility         Disposition:   Discharge home    Diet:   Cardiac diet    Activity:   As tolerated with home walker    Instructions:         The patient was instructed to return to the ER in the event of worsening symptoms. I have counseled the patient on the importance of compliance and the patient has agreed to proceed with all medical recommendations and follow up plan indicated above.   The patient understands that all medications come with benefits and risks. Risks may include permanent injury or death and these risks can be minimized with close reassessment and monitoring.        Primary Care Provider:    Pending appointment with [facility]  7/15/2019  Discharge summary faxed to primary care provider:  Deferred  Copy of discharge summary given to the patient: Deferred      Follow up appointment details :      -Anticoaguation:  5/28/2019.  Management of warfarin and lovenox  -Vascular surgery  -Heart failure clinic  -Primary care 7/15/2019    Pending Studies:        -Antiphospholipid syndrome workup.    Time spent on discharge day patient visit, preparing discharge paperwork and arranging for patient follow up.    Summary of follow up issues:   -Right leg arterial thrombus:  Post op follow up in 2-3 weeks with surgery.  He will require anticoagulation for 3 months  -APL syndrome workup anticardiolipin, beta2 microglobulin pending  -Cardiomyopathy:  Patient advised to stop substance abuse.  He will need to follow up with the heart failure clnic  -Pulmonary hypertention  -Substance abuse  -Prediabete  -Hepatic steatosis    Discharge Time (Minutes) :    approx 35mins  Hospital Course Type:  Inpatient Stay >2 midnights      Condition on Discharge:  Stable, perfusion to right lower extremity restored.  Mobile with walker    ______________________________________________________________________    Interval history/exam for day of discharge:    -No acute events overnight  -Patient eager to discharge prior to long weekend.  -R leg art thromb:  Patient states feeling okay, pain controlled, movement and sensation of RLE back to baseline, denies numbness or coolness of RLE.   Hospital ok with paying for lovenox and warfarin.  He will follow up in the AC clinic in 4 days after discharge.  Discharge INR 1.01.  -FX positive for coagulopathy:  Antiphospholipid syndrome work-up pending (anticardiolipin, beta2 microglobulin.)  -Hypotension:  SBP 100s-120s.  Improved.  Carvedilol at home dose (12.5mg BID).  -DC plan:  DCed home today.  Lovenox and warfarin were to be paid for by the hospital    Vitals:    05/23/19 1919 05/24/19 0305 05/24/19 0717 05/24/19 1118   BP:  102/60 114/78 108/65 (!) 94/59   Pulse: 93 87 84 76   Resp: 17 16 16 15   Temp: 36.8 °C (98.3 °F) 36.6 °C (97.8 °F) 37.4 °C (99.3 °F) 36.3 °C (97.4 °F)   TempSrc: Temporal Temporal Temporal Temporal   SpO2: 92% 94% 92% 96%   Weight:       Height:         Weight/BMI: Body mass index is 29.63 kg/m².  Pulse Oximetry: 96 %, O2 (LPM): 0, O2 Delivery: None (Room Air)    Constitutional: He is oriented to person, place, and time and well-developed, well-nourished, and in no distress.   Cardiovascular: Normal rate and regular rhythm.    Pulmonary/Chest: Effort normal and breath sounds normal. No respiratory distress. He has no wheezes. He has no rales.   Abdominal: Soft. There is no tenderness. There is no rebound and no guarding.   Musculoskeletal:   Clean dressing over RLE thrombectomy incision sites.  No significant surrounding edema or erythema.  No coolness, numbness of either LE, freely moving all extremities.   Neurological: He is alert and oriented to person, place, and time.   Skin: Skin is warm and dry.   Psychiatric: Mood and affect normal.     Most Recent Labs:    Lab Results   Component Value Date/Time    WBC 12.4 (H) 05/23/2019 03:00 AM    RBC 4.76 05/23/2019 03:00 AM    HEMOGLOBIN 15.8 05/23/2019 03:00 AM    HEMATOCRIT 47.1 05/23/2019 03:00 AM    MCV 98.9 (H) 05/23/2019 03:00 AM    MCH 33.2 (H) 05/23/2019 03:00 AM    MCHC 33.5 (L) 05/23/2019 03:00 AM    MPV 10.2 05/23/2019 03:00 AM    NEUTSPOLYS 43.50 (L) 05/23/2019 03:00 AM    LYMPHOCYTES 41.80 (H) 05/23/2019 03:00 AM    MONOCYTES 10.60 05/23/2019 03:00 AM    EOSINOPHILS 2.50 05/23/2019 03:00 AM    BASOPHILS 1.20 05/23/2019 03:00 AM      Lab Results   Component Value Date/Time    SODIUM 139 05/24/2019 03:03 AM    POTASSIUM 4.2 05/24/2019 03:03 AM    CHLORIDE 104 05/24/2019 03:03 AM    CO2 25 05/24/2019 03:03 AM    GLUCOSE 126 (H) 05/24/2019 03:03 AM    BUN 21 05/24/2019 03:03 AM    CREATININE 1.06 05/24/2019 03:03 AM      Lab Results   Component Value  Date/Time    ALTSGPT 51 (H) 05/21/2019 05:40 PM    ASTSGOT 32 05/21/2019 05:40 PM    ALKPHOSPHAT 85 05/21/2019 05:40 PM    TBILIRUBIN 0.5 05/21/2019 05:40 PM    ALBUMIN 4.3 05/21/2019 05:40 PM    GLOBULIN 3.0 05/21/2019 05:40 PM    INR 1.01 05/24/2019 03:03 AM     Lab Results   Component Value Date/Time    PROTHROMBTM 13.4 05/24/2019 03:03 AM    INR 1.01 05/24/2019 03:03 AM

## 2019-05-24 NOTE — DISCHARGE PLANNING
JESSIEW faxed the Lovenox Script to the [facility] at [facility], per patient's request.

## 2019-05-24 NOTE — ADDENDUM NOTE
Encounter addended by: Daria Santos R.N. on: 5/24/2019 11:48 AM<BR>    Actions taken: Flowsheet accepted

## 2019-05-24 NOTE — CARE PLAN
Problem: Safety  Goal: Will remain free from falls  Outcome: PROGRESSING AS EXPECTED  Pt was educated on fall risk and need to call nursing staff prior to mobilization. Pt verbalizes understanding of allteaching. Pt is A&O x 4 and demonstrates appropriate use of call light.  CLIP. Hourly rounding in place.    Problem: Knowledge Deficit  Goal: Knowledge of disease process/condition, treatment plan, diagnostic tests, and medications will improve  Outcome: PROGRESSING AS EXPECTED    Intervention: Explain information regarding disease process/condition, treatment plan, diagnostic tests, and medications and document in education  Pt was educated on POC, MAR, shift routine and nursing education R/T Dx. Questions were encouraged and answered. Pt was educated on heparin drip and bleeding risk. Pt verbalized understanding of all teaching.

## 2019-05-24 NOTE — DISCHARGE PLANNING
The [facility] provided the Approved Services cost for Lovenox.  LSW faxed the Approved Services Form to the previously mentioned pharmacy.      JESSIEW spoke with MANISH Thompson via phone.  JESSIEW explained, the Lovenox is approved and patient has to pick it up today as the pharmacy closes at 1700.  Patient has to present to original script and copy of the Approved Services Form which is in patient's chart.

## 2019-05-24 NOTE — DISCHARGE PLANNING
At approximately 3:00pm, JESSIEW received an additional script for Coumadin.  JESSIEW faxed the script with the Approved Services Form to the [facility].    JESSIEW spoke with Any at the Coumadin Clinic.  Per Any, patient's appointment is scheduled for Tuesday May 28/2019 at 3:15pm, MANISH jordan.

## 2019-05-24 NOTE — PROGRESS NOTES
Received order for discharge.  Discharge instructions reviewed with pt.  All questions answered.  Pt able to teach back to this RN administration of lovenox injection.  Pt's brother able to  coumadin from [facility] pharmacy.   All belongings gathered.  PIV removed.  Pt escorted off unit with staff to discharge home with brother.

## 2019-05-24 NOTE — PROGRESS NOTES
Inpatient Anticoagulation Service Note    Date: 5/24/2019  Reason for Anticoagulation: Other (Comments)            Hemoglobin Value: 15.8  Hematocrit Value: 47.1  Lab Platelet Value: 211  Target INR: 2.0 to 3.0    INR from last 7 days     Date/Time INR Value    05/24/19 0303  1.01    05/23/19 1204  1.05    05/22/19 0000  1.06    05/21/19 1740  0.96        Dose from last 7 days     Date/Time Dose (mg)    05/24/19 1246  10    05/23/19 1246  5        Average Dose (mg):  (New start)  Significant Interactions: Aspirin, Statin, NSAID  Bridge Therapy: Yes (HWBP)  Date of Last VTE Event: 05/21/19  Bridge Therapy Start Date: 05/23/19  Days of Overlap Therapy: 1   INR Value Greater than 2 Prior to Discontinuation of Parenteral Anticoagulation: Not Applicable     Reversal Agent Administered: Not Applicable  Comments: INR subtherapeutic as expected given first doses yesterday. Will give a 10mg bolus dose x1 to aid in achieving a therapeutic INR sooner. Continue heparin bridge. NNL to assess h/h but no indication of bleeding noted. No new DDI.     Plan:  Warfarin 10mg x1 with INR check tomorrow  Education Material Provided?: No (Will need before discharge)  Pharmacist suggested discharge dosing: Warfarin 5mg PO daily with close f/u within 3 days       Rosalia Mantilla, PharmD., BCPS

## 2019-05-24 NOTE — DISCHARGE INSTRUCTIONS
Discharge Instructions    Discharged to home by car with relative. Discharged via wheelchair, hospital escort: Yes.  Special equipment needed: Not Applicable    Be sure to schedule a follow-up appointment with your primary care doctor or any specialists as instructed.     Discharge Plan:   Smoking Cessation Offered: Patient Refused  Influenza Vaccine Indication: Patient Refuses    I understand that a diet low in cholesterol, fat, and sodium is recommended for good health. Unless I have been given specific instructions below for another diet, I accept this instruction as my diet prescription.   Other diet: Regular diet.    Special Instructions: Instruct patient to call Anticoagulation Services the next morning to set up an appointment time at [phone]. If patient is discharged on weekend/holiday, have the patient call [phone] on Monday or the first business day by 12 noon to verify appointment for first follow-up visit.     warfarin prescription from [facility] pharmacy.     · Is patient discharged on Warfarin / Coumadin?   Yes    You are receiving the drug warfarin. Please understand the importance of monitoring warfarin with scheduled PT/INR blood draws.  Follow-up with the [facility]madin Clinic Tuesday 5/28 3:15 p.m.for INR lab.    IMPORTANT: HOW TO USE THIS INFORMATION:  This is a summary and does NOT have all possible information about this product. This information does not assure that this product is safe, effective, or appropriate for you. This information is not individual medical advice and does not substitute for the advice of your health care professional. Always ask your health care professional for complete information about this product and your specific health needs.      WARFARIN - ORAL (WARF-uh-rin)      COMMON BRAND NAME(S): Coumadin      WARNING:  Warfarin can cause very serious (possibly fatal) bleeding. This is more likely to occur when you first start taking this medication or  "if you take too much warfarin. To decrease your risk for bleeding, your doctor or other health care provider will monitor you closely and check your lab results (INR test) to make sure you are not taking too much warfarin. Keep all medical and laboratory appointments. Tell your doctor right away if you notice any signs of serious bleeding. See also Side Effects section.      USES:  This medication is used to treat blood clots (such as in deep vein thrombosis-DVT or pulmonary embolus-PE) and/or to prevent new clots from forming in your body. Preventing harmful blood clots helps to reduce the risk of a stroke or heart attack. Conditions that increase your risk of developing blood clots include a certain type of irregular heart rhythm (atrial fibrillation), heart valve replacement, recent heart attack, and certain surgeries (such as hip/knee replacement). Warfarin is commonly called a \"blood thinner,\" but the more correct term is \"anticoagulant.\" It helps to keep blood flowing smoothly in your body by decreasing the amount of certain substances (clotting proteins) in your blood.      HOW TO USE:  Read the Medication Guide provided by your pharmacist before you start taking warfarin and each time you get a refill. If you have any questions, ask your doctor or pharmacist. Take this medication by mouth with or without food as directed by your doctor or other health care professional, usually once a day. It is very important to take it exactly as directed. Do not increase the dose, take it more frequently, or stop using it unless directed by your doctor. Dosage is based on your medical condition, laboratory tests (such as INR), and response to treatment. Your doctor or other health care provider will monitor you closely while you are taking this medication to determine the right dose for you. Use this medication regularly to get the most benefit from it. To help you remember, take it at the same time each day. It is " important to eat a balanced, consistent diet while taking warfarin. Some foods can affect how warfarin works in your body and may affect your treatment and dose. Avoid sudden large increases or decreases in your intake of foods high in vitamin K (such as broccoli, cauliflower, cabbage, brussels sprouts, kale, spinach, and other green leafy vegetables, liver, green tea, certain vitamin supplements). If you are trying to lose weight, check with your doctor before you try to go on a diet. Cranberry products may also affect how your warfarin works. Limit the amount of cranberry juice (16 ounces/480 milliliters a day) or other cranberry products you may drink or eat.      SIDE EFFECTS:  Nausea, loss of appetite, or stomach/abdominal pain may occur. If any of these effects persist or worsen, tell your doctor or pharmacist promptly. Remember that your doctor has prescribed this medication because he or she has judged that the benefit to you is greater than the risk of side effects. Many people using this medication do not have serious side effects. This medication can cause serious bleeding if it affects your blood clotting proteins too much (shown by unusually high INR lab results). Even if your doctor stops your medication, this risk of bleeding can continue for up to a week. Tell your doctor right away if you have any signs of serious bleeding, including: unusual pain/swelling/discomfort, unusual/easy bruising, prolonged bleeding from cuts or gums, persistent/frequent nosebleeds, unusually heavy/prolonged menstrual flow, pink/dark urine, coughing up blood, vomit that is bloody or looks like coffee grounds, severe headache, dizziness/fainting, unusual or persistent tiredness/weakness, bloody/black/tarry stools, chest pain, shortness of breath, difficulty swallowing. Tell your doctor right away if any of these unlikely but serious side effects occur: persistent nausea/vomiting, severe stomach/abdominal pain, yellowing  eyes/skin. This drug rarely has caused very serious (possibly fatal) problems if its effects lead to small blood clots (usually at the beginning of treatment). This can lead to severe skin/tissue damage that may require surgery or amputation if left untreated. Patients with certain blood conditions (protein C or S deficiency) may be at greater risk. Get medical help right away if any of these rare but serious side effects occur: painful/red/purplish patches on the skin (such as on the toe, breast, abdomen), change in the amount of urine, vision changes, confusion, slurred speech, weakness on one side of the body. A very serious allergic reaction to this drug is rare. However, get medical help right away if you notice any symptoms of a serious allergic reaction, including: rash, itching/swelling (especially of the face/tongue/throat), severe dizziness, trouble breathing. This is not a complete list of possible side effects. If you notice other effects not listed above, contact your doctor or pharmacist. In the [place] - Call your doctor for medical advice about side effects. You may report side effects to FDA at 6-471-YCB-7683. In [place] - Call your doctor for medical advice about side effects. You may report side effects to [facility] [place] at 1-872.215.8002.      PRECAUTIONS:  Before taking warfarin, tell your doctor or pharmacist if you are allergic to it; or if you have any other allergies. This product may contain inactive ingredients, which can cause allergic reactions or other problems. Talk to your pharmacist for more details. Before using this medication, tell your doctor or pharmacist your medical history, especially of: blood disorders (such as anemia, hemophilia), bleeding problems (such as bleeding of the stomach/intestines, bleeding in the brain), blood vessel disorders (such as aneurysms), recent major injury/surgery, liver disease, alcohol use, mental/mood disorders (including memory problems), frequent  falls/injuries. It is important that all your doctors and dentists know that you take warfarin. Before having surgery or any medical/dental procedures, tell your doctor or dentist that you are taking this medication and about all the products you use (including prescription drugs, nonprescription drugs, and herbal products). Avoid getting injections into the muscles. If you must have an injection into a muscle (for example, a flu shot), it should be given in the arm. This way, it will be easier to check for bleeding and/or apply pressure bandages. This medication may cause stomach bleeding. Daily use of alcohol while using this medicine will increase your risk for stomach bleeding and may also affect how this medication works. Limit or avoid alcoholic beverages. If you have not been eating well, if you have an illness or infection that causes fever, vomiting, or diarrhea for more than 2 days, or if you start using any antibiotic medications, contact your doctor or pharmacist immediately because these conditions can affect how warfarin works. This medication can cause heavy bleeding. To lower the chance of getting cut, bruised, or injured, use great caution with sharp objects like safety razors and nail cutters. Use an electric razor when shaving and a soft toothbrush when brushing your teeth. Avoid activities such as contact sports. If you fall or injure yourself, especially if you hit your head, call your doctor immediately. Your doctor may need to check you. The Food & Drug Administration has stated that generic warfarin products are interchangeable. However, consult your doctor or pharmacist before switching warfarin products. Be careful not to take more than one medication that contains warfarin unless specifically directed by the doctor or health care provider who is monitoring your warfarin treatment. Older adults may be at greater risk for bleeding while using this drug. This medication is not recommended for  "use during pregnancy because of serious (possibly fatal) harm to an unborn baby. Discuss the use of reliable forms of birth control with your doctor. If you become pregnant or think you may be pregnant, tell your doctor immediately. If you are planning pregnancy, discuss a plan for managing your condition with your doctor before you become pregnant. Your doctor may switch the type of medication you use during pregnancy. Very small amounts of this medication may pass into breast milk but is unlikely to harm a nursing infant. Consult your doctor before breast-feeding.      DRUG INTERACTIONS:  Drug interactions may change how your medications work or increase your risk for serious side effects. This document does not contain all possible drug interactions. Keep a list of all the products you use (including prescription/nonprescription drugs and herbal products) and share it with your doctor and pharmacist. Do not start, stop, or change the dosage of any medicines without your doctor's approval. Warfarin interacts with many prescription, nonprescription, vitamin, and herbal products. This includes medications that are applied to the skin or inside the vagina or rectum. The interactions with warfarin usually result in an increase or decrease in the \"blood-thinning\" (anticoagulant) effect. Your doctor or other health care professional should closely monitor you to prevent serious bleeding or clotting problems. While taking warfarin, it is very important to tell your doctor or pharmacist of any changes in medications, vitamins, or herbal products that you are taking. Some products that may interact with this drug include: capecitabine, imatinib, mifepristone. Aspirin, aspirin-like drugs (salicylates), and nonsteroidal anti-inflammatory drugs (NSAIDs such as ibuprofen, naproxen, celecoxib) may have effects similar to warfarin. These drugs may increase the risk of bleeding problems if taken during treatment with warfarin. " Carefully check all prescription/nonprescription product labels (including drugs applied to the skin such as pain-relieving creams) since the products may contain NSAIDs or salicylates. Talk to your doctor about using a different medication (such as acetaminophen) to treat pain/fever. Low-dose aspirin and related drugs (such as clopidogrel, ticlopidine) should be continued if prescribed by your doctor for specific medical reasons such as heart attack or stroke prevention. Consult your doctor or pharmacist for more details. Many herbal products interact with warfarin. Tell your doctor before taking any herbal products, especially bromelains, coenzyme Q10, cranberry, danshen, dong quai, fenugreek, garlic, ginkgo biloba, ginseng, and Any's wort, among others. This medication may interfere with a certain laboratory test to measure theophylline levels, possibly causing false test results. Make sure laboratory personnel and all your doctors know you use this drug.      OVERDOSE:  If overdose is suspected, contact a poison control center or emergency room immediately. [place] residents can call the [place] National Poison Hotline at 1-877.811.7224. [place] residents can call a provincial poison control center. Symptoms of overdose may include: bloody/black/tarry stools, pink/dark urine, unusual/prolonged bleeding.      NOTES:  Do not share this medication with others. Laboratory and/or medical tests (such as INR, complete blood count) must be performed periodically to monitor your progress or check for side effects. Consult your doctor for more details.      MISSED DOSE:  For the best possible benefit, do not miss any doses. If you do miss a dose and remember on the same day, take it as soon as you remember. If you remember on the next day, skip the missed dose and resume your usual dosing schedule. Do not double the dose to catch up because this could increase your risk for bleeding. Keep a record of missed doses to give to  your doctor or pharmacist. Contact your doctor or pharmacist if you miss 2 or more doses in a row.      STORAGE:  Store at room temperature away from light and moisture. Do not store in the bathroom. Keep all medications away from children and pets. Do not flush medications down the toilet or pour them into a drain unless instructed to do so. Properly discard this product when it is  or no longer needed. Consult your pharmacist or local waste disposal company for more details about how to safely discard your product.      MEDICAL ALERT:  Your condition and medication can cause complications in a medical emergency. For information about enrolling in MedicAlert, call 1-127.966.8071 ([place]) or 1-559.809.9327 ([place]).      Information last revised 2010 Copyright(c)  [employer]           Embolectomy and Thrombectomy, Care After  These instructions give you information on caring for yourself after your procedure. Your doctor may also give you more specific instructions. Call your doctor if you have any problems or questions after your procedure.  HOME CARE  · Only take medicine as told by your doctor. Your doctor may give you medicine to thin your blood (blood thinners).  · Tell your doctor if you have bruises, bleeding including nose bleeds, or you throw up (vomit) blood. Tell your doctor if you have blood in your pee (urine) or poop (stools). Tell your doctor if your poop is black and tarry or red.  · Keep all doctor visits as told.  · Change your bandages (dressings) as told.  · Do not swim, bathe, or shower unless your doctor says it is okay.  · Do not use any tobacco products including cigarettes, chewing tobacco or electronic cigarettes.  · Do not drink alcohol.  GET HELP RIGHT AWAY IF:  · You have bleeding from the surgery site.  · You have sudden pain in the foot, leg, hand, or arm.  · You have sudden belly (abdominal) pain.  · Your face droops, you feel weak on one side of your body, or  you have trouble speaking.  · You have bloody poop.  · You throw up blood.  · You suddenly feel short of breath, weak, or dizzy.  · You have chest pain.  · You have drainage, redness, swelling, or pain at the surgery site.  · You have a fever.  MAKE SURE YOU:  · Understand these instructions.  · Will watch your condition.  · Will get help right away if you are not doing well or get worse.     This information is not intended to replace advice given to you by your health care provider. Make sure you discuss any questions you have with your health care provider.     Document Released: 04/12/2012 Document Revised: 01/08/2016 Document Reviewed: 04/12/2012  [employer] Patient Education ©2016 [employer]      Depression / Suicide Risk    As you are discharged from this [facility] facility, it is important to learn how to keep safe from harming yourself.    Recognize the warning signs:  · Abrupt changes in personality, positive or negative- including increase in energy   · Giving away possessions  · Change in eating patterns- significant weight changes-  positive or negative  · Change in sleeping patterns- unable to sleep or sleeping all the time   · Unwillingness or inability to communicate  · Depression  · Unusual sadness, discouragement and loneliness  · Talk of wanting to die  · Neglect of personal appearance   · Rebelliousness- reckless behavior  · Withdrawal from people/activities they love  · Confusion- inability to concentrate     If you or a loved one observes any of these behaviors or has concerns about self-harm, here's what you can do:  · Talk about it- your feelings and reasons for harming yourself  · Remove any means that you might use to hurt yourself (examples: pills, rope, extension cords, firearm)  · Get professional help from the community (Mental Health, Substance Abuse, psychological counseling)  · Do not be alone:Call your Safe Contact- someone whom you trust who will be there for  you.  · Call your local CRISIS HOTLINE 193-4578 or 037-845-5949  · Call your local Children's Mobile Crisis Response Team [place] (068) 451-3874 or www.[employer]  · Call the toll free National Suicide Prevention Hotlines   · National Suicide Prevention Lifeline 906-845-YEWR (1120)  · National [employer] Line Network 800-SUICIDE (080-4708)

## 2019-05-24 NOTE — DISCHARGE PLANNING
MEDHAT spoke with Jared at the [facility] at [facility].  Per Jared, patient's insurance information is invalid.  JESSIEW faxed the Lovenox script to the [facility], awaiting call back.

## 2019-05-28 ENCOUNTER — ANTICOAGULATION VISIT (OUTPATIENT)
Dept: VASCULAR LAB | Facility: MEDICAL CENTER | Age: 49
End: 2019-05-28
Attending: INTERNAL MEDICINE
Payer: COMMERCIAL

## 2019-05-28 ENCOUNTER — TELEPHONE (OUTPATIENT)
Dept: VASCULAR LAB | Facility: MEDICAL CENTER | Age: 49
End: 2019-05-28

## 2019-05-28 VITALS — SYSTOLIC BLOOD PRESSURE: 123 MMHG | DIASTOLIC BLOOD PRESSURE: 71 MMHG | HEART RATE: 92 BPM

## 2019-05-28 DIAGNOSIS — I74.3 ARTERIAL EMBOLISM AND THROMBOSIS OF LOWER EXTREMITY (HCC): ICD-10-CM

## 2019-05-28 LAB — INR PPP: 1.7 (ref 2–3.5)

## 2019-05-28 PROCEDURE — 99213 OFFICE O/P EST LOW 20 MIN: CPT

## 2019-05-28 PROCEDURE — 85610 PROTHROMBIN TIME: CPT

## 2019-05-28 NOTE — PROGRESS NOTES
Anticoagulation Summary  As of 2019    INR goal:   2.0-3.0   TTR:   --   INR used for dosin.70! (2019)   Warfarin maintenance plan:   No maintenance plan   Next INR check:   2019   Target end date:            Anticoagulation Episode Summary     INR check location:       Preferred lab:       Send INR reminders to:       Comments:         Anticoagulation Care Providers     Provider Role Specialty Phone number    Jelani Weinstein M.D. Referring Internal Medicine [phone]    [facility] Responsible  [phone]        Anticoagulation Patient Findings          Pt is new to warfarin and new to [facility].  Discussed indication for warfarin therapy and INR goal range. Explained our services, hours of operation, warfarin therapy, potential SE, potential DI. Discussed diet at length, with an emphasis on foods rich in vitamin K.  Discussed monitoring parameters, such as blood in urine, blood in stool, discussed what to do if a dose is missed, or suspected as missed.  Emphasized importance of compliance including follow up. Discussed lifestyle choices of ETOH & smoking and its impact on therapy.    Pt is on ASA 81 mg, per MD discharge recommendations.   Can pt be transitioned to DOAC - unable to afford, also not studied in arterial thrombus.      Pt denies any unusual s/s of bleeding, bruising, clotting or any changes to diet or medications.    HASBLED= (HTN, ASA)  Added [facility] to care team     Pt recently discharged post arterial thrombus.  Pt also underwent artery thrombectomy on 19:     -Extensive arterial thrombosis / arterial stasis noted on CTA RLE, US arterial Doppler RLE.  -S/p R anterior tibial artery thrombectomy 2019 with restoration of perfusion.  -Repeat echo with contrast did not show thrombus, no evidence of shunt, no h/o atrial fibrillation.  Likely cardiac emboli in setting of severe cardiac dysfunction, but cannot rule out underlying  hypercoagulopathy given family h/o.     Reports his brother has had three episodes of DVT (DVT of LE, and PE).  Not in communication with his children, unknown if they have experienced any DVT's.     Denies any provoking factors.  Currently smoking, cutting back, but no willing to stop today.    Pt has successfully been bridged on enoxaparin 80 mg BID, will continue with current regimen as INR is <2.    Bolus warfarin 10 mg x2 days and follow up in 48 hours.    Alejandro Lay, PharmD  CC  Dr Michael Bloch.

## 2019-05-29 LAB — INR BLD: 1.7 (ref 0.9–1.2)

## 2019-05-29 NOTE — TELEPHONE ENCOUNTER
Initial anticoag note and most recent d/c summary reviewed.  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    Pending further recs from vascular surgery and cardiology we will continue with at least 6 months of oral anticoagulation and then re-evaluate.  Pending further recs, we will also continue low dose asa as recommended at discharge.    Recommend close follow up with vascular surgery and cardiology.  We will defer any indicated vascular surveillance to vascular surgery and all other cv care, aside from management of warfarin dosing, to cardiology.    Michael Bloch, MD  Anticoagulation Clinic    Cc:  NAMAN Acosta

## 2019-05-30 ENCOUNTER — ANTICOAGULATION VISIT (OUTPATIENT)
Dept: VASCULAR LAB | Facility: MEDICAL CENTER | Age: 49
End: 2019-05-30
Attending: INTERNAL MEDICINE
Payer: COMMERCIAL

## 2019-05-30 VITALS — SYSTOLIC BLOOD PRESSURE: 119 MMHG | DIASTOLIC BLOOD PRESSURE: 68 MMHG | HEART RATE: 96 BPM

## 2019-05-30 DIAGNOSIS — I74.3 ARTERIAL EMBOLISM AND THROMBOSIS OF LOWER EXTREMITY (HCC): ICD-10-CM

## 2019-05-30 LAB — INR PPP: 3.2 (ref 2–3.5)

## 2019-05-30 PROCEDURE — 99212 OFFICE O/P EST SF 10 MIN: CPT

## 2019-05-30 PROCEDURE — 85610 PROTHROMBIN TIME: CPT

## 2019-05-30 NOTE — PROGRESS NOTES
Anticoagulation Summary  As of 5/30/2019    INR goal:   2.0-3.0   TTR:   --   INR used for dosing:   3.20! 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 Referring Internal Medicine [phone]    [facility] Responsible  [phone]        Anticoagulation Patient Findings      HPI:  Lucas Agee seen in clinic today, on anticoagulation therapy with warfarin for Arterial embolus.   Changes to current medical/health status since last appt: none  Denies signs/symptoms of bleeding and/or thrombosis since the last appt.    Denies any interval changes to diet  Denies any interval changes to medications since last appt.   Denies any complications or cost restrictions with current therapy.   BP recorded in vitals.  Confirmed dosing regimen.     A/P   INR  SUPRA-therapeutic.   D/C enoxaparin.  Reduce warfarin dose today, then begin new regimen shown above.     Follow up appointment in 4 days.     Alejandro Lay, PharmD

## 2019-05-31 ENCOUNTER — OFFICE VISIT (OUTPATIENT)
Dept: CARDIOLOGY | Facility: MEDICAL CENTER | Age: 49
End: 2019-05-31
Payer: COMMERCIAL

## 2019-05-31 VITALS
DIASTOLIC BLOOD PRESSURE: 60 MMHG | OXYGEN SATURATION: 95 % | HEART RATE: 93 BPM | HEIGHT: 69 IN | WEIGHT: 200 LBS | BODY MASS INDEX: 29.62 KG/M2 | SYSTOLIC BLOOD PRESSURE: 94 MMHG

## 2019-05-31 DIAGNOSIS — I74.3 ARTERIAL EMBOLISM AND THROMBOSIS OF LOWER EXTREMITY (HCC): ICD-10-CM

## 2019-05-31 DIAGNOSIS — I42.0 DILATED CARDIOMYOPATHY (HCC): ICD-10-CM

## 2019-05-31 DIAGNOSIS — F15.11 HISTORY OF METHAMPHETAMINE ABUSE (HCC): ICD-10-CM

## 2019-05-31 DIAGNOSIS — I50.9 CONGESTIVE HEART FAILURE, NYHA CLASS 2 AND ACC/AHA STAGE C (HCC): ICD-10-CM

## 2019-05-31 PROBLEM — I50.20 ACC/AHA STAGE C SYSTOLIC HEART FAILURE (HCC): Status: ACTIVE | Noted: 2019-05-15

## 2019-05-31 LAB — INR BLD: 3.2 (ref 0.9–1.2)

## 2019-05-31 PROCEDURE — 99204 OFFICE O/P NEW MOD 45 MIN: CPT | Performed by: INTERNAL MEDICINE

## 2019-05-31 RX ORDER — FUROSEMIDE 20 MG/1
20 TABLET ORAL DAILY
Qty: 30 TAB | Refills: 11 | Status: SHIPPED | OUTPATIENT
Start: 2019-05-31 | End: 2020-06-01 | Stop reason: SDUPTHER

## 2019-05-31 ASSESSMENT — ENCOUNTER SYMPTOMS
HEMOPTYSIS: 0
EYE DISCHARGE: 0
ABDOMINAL PAIN: 0
BLURRED VISION: 0
WHEEZING: 0
WEIGHT LOSS: 0
BRUISES/BLEEDS EASILY: 0
NERVOUS/ANXIOUS: 0
DEPRESSION: 0
CHILLS: 0
VOMITING: 0
EYE PAIN: 0
PALPITATIONS: 0
NAUSEA: 0
COUGH: 1
LOSS OF CONSCIOUSNESS: 0
MYALGIAS: 0
SPEECH CHANGE: 0

## 2019-05-31 ASSESSMENT — MINNESOTA LIVING WITH HEART FAILURE QUESTIONNAIRE (MLHF)
DIFFICULTY SLEEPING WELL AT NIGHT: 2
MAKING YOU WORRY: 2
EATING LESS FOODS YOU LIKE: 3
TIRED, FATIGUED OR LOW ON ENERGY: 4
COSTING YOU MONEY FOR MEDICAL CARE: 1
MAKING YOU STAY IN A HOSPITAL: 4
SWELLING IN ANKLES OR LEGS: 2
MAKING YOU SHORT OF BREATH: 4
DIFFICULTY WITH SEXUAL ACTIVITIES: 3
DIFFICULTY GOING AWAY FROM HOME: 2
GIVING YOU SIDE EFFECTS FROM TREATMENTS: 0
MAKING YOU FEEL DEPRESSED: 0
DIFFICULTY SOCIALIZING WITH FAMILY OR FRIENDS: 3
TOTAL_SCORE: 46
WORKING AROUND THE HOUSE OR YARD DIFFICULT: 2
HAVING TO SIT OR LIE DOWN DURING THE DAY: 1
DIFFICULTY TO CONCENTRATE OR REMEMBERING THINGS: 2
LOSS OF SELF CONTROL IN YOUR LIFE: 2
WALKING ABOUT OR CLIMBING STAIRS DIFFICULT: 1
DIFFICULTY WITH RECREATIONAL PASTIMES, SPORTS, HOBBIES: 3
DIFFICULTY WORKING TO EARN A LIVING: 2
FEELING LIKE A BURDEN TO FAMILY AND FRIENDS: 3

## 2019-05-31 ASSESSMENT — 6 MINUTE WALK TEST (6MWT): TOTAL DISTANCE WALKED (METERS): 219

## 2019-05-31 ASSESSMENT — NEW YORK HEART ASSOCIATION (NYHA) CLASSIFICATION: NYHA FUNCTIONAL CLASS: CLASS II

## 2019-05-31 NOTE — PATIENT INSTRUCTIONS
Reduce furosemide to 20 mg 2 tablets daily for 3 days.  If no increasing dyspnea or edema, reduce furosemide to 20 mg once daily.

## 2019-05-31 NOTE — PROGRESS NOTES
"Education Narrative- educatipon completed on phone.   Reviewed anatomy and physiology of heart failure with patient. Went over heart failure worksheet and patient's individual HF diagnosis, EF, risk factors, general medication classes and indications, as well as personal goals.  Goals: Patient's primary goal is to increase activity.      Discussed daily weights, sodium restriction, worsening signs and symptoms to report to physician, heart medications, and importance of adherence to medication regimen. Emphasized recommendation from AHA/AAHFN to keep daily sodium intake between 1500mg-2000mg.      Reviewed dietary handouts, advanced care planning classes, and advance directive planning handout. Discussed dietary considerations and reviewed Seven Day Heart Healthy Meal Plan by [employer].     Invited patient and family members/friends to HF support group and encouraged patient to call Heart Failure clinic during normal business hours with any questions.  Heart Failure program card with number given to patient.           Patient states full understanding of all information given.     OP Heart Failure  Vitals     Weight: 90.7 kg (200 lb)        Height: 175.3 cm (5' 9\")  BMI (Calculated): 29.53  Blood Pressure: (!) 94/60  Pulse: 93    System Assessment  NYHA Functional Class Assessment: Class II  ACC/AHA HF Stage: C    Smoking Hx  Have you Ever Smoked: Yes  Have you Smoked in the Last 12 Mos: Yes  Have you Quit Smoking: No      Smoking Cessation Offered: Patient Counseled    Alcohol Hx  Do you Drink?: No            Illicit Drug Hx  Illicit Drug History: Yes    Social Hx  Social History: Lives with Other  Level of Support: Unknown  Advance Directives: None    Education  Symptoms: Verbalizes understanding  Weighing: Verbalizes Understanding  Weight Gain Response: Verbalizes Understanding   Recording Data: Verbalizes understanding  Teach Back Failures: Teach Back Successful  Compliance: Patient is Compliant   "     Medications  Medication Reconciliation : Complete  Medication Counseling Provided: Verbalizes Understanding  Able to Accurately Identify Medication Indications: Some  Medication Discrepancies: None  Is Patient on an Evidence Based Beta Blocker: Yes  Is Patient on ACE-1 or ARB: Yes    Dietary Assessment  Food Labels: Verbalizes Understanding  Foods High in Sodium: Verbalizes Understanding  Daily Sodium Intake: Verbalizes Understanding  Diet: Verbalizes Understanding  Food Preparation: Verbalizes Understanding  Eating Out Plan: Verbalizes understanding  Healthier Options: Verbalizes Understanding  Fluid Restriction: Not Applicable    MN Living with Heart Failure  Swelling in Ankles or Legs: 2  Having to Sit or Lie Down During the Day: 1  Walking About or Climbing Stairs Difficult: 1  Working Around the House or Yard Difficult: 2  Difficulty Going Away from Home: 2  Difficulty Sleeping Well at Night: 2  Difficulty Socializing with Family or Friends: 3  Difficulty Working to Earn a Livin  Difficulty with Recreational Pastimes, Sports, Hobbies: 3  Difficulty with Sexual Activities: 3  Eating Less Foods You Like: 3  Making you Short of Breath: 4  Tired, Fatigued or Low on Energy: 4  Making you Stay in a Hospital: 4  Costing you Money for Medical Care?: 1  Giving you Side Effects from Treatments: 0  Feeling like a [place] to Family and Friends: 3  Loss of Self Control in your Life: 2  Making You Worry: 2  Difficulty to Concentrate or Remembering Things: 2  Making you Feel Depressed: 0  MLHF Total Score : 46    6 Minute Walk Test  Baseline to end of test: 6M  Total meters walked: 219

## 2019-06-03 ENCOUNTER — TELEPHONE (OUTPATIENT)
Dept: CARDIOLOGY | Facility: MEDICAL CENTER | Age: 49
End: 2019-06-03

## 2019-06-03 ENCOUNTER — HOSPITAL ENCOUNTER (OUTPATIENT)
Dept: LAB | Facility: MEDICAL CENTER | Age: 49
End: 2019-06-03
Attending: INTERNAL MEDICINE
Payer: COMMERCIAL

## 2019-06-03 ENCOUNTER — ANTICOAGULATION VISIT (OUTPATIENT)
Dept: VASCULAR LAB | Facility: MEDICAL CENTER | Age: 49
End: 2019-06-03
Attending: INTERNAL MEDICINE
Payer: COMMERCIAL

## 2019-06-03 VITALS — SYSTOLIC BLOOD PRESSURE: 112 MMHG | HEART RATE: 85 BPM | DIASTOLIC BLOOD PRESSURE: 68 MMHG

## 2019-06-03 DIAGNOSIS — I50.9 CONGESTIVE HEART FAILURE, NYHA CLASS 2 AND ACC/AHA STAGE C (HCC): ICD-10-CM

## 2019-06-03 DIAGNOSIS — I74.3 ARTERIAL EMBOLISM AND THROMBOSIS OF LOWER EXTREMITY (HCC): ICD-10-CM

## 2019-06-03 LAB — INR PPP: 2.6 (ref 2–3.5)

## 2019-06-03 PROCEDURE — 36415 COLL VENOUS BLD VENIPUNCTURE: CPT

## 2019-06-03 PROCEDURE — 80048 BASIC METABOLIC PNL TOTAL CA: CPT

## 2019-06-03 PROCEDURE — 85610 PROTHROMBIN TIME: CPT

## 2019-06-03 PROCEDURE — 99211 OFF/OP EST MAY X REQ PHY/QHP: CPT

## 2019-06-03 NOTE — PROGRESS NOTES
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 Referring Internal Medicine [phone]    [facility] Responsible  [phone]        Anticoagulation Patient Findings  Patient Findings     Negatives:   Signs/symptoms of thrombosis, Signs/symptoms of bleeding, Laboratory test error suspected, Change in health, Change in alcohol use, Change in activity, Upcoming invasive procedure, Emergency department visit, Upcoming dental procedure, Missed doses, Extra doses, Change in medications, Change in diet/appetite, Hospital admission, Bruising, Other complaints          HPI:  Lucas Agee seen in clinic today, on anticoagulation therapy with warfarin for arterial embolus.  Changes to current medical/health status since last appt: none  Denies signs/symptoms of bleeding and/or thrombosis since the last appt.    Denies any interval changes to diet  Denies any interval changes to medications since last appt.   Denies any complications or cost restrictions with current therapy.   BP recorded in vitals.  Confirmed current dosing regimen.     Patient's previous INR was supratherapeutic at 3.2 on 19, at which time patient was instructed to reduce dose, then begin reduced weekly warfarin regimen. He returns to clinic today to recheck INR to ensure it is therapeutic and thus preventing possible clotting  and/or bleeding/bruising complications.    A/P   INR is therapeutic today at 2.6.  Patient instructed to continue with the current warfarin dosing regimen, and asked to follow up again in 4 days.    Next appt: Friday, June 7, 2019  4:45pm    Rex Prado PharmD Intern  Doron ArteagaD

## 2019-06-03 NOTE — TELEPHONE ENCOUNTER
LM to remind patient to complete non-fasting labs before upcoming appt with MARIA DEL CARMEN Mercer on 06/13/2019.

## 2019-06-04 ENCOUNTER — TELEPHONE (OUTPATIENT)
Dept: CARDIOLOGY | Facility: MEDICAL CENTER | Age: 49
End: 2019-06-04

## 2019-06-04 LAB
ANION GAP SERPL CALC-SCNC: 16 MMOL/L (ref 0–11.9)
BUN SERPL-MCNC: 38 MG/DL (ref 8–22)
CALCIUM SERPL-MCNC: 9.7 MG/DL (ref 8.5–10.5)
CHLORIDE SERPL-SCNC: 101 MMOL/L (ref 96–112)
CO2 SERPL-SCNC: 20 MMOL/L (ref 20–33)
CREAT SERPL-MCNC: 1.31 MG/DL (ref 0.5–1.4)
GLUCOSE SERPL-MCNC: 84 MG/DL (ref 65–99)
INR BLD: 2.6 (ref 0.9–1.2)
POTASSIUM SERPL-SCNC: 5.1 MMOL/L (ref 3.6–5.5)
SODIUM SERPL-SCNC: 137 MMOL/L (ref 135–145)

## 2019-06-04 NOTE — TELEPHONE ENCOUNTER
Patient called with questions regarding medication confusion. He thought he was supposed to cut his statin in half which he had been doing, confirmed that he is to continue his full statin dose for the time being but the doctor may eventually discontinue it. He is to cut his furosemide down to 40mg daily for 3 days and if he doesn't have further swelling or SOB he can cut it down further to 20mg daily. Patient given PUMP line to call with any further questions.

## 2019-06-04 NOTE — DOCUMENTATION QUERY
[facility]                                                                       Query Response Note      PATIENT:               EDMUNDO OROPEZA  ACCT #:                  [id number]  MRN:                     [id number]  :                      1970  ADMIT DATE:       2019 3:33 PM  DISCH DATE:        2019 3:00 PM  RESPONDING  PROVIDER #:        [id number]           QUERY TEXT:    Clarification is needed due to confliction of the significance of the elevated troponin. Please clarify which condition after study the patient has.    NOTE:  If an appropriate response is not listed below, please respond with a new note    The patient's Clinical Indicators include:  Progress note  NSTEMI II likely.    EKG States:  Troponins mildly elevated, 0.06 to 0.12, but no ischemic cardiac symptoms, associated acute ST-T changes on EKG; likely demand-related.  Echo showed dilated cardiomyopathy, biventricular  dysfunction with global hypokinesis, ejection fraction 15-20%, moderate to severe tricuspid regurgitation.  Cardiology felt cardiac dysfunction likely secondary to methamphetamine use.    Query created by: Isis Alexander on 2019 5:05 PM    RESPONSE TEXT:    Demand Ischemia          Electronically signed by:  VENTURA JOHNS MD 2019 4:30 PM

## 2019-06-06 ENCOUNTER — TELEPHONE (OUTPATIENT)
Dept: CARDIOLOGY | Facility: MEDICAL CENTER | Age: 49
End: 2019-06-06

## 2019-06-06 NOTE — TELEPHONE ENCOUNTER
" request patient call back   Received: Today      Call patient   Message Contents   NAOMI Hodgson R.N.   Cc: Marilee SAlberto Mahmood R.N.             Patient called and left a message on Marilee Mahmodo's phone at 9:06 AM today  that he has gained 4 lbs and to please call him back.   Can you please address patient?   Thank You   Lindsey      Pt called. No answer, voicemail left with request for call back and contact information.    ============================================================    Pt calls back.  warm transfers call. Pt states weight yesterday was 196.4 and today was 200.4. Pt reports no increasing SOB, no increasing swelling. Pt states he has been really working on his diet and reports no high sodium food intake. BP low in office. Pt states that his BP since has been \"Pretty good\". Today 129/88 yesterday was 129/78     Lasix 40 mg daily. Attempted to discuss MD visit notes and plan of care with patient from 5/31 when patient begins to have an argument with a female in the background. He states he cant handle this and disconnects call.    ===============================================================    Pt calls back again.  warm transfers call back to me.    Pt reports he has been taking 40 mg of lasix since yesterday ONLY, pt was taking 80 mg prior since 5/31 appt because he mixed it up with Atorvastatin. Proper diet and hydration discussed-  2L hydration.      Pt continues to smoke, no alcohol, no drink, pt does smoke marijuana. Pt denies any other illicit drug use.     Pt encouraged to hydrate, watch dietary intake strictly and continue to work on other unhealthy behaviors that harm his cardiac function.   "

## 2019-06-07 ENCOUNTER — ANTICOAGULATION VISIT (OUTPATIENT)
Dept: VASCULAR LAB | Facility: MEDICAL CENTER | Age: 49
End: 2019-06-07
Attending: INTERNAL MEDICINE
Payer: COMMERCIAL

## 2019-06-07 VITALS — HEART RATE: 86 BPM | DIASTOLIC BLOOD PRESSURE: 57 MMHG | SYSTOLIC BLOOD PRESSURE: 116 MMHG

## 2019-06-07 DIAGNOSIS — I74.3 ARTERIAL EMBOLISM AND THROMBOSIS OF LOWER EXTREMITY (HCC): ICD-10-CM

## 2019-06-07 LAB
INR BLD: 2.9 (ref 0.9–1.2)
INR PPP: 2.9 (ref 2–3.5)

## 2019-06-07 PROCEDURE — 99211 OFF/OP EST MAY X REQ PHY/QHP: CPT | Performed by: PHARMACIST

## 2019-06-07 PROCEDURE — 85610 PROTHROMBIN TIME: CPT

## 2019-06-07 RX ORDER — WARFARIN SODIUM 5 MG/1
7.5 TABLET ORAL
Qty: 135 TAB | Refills: 3 | Status: SHIPPED | OUTPATIENT
Start: 2019-06-07 | End: 2019-08-23

## 2019-06-07 NOTE — PROGRESS NOTES
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 Referring Internal Medicine [phone]    [facility] Responsible  [phone]                Anticoagulation Patient Findings      HPI:  Lucas Agee seen in clinic today, on anticoagulation therapy with warfarin for VTE  Changes to current medical/health status since last appt: lasix dose has been decreased  Denies signs/symptoms of bleeding and/or thrombosis since the last appt.    Denies any interval changes to diet  Denies any interval changes to medications since last appt.   Denies any complications or cost restrictions with current therapy.   BP recorded in vitals.       A/P   INR  is-therapeutic.   Will continue with the same warfarin dosing.     Follow up appointment in 1 week(s).    Nessa Floyd, PharmD

## 2019-06-13 ENCOUNTER — ANTICOAGULATION VISIT (OUTPATIENT)
Dept: VASCULAR LAB | Facility: MEDICAL CENTER | Age: 49
End: 2019-06-13
Attending: INTERNAL MEDICINE
Payer: COMMERCIAL

## 2019-06-13 ENCOUNTER — OFFICE VISIT (OUTPATIENT)
Dept: CARDIOLOGY | Facility: MEDICAL CENTER | Age: 49
End: 2019-06-13
Payer: COMMERCIAL

## 2019-06-13 VITALS
SYSTOLIC BLOOD PRESSURE: 120 MMHG | DIASTOLIC BLOOD PRESSURE: 84 MMHG | HEART RATE: 81 BPM | HEIGHT: 69 IN | BODY MASS INDEX: 29.92 KG/M2 | WEIGHT: 202 LBS | OXYGEN SATURATION: 96 %

## 2019-06-13 DIAGNOSIS — I27.20 PULMONARY HYPERTENSION (HCC): ICD-10-CM

## 2019-06-13 DIAGNOSIS — I74.9 ARTERIAL EMBOLISM (HCC): ICD-10-CM

## 2019-06-13 DIAGNOSIS — Z72.0 TOBACCO USE: ICD-10-CM

## 2019-06-13 DIAGNOSIS — F15.11 HISTORY OF METHAMPHETAMINE ABUSE (HCC): ICD-10-CM

## 2019-06-13 DIAGNOSIS — I50.82 BIVENTRICULAR HEART FAILURE (HCC): ICD-10-CM

## 2019-06-13 DIAGNOSIS — I74.3 ARTERIAL EMBOLISM AND THROMBOSIS OF LOWER EXTREMITY (HCC): ICD-10-CM

## 2019-06-13 DIAGNOSIS — I50.9 CONGESTIVE HEART FAILURE, NYHA CLASS 2 AND ACC/AHA STAGE C (HCC): ICD-10-CM

## 2019-06-13 LAB
INR BLD: 2.9 (ref 0.9–1.2)
INR PPP: 2.9 (ref 2–3.5)

## 2019-06-13 PROCEDURE — 85610 PROTHROMBIN TIME: CPT

## 2019-06-13 PROCEDURE — 99214 OFFICE O/P EST MOD 30 MIN: CPT | Performed by: PHYSICIAN ASSISTANT

## 2019-06-13 PROCEDURE — 99211 OFF/OP EST MAY X REQ PHY/QHP: CPT | Performed by: NURSE PRACTITIONER

## 2019-06-13 RX ORDER — SPIRONOLACTONE 25 MG/1
12.5 TABLET ORAL
Qty: 30 TAB | Refills: 3 | Status: SHIPPED | OUTPATIENT
Start: 2019-06-13 | End: 2019-08-01 | Stop reason: SDUPTHER

## 2019-06-13 ASSESSMENT — ENCOUNTER SYMPTOMS
COUGH: 1
BLOOD IN STOOL: 0
WEIGHT LOSS: 0
FEVER: 0
SPUTUM PRODUCTION: 1
PALPITATIONS: 0
LOSS OF CONSCIOUSNESS: 0
CHILLS: 0
ORTHOPNEA: 1
DIZZINESS: 0
PND: 0

## 2019-06-13 NOTE — PATIENT INSTRUCTIONS
Start Spironolactone 1/2 tab every other day.   Please get labs in 1-2 weeks.     Call the Vascular office today to get your staples removed

## 2019-06-13 NOTE — PROGRESS NOTES
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 Referring Internal Medicine [phone]    [facility] Responsible  [phone]        Anticoagulation Patient Findings      HPI:  Lucas Agee seen in clinic today for follow up on anticoagulation therapy in the presence of arterial clot. Denies any changes to current medical/health status since last appointment. Starting spironolactone. Denies any diet changes. No current symptoms of bleeding or thrombosis reported.    A/P:   INR therapeutic. Continue current regimen. BP recorded in vitals.    Follow up appointment in 1 week(s).    Next Appointment: 2019 at 10:30 am.     Joycelyn CHIRINOS

## 2019-06-13 NOTE — PROGRESS NOTES
"Chief Complaint   Patient presents with   • Congestive Heart Failure       Subjective:   Lucas Agee is a 48 y.o. male who presents today for follow up.     Patient of Dr. Acosta. He is seen in the HF clinic for cardiomyopathy, presumed 2/2 methamphetamine use, NYHA class II.    Today he is feeling well. He has decrease his water pill to 20mg daily, has not noticed any weight gain, shortness of breath, bloating, swelling in left leg. He does develop a \"productive cough\" when he lays down at night. He can weed the yard without shortness of breath.    Blood pressures at home 120/80 usually. Weights at home about 196-200lbs.     He is recovering well from his thrombectomy, still has stables and stitches in place. Has minimal pain on right leg.     Trying to quit smoking, continues to abstain for ETOH, illicit drugs.    Regarding his past history, he was not taking any medications in the past, denies history of heart attack, known coronary artery disease or angina.    Past Medical History:   Diagnosis Date   • Congestive heart failure (HCC)    • Hypertension      Past Surgical History:   Procedure Laterality Date   • THROMBECTOMY Right 5/21/2019    Procedure: THROMBECTOMY - lower extremity, anterior tibial artery ;  Surgeon: Deidra Dominguez M.D.;  Location: SURGERY [facility];  Service: General   • HAND SURGERY Right 1995    \"metal plate placed\"   • HERNIA REPAIR Right 1990    groin   • HERNIA REPAIR       Family History   Problem Relation Age of Onset   • Diabetes Mother    • Heart Disease Father    • Blood Clots Brother    • Heart Disease Brother         pacemaker   • Blood Clots Paternal Grandmother    • Heart Disease Paternal Grandmother    • Heart Attack Paternal Uncle 60     Social History     Social History   • Marital status: Single     Spouse name: N/A   • Number of children: N/A   • Years of education: N/A     Occupational History   • Not on file.     Social History Main Topics   • Smoking " "status: Current Every Day Smoker     Packs/day: 0.50     Types: Cigarettes   • Smokeless tobacco: Never Used   • Alcohol use No   • Drug use: Yes     Types: Inhaled, Intravenous, Methamphetamines, Marijuana      Comment: marijuana; meth use    • Sexual activity: Not on file     Other Topics Concern   • Not on file     Social History Narrative   • No narrative on file     No Known Allergies  Outpatient Encounter Prescriptions as of 6/13/2019   Medication Sig Dispense Refill   • spironolactone (ALDACTONE) 25 MG Tab Take 0.5 Tabs by mouth every 48 hours. (every other day) 30 Tab 3   • warfarin (COUMADIN) 5 MG Tab Take 1.5 Tabs by mouth COUMADIN-DAILY. 135 Tab 3   • furosemide (LASIX) 20 MG Tab Take 1 Tab by mouth every day. 30 Tab 11   • carvedilol (COREG) 25 MG Tab Take 25 mg by mouth 2 times a day.     • aspirin (ASA) 81 MG Chew Tab chewable tablet Take 1 Tab by mouth every day. 30 Tab 1   • atorvastatin (LIPITOR) 80 MG tablet Take 1 Tab by mouth every evening. 30 Tab 1   • lisinopril (PRINIVIL) 20 MG Tab Take 1 Tab by mouth every day. 30 Tab 1     No facility-administered encounter medications on file as of 6/13/2019.      Review of Systems   Constitutional: Negative for chills, fever and weight loss.   HENT: Negative for nosebleeds.    Respiratory: Positive for cough and sputum production.    Cardiovascular: Positive for orthopnea and leg swelling (right leg s/p thrombectomy). Negative for palpitations and PND.   Gastrointestinal: Negative for blood in stool.   Genitourinary: Negative for hematuria.   Musculoskeletal:        Right lower leg pain.    Neurological: Negative for dizziness and loss of consciousness.        Objective:   /84 (BP Location: Left arm, Patient Position: Sitting, BP Cuff Size: Adult)   Pulse 81   Ht 1.753 m (5' 9\")   Wt 91.6 kg (202 lb)   SpO2 96%   BMI 29.83 kg/m²      Physical Exam   Constitutional: He is oriented to person, place, and time. He appears well-developed and " well-nourished. No distress.   HENT:   Head: Normocephalic and atraumatic.   Eyes: Conjunctivae are normal. No scleral icterus.   Neck: Neck supple. JVD (JVP 9cm) present.   Cardiovascular: Normal rate, regular rhythm and intact distal pulses.    No murmur heard.  Physiologic splitting of S2   Pulmonary/Chest: Effort normal and breath sounds normal. No respiratory distress. He has no rales.   Abdominal: Soft. Bowel sounds are normal. He exhibits no distension. There is no tenderness.   Musculoskeletal:   Healing 6cm incision with staples on right inner thigh, no redness, tenderness or exudate. Smaller 3cm incision over right ankle, sutures in tact, no redness or exudates. Mild swelling in right foot to ankle. PT pulse 2+   Neurological: He is alert and oriented to person, place, and time.   Skin: Skin is warm and dry. He is not diaphoretic.   Psychiatric: Judgment normal.       Transthoracic Echo 5/14/19  CONCLUSIONS  No prior study is available for comparison.   Dilated cardiomyopathy, biventricular dysfunction.  LVEF 15-20%.  Moderate to severe tricuspid regurgitation.  Estimated right ventricular systolic pressure  is 70 mmHg.    Mildly dilated eft ventricle. Normal left ventricular wall thickness.   Severely reduced left ventricular systolic function. Left ventricular ejection fraction is visually estimated to be less than 10-15%. Global hypokinesis.    Moderately dilated right ventricle. Reduced right ventricular systolic function.    Enlarged right atrium. Dilated inferior vena cava without inspiratory collapse.    Normal left atrial size.    Structurally normal mitral valve. Trace mitral regurgitation.  Structurally normal aortic valve without significant stenosis or   regurgitation.    Structurally normal tricuspid valve. Moderate to severe tricuspid   regurgitation. Estimated right ventricular systolic pressure  is 70 mmHg.    Structurally normal pulmonic valve. Trace pulmonic insufficiency.    No  pericardial effusion seen.    The ascending aorta is ectatic.    Lab Results   Component Value Date/Time    CHOLSTRLTOT 146 05/14/2019 08:40 PM    LDL 72 05/14/2019 08:40 PM    HDL 34 (A) 05/14/2019 08:40 PM    TRIGLYCERIDE 198 (H) 05/14/2019 08:40 PM         Assessment:     1. Pulmonary hypertension (HCC)     2. Congestive heart failure, NYHA class 2 and ACC/AHA stage C ([facility])  Basic Metabolic Panel   3. History of methamphetamine abuse     4. Biventricular heart failure (HCC)         Medical Decision Making:  Today's Assessment / Status / Plan:   Biventricular heart failure NYHA class II  Dilated cardiomyopathy  -Presumed secondary to methamphetamine use, no ischemic work-up has been performed, discuss CAC scoring at next visit  -Mildly elevated JVP, orthopnea in the history continue Lasix 20 mg daily, will cautiously add spironolactone 12.5 mg EOD given potassium is 5.1.  Discussed decreasing use of salt substitutes, labs in 1 to 2 weeks   - continue coreg 25mg bid and lisinopril 20mg daily    S/P right LE ischemic due to arterial emboli  - no evidence of apical thrombus on limited echo  - following with Vascular surgery, will have stables and sutures removed at their office tomorrow.   - coumadin managed by AC clinic, no bleeding complications  - atorvastatin 80mg, aspirin 81mg    Tobacco Use  he does not request any assistance but knows there is some available if needed.     Plan: tea 12.5mg EOD, labs, discuss CAC at next visit    Collaborating MD: Dr. Bland

## 2019-06-28 ENCOUNTER — HOSPITAL ENCOUNTER (OUTPATIENT)
Dept: LAB | Facility: MEDICAL CENTER | Age: 49
End: 2019-06-28
Attending: PHYSICIAN ASSISTANT
Payer: COMMERCIAL

## 2019-06-28 DIAGNOSIS — I50.9 CONGESTIVE HEART FAILURE, NYHA CLASS 2 AND ACC/AHA STAGE C (HCC): ICD-10-CM

## 2019-06-28 LAB
ANION GAP SERPL CALC-SCNC: 11 MMOL/L (ref 0–11.9)
BUN SERPL-MCNC: 31 MG/DL (ref 8–22)
CALCIUM SERPL-MCNC: 9.4 MG/DL (ref 8.5–10.5)
CHLORIDE SERPL-SCNC: 102 MMOL/L (ref 96–112)
CO2 SERPL-SCNC: 24 MMOL/L (ref 20–33)
CREAT SERPL-MCNC: 1.21 MG/DL (ref 0.5–1.4)
GLUCOSE SERPL-MCNC: 101 MG/DL (ref 65–99)
POTASSIUM SERPL-SCNC: 4.3 MMOL/L (ref 3.6–5.5)
SODIUM SERPL-SCNC: 137 MMOL/L (ref 135–145)

## 2019-06-28 PROCEDURE — 36415 COLL VENOUS BLD VENIPUNCTURE: CPT

## 2019-06-28 PROCEDURE — 80048 BASIC METABOLIC PNL TOTAL CA: CPT

## 2019-07-01 ENCOUNTER — ANTICOAGULATION MONITORING (OUTPATIENT)
Dept: MEDICAL GROUP | Facility: PHYSICIAN GROUP | Age: 49
End: 2019-07-01

## 2019-07-01 ENCOUNTER — APPOINTMENT (OUTPATIENT)
Dept: RADIOLOGY | Facility: MEDICAL CENTER | Age: 49
End: 2019-07-01
Attending: SURGERY
Payer: COMMERCIAL

## 2019-07-01 DIAGNOSIS — I74.3 ARTERIAL EMBOLISM AND THROMBOSIS OF LOWER EXTREMITY (HCC): ICD-10-CM

## 2019-07-01 NOTE — PROGRESS NOTES
Anticoagulation clinic    Reminder voice message for patient regarding getting INR done ASAP for anticoagulation clinic.     Kory Hardy, PharmD

## 2019-07-09 ENCOUNTER — APPOINTMENT (OUTPATIENT)
Dept: VASCULAR LAB | Facility: MEDICAL CENTER | Age: 49
End: 2019-07-09
Attending: INTERNAL MEDICINE
Payer: COMMERCIAL

## 2019-07-10 ENCOUNTER — ANTICOAGULATION VISIT (OUTPATIENT)
Dept: VASCULAR LAB | Facility: MEDICAL CENTER | Age: 49
End: 2019-07-10
Attending: INTERNAL MEDICINE
Payer: COMMERCIAL

## 2019-07-10 VITALS — SYSTOLIC BLOOD PRESSURE: 84 MMHG | HEART RATE: 92 BPM | DIASTOLIC BLOOD PRESSURE: 50 MMHG

## 2019-07-10 DIAGNOSIS — I74.3 ARTERIAL EMBOLISM AND THROMBOSIS OF LOWER EXTREMITY (HCC): ICD-10-CM

## 2019-07-10 LAB
INR BLD: 2.2 (ref 0.9–1.2)
INR PPP: 2.2 (ref 2–3.5)

## 2019-07-10 PROCEDURE — 85610 PROTHROMBIN TIME: CPT

## 2019-07-10 PROCEDURE — 99211 OFF/OP EST MAY X REQ PHY/QHP: CPT | Performed by: NURSE PRACTITIONER

## 2019-07-10 NOTE — PROGRESS NOTES
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 Referring Internal Medicine [phone]    [facility]ticoagulation Services Responsible  [phone]        Anticoagulation Patient Findings      HPI:  Lucas Lorakay Agee seen in clinic today for follow up on anticoagulation therapy in the presence of arterial thrombus.   Denies any changes to current medical/health status since last appointment.   Denies any medication or diet changes.   No current symptoms of bleeding or thrombosis reported.  Missed his last appointment for INR. Confirmed dose. Missed one dose since last visit.    A/P:   INR therapeutic.   Continue current regimen.   BP recorded in vitals. Hypotensive today. Feels a little lightheaded. He checks his BP daily and reports SBP in 100-110's. He will recheck BP today at home and follow up with MD.    Follow up appointment in 3 week(s).    Next Appointment: 2019 at 11:00 am.    Joycelyn CHIRINOS

## 2019-07-18 DIAGNOSIS — I50.9 CONGESTIVE HEART FAILURE, NYHA CLASS 2 AND ACC/AHA STAGE C (HCC): Primary | ICD-10-CM

## 2019-07-18 DIAGNOSIS — E78.5 HYPERLIPIDEMIA, UNSPECIFIED HYPERLIPIDEMIA TYPE: Primary | ICD-10-CM

## 2019-07-18 RX ORDER — ATORVASTATIN CALCIUM 80 MG/1
80 TABLET, FILM COATED ORAL EVERY EVENING
Qty: 30 TAB | Refills: 6 | Status: SHIPPED | OUTPATIENT
Start: 2019-07-18 | End: 2019-08-01 | Stop reason: SDUPTHER

## 2019-07-18 RX ORDER — CARVEDILOL 25 MG/1
25 TABLET ORAL 2 TIMES DAILY
Qty: 60 TAB | Refills: 6 | Status: SHIPPED | OUTPATIENT
Start: 2019-07-18 | End: 2019-08-01 | Stop reason: SDUPTHER

## 2019-07-18 RX ORDER — ASPIRIN 81 MG/1
81 TABLET, CHEWABLE ORAL DAILY
Qty: 30 TAB | Refills: 6 | Status: SHIPPED | OUTPATIENT
Start: 2019-07-18 | End: 2023-11-14

## 2019-07-18 RX ORDER — LISINOPRIL 20 MG/1
20 TABLET ORAL DAILY
Qty: 30 TAB | Refills: 6 | Status: SHIPPED | OUTPATIENT
Start: 2019-07-18 | End: 2019-08-01 | Stop reason: SDUPTHER

## 2019-07-29 ENCOUNTER — APPOINTMENT (OUTPATIENT)
Dept: VASCULAR LAB | Facility: MEDICAL CENTER | Age: 49
End: 2019-07-29
Attending: INTERNAL MEDICINE
Payer: COMMERCIAL

## 2019-07-31 ASSESSMENT — ENCOUNTER SYMPTOMS
DIZZINESS: 0
FEVER: 0
PND: 0
WEIGHT LOSS: 0
CHILLS: 0
PALPITATIONS: 0
BLOOD IN STOOL: 0

## 2019-08-01 ENCOUNTER — OFFICE VISIT (OUTPATIENT)
Dept: CARDIOLOGY | Facility: MEDICAL CENTER | Age: 49
End: 2019-08-01
Payer: COMMERCIAL

## 2019-08-01 VITALS
HEIGHT: 68 IN | DIASTOLIC BLOOD PRESSURE: 62 MMHG | WEIGHT: 194 LBS | OXYGEN SATURATION: 95 % | BODY MASS INDEX: 29.4 KG/M2 | HEART RATE: 84 BPM | SYSTOLIC BLOOD PRESSURE: 112 MMHG

## 2019-08-01 DIAGNOSIS — Z72.0 TOBACCO USE: ICD-10-CM

## 2019-08-01 DIAGNOSIS — I50.82 BIVENTRICULAR HEART FAILURE (HCC): ICD-10-CM

## 2019-08-01 DIAGNOSIS — I50.20 ACC/AHA STAGE C SYSTOLIC HEART FAILURE (HCC): ICD-10-CM

## 2019-08-01 DIAGNOSIS — E78.5 HYPERLIPIDEMIA, UNSPECIFIED HYPERLIPIDEMIA TYPE: ICD-10-CM

## 2019-08-01 DIAGNOSIS — I74.3 ARTERIAL EMBOLISM AND THROMBOSIS OF LOWER EXTREMITY (HCC): ICD-10-CM

## 2019-08-01 DIAGNOSIS — F15.11 HISTORY OF METHAMPHETAMINE ABUSE (HCC): ICD-10-CM

## 2019-08-01 DIAGNOSIS — I50.20 NYHA CLASS 1 HEART FAILURE WITH REDUCED EJECTION FRACTION (HCC): ICD-10-CM

## 2019-08-01 PROCEDURE — 99214 OFFICE O/P EST MOD 30 MIN: CPT | Performed by: PHYSICIAN ASSISTANT

## 2019-08-01 RX ORDER — ATORVASTATIN CALCIUM 80 MG/1
80 TABLET, FILM COATED ORAL EVERY EVENING
Qty: 30 TAB | Refills: 6 | Status: SHIPPED | OUTPATIENT
Start: 2019-08-01 | End: 2020-04-20

## 2019-08-01 RX ORDER — CARVEDILOL 25 MG/1
25 TABLET ORAL 2 TIMES DAILY
Qty: 60 TAB | Refills: 6 | Status: SHIPPED | OUTPATIENT
Start: 2019-08-01 | End: 2020-04-27

## 2019-08-01 RX ORDER — LISINOPRIL 20 MG/1
20 TABLET ORAL DAILY
Qty: 30 TAB | Refills: 6 | Status: SHIPPED | OUTPATIENT
Start: 2019-08-01 | End: 2020-03-17

## 2019-08-01 RX ORDER — SPIRONOLACTONE 25 MG/1
12.5 TABLET ORAL DAILY
Qty: 30 TAB | Refills: 3 | Status: SHIPPED | OUTPATIENT
Start: 2019-08-01 | End: 2019-08-23

## 2019-08-01 ASSESSMENT — ENCOUNTER SYMPTOMS
DEPRESSION: 0
COUGH: 0
ORTHOPNEA: 0
SHORTNESS OF BREATH: 0
NAUSEA: 0

## 2019-08-01 NOTE — PROGRESS NOTES
"Chief Complaint   Patient presents with   • Congestive Heart Failure       Subjective:   Lucas Agee is a 48 y.o. male who presents today for follow up.     Patient of Dr. Acosta. He is seen in the HF clinic for cardiomyopathy, presumed 2/2 methamphetamine use, NYHA class II.    Today he is doing well. Previously he had a cough at night but this has dissipated. He continues to work around the house without any symptoms of SOB, dizziness, leg swelling, abdominal swelling.     Weight is 190-193lbs. Blood pressure has been 110/70s.     He is not working right now but thinks he could start a job that is sedentary.    He follows up with Vascular surgery next week. Continuing on coumadin without bleeding complications.     He is not doing well with smoking cessation, he is stressed about money. Continues to abstain for ETOH, illicit drugs.    Regarding his past history, he was not taking any medications in the past, denies history of heart attack, known coronary artery disease or angina.    Past Medical History:   Diagnosis Date   • Congestive heart failure (HCC)    • Hypertension      Past Surgical History:   Procedure Laterality Date   • THROMBECTOMY Right 5/21/2019    Procedure: THROMBECTOMY - lower extremity, anterior tibial artery ;  Surgeon: Deidra Dominguez M.D.;  Location: SURGERY [facility];  Service: General   • HAND SURGERY Right 1995    \"metal plate placed\"   • HERNIA REPAIR Right 1990    groin   • HERNIA REPAIR       Family History   Problem Relation Age of Onset   • Diabetes Mother    • Heart Disease Father    • Blood Clots Brother    • Heart Disease Brother         pacemaker   • Blood Clots Paternal Grandmother    • Heart Disease Paternal Grandmother    • Heart Attack Paternal Uncle 60     Social History     Socioeconomic History   • Marital status: Single     Spouse name: Not on file   • Number of children: Not on file   • Years of education: Not on file   • Highest education level: Not on " file   Occupational History   • Not on file   Social Needs   • Financial resource strain: Not on file   • Food insecurity:     Worry: Not on file     Inability: Not on file   • Transportation needs:     Medical: Not on file     Non-medical: Not on file   Tobacco Use   • Smoking status: Current Every Day Smoker     Packs/day: 0.50     Types: Cigarettes   • Smokeless tobacco: Never Used   Substance and Sexual Activity   • Alcohol use: No   • Drug use: Yes     Types: Inhaled, Intravenous, Methamphetamines, Marijuana     Comment: marijuana; meth use    • Sexual activity: Not on file   Lifestyle   • Physical activity:     Days per week: Not on file     Minutes per session: Not on file   • Stress: Not on file   Relationships   • Social connections:     Talks on phone: Not on file     Gets together: Not on file     Attends [employer] service: Not on file     Active member of club or organization: Not on file     Attends meetings of clubs or organizations: Not on file     Relationship status: Not on file   • Intimate partner violence:     Fear of current or ex partner: Not on file     Emotionally abused: Not on file     Physically abused: Not on file     Forced sexual activity: Not on file   Other Topics Concern   • Not on file   Social History Narrative   • Not on file     No Known Allergies  Outpatient Encounter Medications as of 8/1/2019   Medication Sig Dispense Refill   • carvedilol (COREG) 25 MG Tab Take 1 Tab by mouth 2 times a day. 60 Tab 6   • lisinopril (PRINIVIL) 20 MG Tab Take 1 Tab by mouth every day. 30 Tab 6   • atorvastatin (LIPITOR) 80 MG tablet Take 1 Tab by mouth every evening. 30 Tab 6   • spironolactone (ALDACTONE) 25 MG Tab Take 0.5 Tabs by mouth every day. 30 Tab 3   • aspirin (ASA) 81 MG Chew Tab chewable tablet Take 1 Tab by mouth every day. 30 Tab 6   • warfarin (COUMADIN) 5 MG Tab Take 1.5 Tabs by mouth COUMADIN-DAILY. 135 Tab 3   • furosemide (LASIX) 20 MG Tab Take 1 Tab by mouth every day. 30 Tab  "11   • [DISCONTINUED] lisinopril (PRINIVIL) 20 MG Tab Take 1 Tab by mouth every day. 30 Tab 6   • [DISCONTINUED] carvedilol (COREG) 25 MG Tab Take 1 Tab by mouth 2 times a day. 60 Tab 6   • [DISCONTINUED] atorvastatin (LIPITOR) 80 MG tablet Take 1 Tab by mouth every evening. 30 Tab 6   • [DISCONTINUED] spironolactone (ALDACTONE) 25 MG Tab Take 0.5 Tabs by mouth every 48 hours. (every other day) 30 Tab 3     No facility-administered encounter medications on file as of 8/1/2019.      Review of Systems   Constitutional: Negative for chills, fever and weight loss.   HENT: Negative for nosebleeds.    Respiratory: Negative for cough and shortness of breath.    Cardiovascular: Negative for chest pain, palpitations, orthopnea, leg swelling and PND.   Gastrointestinal: Negative for blood in stool, melena and nausea.   Genitourinary:        No difficulty urinating   Neurological: Negative for dizziness.   Psychiatric/Behavioral: Negative for depression.        Objective:   /62 (BP Location: Left arm, Patient Position: Sitting, BP Cuff Size: Adult)   Pulse 84   Ht 1.727 m (5' 8\")   Wt 88 kg (194 lb)   SpO2 95%   BMI 29.50 kg/m²     Physical Exam   Constitutional: He appears well-developed and well-nourished. No distress.   HENT:   Head: Normocephalic and atraumatic.   Eyes: Conjunctivae are normal. No scleral icterus.   Neck: No JVD present. Carotid bruit is not present.   Cardiovascular: Normal rate, regular rhythm and intact distal pulses.   No murmur heard.  Pulmonary/Chest: Effort normal and breath sounds normal. No respiratory distress. He has no wheezes. He has no rales.   Abdominal: Soft. Bowel sounds are normal. He exhibits no distension. There is no tenderness.   Musculoskeletal: He exhibits no edema.   Neurological: Gait normal.   Skin: Skin is warm and dry. He is not diaphoretic. No pallor.   Psychiatric: Judgment normal.       Transthoracic Echo 5/14/19  CONCLUSIONS  No prior study is available for " comparison.   Dilated cardiomyopathy, biventricular dysfunction.  LVEF 15-20%.  Moderate to severe tricuspid regurgitation.  Estimated right ventricular systolic pressure  is 70 mmHg.    Mildly dilated eft ventricle. Normal left ventricular wall thickness.   Severely reduced left ventricular systolic function. Left ventricular ejection fraction is visually estimated to be less than 10-15%. Global hypokinesis.    Moderately dilated right ventricle. Reduced right ventricular systolic function.    Enlarged right atrium. Dilated inferior vena cava without inspiratory collapse.    Normal left atrial size.    Structurally normal mitral valve. Trace mitral regurgitation.  Structurally normal aortic valve without significant stenosis or   regurgitation.    Structurally normal tricuspid valve. Moderate to severe tricuspid   regurgitation. Estimated right ventricular systolic pressure  is 70 mmHg.    Structurally normal pulmonic valve. Trace pulmonic insufficiency.    No pericardial effusion seen.    The ascending aorta is ectatic.    Labs 6/28/19 Reviewed with patient    Assessment:     1. ACC/AHA stage C systolic heart failure (HCC)     2. NYHA class 1 heart failure with reduced ejection fraction (HCC)     3. Biventricular heart failure (HCC)     4. Hyperlipidemia, unspecified hyperlipidemia type  atorvastatin (LIPITOR) 80 MG tablet   5. History of methamphetamine abuse     6. 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 Tobacco use         Medical Decision Making:  Today's Assessment / Status / Plan:   Biventricular heart failure, ACC stage C, NYHA class I  Dilated cardiomyopathy  -Presumed secondary to methamphetamine use, no ischemic work-up has been performed we will repeat his echo and if EF not improved will pursue coronary calcium scoring (cost may be prohibitive).   - euvolemic  - continue current meds: lasix 20mg, coreg 25mg bid and lisinopril 20mg daily. Increase tea to 12.5mg daily.   -  consider ivabradine or dig for HR if EF not improved  - repeat ECHO to eval for ICD    S/P thrombectomy for right LE ischemic due to arterial emboli  - no evidence of apical thrombus on limited echo  - following with Vascular surgery  - coumadin managed by  clinic, no bleeding complications  - atorvastatin 80mg, aspirin 81mg    Tobacco Use  he does not request any assistance but knows there is some available if needed.     Plan: tea 12.5mg daily, repeat echo, follow up in 3mo, sooner if problems    Collaborating MD: Dr. Acosta

## 2019-08-05 ENCOUNTER — ANTICOAGULATION VISIT (OUTPATIENT)
Dept: VASCULAR LAB | Facility: MEDICAL CENTER | Age: 49
End: 2019-08-05
Attending: INTERNAL MEDICINE
Payer: COMMERCIAL

## 2019-08-05 VITALS — DIASTOLIC BLOOD PRESSURE: 51 MMHG | HEART RATE: 80 BPM | SYSTOLIC BLOOD PRESSURE: 97 MMHG

## 2019-08-05 DIAGNOSIS — I74.3 ARTERIAL EMBOLISM AND THROMBOSIS OF LOWER EXTREMITY (HCC): ICD-10-CM

## 2019-08-05 LAB
INR BLD: 1.8 (ref 0.9–1.2)
INR PPP: 1.8 (ref 2–3.5)

## 2019-08-05 PROCEDURE — 85610 PROTHROMBIN TIME: CPT

## 2019-08-05 PROCEDURE — 99212 OFFICE O/P EST SF 10 MIN: CPT | Performed by: NURSE PRACTITIONER

## 2019-08-05 NOTE — PROGRESS NOTES
Anticoagulation Summary  As of 2019    INR goal:   2.0-3.0   TTR:   78.0 % (2 mo)   INR used for dosin.80! 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 Referring Internal Medicine [phone]    [facility] Responsible  [phone]        Anticoagulation Patient Findings      HPI:  Lucas Agee seen in clinic today for follow up on anticoagulation therapy in the presence of arterial thrombosis.   Denies any changes to current medical/health status since last appointment.   He's been eating more greens than normal but will resume his usual diet. Denies any medication changes.   No current symptoms of bleeding or thrombosis reported.    A/P:   INR subtherapeutic. Arterial clot was 19 so within 90 days. We talked about starting Lovenox. He declines and is aware of the risks.  Will give two loading doses of warfarin then continue current regimen.   BP recorded in vitals. BP low. Denies any lightheadedness or dizziness.    Follow up appointment in 1 week(s).    Next Appointment: 2019 at 11:00 am.    Joycelyn CHIRINOS

## 2019-08-12 ENCOUNTER — ANTICOAGULATION VISIT (OUTPATIENT)
Dept: VASCULAR LAB | Facility: MEDICAL CENTER | Age: 49
End: 2019-08-12
Attending: INTERNAL MEDICINE
Payer: COMMERCIAL

## 2019-08-12 VITALS — HEART RATE: 99 BPM | DIASTOLIC BLOOD PRESSURE: 58 MMHG | SYSTOLIC BLOOD PRESSURE: 99 MMHG

## 2019-08-12 DIAGNOSIS — I74.3 ARTERIAL EMBOLISM AND THROMBOSIS OF LOWER EXTREMITY (HCC): ICD-10-CM

## 2019-08-12 LAB
INR BLD: 2.1 (ref 0.9–1.2)
INR PPP: 2.1 (ref 2–3.5)

## 2019-08-12 PROCEDURE — 85610 PROTHROMBIN TIME: CPT

## 2019-08-12 PROCEDURE — 99211 OFF/OP EST MAY X REQ PHY/QHP: CPT

## 2019-08-12 NOTE — PROGRESS NOTES
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 Referring Internal Medicine [phone]    [facility] Responsible  [phone]        Anticoagulation Patient Findings  Patient Findings     Negatives:   Signs/symptoms of thrombosis, Signs/symptoms of bleeding, Laboratory test error suspected, Change in health, Change in alcohol use, Change in activity, Upcoming invasive procedure, Emergency department visit, Upcoming dental procedure, Missed doses, Extra doses, Change in medications, Change in diet/appetite, Hospital admission, Bruising, Other complaints          HPI:  Lucas Agee seen in clinic today, on anticoagulation therapy with warfarin for arterial embolism.   Changes to current medical/health status since last appt: none  Denies signs/symptoms of bleeding and/or thrombosis since the last appt.    Denies any interval changes to diet  Denies any interval changes to medications since last appt.   Denies any complications or cost restrictions with current therapy.   BP recorded in vitals.  Confirmed current dosing regimen.     Patient's previous INR was subtherapeutic at 1.8 on 19, at which time patient was instructed to bolus x 2 days, then continue with current warfarin regimen. He returns to clinic today to recheck INR to ensure it is therapeutic and thus preventing  possible clotting and/or bleeding/bruising complications.    A/P   INR is therapeutic today at 2.1.  Patient instructed to continue with the current warfarin dosing regimen, and asked to follow up again in 1 week.    Next appt: Monday, Aug 19, 2019  1pkitty ArteagaD

## 2019-08-19 ENCOUNTER — ANTICOAGULATION VISIT (OUTPATIENT)
Dept: VASCULAR LAB | Facility: MEDICAL CENTER | Age: 49
End: 2019-08-19
Attending: INTERNAL MEDICINE
Payer: COMMERCIAL

## 2019-08-19 ENCOUNTER — HOSPITAL ENCOUNTER (OUTPATIENT)
Dept: CARDIOLOGY | Facility: MEDICAL CENTER | Age: 49
End: 2019-08-19
Attending: PHYSICIAN ASSISTANT
Payer: COMMERCIAL

## 2019-08-19 VITALS — HEART RATE: 95 BPM | DIASTOLIC BLOOD PRESSURE: 65 MMHG | SYSTOLIC BLOOD PRESSURE: 108 MMHG

## 2019-08-19 DIAGNOSIS — I74.3 ARTERIAL EMBOLISM AND THROMBOSIS OF LOWER EXTREMITY (HCC): ICD-10-CM

## 2019-08-19 LAB
INR BLD: 3.8 (ref 0.9–1.2)
INR PPP: 3.8 (ref 2–3.5)

## 2019-08-19 PROCEDURE — 93306 TTE W/DOPPLER COMPLETE: CPT

## 2019-08-19 PROCEDURE — 99212 OFFICE O/P EST SF 10 MIN: CPT | Performed by: NURSE PRACTITIONER

## 2019-08-19 PROCEDURE — 85610 PROTHROMBIN TIME: CPT

## 2019-08-19 NOTE — PROGRESS NOTES
Anticoagulation Summary  As of 8/19/2019    INR goal:   2.0-3.0   TTR:   71.3 % (2.4 mo)   INR used for dosing:   3.80! 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 Referring Internal Medicine [phone]    [facility] Responsible  [phone]        Anticoagulation Patient Findings      HPI:  Lucas Lorakay Agee seen in clinic today for follow up on anticoagulation therapy in the presence of arterial clot.   Denies any changes to current medical/health status since last appointment.   Denies any medication or diet changes.   No current symptoms of bleeding or thrombosis reported.  Had cranberry juice yesterday which he will avoid.    A/P:   INR supratherapeutic.   Decrease tonight then continue current regimen.   BP recorded in vitals.    Follow up appointment in 1 week(s).    Next Appointment: Monday, August 26, 2019 at 1:15 pm.    Joycelyn CHIRINOS

## 2019-08-20 LAB
LV EJECT FRACT  99904: 45
LV EJECT FRACT MOD 2C 99903: 39.82
LV EJECT FRACT MOD 4C 99902: 44.65
LV EJECT FRACT MOD BP 99901: 39.33

## 2019-08-20 PROCEDURE — 93306 TTE W/DOPPLER COMPLETE: CPT | Mod: 26 | Performed by: INTERNAL MEDICINE

## 2019-08-22 ENCOUNTER — TELEPHONE (OUTPATIENT)
Dept: CARDIOLOGY | Facility: MEDICAL CENTER | Age: 49
End: 2019-08-22

## 2019-08-22 NOTE — TELEPHONE ENCOUNTER
"Return call to patient. Christopher answers and says she is the one who called. Patient advised to go to [facility] and/or [facility] urgent care for evaluation. Pt asks \"so is that all you can do for me\". Rational explained to patient re: immediate evaluation, especially if he is unable to bear weight. Explained to patient that as a cardiology practice we would be unable to provide those resources.  "

## 2019-08-22 NOTE — TELEPHONE ENCOUNTER
ALEX/Savita      Patient has pain behind his left knee. He can't put pressure on his knee without feeling pain. Patient doesn't have a PCP to contact. His last INR was high, and wonders if the knee pain is related to that. Pt. # [phone].

## 2019-08-23 ENCOUNTER — APPOINTMENT (OUTPATIENT)
Dept: RADIOLOGY | Facility: MEDICAL CENTER | Age: 49
DRG: 554 | End: 2019-08-23
Attending: STUDENT IN AN ORGANIZED HEALTH CARE EDUCATION/TRAINING PROGRAM
Payer: COMMERCIAL

## 2019-08-23 ENCOUNTER — APPOINTMENT (OUTPATIENT)
Dept: RADIOLOGY | Facility: MEDICAL CENTER | Age: 49
DRG: 554 | End: 2019-08-23
Attending: EMERGENCY MEDICINE
Payer: COMMERCIAL

## 2019-08-23 ENCOUNTER — HOSPITAL ENCOUNTER (INPATIENT)
Facility: MEDICAL CENTER | Age: 49
LOS: 2 days | DRG: 554 | End: 2019-08-25
Attending: EMERGENCY MEDICINE | Admitting: INTERNAL MEDICINE
Payer: COMMERCIAL

## 2019-08-23 DIAGNOSIS — M25.462 PAIN AND SWELLING OF LEFT KNEE: ICD-10-CM

## 2019-08-23 DIAGNOSIS — M25.562 PAIN AND SWELLING OF LEFT KNEE: ICD-10-CM

## 2019-08-23 LAB
ALBUMIN SERPL BCP-MCNC: 3.9 G/DL (ref 3.2–4.9)
ALBUMIN/GLOB SERPL: 1.1 G/DL
ALP SERPL-CCNC: 99 U/L (ref 30–99)
ALT SERPL-CCNC: 18 U/L (ref 2–50)
ANION GAP SERPL CALC-SCNC: 8 MMOL/L (ref 0–11.9)
APPEARANCE FLD: NORMAL
AST SERPL-CCNC: 15 U/L (ref 12–45)
BASOPHILS # BLD AUTO: 0.5 % (ref 0–1.8)
BASOPHILS # BLD: 0.07 K/UL (ref 0–0.12)
BILIRUB SERPL-MCNC: 0.4 MG/DL (ref 0.1–1.5)
BODY FLD TYPE: NORMAL
BODY FLD TYPE: NORMAL
BUN SERPL-MCNC: 15 MG/DL (ref 8–22)
CALCIUM SERPL-MCNC: 9.3 MG/DL (ref 8.5–10.5)
CHLORIDE SERPL-SCNC: 103 MMOL/L (ref 96–112)
CO2 SERPL-SCNC: 24 MMOL/L (ref 20–33)
COLOR FLD: YELLOW
CREAT SERPL-MCNC: 0.93 MG/DL (ref 0.5–1.4)
CRP SERPL HS-MCNC: 2.98 MG/DL (ref 0–0.75)
CRYSTALS FLD MICRO: NORMAL
CSF COMMENTS 1658: NORMAL
EKG IMPRESSION: NORMAL
EOSINOPHIL # BLD AUTO: 0.34 K/UL (ref 0–0.51)
EOSINOPHIL NFR BLD: 2.2 % (ref 0–6.9)
ERYTHROCYTE [DISTWIDTH] IN BLOOD BY AUTOMATED COUNT: 42.3 FL (ref 35.9–50)
ERYTHROCYTE [SEDIMENTATION RATE] IN BLOOD BY WESTERGREN METHOD: 29 MM/HOUR (ref 0–15)
GLOBULIN SER CALC-MCNC: 3.4 G/DL (ref 1.9–3.5)
GLUCOSE FLD-MCNC: 105 MG/DL
GLUCOSE SERPL-MCNC: 115 MG/DL (ref 65–99)
GRAM STN SPEC: NORMAL
HCT VFR BLD AUTO: 41 % (ref 42–52)
HGB BLD-MCNC: 14.3 G/DL (ref 14–18)
IMM GRANULOCYTES # BLD AUTO: 0.08 K/UL (ref 0–0.11)
IMM GRANULOCYTES NFR BLD AUTO: 0.5 % (ref 0–0.9)
INR PPP: 2.62 (ref 0.87–1.13)
LYMPHOCYTES # BLD AUTO: 3.42 K/UL (ref 1–4.8)
LYMPHOCYTES NFR BLD: 22.5 % (ref 22–41)
LYMPHOCYTES NFR FLD: 7 %
MCH RBC QN AUTO: 32.4 PG (ref 27–33)
MCHC RBC AUTO-ENTMCNC: 34.9 G/DL (ref 33.7–35.3)
MCV RBC AUTO: 92.8 FL (ref 81.4–97.8)
MONOCYTES # BLD AUTO: 1.48 K/UL (ref 0–0.85)
MONOCYTES NFR BLD AUTO: 9.7 % (ref 0–13.4)
MONONUC CELLS NFR FLD: 4 %
NEUTROPHILS # BLD AUTO: 9.8 K/UL (ref 1.82–7.42)
NEUTROPHILS NFR BLD: 64.6 % (ref 44–72)
NEUTROPHILS NFR FLD: 89 %
NRBC # BLD AUTO: 0 K/UL
NRBC BLD-RTO: 0 /100 WBC
PLATELET # BLD AUTO: 268 K/UL (ref 164–446)
PMV BLD AUTO: 9.8 FL (ref 9–12.9)
POTASSIUM SERPL-SCNC: 4 MMOL/L (ref 3.6–5.5)
PROT FLD-MCNC: 4.8 G/DL
PROT SERPL-MCNC: 7.3 G/DL (ref 6–8.2)
PROTHROMBIN TIME: 29 SEC (ref 12–14.6)
RBC # BLD AUTO: 4.42 M/UL (ref 4.7–6.1)
RBC # FLD: <2000 CELLS/UL
SIGNIFICANT IND 70042: NORMAL
SITE SITE: NORMAL
SODIUM SERPL-SCNC: 135 MMOL/L (ref 135–145)
SOURCE SOURCE: NORMAL
TROPONIN T SERPL-MCNC: 7 NG/L (ref 6–19)
WBC # BLD AUTO: 15.2 K/UL (ref 4.8–10.8)
WBC # FLD: NORMAL CELLS/UL

## 2019-08-23 PROCEDURE — 36415 COLL VENOUS BLD VENIPUNCTURE: CPT

## 2019-08-23 PROCEDURE — 700102 HCHG RX REV CODE 250 W/ 637 OVERRIDE(OP): Performed by: STUDENT IN AN ORGANIZED HEALTH CARE EDUCATION/TRAINING PROGRAM

## 2019-08-23 PROCEDURE — 89051 BODY FLUID CELL COUNT: CPT

## 2019-08-23 PROCEDURE — 93926 LOWER EXTREMITY STUDY: CPT | Mod: 26,LT | Performed by: INTERNAL MEDICINE

## 2019-08-23 PROCEDURE — 85025 COMPLETE CBC W/AUTO DIFF WBC: CPT

## 2019-08-23 PROCEDURE — 20610 DRAIN/INJ JOINT/BURSA W/O US: CPT

## 2019-08-23 PROCEDURE — 700102 HCHG RX REV CODE 250 W/ 637 OVERRIDE(OP): Performed by: EMERGENCY MEDICINE

## 2019-08-23 PROCEDURE — 80307 DRUG TEST PRSMV CHEM ANLYZR: CPT

## 2019-08-23 PROCEDURE — 93005 ELECTROCARDIOGRAM TRACING: CPT

## 2019-08-23 PROCEDURE — 93005 ELECTROCARDIOGRAM TRACING: CPT | Performed by: STUDENT IN AN ORGANIZED HEALTH CARE EDUCATION/TRAINING PROGRAM

## 2019-08-23 PROCEDURE — A9270 NON-COVERED ITEM OR SERVICE: HCPCS | Performed by: STUDENT IN AN ORGANIZED HEALTH CARE EDUCATION/TRAINING PROGRAM

## 2019-08-23 PROCEDURE — 84484 ASSAY OF TROPONIN QUANT: CPT

## 2019-08-23 PROCEDURE — 87205 SMEAR GRAM STAIN: CPT

## 2019-08-23 PROCEDURE — 93005 ELECTROCARDIOGRAM TRACING: CPT | Performed by: EMERGENCY MEDICINE

## 2019-08-23 PROCEDURE — 99285 EMERGENCY DEPT VISIT HI MDM: CPT

## 2019-08-23 PROCEDURE — 0S9D3ZX DRAINAGE OF LEFT KNEE JOINT, PERCUTANEOUS APPROACH, DIAGNOSTIC: ICD-10-PCS | Performed by: EMERGENCY MEDICINE

## 2019-08-23 PROCEDURE — 770020 HCHG ROOM/CARE - TELE (206)

## 2019-08-23 PROCEDURE — 82945 GLUCOSE OTHER FLUID: CPT

## 2019-08-23 PROCEDURE — 84157 ASSAY OF PROTEIN OTHER: CPT

## 2019-08-23 PROCEDURE — 80053 COMPREHEN METABOLIC PANEL: CPT

## 2019-08-23 PROCEDURE — 87070 CULTURE OTHR SPECIMN AEROBIC: CPT

## 2019-08-23 PROCEDURE — 71045 X-RAY EXAM CHEST 1 VIEW: CPT

## 2019-08-23 PROCEDURE — 89060 EXAM SYNOVIAL FLUID CRYSTALS: CPT

## 2019-08-23 PROCEDURE — 93926 LOWER EXTREMITY STUDY: CPT | Mod: LT

## 2019-08-23 PROCEDURE — 85610 PROTHROMBIN TIME: CPT

## 2019-08-23 PROCEDURE — 85652 RBC SED RATE AUTOMATED: CPT

## 2019-08-23 PROCEDURE — A9270 NON-COVERED ITEM OR SERVICE: HCPCS | Performed by: EMERGENCY MEDICINE

## 2019-08-23 PROCEDURE — 86140 C-REACTIVE PROTEIN: CPT

## 2019-08-23 RX ORDER — SPIRONOLACTONE 25 MG/1
25 TABLET ORAL
COMMUNITY
End: 2020-07-07 | Stop reason: SDUPTHER

## 2019-08-23 RX ORDER — WARFARIN SODIUM 7.5 MG/1
7.5 TABLET ORAL
Status: DISCONTINUED | OUTPATIENT
Start: 2019-08-23 | End: 2019-08-25 | Stop reason: HOSPADM

## 2019-08-23 RX ORDER — OXYCODONE HYDROCHLORIDE 5 MG/1
5 TABLET ORAL ONCE
Status: COMPLETED | OUTPATIENT
Start: 2019-08-23 | End: 2019-08-23

## 2019-08-23 RX ORDER — AMOXICILLIN 250 MG
2 CAPSULE ORAL 2 TIMES DAILY
Status: DISCONTINUED | OUTPATIENT
Start: 2019-08-24 | End: 2019-08-25 | Stop reason: HOSPADM

## 2019-08-23 RX ORDER — BISACODYL 10 MG
10 SUPPOSITORY, RECTAL RECTAL
Status: DISCONTINUED | OUTPATIENT
Start: 2019-08-23 | End: 2019-08-25 | Stop reason: HOSPADM

## 2019-08-23 RX ORDER — ACETAMINOPHEN 325 MG/1
650 TABLET ORAL ONCE
Status: COMPLETED | OUTPATIENT
Start: 2019-08-23 | End: 2019-08-23

## 2019-08-23 RX ORDER — WARFARIN SODIUM 5 MG/1
7.5 TABLET ORAL DAILY
COMMUNITY
End: 2020-06-15

## 2019-08-23 RX ORDER — OXYCODONE HYDROCHLORIDE 5 MG/1
5 TABLET ORAL EVERY 6 HOURS PRN
Status: DISCONTINUED | OUTPATIENT
Start: 2019-08-23 | End: 2019-08-23

## 2019-08-23 RX ORDER — ASPIRIN 81 MG/1
81 TABLET, CHEWABLE ORAL DAILY
Status: DISCONTINUED | OUTPATIENT
Start: 2019-08-24 | End: 2019-08-25 | Stop reason: HOSPADM

## 2019-08-23 RX ORDER — FUROSEMIDE 20 MG/1
20 TABLET ORAL DAILY
Status: DISCONTINUED | OUTPATIENT
Start: 2019-08-24 | End: 2019-08-25 | Stop reason: HOSPADM

## 2019-08-23 RX ORDER — SPIRONOLACTONE 25 MG/1
25 TABLET ORAL
Status: DISCONTINUED | OUTPATIENT
Start: 2019-08-25 | End: 2019-08-25 | Stop reason: HOSPADM

## 2019-08-23 RX ORDER — LISINOPRIL 20 MG/1
20 TABLET ORAL DAILY
Status: DISCONTINUED | OUTPATIENT
Start: 2019-08-24 | End: 2019-08-25 | Stop reason: HOSPADM

## 2019-08-23 RX ORDER — POLYETHYLENE GLYCOL 3350 17 G/17G
1 POWDER, FOR SOLUTION ORAL
Status: DISCONTINUED | OUTPATIENT
Start: 2019-08-23 | End: 2019-08-25 | Stop reason: HOSPADM

## 2019-08-23 RX ORDER — ACETAMINOPHEN 500 MG
500-1000 TABLET ORAL EVERY 6 HOURS PRN
Status: ON HOLD | COMMUNITY
End: 2019-08-25 | Stop reason: SDUPTHER

## 2019-08-23 RX ORDER — ATORVASTATIN CALCIUM 80 MG/1
80 TABLET, FILM COATED ORAL EVERY EVENING
Status: DISCONTINUED | OUTPATIENT
Start: 2019-08-23 | End: 2019-08-25 | Stop reason: HOSPADM

## 2019-08-23 RX ORDER — ACETAMINOPHEN 325 MG/1
650 TABLET ORAL EVERY 6 HOURS
Status: DISCONTINUED | OUTPATIENT
Start: 2019-08-23 | End: 2019-08-25 | Stop reason: HOSPADM

## 2019-08-23 RX ORDER — CARVEDILOL 25 MG/1
25 TABLET ORAL 2 TIMES DAILY
Status: DISCONTINUED | OUTPATIENT
Start: 2019-08-23 | End: 2019-08-25 | Stop reason: HOSPADM

## 2019-08-23 RX ADMIN — ACETAMINOPHEN 650 MG: 325 TABLET ORAL at 20:56

## 2019-08-23 RX ADMIN — OXYCODONE HYDROCHLORIDE 5 MG: 5 TABLET ORAL at 21:48

## 2019-08-23 RX ADMIN — WARFARIN SODIUM 7.5 MG: 7.5 TABLET ORAL at 21:20

## 2019-08-23 RX ADMIN — ACETAMINOPHEN 650 MG: 325 TABLET, FILM COATED ORAL at 17:27

## 2019-08-23 RX ADMIN — CARVEDILOL 25 MG: 25 TABLET, FILM COATED ORAL at 20:56

## 2019-08-23 RX ADMIN — ATORVASTATIN CALCIUM 80 MG: 80 TABLET, FILM COATED ORAL at 20:56

## 2019-08-23 ASSESSMENT — ENCOUNTER SYMPTOMS
PALPITATIONS: 0
COUGH: 0
FEVER: 0
HALLUCINATIONS: 0
VOMITING: 0
MYALGIAS: 0
DIZZINESS: 0
WEIGHT LOSS: 0
FLANK PAIN: 0
CHILLS: 0
BRUISES/BLEEDS EASILY: 0
ABDOMINAL PAIN: 0
BLURRED VISION: 0
NECK PAIN: 0
PHOTOPHOBIA: 0
WHEEZING: 0
HEMOPTYSIS: 0
HEADACHES: 0
BLOOD IN STOOL: 0
CLAUDICATION: 0
NAUSEA: 0
DEPRESSION: 0
SEIZURES: 0
SORE THROAT: 0
DIAPHORESIS: 0
CONSTIPATION: 0
WEAKNESS: 0
HEARTBURN: 0
POLYDIPSIA: 0
DOUBLE VISION: 0
DIARRHEA: 0
FALLS: 0
LOSS OF CONSCIOUSNESS: 0
FOCAL WEAKNESS: 0
SHORTNESS OF BREATH: 0

## 2019-08-23 ASSESSMENT — LIFESTYLE VARIABLES
EVER_SMOKED: YES
SUBSTANCE_ABUSE: 1
DO YOU DRINK ALCOHOL: YES

## 2019-08-23 NOTE — ED PROVIDER NOTES
ED Provider Note    CHIEF COMPLAINT  Chief Complaint   Patient presents with   • Leg Pain   • Chest Pain       HPI  Lucas Agee is a 48 y.o. male who presents for evaluation of left leg pain, chest discomfort described as pressure.  The patient is a complex medical history including CHF with an ejection fraction of approximately 15%.  He was hospitalized here earlier this summer for an acute lower extremity arterial thrombosis that underwent thrombectomy.  Patient is on Coumadin and has his INR checked earlier this week and it was supratherapeutic at 3.8.  He reports aching pain in his left leg he is concerned about another arterial occlusion or possible blood clot as well.  He also reports chest pressure described a squeezing sensation radiating to his jaw and shoulder.  He has severe CHF but no known coronary artery disease and he also reports swelling around the left knee without any recollection of any trauma    REVIEW OF SYSTEMS  See HPI for further details no night sweats weight loss numbness tingling weakness rash. All other systems are negative.     PAST MEDICAL HISTORY  Past Medical History:   Diagnosis Date   • Congestive heart failure (HCC)    • DVT (deep venous thrombosis) (HCC)    • Hypertension        FAMILY HISTORY  Noncontributory  SOCIAL HISTORY  Social History     Socioeconomic History   • Marital status: Single     Spouse name: Not on file   • Number of children: Not on file   • Years of education: Not on file   • Highest education level: Not on file   Occupational History   • Not on file   Social Needs   • Financial resource strain: Not on file   • Food insecurity:     Worry: Not on file     Inability: Not on file   • Transportation needs:     Medical: Not on file     Non-medical: Not on file   Tobacco Use   • Smoking status: Current Every Day Smoker     Packs/day: 0.50     Types: Cigarettes   • Smokeless tobacco: Never Used   Substance and Sexual Activity   • Alcohol use: No   • Drug  "use: Yes     Types: Inhaled, Intravenous, Methamphetamines, Marijuana     Comment: marijuana; meth use    • Sexual activity: Not on file   Lifestyle   • Physical activity:     Days per week: Not on file     Minutes per session: Not on file   • Stress: Not on file   Relationships   • Social connections:     Talks on phone: Not on file     Gets together: Not on file     Attends Bahai service: Not on file     Active member of club or organization: Not on file     Attends meetings of clubs or organizations: Not on file     Relationship status: Not on file   • Intimate partner violence:     Fear of current or ex partner: Not on file     Emotionally abused: Not on file     Physically abused: Not on file     Forced sexual activity: Not on file   Other Topics Concern   • Not on file   Social History Narrative   • Not on file       SURGICAL HISTORY  Past Surgical History:   Procedure Laterality Date   • THROMBECTOMY Right 5/21/2019    Procedure: THROMBECTOMY - lower extremity, anterior tibial artery ;  Surgeon: Deidra Dominguez M.D.;  Location: SURGERY [facility];  Service: General   • HAND SURGERY Right 1995    \"metal plate placed\"   • HERNIA REPAIR Right 1990    groin   • HERNIA REPAIR         CURRENT MEDICATIONS  No current facility-administered medications for this encounter.     Current Outpatient Medications:   •  carvedilol (COREG) 25 MG Tab, Take 1 Tab by mouth 2 times a day., Disp: 60 Tab, Rfl: 6  •  lisinopril (PRINIVIL) 20 MG Tab, Take 1 Tab by mouth every day., Disp: 30 Tab, Rfl: 6  •  atorvastatin (LIPITOR) 80 MG tablet, Take 1 Tab by mouth every evening., Disp: 30 Tab, Rfl: 6  •  spironolactone (ALDACTONE) 25 MG Tab, Take 0.5 Tabs by mouth every day., Disp: 30 Tab, Rfl: 3  •  aspirin (ASA) 81 MG Chew Tab chewable tablet, Take 1 Tab by mouth every day., Disp: 30 Tab, Rfl: 6  •  warfarin (COUMADIN) 5 MG Tab, Take 1.5 Tabs by mouth COUMADIN-DAILY., Disp: 135 Tab, Rfl: 3  •  furosemide (LASIX) 20 MG Tab, " "Take 1 Tab by mouth every day., Disp: 30 Tab, Rfl: 11    ALLERGIES  No Known Allergies    PHYSICAL EXAM  VITAL SIGNS: /86   Pulse 95   Temp 36.6 °C (97.9 °F) (Temporal)   Resp 18   Ht 1.727 m (5' 8\")   Wt 86.2 kg (190 lb)   SpO2 96%   BMI 28.89 kg/m²       Constitutional: Well developed, Well nourished, No acute distress, Non-toxic appearance.   HENT: Normocephalic, Atraumatic, Bilateral external ears normal, Oropharynx moist, No oral exudates, Nose normal.   Eyes: PERRLA, EOMI, Conjunctiva normal, No discharge.   Neck: Normal range of motion, No tenderness, Supple, No stridor.   Cardiovascular: Normal heart rate, Normal rhythm, No murmurs, No rubs, No gallops.   Thorax & Lungs: Normal breath sounds, No respiratory distress, No wheezing, No chest tenderness.   Abdomen: Bowel sounds normal, Soft, No tenderness, No masses, No pulsatile masses.   Skin: Warm, Dry, No erythema, No rash.   Back: No tenderness, No CVA tenderness.   Extremities pulses and good capillary refill noted on bilateral lower extremities.  There is likely an effusion surrounding the left knee with no overlying warmth or erythema   neurologic: Alert & oriented x 3, Normal motor function, Normal sensory function, No focal deficits noted.   Psychiatric: Affect normal, Judgment normal, Mood normal.       RADIOLOGY/PROCEDURES  US-EXTREMITY ARTERY LOWER UNILAT LEFT   Final Result      DX-CHEST-PORTABLE (1 VIEW)   Final Result      Left lung base linear atelectasis. No consolidation.        Arterial ultrasound of left lower extremity demonstrates no evidence of occlusion  Results for orders placed or performed during the hospital encounter of 08/23/19   CBC with Differential   Result Value Ref Range    WBC 15.2 (H) 4.8 - 10.8 K/uL    RBC 4.42 (L) 4.70 - 6.10 M/uL    Hemoglobin 14.3 14.0 - 18.0 g/dL    Hematocrit 41.0 (L) 42.0 - 52.0 %    MCV 92.8 81.4 - 97.8 fL    MCH 32.4 27.0 - 33.0 pg    MCHC 34.9 33.7 - 35.3 g/dL    RDW 42.3 35.9 - 50.0 " fL    Platelet Count 268 164 - 446 K/uL    MPV 9.8 9.0 - 12.9 fL    Neutrophils-Polys 64.60 44.00 - 72.00 %    Lymphocytes 22.50 22.00 - 41.00 %    Monocytes 9.70 0.00 - 13.40 %    Eosinophils 2.20 0.00 - 6.90 %    Basophils 0.50 0.00 - 1.80 %    Immature Granulocytes 0.50 0.00 - 0.90 %    Nucleated RBC 0.00 /100 WBC    Neutrophils (Absolute) 9.80 (H) 1.82 - 7.42 K/uL    Lymphs (Absolute) 3.42 1.00 - 4.80 K/uL    Monos (Absolute) 1.48 (H) 0.00 - 0.85 K/uL    Eos (Absolute) 0.34 0.00 - 0.51 K/uL    Baso (Absolute) 0.07 0.00 - 0.12 K/uL    Immature Granulocytes (abs) 0.08 0.00 - 0.11 K/uL    NRBC (Absolute) 0.00 K/uL   Complete Metabolic Panel (CMP)   Result Value Ref Range    Sodium 135 135 - 145 mmol/L    Potassium 4.0 3.6 - 5.5 mmol/L    Chloride 103 96 - 112 mmol/L    Co2 24 20 - 33 mmol/L    Anion Gap 8.0 0.0 - 11.9    Glucose 115 (H) 65 - 99 mg/dL    Bun 15 8 - 22 mg/dL    Creatinine 0.93 0.50 - 1.40 mg/dL    Calcium 9.3 8.5 - 10.5 mg/dL    AST(SGOT) 15 12 - 45 U/L    ALT(SGPT) 18 2 - 50 U/L    Alkaline Phosphatase 99 30 - 99 U/L    Total Bilirubin 0.4 0.1 - 1.5 mg/dL    Albumin 3.9 3.2 - 4.9 g/dL    Total Protein 7.3 6.0 - 8.2 g/dL    Globulin 3.4 1.9 - 3.5 g/dL    A-G Ratio 1.1 g/dL   Troponin   Result Value Ref Range    Troponin T 7 6 - 19 ng/L   Prothrombin Time   Result Value Ref Range    PT 29.0 (H) 12.0 - 14.6 sec    INR 2.62 (H) 0.87 - 1.13   ESTIMATED GFR   Result Value Ref Range    GFR If African American >60 >60 mL/min/1.73 m 2    GFR If Non African American >60 >60 mL/min/1.73 m 2   FLUID CELL COUNT   Result Value Ref Range    Fluid Type Synovial     Color-Body Fluid Yellow     Character-Body Fluid Cloudy     Total RBC Count <2000 cells/uL    Total WBC 02973 cells/uL    Polys 89 %    Lymphs 7 %    Mononuclear Cells - Fluid 4 %    Comments see below    FLUID TOTAL PROTEIN   Result Value Ref Range    Fluid Type Synovial     Total Protein Fluid 4.8 g/dL   FLUID GLUCOSE   Result Value Ref Range     Glucose, Fluid 105 mg/dL   EKG (NOW)   Result Value Ref Range    Report       [facility]    Test Date:  2019  Pt Name:    EDMUNDO OROPEZA                 Department: ER  MRN:        [id number]                      Room:  Gender:     Male                         Technician: [id number]  :        1970                   Requested By:ER TRIAGE PROTOCOL  Order #:    [id number]                    Reading MD: ETHEL LEHMAN MD    Measurements  Intervals                                Axis  Rate:       100                          P:          76  ID:         [id number]                          QRS:        29  QRSD:       88                           T:          50  QT:         368  QTc:        475    Interpretive Statements  SINUS TACHYCARDIA  BASELINE WANDER IN LEAD(S) II,V3  Compared to ECG 2019 17:16:05  Sinus rhythm no longer present    Electronically Signed On 2019 15:49:56 PDT by ETHEL LEHMAN MD     EKG interpretation by me rate 100 sinus tachycardia.  Compared to old EKG there does appear to be to some new ST depression in the precordial and lateral leads no ST segment elevation.  There is a Q wave in lead III which appears to be new as well.  Mild motion artifact no ectopy intervals and axis otherwise normal    COURSE & MEDICAL DECISION MAKING  Pertinent Labs & Imaging studies reviewed. (See chart for details)  Physician procedure: Diagnostic and therapeutic arthrocentesis of the left knee.  Indication rule out septic joint and to clarify the nature of effusion.  Verbal consent was obtained.  The area on the left medial knee was prepped 3 times with Betadine.  Using all sterile technique 5 cc of lidocaine with epinephrine was infiltrated into the skin and deeper tissues for anesthesia.  A sterile 20-gauge needle was inserted on the medial border at 90 degrees.  A total of 15 cc of yellow turbid fluid is aspirated no complications.    Patient presented here with chest  discomfort and left leg pain.  I was concerned about arterial occlusion given his history.  His Coumadin level is therapeutic.  There is no suggestion of arterial occlusion of the left lower extremity.  He is therapeutic on his Coumadin therefore I think that DVT and pulmonary embolism is exceedingly unlikely.  The patient did have a swollen left knee with no erythema or warmth.  I performed arthrocentesis and it has an indeterminate result concerning for remote possibility of septic joint.  Dr. Bailey was consulted and will follow the cultures.  He did not feel and I agree that immediate and mandatory washout is indicated as he clinically appears well.  He will be admitted to internal medicine    FINAL IMPRESSION  1.  Chest pain  2.  Left knee effusion    Admission    Electronically signed by: Mika Patton, 8/23/2019 2:44 PM

## 2019-08-23 NOTE — ED TRIAGE NOTES
".  Chief Complaint   Patient presents with   • Leg Pain   • Chest Pain   Agree with triage assessment.  Pt reports \" left knee hurts all the time, I can't even put weight on it. \"  Chest tightness onset this morning.  No abdominal pain.  No fever/chills.     "

## 2019-08-23 NOTE — ED TRIAGE NOTES
Chief Complaint   Patient presents with   • Leg Pain   • Chest Pain   Pt to triage with left leg/knee pain x 2-3 days.  Pt also reports some chest tightness starting earlier today.  Denies SOB.  EKG completed.  Pt states he had a DVT in RLE and is on Coumadin.  Pt educated on triage process and instructed to notify triage RN of any change in status.

## 2019-08-24 ENCOUNTER — APPOINTMENT (OUTPATIENT)
Dept: RADIOLOGY | Facility: MEDICAL CENTER | Age: 49
DRG: 554 | End: 2019-08-24
Attending: STUDENT IN AN ORGANIZED HEALTH CARE EDUCATION/TRAINING PROGRAM
Payer: COMMERCIAL

## 2019-08-24 PROBLEM — M25.562 PAIN AND SWELLING OF LEFT KNEE: Status: ACTIVE | Noted: 2019-08-24

## 2019-08-24 PROBLEM — M25.462 PAIN AND SWELLING OF LEFT KNEE: Status: ACTIVE | Noted: 2019-08-24

## 2019-08-24 PROBLEM — R07.89 CHEST DISCOMFORT: Status: ACTIVE | Noted: 2019-08-24

## 2019-08-24 LAB
ALBUMIN SERPL BCP-MCNC: 3.9 G/DL (ref 3.2–4.9)
ALBUMIN/GLOB SERPL: 1.3 G/DL
ALP SERPL-CCNC: 86 U/L (ref 30–99)
ALT SERPL-CCNC: 15 U/L (ref 2–50)
AMPHET UR QL SCN: POSITIVE
ANION GAP SERPL CALC-SCNC: 7 MMOL/L (ref 0–11.9)
AST SERPL-CCNC: 18 U/L (ref 12–45)
BARBITURATES UR QL SCN: NEGATIVE
BASOPHILS # BLD AUTO: 0.7 % (ref 0–1.8)
BASOPHILS # BLD: 0.09 K/UL (ref 0–0.12)
BENZODIAZ UR QL SCN: NEGATIVE
BILIRUB SERPL-MCNC: 0.4 MG/DL (ref 0.1–1.5)
BUN SERPL-MCNC: 14 MG/DL (ref 8–22)
BZE UR QL SCN: NEGATIVE
C TRACH DNA SPEC QL NAA+PROBE: NEGATIVE
CALCIUM SERPL-MCNC: 9.3 MG/DL (ref 8.5–10.5)
CANNABINOIDS UR QL SCN: POSITIVE
CHLORIDE SERPL-SCNC: 104 MMOL/L (ref 96–112)
CO2 SERPL-SCNC: 26 MMOL/L (ref 20–33)
CREAT SERPL-MCNC: 0.95 MG/DL (ref 0.5–1.4)
EKG IMPRESSION: NORMAL
EOSINOPHIL # BLD AUTO: 0.28 K/UL (ref 0–0.51)
EOSINOPHIL NFR BLD: 2.2 % (ref 0–6.9)
ERYTHROCYTE [DISTWIDTH] IN BLOOD BY AUTOMATED COUNT: 43.4 FL (ref 35.9–50)
GLOBULIN SER CALC-MCNC: 3.1 G/DL (ref 1.9–3.5)
GLUCOSE SERPL-MCNC: 104 MG/DL (ref 65–99)
HCT VFR BLD AUTO: 43.5 % (ref 42–52)
HGB BLD-MCNC: 14.7 G/DL (ref 14–18)
IMM GRANULOCYTES # BLD AUTO: 0.07 K/UL (ref 0–0.11)
IMM GRANULOCYTES NFR BLD AUTO: 0.6 % (ref 0–0.9)
INR PPP: 2.21 (ref 0.87–1.13)
LYMPHOCYTES # BLD AUTO: 5.03 K/UL (ref 1–4.8)
LYMPHOCYTES NFR BLD: 40 % (ref 22–41)
MCH RBC QN AUTO: 32.2 PG (ref 27–33)
MCHC RBC AUTO-ENTMCNC: 33.8 G/DL (ref 33.7–35.3)
MCV RBC AUTO: 95.2 FL (ref 81.4–97.8)
METHADONE UR QL SCN: NEGATIVE
MONOCYTES # BLD AUTO: 1.26 K/UL (ref 0–0.85)
MONOCYTES NFR BLD AUTO: 10 % (ref 0–13.4)
N GONORRHOEA DNA SPEC QL NAA+PROBE: NEGATIVE
NEUTROPHILS # BLD AUTO: 5.85 K/UL (ref 1.82–7.42)
NEUTROPHILS NFR BLD: 46.5 % (ref 44–72)
NRBC # BLD AUTO: 0 K/UL
NRBC BLD-RTO: 0 /100 WBC
OPIATES UR QL SCN: NEGATIVE
OXYCODONE UR QL SCN: POSITIVE
PCP UR QL SCN: NEGATIVE
PLATELET # BLD AUTO: 259 K/UL (ref 164–446)
PMV BLD AUTO: 10 FL (ref 9–12.9)
POTASSIUM SERPL-SCNC: 4.3 MMOL/L (ref 3.6–5.5)
PROPOXYPH UR QL SCN: NEGATIVE
PROT SERPL-MCNC: 7 G/DL (ref 6–8.2)
PROTHROMBIN TIME: 25.2 SEC (ref 12–14.6)
RBC # BLD AUTO: 4.57 M/UL (ref 4.7–6.1)
SODIUM SERPL-SCNC: 137 MMOL/L (ref 135–145)
SPECIMEN SOURCE: NORMAL
TROPONIN T SERPL-MCNC: <6 NG/L (ref 6–19)
WBC # BLD AUTO: 12.6 K/UL (ref 4.8–10.8)

## 2019-08-24 PROCEDURE — A9270 NON-COVERED ITEM OR SERVICE: HCPCS | Performed by: STUDENT IN AN ORGANIZED HEALTH CARE EDUCATION/TRAINING PROGRAM

## 2019-08-24 PROCEDURE — 93010 ELECTROCARDIOGRAM REPORT: CPT | Performed by: INTERNAL MEDICINE

## 2019-08-24 PROCEDURE — 85025 COMPLETE CBC W/AUTO DIFF WBC: CPT

## 2019-08-24 PROCEDURE — 700102 HCHG RX REV CODE 250 W/ 637 OVERRIDE(OP): Performed by: INTERNAL MEDICINE

## 2019-08-24 PROCEDURE — 87591 N.GONORRHOEAE DNA AMP PROB: CPT

## 2019-08-24 PROCEDURE — 770020 HCHG ROOM/CARE - TELE (206)

## 2019-08-24 PROCEDURE — A9270 NON-COVERED ITEM OR SERVICE: HCPCS | Performed by: INTERNAL MEDICINE

## 2019-08-24 PROCEDURE — 87491 CHLMYD TRACH DNA AMP PROBE: CPT

## 2019-08-24 PROCEDURE — 700102 HCHG RX REV CODE 250 W/ 637 OVERRIDE(OP): Performed by: STUDENT IN AN ORGANIZED HEALTH CARE EDUCATION/TRAINING PROGRAM

## 2019-08-24 PROCEDURE — 73562 X-RAY EXAM OF KNEE 3: CPT | Mod: LT

## 2019-08-24 PROCEDURE — 36415 COLL VENOUS BLD VENIPUNCTURE: CPT

## 2019-08-24 PROCEDURE — 80053 COMPREHEN METABOLIC PANEL: CPT

## 2019-08-24 PROCEDURE — 85610 PROTHROMBIN TIME: CPT

## 2019-08-24 PROCEDURE — 700111 HCHG RX REV CODE 636 W/ 250 OVERRIDE (IP): Performed by: STUDENT IN AN ORGANIZED HEALTH CARE EDUCATION/TRAINING PROGRAM

## 2019-08-24 PROCEDURE — 99223 1ST HOSP IP/OBS HIGH 75: CPT | Mod: GC | Performed by: INTERNAL MEDICINE

## 2019-08-24 PROCEDURE — 87040 BLOOD CULTURE FOR BACTERIA: CPT

## 2019-08-24 PROCEDURE — 84484 ASSAY OF TROPONIN QUANT: CPT

## 2019-08-24 PROCEDURE — 99255 IP/OBS CONSLTJ NEW/EST HI 80: CPT | Performed by: INTERNAL MEDICINE

## 2019-08-24 RX ORDER — OXYCODONE HYDROCHLORIDE 5 MG/1
5 TABLET ORAL EVERY 6 HOURS PRN
Status: DISCONTINUED | OUTPATIENT
Start: 2019-08-24 | End: 2019-08-25 | Stop reason: HOSPADM

## 2019-08-24 RX ORDER — NICOTINE 21 MG/24HR
14 PATCH, TRANSDERMAL 24 HOURS TRANSDERMAL
Status: DISCONTINUED | OUTPATIENT
Start: 2019-08-24 | End: 2019-08-25 | Stop reason: HOSPADM

## 2019-08-24 RX ORDER — OXYCODONE HYDROCHLORIDE 5 MG/1
5 TABLET ORAL ONCE
Status: COMPLETED | OUTPATIENT
Start: 2019-08-24 | End: 2019-08-24

## 2019-08-24 RX ORDER — COLCHICINE 0.6 MG/1
0.6 TABLET ORAL DAILY
Status: DISCONTINUED | OUTPATIENT
Start: 2019-08-24 | End: 2019-08-25 | Stop reason: HOSPADM

## 2019-08-24 RX ORDER — KETOROLAC TROMETHAMINE 30 MG/ML
30 INJECTION, SOLUTION INTRAMUSCULAR; INTRAVENOUS ONCE
Status: COMPLETED | OUTPATIENT
Start: 2019-08-24 | End: 2019-08-24

## 2019-08-24 RX ORDER — PREDNISONE 20 MG/1
20 TABLET ORAL ONCE
Status: COMPLETED | OUTPATIENT
Start: 2019-08-24 | End: 2019-08-24

## 2019-08-24 RX ADMIN — WARFARIN SODIUM 7.5 MG: 7.5 TABLET ORAL at 17:19

## 2019-08-24 RX ADMIN — COLCHICINE 0.6 MG: 0.6 TABLET, FILM COATED ORAL at 15:55

## 2019-08-24 RX ADMIN — PREDNISONE 20 MG: 20 TABLET ORAL at 14:11

## 2019-08-24 RX ADMIN — ATORVASTATIN CALCIUM 80 MG: 80 TABLET, FILM COATED ORAL at 17:19

## 2019-08-24 RX ADMIN — ACETAMINOPHEN 650 MG: 325 TABLET ORAL at 11:25

## 2019-08-24 RX ADMIN — KETOROLAC TROMETHAMINE 30 MG: 30 INJECTION, SOLUTION INTRAMUSCULAR at 14:11

## 2019-08-24 RX ADMIN — OXYCODONE HYDROCHLORIDE 5 MG: 5 TABLET ORAL at 04:40

## 2019-08-24 RX ADMIN — CARVEDILOL 25 MG: 25 TABLET, FILM COATED ORAL at 17:20

## 2019-08-24 RX ADMIN — CARVEDILOL 25 MG: 25 TABLET, FILM COATED ORAL at 04:40

## 2019-08-24 RX ADMIN — LISINOPRIL 20 MG: 20 TABLET ORAL at 04:40

## 2019-08-24 RX ADMIN — ASPIRIN 81 MG 81 MG: 81 TABLET ORAL at 04:40

## 2019-08-24 RX ADMIN — FUROSEMIDE 20 MG: 20 TABLET ORAL at 04:40

## 2019-08-24 ASSESSMENT — ENCOUNTER SYMPTOMS
MYALGIAS: 0
HEMOPTYSIS: 0
BLURRED VISION: 0
CHILLS: 0
NAUSEA: 0
PALPITATIONS: 0
FEVER: 0
COUGH: 0
VOMITING: 0
NERVOUS/ANXIOUS: 0
FOCAL WEAKNESS: 0
DOUBLE VISION: 0
HEADACHES: 0

## 2019-08-24 ASSESSMENT — LIFESTYLE VARIABLES
HOW MANY TIMES IN THE PAST YEAR HAVE YOU HAD 5 OR MORE DRINKS IN A DAY: 0
HAVE PEOPLE ANNOYED YOU BY CRITICIZING YOUR DRINKING: NO
TOTAL SCORE: 0
TOTAL SCORE: 0
EVER FELT BAD OR GUILTY ABOUT YOUR DRINKING: NO
AVERAGE NUMBER OF DAYS PER WEEK YOU HAVE A DRINK CONTAINING ALCOHOL: 0
ON A TYPICAL DAY WHEN YOU DRINK ALCOHOL HOW MANY DRINKS DO YOU HAVE: 0
DOES PATIENT WANT TO STOP DRINKING: NO
ALCOHOL_USE: NO
TOTAL SCORE: 0
HAVE YOU EVER FELT YOU SHOULD CUT DOWN ON YOUR DRINKING: NO
EVER HAD A DRINK FIRST THING IN THE MORNING TO STEADY YOUR NERVES TO GET RID OF A HANGOVER: NO
CONSUMPTION TOTAL: NEGATIVE
SUBSTANCE_ABUSE: 0

## 2019-08-24 ASSESSMENT — COPD QUESTIONNAIRES
COPD SCREENING SCORE: 5
IN THE PAST 12 MONTHS DO YOU DO LESS THAN YOU USED TO BECAUSE OF YOUR BREATHING PROBLEMS: STRONGLY AGREE
DURING THE PAST 4 WEEKS HOW MUCH DID YOU FEEL SHORT OF BREATH: NONE/LITTLE OF THE TIME
DO YOU EVER COUGH UP ANY MUCUS OR PHLEGM?: YES, A FEW DAYS A WEEK OR MONTH
HAVE YOU SMOKED AT LEAST 100 CIGARETTES IN YOUR ENTIRE LIFE: YES

## 2019-08-24 ASSESSMENT — COGNITIVE AND FUNCTIONAL STATUS - GENERAL
MOVING TO AND FROM BED TO CHAIR: A LITTLE
TOILETING: A LITTLE
SUGGESTED CMS G CODE MODIFIER DAILY ACTIVITY: CJ
MOBILITY SCORE: 18
DAILY ACTIVITIY SCORE: 22
STANDING UP FROM CHAIR USING ARMS: A LITTLE
CLIMB 3 TO 5 STEPS WITH RAILING: A LOT
DRESSING REGULAR LOWER BODY CLOTHING: A LITTLE
SUGGESTED CMS G CODE MODIFIER MOBILITY: CK
WALKING IN HOSPITAL ROOM: A LITTLE
MOVING FROM LYING ON BACK TO SITTING ON SIDE OF FLAT BED: A LITTLE

## 2019-08-24 NOTE — SENIOR ADMIT NOTE
Senior Admission Note    In summary: Lucas Agee is a 48 y.o. male with past medical history of HF and  lower extremity arterial thrombosis s/p thrombectomy presented to the ER with left knee pain.     Patient reported that the pain started yesterday after he woke up. Patient notice left knee pain and swelling. Patient reported that he decided to come to ED because he could not walk without pain. Patient denies any erythema in the area, recent trauma, or lesions. Denies fever or chills. Described pain intensity as 10/10. Patient also reported chest discomfort, that he believes is related to his knee pain and anxiety due to pain. Denies any pain or radiation to other areas. Also denies palpitations or dizziness.     Has history of lower extremity arterial thrombosis s/p thrombectomy, currently on warfarin. Arterial US done in ED.       Assessment and plan in summary:    #Left knee pain    - patient reported left knee pain that started yesterday    - On exam there is no erythema and mild swelling    - arthrocentesis done in ED and WBC 27505 (inflammatory range) and gram stain showed WBC, but no organisms    - Ortho consulted by ED - Dr Bailey will follow the cultures  #Chest discomfort    - negative troponin    #History of lower extremity arterial thrombosis s/p thrombectomy    - on Warfarin   #Dilated Cardiomyopathy   #Chronic systolic heat failure    - ECHO on 5/19 showed LVEF 15-20%, global hypokinesis, dilated cardiomyopathy, biventricular dysfunction. Repeat ECHO showed EF 45%, mild concentric left ventricular hypertrophy. Grade I diastolic dysfunction.    - No acute exacerbation at this time    - On ASA, atorvastatin, carvedilol, lisinopril and lasix    - patient had follow up with Cardiology 8/1/19  Plan   - admitted to telemetry   - trend troponin and EKG   - will not start antibiotics at this time as less likely to be septic arthritis.   - Follow fluid culture   - Urine drug screen   - blood culture      For full plan, please see Intern note for details   Khang Lei M.D.  PGY 3

## 2019-08-24 NOTE — PROGRESS NOTES
Inpatient Anticoagulation Service Note    Date: 8/24/2019    Reason for Anticoagulation: Other (Comments)(Arterial thrombosis)   Target INR: 2.0 to 3.0         Hemoglobin Value: 14.7  Hematocrit Value: 43.5    INR from last 7 days     Date/Time INR Value    08/24/19 1131  (!) 2.21    08/23/19 1520  (!) 2.62        Dose from last 7 days     Date/Time Dose (mg)    08/24/19 1500  7.5    08/23/19 1957  7.5        Average Dose (mg): (7.5 mg daily)  Significant Interactions: Aspirin  Bridge Therapy: No     Comments:  48 y.o. male who presents w/left leg pain, chest discomfort. PMHx: CHF w/ LVEF~45%, was hospitalized here earlier this summer for an acute lower extremity arterial thrombosis that underwent thrombectomy(5/2019). HTN, and Hx DVT. 08/23/19: US-Extremity:atherosclerotic plaque throughout the left lower extremity without evidence for stenosis.  There is a small segment of occlusion in the posterior tibial artery at the ankle that is reconstituted more distally. INR @ goal on admit , decrease over interval. H/H  wnl , no documented signs of overt bleeding. Currently on low dose ASA.     Plan:  Warfarin 7.5 mg po daily , INR in AM.   Education Material Provided?: No(Chronic warfarin therapy )  Pharmacist suggested discharge dosing: Warfarin 7.5mg po daily , follow up with anticoagulation clinic after discharge.      Derrick ArteagaD BCPS

## 2019-08-24 NOTE — PROGRESS NOTES
Assumed care at 0700. Bedside report received from Derrick. Patient's chart and MAR reviewed. Pt complains of moderate left knee pain at this time. Pt is A & O 4. Patient was updated on plan of care for the day. Questions answered and concerns addressed.  Pt denies any additional needs at this time. White board updated. Call light, phone and personal belongings within reach.

## 2019-08-24 NOTE — ASSESSMENT & PLAN NOTE
Patient has stopped using methamphetamine since he was hospitalized for arterial thrombus .  Patient is using cannabis and smokes half a pack of cigarette everyday

## 2019-08-24 NOTE — ASSESSMENT & PLAN NOTE
Patient was admitted in May 2019 for right  leg pain and swelling and found to have arterial thrombus ( anterior tibial artery) = he had thrombectomy done   Started on chronic anticoagulation treatment .  Warfarin therapy   Last week INR was  3.8  and in the ED it was within the therapeutic range = 2.6  Plan :  Ultrasound of arteries left leg done in the ED didn't show any thrombus   Continue Warfarin - pharmacy to dose the medication

## 2019-08-24 NOTE — ED NOTES
Med Rec completed per patient   Allergies reviewed  No ORAL antibiotics in last 14 days    Patient takes Warfarin 7.5 mg daily

## 2019-08-24 NOTE — ASSESSMENT & PLAN NOTE
Smokes Half a pack of cigarettes a day     Plan:  Nicotine patches   Smoking cessation counseling

## 2019-08-24 NOTE — ASSESSMENT & PLAN NOTE
Chest discomfort present for an hour in the afternoon  As per patient he was having left knee pain and anxious at that time .   Denies use of methamphetamine   No associated palpitations or  SOB   Resolved after an hour - spontaneously     Plan  Admit in telemetry  Trend troponin - negative x 2   EKG

## 2019-08-24 NOTE — CONSULTS
[facility] INFECTIOUS DISEASES INPATIENT CONSULT NOTE     Date of Service: 8/24/2019    Consult Requested By: Jose Marquez M.D.    Reason for Consultation: Knee swelling    Chief Complaint: Left knee pain and swelling    History of Present Illness:     Lucas Agee is a 48 y.o. male admitted 8/23/2019.  Patient has a past medical history of systolic heart failure, lower extremity arterial thrombosis status post thrombectomy, amphetamine abuse.  Patient presented on 8/23 to the ER with 2 days of acute left-sided knee pain that progressively worsened over the next couple of days.  One day prior to admission, he also noted significant swelling, no obvious redness.  In the ER, patient is afebrile, but did have leukocytosis up to 15,000.    A tap of the joint was performed by orthopedics which showed 24,780 white cells, no crystals.  Gram stain showed white cells but no organisms.  Patient has received no antibiotics but reports that his knee pain is improved, now able to move it, and with improvement in the pain and swelling.  Cultures so far no growth till date.    Patient reports no skin lesions, no other joint or tendon pains, reports a stable relationship with a single girlfriend for at least the past 6 months.  He reports no dysuria or penile drainage, no recent STIs.    His repeat white count improved to 12.6, without antibiotic therapy.    Urine drug screen positive for amphetamines    Patient states that one way or another, he is leaving the hospital today.    Review of Systems:  All other systems reviewed and are negative expect as noted in HPI    Past Medical History:   Diagnosis Date   • Congestive heart failure (HCC)    • DVT (deep venous thrombosis) (HCC)    • Hypertension        Past Surgical History:   Procedure Laterality Date   • THROMBECTOMY Right 5/21/2019    Procedure: THROMBECTOMY - lower extremity, anterior tibial artery ;  Surgeon: Deidra Dominguez M.D.;  Location: SURGERY [facility];   "Service: General   • HAND SURGERY Right 1995    \"metal plate placed\"   • HERNIA REPAIR Right 1990    groin   • HERNIA REPAIR         Family History   Problem Relation Age of Onset   • Diabetes Mother    • Heart Disease Father    • Blood Clots Brother    • Heart Disease Brother         pacemaker   • Blood Clots Paternal Grandmother    • Heart Disease Paternal Grandmother    • Heart Attack Paternal Uncle 60       Social History     Socioeconomic History   • Marital status: Single     Spouse name: Not on file   • Number of children: Not on file   • Years of education: Not on file   • Highest education level: Not on file   Occupational History   • Not on file   Social Needs   • Financial resource strain: Not on file   • Food insecurity:     Worry: Not on file     Inability: Not on file   • Transportation needs:     Medical: Not on file     Non-medical: Not on file   Tobacco Use   • Smoking status: Current Every Day Smoker     Packs/day: 0.50     Types: Cigarettes   • Smokeless tobacco: Never Used   Substance and Sexual Activity   • Alcohol use: No   • Drug use: Yes     Types: Inhaled, Intravenous, Methamphetamines, Marijuana     Comment: marijuana; meth use    • Sexual activity: Not on file   Lifestyle   • Physical activity:     Days per week: Not on file     Minutes per session: Not on file   • Stress: Not on file   Relationships   • Social connections:     Talks on phone: Not on file     Gets together: Not on file     Attends [employer] service: Not on file     Active member of club or organization: Not on file     Attends meetings of clubs or organizations: Not on file     Relationship status: Not on file   • Intimate partner violence:     Fear of current or ex partner: Not on file     Emotionally abused: Not on file     Physically abused: Not on file     Forced sexual activity: Not on file   Other Topics Concern   • Not on file   Social History Narrative   • Not on file       No Known " "Allergies    Medications:    Current Facility-Administered Medications:   •  nicotine (NICODERM) 14 MG/24HR 14 mg, 14 mg, Transdermal, Daily-0600, Dina Denton M.D.  •  colchicine (COLCRYS) tablet 0.6 mg, 0.6 mg, Oral, DAILY, Jose Marquez M.D., 0.6 mg at 19 1555  •  senna-docusate (PERICOLACE or SENOKOT S) 8.6-50 MG per tablet 2 Tab, 2 Tab, Oral, BID **AND** polyethylene glycol/lytes (MIRALAX) PACKET 1 Packet, 1 Packet, Oral, QDAY PRN **AND** magnesium hydroxide (MILK OF MAGNESIA) suspension 30 mL, 30 mL, Oral, QDAY PRN **AND** bisacodyl (DULCOLAX) suppository 10 mg, 10 mg, Rectal, QDAY PRN, Dina Denton M.D.  •  acetaminophen (TYLENOL) tablet 650 mg, 650 mg, Oral, Q6HRS, Dina Denton M.D., 650 mg at 19 1125  •  aspirin (ASA) chewable tab 81 mg, 81 mg, Oral, DAILY, Dina Denton M.D., 81 mg at 19 0440  •  atorvastatin (LIPITOR) tablet 80 mg, 80 mg, Oral, Q EVENING, Dina Denton M.D., 80 mg at 19 2056  •  carvedilol (COREG) tablet 25 mg, 25 mg, Oral, BID, Dina Denton M.D., 25 mg at 19 0440  •  furosemide (LASIX) tablet 20 mg, 20 mg, Oral, DAILY, Dina Denton M.D., 20 mg at 19 0440  •  lisinopril (PRINIVIL) tablet 20 mg, 20 mg, Oral, DAILY, Dina Denton M.D., 20 mg at 19 0440  •  [START ON 2019] spironolactone (ALDACTONE) tablet 25 mg, 25 mg, Oral, Q48HRS, Dina Denton M.D.  •  MD Alert...Warfarin per Pharmacy, , Other, PHARMACY TO DOSE, Dina Denton M.D.  •  warfarin (COUMADIN) tablet 7.5 mg, 7.5 mg, Oral, COUMADIN-DAILY, Dina Denton M.D., 7.5 mg at 19    Physical Exam:   Vital Signs: /69   Pulse 60   Temp 36.6 °C (97.8 °F) (Temporal)   Resp 18   Ht 1.727 m (5' 8\")   Wt 86.2 kg (190 lb 0.6 oz)   SpO2 95%   BMI 28.89 kg/m²   Temp  Av.6 °C (97.8 °F)  Min: 36.2 °C (97.1 °F)  Max: 37 °C (98.6 °F)  Vital signs reviewed  Constitutional: Patient is oriented to person, place, and time. Appears well-developed and well-nourished. No distress  Head: " Atraumatic, normocephalic  Eyes: Conjunctivae normal, EOM intact. Pupils are equal, round, and reactive to light.   Mouth/Throat: Lips without lesions, oropharynx is clear and moist.  Neck: Neck supple. No masses/lymphadenopathy  Cardiovascular: Normal rate, regular rhythm, normal S1S2 and intact distal pulses. No murmur, gallop, or friction rub. No pedal edema.  Pulmonary/Chest: No respiratory distress. Unlabored respiratory effort, lungs clear to auscultation. No wheezes or rales.   Abdominal: Soft, non tender. BS + x 4. No masses or hepatosplenomegaly.   Musculoskeletal: Left knee swollen, no erythema, some restriction of motion, no joint line tenderness, increased warmth compared to right knee  Neurological: Alert and oriented to person, place, and time. No gross cranial nerve deficit. No focal neural deficit noted  Skin: Skin is warm and dry. No rashes or embolic phenomena noted on exposed skin  Psychiatric: Irritable. Behavior is normal.     LABS:  Recent Labs     08/23/19  1520 08/24/19  0102   WBC 15.2* 12.6*      Recent Labs     08/23/19  1520 08/24/19  0102   HEMOGLOBIN 14.3 14.7   HEMATOCRIT 41.0* 43.5   MCV 92.8 95.2   MCH 32.4 32.2   PLATELETCT 268 259       Recent Labs     08/23/19  1520 08/24/19  0102   SODIUM 135 137   POTASSIUM 4.0 4.3   CHLORIDE 103 104   CO2 24 26   CREATININE 0.93 0.95        Recent Labs     08/23/19  1520 08/24/19  0102   ALBUMIN 3.9 3.9        MICRO:  No results found for: BLOODCULTU, BLDCULT, BCHOLD     Latest pertinent labs were reviewed    IMAGING STUDIES:  Knee x-ray with joint effusion    Hospital Course/Assessment:   Lucas Agee is a 48 y.o. male with a history of past medical history of systolic heart failure, lower extremity arterial thrombosis status post thrombectomy, amphetamine abuse, here with acute onset of left knee pain and swelling.    Patient joint aspirate without antibiotics showed 24,000 white cells, Gram stain with no organisms, cultures negative  thus far.  His symptoms and exam have even improved without antibiotics or surgical management.  This constellation of findings is not very strong evidence of septic arthritis, however it is not off the table. This is low suspicion for a typical gram-positive septic arthritis. Gonococcal arthritis however is a reasonable suspicion in this patient. The fluid analysis showed clumps of cells, which could mean that the white count may have been underestimated.  The count was neutrophil predominant, and at this point in time we have no other alternative explanation for his acute joint pain. The improvement perhaps could be explained by the natural evolution of migratory polyarthritis in gonococcemia, or some improvement after release with joint aspiration.    In any case, patient has improved without any antibiotic therapy, and there are no other physical signs of disseminated gonococcus (no skin lesions, no tenosynovitis). Per our lab, we will have the results of the urine gonorrhea PCR in a couple of hours.  Reasonable to wait for this result to decide on initiating a course of IV ceftriaxone.    Pertinent Diagnoses:  Left knee acute inflammatory arthritis  Amphetamine abuse  Chronic systolic heart failure    Plan:   -Please see discussion above. Patient states that one way or another, he will leave the hospital today. Counseled regarding possibility of infection and the need for antibiotics if this is the case  -Per our lab, we will have the results of the urine gonorrhea PCR in a couple of hours.  Please follow.  If positive, would initiate IV ceftriaxone 1 g every 24 hours x 7-day course  -If negative, given improvement, reasonable to let patient go with strong advice to return to the ER if worsens, or if additional joint swellings develop or skin lesions/tenosynovitis  -Would also follow the joint culture till finalized.  Spoke with lab and informed them that gonococcus is on the differential for appropriate  plating of cultures    Plan was discussed with the primary team, Dr. Mae    ID will follow. Please feel free to call with questions.    Stevan Hood M.D.    Please note that this dictation was created using voice recognition software. I have worked with technical experts from [facility] to optimize the interface.  I have made every reasonable attempt to correct obvious errors, but there may be errors of grammar and possibly content that I did not discover before finalizing the note.

## 2019-08-24 NOTE — ED NOTES
ERP at bedside - tap left knee.  Pt tolerated well.  Approximately 12 ml clear/yellow aspirate obtained, specimen hand delivered to lab by ED tech.

## 2019-08-24 NOTE — PROGRESS NOTES
PT arrived on floor from ED with 2 ACLS RNs. Pt admitted and assessed. Pt complaining of 10/10 left knee pain. Pt medicated with Tylenol per MAR. Pt reassessed for pain 30 minutes later with no relief. Dr. Denton notified of Pt's pain. Orders received for Oxycodone 5 mg PO once. Call light and belongings within reach. POC discussed with patient at bedside. Pt verbalizes understanding.

## 2019-08-24 NOTE — H&P
Internal Medicine Admitting History and Physical    Note Author: Dina Denton M.D.       Name Lucas Agee     1970   Age/Sex 48 y.o. male   MRN [id number]   Code Status Full code     After 5PM or if no immediate response to page, please call for cross-coverage  Attending/Team: Dr. Marquez/Bala See Patient List for primary contact information  Call [phone] to page    1st Call - Day Intern (R1):   Walter 2nd Call - Day Sr. Resident (R2/R3):   Gege       Chief Complaint:   Left knee pain   Chest discomfort       HPI:  A 48 y.o male patient came to the ED because of sever left knee pain and swelling  .It started yesterday when  he woke up in the morning because of pain in the knee and noticed some swelling of the joint. He denies any pain/swelling of the knee before that. Pain intensity was 10/10 in the afternoon when he came to the ED. Swelling present but denies redness of the skin over the joint. Denies fever or chills . Denies any trauma.He was hospitalized here earlier this summer for an acute lower extremity arterial thrombosis and  underwent thrombectomy.  Patient is on Coumadin and  his INR is within the expected therapeutic  range( 2.62). Patient denies pain in calf /leg - pain is localised to the knee and could not stand or walk since it hurts bad when he puts weight on the left leg- patient is using a cane for walking .   Past medical history including CHF with an ejection fraction of approximately 15%, hypertension , h/o thrombosis and prediabetes .  Denies any chest pain at present but had discomfort in the afternoon when he came to ED . Pressure type pain . As per patient he was in lot of pain and very anxious at that time and believes that that might be the reason for his chest discomfort. No radiation of the chest pain to arm or jaw. No associated SOB or palpitations.    In the ED    Arterial US done = negative for stenosis. small segment of occlusion in the posterior  tibial artery at the ankle that is reconstituted more distally   diagnostic and therapeutic arthrocentesis of the left knee.  Indication was to rule out septic joint and to clarify the nature of effusion. About 15 cc of yellow turbid fluid was aspirated . The synovial fluid analysis didn't show any crystals , WBC   were about 66060 (89% Neutrophils) , less than 2000 RBC . Culture pending. Ortho ( Dr Bailey ) was consulted by the ED .  Labs :  CBC shows  WBC 15.2 , ESR of 29 , CRP 2.98   INR ( last week in outpatient - 3.8 but today in the ED was 2.6)      Review of Systems   Constitutional: Negative for chills, diaphoresis, fever, malaise/fatigue and weight loss.   HENT: Negative for ear pain, hearing loss, nosebleeds, sore throat and tinnitus.    Eyes: Negative for blurred vision, double vision and photophobia.   Respiratory: Negative for cough, hemoptysis, shortness of breath and wheezing.    Cardiovascular: Positive for chest pain. Negative for palpitations, claudication and leg swelling.        Chest discomfort - present today afternoon for few hours. No chest pain/discomfort at present  As per patient he was in severe pain and was anxious at that time .   Gastrointestinal: Negative for abdominal pain, blood in stool, constipation, diarrhea, heartburn, nausea and vomiting.   Genitourinary: Negative for dysuria, flank pain and urgency.   Musculoskeletal: Positive for joint pain. Negative for falls, myalgias and neck pain.        Left knee pain and swelling , sudden onset    Skin: Negative for itching and rash.   Neurological: Negative for dizziness, focal weakness, seizures, loss of consciousness, weakness and headaches.   Endo/Heme/Allergies: Negative.  Negative for polydipsia. Does not bruise/bleed easily.   Psychiatric/Behavioral: Positive for substance abuse. Negative for depression, hallucinations and suicidal ideas.        Cannabis abuse - at present  History of Methamphetamine use             Past  "Medical History (Chronic medical problem, known complications and current treatment)    Congestive heart failure   DVT   Arterial thrombosis -( thrombectomy done)  Hypertension  Prediabetes  Chronic anticoagulation        Past Surgical History:  Past Surgical History:   Procedure Laterality Date   • THROMBECTOMY Right 5/21/2019    Procedure: THROMBECTOMY - lower extremity, anterior tibial artery ;  Surgeon: Deidra Dominguez M.D.;  Location: SURGERY [facility];  Service: General   • HAND SURGERY Right 1995    \"metal plate placed\"   • HERNIA REPAIR Right 1990    groin   • HERNIA REPAIR         Current Outpatient Medications:  Home Medications     Reviewed by Derrick Gilmore R.N. (Registered Nurse) on 08/23/19 at 2102  Med List Status: Complete   Medication Last Dose Status   acetaminophen (TYLENOL) 500 MG Tab 8/22/2019 Active   aspirin (ASA) 81 MG Chew Tab chewable tablet 8/23/2019 Active   atorvastatin (LIPITOR) 80 MG tablet 8/22/2019 Active   carvedilol (COREG) 25 MG Tab 8/23/2019 Active   furosemide (LASIX) 20 MG Tab 8/23/2019 Active   lisinopril (PRINIVIL) 20 MG Tab 8/23/2019 Active   spironolactone (ALDACTONE) 25 MG Tab 8/23/2019 Active   warfarin (COUMADIN) 5 MG Tab 8/22/2019 Active                Medication Allergy/Sensitivities:  No Known Allergies      Family History (mandatory)   Family History   Problem Relation Age of Onset   • Diabetes Mother    • Heart Disease Father    • Blood Clots Brother    • Heart Disease Brother         pacemaker   • Blood Clots Paternal Grandmother    • Heart Disease Paternal Grandmother    • Heart Attack Paternal Uncle 60       Social History (mandatory)   Social History     Socioeconomic History   • Marital status: Single     Spouse name: Not on file   • Number of children: Not on file   • Years of education: Not on file   • Highest education level: Not on file   Occupational History   • Not on file   Social Needs   • Financial resource strain: Not on file   • Food " insecurity:     Worry: Not on file     Inability: Not on file   • Transportation needs:     Medical: Not on file     Non-medical: Not on file   Tobacco Use   • Smoking status: Current Every Day Smoker     Packs/day: 0.50     Types: Cigarettes   • Smokeless tobacco: Never Used   Substance and Sexual Activity   • Alcohol use: No   • Drug use: Yes     Types: Inhaled, Intravenous, Methamphetamines, Marijuana     Comment: marijuana; meth use    • Sexual activity: Not on file   Lifestyle   • Physical activity:     Days per week: Not on file     Minutes per session: Not on file   • Stress: Not on file   Relationships   • Social connections:     Talks on phone: Not on file     Gets together: Not on file     Attends Jainism service: Not on file     Active member of club or organization: Not on file     Attends meetings of clubs or organizations: Not on file     Relationship status: Not on file   • Intimate partner violence:     Fear of current or ex partner: Not on file     Emotionally abused: Not on file     Physically abused: Not on file     Forced sexual activity: Not on file   Other Topics Concern   • Not on file   Social History Narrative   • Not on file       PCP : Pcp Pt States None    Physical Exam     Vitals:    08/23/19 2000 08/23/19 2056 08/24/19 0022 08/24/19 0429   BP: 129/76 134/87 120/70 135/85   Pulse: 71 83 69 80   Resp: 16 16 15 16   Temp:  37 °C (98.6 °F) 36.7 °C (98.1 °F) 36.6 °C (97.9 °F)   TempSrc:  Temporal Temporal Temporal   SpO2: 94% 98% 96% 93%   Weight:  86.2 kg (190 lb 0.6 oz)     Height:         Body mass index is 28.89 kg/m².  O2 therapy: Pulse Oximetry: 93 %, O2 (LPM): 0, O2 Delivery: None (Room Air)    Physical Exam   Constitutional: He is oriented to person, place, and time. He appears distressed.   Well developed and nourished.   Mildly agitated because of pain     HENT:   Head: Normocephalic and atraumatic.   Mouth/Throat: Oropharynx is clear and moist. No oropharyngeal exudate.   MP  score 2   Eyes: Pupils are equal, round, and reactive to light. EOM are normal. No scleral icterus.   Neck: Normal range of motion. Neck supple.   Cardiovascular: Regular rhythm, normal heart sounds and intact distal pulses.   No murmur heard.  Mild Tachycardia ( may be due to pain)    Pulmonary/Chest: Effort normal and breath sounds normal. No respiratory distress. He has no wheezes. He exhibits no tenderness.   Abdominal: Soft. Bowel sounds are normal. He exhibits no distension. There is no tenderness.   Musculoskeletal:   Decreased range of motion in the left knee due to pain   Patient unwilling to move the painful knee     Pain increases with movement - ROM restricted to flexion more then 30 degree.  Tenderness present over the left knee- no erythema seen  Swelling present ( already had aspiration of about 15 ml of effusion from the joint)   Neurological: He is alert and oriented to person, place, and time. No cranial nerve deficit. GCS score is 15.   Patient is using a cane for support for walking.  He is not able to walk without support due to pain     Skin: Skin is warm.   Psychiatric: Mood, affect and judgment normal.         Data Review       Old Records Request:   Completed  Current Records review/summary: Completed    Lab Data Review:  Recent Results (from the past 24 hour(s))   EKG (NOW)    Collection Time: 19  1:40 PM   Result Value Ref Range    Report       [facility] Emergency Dept.    Test Date:  2019  Pt Name:    EDMUNDO OROPEZA                 Department: ER  MRN:        [id number]                      Room:  Gender:     Male                         Technician: [id number]  :        1970                   Requested By:ER TRIAGE PROTOCOL  Order #:    [id number]                    Reading MD: ETHEL LEHMAN MD    Measurements  Intervals                                Axis  Rate:       100                          P:          76  SD:         163                           QRS:        29  QRSD:       88                           T:          50  QT:         368  QTc:        475    Interpretive Statements  SINUS TACHYCARDIA  BASELINE WANDER IN LEAD(S) II,V3  Compared to ECG 05/21/2019 17:16:05  Sinus rhythm no longer present    Electronically Signed On 8- 15:49:56 PDT by ETHEL LEHMAN MD     CBC with Differential    Collection Time: 08/23/19  3:20 PM   Result Value Ref Range    WBC 15.2 (H) 4.8 - 10.8 K/uL    RBC 4.42 (L) 4.70 - 6.10 M/uL    Hemoglobin 14.3 14.0 - 18.0 g/dL    Hematocrit 41.0 (L) 42.0 - 52.0 %    MCV 92.8 81.4 - 97.8 fL    MCH 32.4 27.0 - 33.0 pg    MCHC 34.9 33.7 - 35.3 g/dL    RDW 42.3 35.9 - 50.0 fL    Platelet Count 268 164 - 446 K/uL    MPV 9.8 9.0 - 12.9 fL    Neutrophils-Polys 64.60 44.00 - 72.00 %    Lymphocytes 22.50 22.00 - 41.00 %    Monocytes 9.70 0.00 - 13.40 %    Eosinophils 2.20 0.00 - 6.90 %    Basophils 0.50 0.00 - 1.80 %    Immature Granulocytes 0.50 0.00 - 0.90 %    Nucleated RBC 0.00 /100 WBC    Neutrophils (Absolute) 9.80 (H) 1.82 - 7.42 K/uL    Lymphs (Absolute) 3.42 1.00 - 4.80 K/uL    Monos (Absolute) 1.48 (H) 0.00 - 0.85 K/uL    Eos (Absolute) 0.34 0.00 - 0.51 K/uL    Baso (Absolute) 0.07 0.00 - 0.12 K/uL    Immature Granulocytes (abs) 0.08 0.00 - 0.11 K/uL    NRBC (Absolute) 0.00 K/uL   Complete Metabolic Panel (CMP)    Collection Time: 08/23/19  3:20 PM   Result Value Ref Range    Sodium 135 135 - 145 mmol/L    Potassium 4.0 3.6 - 5.5 mmol/L    Chloride 103 96 - 112 mmol/L    Co2 24 20 - 33 mmol/L    Anion Gap 8.0 0.0 - 11.9    Glucose 115 (H) 65 - 99 mg/dL    Bun 15 8 - 22 mg/dL    Creatinine 0.93 0.50 - 1.40 mg/dL    Calcium 9.3 8.5 - 10.5 mg/dL    AST(SGOT) 15 12 - 45 U/L    ALT(SGPT) 18 2 - 50 U/L    Alkaline Phosphatase 99 30 - 99 U/L    Total Bilirubin 0.4 0.1 - 1.5 mg/dL    Albumin 3.9 3.2 - 4.9 g/dL    Total Protein 7.3 6.0 - 8.2 g/dL    Globulin 3.4 1.9 - 3.5 g/dL    A-G Ratio 1.1 g/dL   Troponin    Collection Time: 08/23/19   3:20 PM   Result Value Ref Range    Troponin T 7 6 - 19 ng/L   Prothrombin Time    Collection Time: 19  3:20 PM   Result Value Ref Range    PT 29.0 (H) 12.0 - 14.6 sec    INR 2.62 (H) 0.87 - 1.13   ESTIMATED GFR    Collection Time: 19  3:20 PM   Result Value Ref Range    GFR If African American >60 >60 mL/min/1.73 m 2    GFR If Non African American >60 >60 mL/min/1.73 m 2   WESTERGREN SED RATE    Collection Time: 19  3:20 PM   Result Value Ref Range    Sed Rate Westergren 29 (H) 0 - 15 mm/hour   CRP QUANTITIVE (NON-CARDIAC)    Collection Time: 19  3:20 PM   Result Value Ref Range    Stat C-Reactive Protein 2.98 (H) 0.00 - 0.75 mg/dL   FLUID CELL COUNT    Collection Time: 19  4:43 PM   Result Value Ref Range    Fluid Type Synovial     Color-Body Fluid Yellow     Character-Body Fluid Cloudy     Total RBC Count <2000 cells/uL    Total WBC 21228 cells/uL    Polys 89 %    Lymphs 7 %    Mononuclear Cells - Fluid 4 %    Comments see below    FLUID GLUCOSE    Collection Time: 19  4:43 PM   Result Value Ref Range    Glucose, Fluid 105 mg/dL   FLUID CRYSTALS    Collection Time: 19  4:43 PM   Result Value Ref Range    Crystals, Body Fluid None Seen None Seen   GRAM STAIN    Collection Time: 19  4:43 PM   Result Value Ref Range    Significant Indicator .     Source SYNO     Site Left knee     Gram Stain Result Moderate WBCs.  No organisms seen.      FLUID TOTAL PROTEIN    Collection Time: 19  4:46 PM   Result Value Ref Range    Fluid Type Synovial     Total Protein Fluid 4.8 g/dL   EKG    Collection Time: 19 10:11 PM   Result Value Ref Range    Report       [facility]    Test Date:  2019  Pt Name:    EDMUNDO OROPEZA                 Department: 183  MRN:        [id number]                      Room:       T836  Gender:     Male                         Technician: SEUN  :        1970                   Requested By:ABRAHAN LEONG  Order #:    [id number]                     Reading MD: Rosie Burnett MD    Measurements  Intervals                                Axis  Rate:       73                           P:          50  CA:         164                          QRS:        65  QRSD:       86                           T:          43  QT:         408  QTc:        450    Interpretive Statements  SINUS RHYTHM  Compared to ECG 08/23/2019 13:40:02  No significant change noted  Electronically Signed On 8- 1:20:08 PDT by Rosie Burnett MD     TROPONIN    Collection Time: 08/24/19  1:02 AM   Result Value Ref Range    Troponin T <6 6 - 19 ng/L   CBC with Differential    Collection Time: 08/24/19  1:02 AM   Result Value Ref Range    WBC 12.6 (H) 4.8 - 10.8 K/uL    RBC 4.57 (L) 4.70 - 6.10 M/uL    Hemoglobin 14.7 14.0 - 18.0 g/dL    Hematocrit 43.5 42.0 - 52.0 %    MCV 95.2 81.4 - 97.8 fL    MCH 32.2 27.0 - 33.0 pg    MCHC 33.8 33.7 - 35.3 g/dL    RDW 43.4 35.9 - 50.0 fL    Platelet Count 259 164 - 446 K/uL    MPV 10.0 9.0 - 12.9 fL    Neutrophils-Polys 46.50 44.00 - 72.00 %    Lymphocytes 40.00 22.00 - 41.00 %    Monocytes 10.00 0.00 - 13.40 %    Eosinophils 2.20 0.00 - 6.90 %    Basophils 0.70 0.00 - 1.80 %    Immature Granulocytes 0.60 0.00 - 0.90 %    Nucleated RBC 0.00 /100 WBC    Neutrophils (Absolute) 5.85 1.82 - 7.42 K/uL    Lymphs (Absolute) 5.03 (H) 1.00 - 4.80 K/uL    Monos (Absolute) 1.26 (H) 0.00 - 0.85 K/uL    Eos (Absolute) 0.28 0.00 - 0.51 K/uL    Baso (Absolute) 0.09 0.00 - 0.12 K/uL    Immature Granulocytes (abs) 0.07 0.00 - 0.11 K/uL    NRBC (Absolute) 0.00 K/uL   Comp Metabolic Panel (CMP)    Collection Time: 08/24/19  1:02 AM   Result Value Ref Range    Sodium 137 135 - 145 mmol/L    Potassium 4.3 3.6 - 5.5 mmol/L    Chloride 104 96 - 112 mmol/L    Co2 26 20 - 33 mmol/L    Anion Gap 7.0 0.0 - 11.9    Glucose 104 (H) 65 - 99 mg/dL    Bun 14 8 - 22 mg/dL    Creatinine 0.95 0.50 - 1.40 mg/dL    Calcium 9.3 8.5 - 10.5 mg/dL    AST(SGOT) 18 12 - 45 U/L    ALT(SGPT) 15  2 - 50 U/L    Alkaline Phosphatase 86 30 - 99 U/L    Total Bilirubin 0.4 0.1 - 1.5 mg/dL    Albumin 3.9 3.2 - 4.9 g/dL    Total Protein 7.0 6.0 - 8.2 g/dL    Globulin 3.1 1.9 - 3.5 g/dL    A-G Ratio 1.3 g/dL   ESTIMATED GFR    Collection Time: 08/24/19  1:02 AM   Result Value Ref Range    GFR If African American >60 >60 mL/min/1.73 m 2    GFR If Non African American >60 >60 mL/min/1.73 m 2   Chlamydia/GC PCR Urine Or Swab    Collection Time: 08/24/19  4:41 AM   Result Value Ref Range    Source Urine        Imaging/Procedures Review:    Independant Imaging Review: Completed      US - extremity (arterial) for left lower leg : Left.    Mild atherosclerotic plaque throughout the left lower extremity without    evidence for stenosis.  There is a small segment of occlusion in the    posterior tibial artery at the ankle that is reconstituted more distally.     All waveforms are brisk and triphasic throughout.     Chest X ray: Normal     X ray 3 view Lt knee : pending                    EKG:   EKG Independent Review: Completed  QTc:475, HR: 101,  Sinus tachycardia , no ST/T changes     Records reviewed and summarized in current documentation :  Yes  [employer] teaching service handout given to patient:  Yes         Assessment/Plan     Pain and swelling of left knee- (present on admission)  Assessment & Plan  patient reported r knee pain that started yesterday   No fever or chills  Denies history of STDs in past  Denies any recent infection/hiking or travel  Denies trauma or history of gout or pseudogout   On exam there is no erythema and mild swelling present ( after aspiration of 15 cc of fluid)  arthrocentesis done in ED :   synovial fluid analysis [place]BC of 68545 (inflammatory range)                                                   gram stain showed WBC, but no organisms                                                    No crystals                                                    Not hemorrhagic( turbid clear fluid)    Ortho consulted by ED - Dr Bailey will follow the cultures  CBC shows  WBC 15.2 , ESR of 29 , CRP 2.98      Plan :   will not start antibiotics at this time as less likely to be septic arthritis.   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  Warfarin therapy   Last week INR was  3.8  but today in the ED it was within the therapeutic range = 2.6    Plan :   Ultrasound of arteries left leg done in the ED didn't show any thrombus   Continue Warfarin - pharmacy to dose the medication    Chest discomfort  Assessment & Plan  Chest discomfort present for an hour in the afternoon  As per patient he was having left knee pain and anxious at that time .   Denies use of methamphetamine   No associated palpitations or  SOB   Resolved after an hour - spontaneously     Plan  Admit in telemetry  Trend troponin - negative x 2   EKG    Congestive heart failure, NYHA class 2 and ACC/AHA stage C (HCC)- (present on admission)  Assessment & Plan  Plan :  Previous EF was 15% ( 05/19)) but the recent echo shows EF of 45% ( 08/19)  Patient used to abuse Methamphetamine previously but has stopped since the last hospitalization    plan.  Continue the home medications   Statins , aspirin , carvedilol , furosemide , lisinopril and spironolactone     History of methamphetamine abuse- (present on admission)  Assessment & Plan  Patient has stopped using methamphetamine since he was hospitalized for arterial thrombus .  Patient is using cannabis and smokes half a pack of cigarette everyday        Tobacco use- (present on admission)  Assessment & Plan  Smokes Half a pack of  cigarettes a day     Plan:  Nicotine patches   Smoking cessation counseling       Anticipated Hospital stay:  >2 midnights        Quality Measures  Quality-Core Measures   Reviewed items::  EKG reviewed, Labs reviewed, Medications reviewed and Radiology images reviewed  Wang catheter::  No Wang  DVT prophylaxis pharmacological::  Warfarin (Coumadin)    PCP: Pcp Pt States None

## 2019-08-24 NOTE — ASSESSMENT & PLAN NOTE
patient reported r knee pain that started yesterday   No fever or chills  Denies history of STDs in past  Denies any recent infection/hiking or travel  Denies trauma or history of gout or pseudogout   On exam there is no erythema and mild swelling present ( after aspiration of 15 cc of fluid)  arthrocentesis done in ED :   synovial fluid analysis shows WBC of 506[address] (inflammatory range)                                                   gram stain showed WBC, but no organisms                                                    No crystals                                                    Not hemorrhagic( turbid clear fluid)   Ortho consulted by ED - Dr Bailey will follow the cultures  CBC shows  WBC 15.2 , ESR of 29 , CRP 2.98  Synovial fluid culture shows no growth to date  Urine PCR for gonococcal arthritis and Chlamydia negative, and hence Ceftriaxone was not started.  Will treat as an inflammatory arthritis with gouty arthritis high on the list.  -Follow-up with primary care practitioner in 1 week time for evaluation and further investigation of inflammatory arthritis in the left knee.  -We will start him on a trial of colchicine, prednisone, and Toradol, prior to being seen by PCP.

## 2019-08-24 NOTE — ED NOTES
Pt resting quietly in gurney, mildly drowsy. Pt c/o 4/10 pain to left knee, tolerable at this time. Denies chest discomfort. Pt comfort level checked, denies needs. Updated on POC. Awaiting room assignment, call light in reach.

## 2019-08-24 NOTE — ASSESSMENT & PLAN NOTE
Plan :  Previous EF was 15% ( 05/19)) but the recent echo shows EF of 45% ( 08/19)  Patient used to abuse Methamphetamine previously but has stopped since the last hospitalization    plan.  Continue the home medications   Statins , aspirin , carvedilol , furosemide , lisinopril and spironolactone

## 2019-08-24 NOTE — PROGRESS NOTES
Inpatient Anticoagulation Service Note    Date: 8/23/2019  Reason for Anticoagulation: Arterial thrombosis  Target INR: 2.0 to 3.0  Hemoglobin Value: 14.3  Hematocrit Value: (!) 41    INR from last 7 days     Date/Time INR Value    08/23/19 1520  (!) 2.62        Dose from last 7 days     Date/Time Dose (mg)    08/23/19 1957  7.5        Average Dose (mg): (7.5 mg daily)  Significant Interactions: Aspirin  Bridge Therapy: No     Comments: Patient presents with complaints of leg pain and chest tightness. US of LLE showing small occulusion in the posterior tibial artery at the ankle. Left knee synovial fluid cultures sent (in process). Patient has a recent hx of arterial clot with thrombectomy (5/21/19).  Chronic therapy with INR currently at goal. On concomitant Aspirin therapy. H/H stable. Minimal concerns for bleeding at this time. Next INR on 8/25/19.    Education Material Provided?: No   Pharmacist suggested discharge dosing: warfarin 7.5 mg PO daily     Chandler StearnsD  D/w Chandler LunsfordD, BCPS

## 2019-08-25 VITALS
HEIGHT: 68 IN | HEART RATE: 70 BPM | DIASTOLIC BLOOD PRESSURE: 85 MMHG | WEIGHT: 187.83 LBS | TEMPERATURE: 97.6 F | SYSTOLIC BLOOD PRESSURE: 138 MMHG | RESPIRATION RATE: 18 BRPM | OXYGEN SATURATION: 93 % | BODY MASS INDEX: 28.47 KG/M2

## 2019-08-25 PROBLEM — R07.89 CHEST DISCOMFORT: Status: RESOLVED | Noted: 2019-08-24 | Resolved: 2019-08-25

## 2019-08-25 PROCEDURE — 99239 HOSP IP/OBS DSCHRG MGMT >30: CPT | Mod: GC | Performed by: INTERNAL MEDICINE

## 2019-08-25 RX ORDER — ACETAMINOPHEN 500 MG
1000 TABLET ORAL
Qty: 180 TAB | Refills: 0 | Status: SHIPPED | OUTPATIENT
Start: 2019-08-25 | End: 2019-09-24

## 2019-08-25 RX ORDER — COLCHICINE 0.6 MG/1
0.6 TABLET ORAL 2 TIMES DAILY
Qty: 14 TAB | Refills: 0 | Status: SHIPPED | OUTPATIENT
Start: 2019-08-25 | End: 2019-09-01

## 2019-08-25 RX ORDER — KETOROLAC TROMETHAMINE 30 MG/ML
30 INJECTION, SOLUTION INTRAMUSCULAR; INTRAVENOUS ONCE
Status: DISCONTINUED | OUTPATIENT
Start: 2019-08-25 | End: 2019-08-25 | Stop reason: HOSPADM

## 2019-08-25 RX ORDER — OXYCODONE HYDROCHLORIDE 5 MG/1
5 TABLET ORAL EVERY 6 HOURS PRN
Qty: 10 TAB | Refills: 0 | Status: SHIPPED | OUTPATIENT
Start: 2019-08-25 | End: 2019-08-25

## 2019-08-25 RX ORDER — OXYCODONE HYDROCHLORIDE 5 MG/1
5 TABLET ORAL EVERY 6 HOURS PRN
Qty: 10 TAB | Refills: 0 | Status: SHIPPED | OUTPATIENT
Start: 2019-08-25 | End: 2019-08-31

## 2019-08-25 RX ORDER — PREDNISONE 20 MG/1
20 TABLET ORAL DAILY
Qty: 7 TAB | Refills: 0 | Status: SHIPPED | OUTPATIENT
Start: 2019-08-25 | End: 2019-09-01

## 2019-08-25 NOTE — CARE PLAN
Problem: Communication  Goal: The ability to communicate needs accurately and effectively will improve  Outcome: PROGRESSING AS EXPECTED     Problem: Knowledge Deficit  Goal: Knowledge of disease process/condition, treatment plan, diagnostic tests, and medications will improve  Outcome: PROGRESSING AS EXPECTED  Goal: Knowledge of the prescribed therapeutic regimen will improve  Outcome: PROGRESSING AS EXPECTED     Problem: Discharge Barriers/Planning  Goal: Patient's continuum of care needs will be met  Outcome: PROGRESSING AS EXPECTED  Note:   Pt. Agitated related to communication of discharge. Pt updated to POC and verbalized understanding.

## 2019-08-25 NOTE — PROGRESS NOTES
[facility] [place] INTERNAL MEDICINE ATTENDING NOTE:   Jose Marquez MD      Visit Time:   Attending/resident bedside rounds 9-11:30 AM     PATIENT ID  Name:             Lucas Agee   YOB: 1970  Age:                 48 y.o.  male   MRN:               [id number]  Admit:  8/23/2019     I saw and examined the patient and discussed the management with the resident staff.  I reviewed the resident's note and agree with the resident's findings and plan as documented in the resident's note except as documented in the attending note. Please reference resident daily note for complete information.    The chart was reviewed and summarized.  Available labs, imaging, O2 sats ,  EKGs were reviewed. Available nursing, consultant, and resident notes were reviewed. I am actively involved in the patient's care.                                                                            ______________________________________________________________________            48(   )  INTERVAL:  Chart reviewed/summarized,       PM: no progressive SIRS  ID: Erwin -- possible atypical GC, PCR pending, pain improved - f/u GC PCR, synovial cultures, careful observation if PCR neg, if POS C3 1 gram x 7 days IV, if negative, careful outpatient reassessment if any new or worsening symptoms   Plan: if PCR/ culture negative, cautious treatment for gout, ketorilac while inpatinet, tylenol/oxycodone  +# 10 short term, colchicine 0.,6 BID  and low dose prednisone  20mg x 1 week, f/u PCP      PM: pain 4/10, ketoralac , pred, cholchicine trial,  hx GOUT in foot, cultures negative, no clinical entry point or bacteremia , BC neg, UC neg     Inflammatory arthritis, by hx has gout in foot, no other systemic arthritis, no risk factors for staph apparent, is sexually active, possible some urethritis s/s (do we have UA, C&S) / , GC ?   ID and ortho input  Would try ketorolac x 1 in spite of HF (stable) ,  colchicine, low dose PRED 20mg  Have lab  relook at fluid, spin it down if not done, check again for crytals  Await C&S, if not clinically septic can hold antibx and see if responds to gout, pseudo gout RX     Get Uric acid, not really helpful for DX however         AUG 24AM:  AF, HR 80, , 93% RA  WBC 12 (15), HB 14.7,  , Na 137, K 4.3, CO2 26, , CR 0.95 , LT wnl , INR 2.62, BC/ cultures pending  NURSING: mod knee pain, ontime meds, no NSAIDS due to HF  Plan: antiCCP, ortho/ID inputs      AUG 23PM: AF, H R80, BP 130s, 98% RA,      Impression:  * inflammatory monoarthritis, septic knee not exlcuded (cesario GC w/o obvious bacteremia or focal entry site) , hx gout in foot , but  no crystals   -- cultures neg so far, NOS on gram stain, empiric RX for gout with Ketorolac short term, colchicine 0.6, PRED 20mg,  ID and Ortho consulted for guidance on empiric antibx and need for KIMMY drain - though currently not looking like STAPH      * chronic HFrEF, recovering from EF 15% May to 45% now, stable on appropriate meds   ,transient chest pain with knee pain , no EKG, trop changes    * NSAIDS: watch for renal problems/bleeding  with gout meds   * hx arterial embolism, DVT (presumed cardioembolic arterial clot ?) ,  warfarin, ASA - scans nothing acute          MEDS: CV (ASA, Lipitor, coreg, LASIX< ACE, warfarin, spironolactone)    OPM: coumadin, ASA, liptor, coreg, Ace, aldactone,, lasix      CORE:  Code Status (    --------------------------------------------------------------------------------------------------  Hospital Summary/ Patient System Review      NP:   *admit(  ACUTE:  AO4, utox: amphetamines, oxycodone, THC      EENT:   *admit(  MP, airway     MSK/PAIN:   *admit(  hx gout left foot, no meds   ACUTE: sudden knee pain, some benefit from NSAIDS OTC,  cant weight bear,  effusion, no redness ,  no overlying wound, no trauma hx ? ESR 29 , CRP 3, EKG: SR, QTc 450  ,no back pain, no rash, no uveitis , sexually active with GF   tap  8/23: : clear,   no crystal seen, NOS, WBC 57770,  89P, glucose 105, protein 4.8, RBC < 2000 , no other synovitis , no rash   Impression:  inflammatory arthritis, no obvious systemic arthritis, hx suggests gout, but neg crystals, septic arthritis not exlcuded (cesario GC)    Plan:  GET CULTURES of fluid,  BC. VANCO, C3   consideration (HELD improving w/o antibx) ,  throat/urethral cultures for GC, rtrauma hx? , onetime Tylenol, oxycodone OK, NSAIDS prob not good idea here (HF) , knee XRAY (neg for fx chondrocalcinosis) ,      CVS:   *admit(  chronic HTN,  HFrEF/15%   , hx arterial clot/thrombectomy May 2019, warfarin 7.5 /ASA  ACUTE chest pain, no SOB, : INR 2.62 , trop 7, , HR 95,   ECHO Aug 2019:  EF 45% (from 15) , LVH, mild reduced fx, hypokinesis anteroseptal, anterior wall/mid and basal segments , normal RV, RA, IVC, normal LA, normal valves, pericardium/root wnl 2.9cm   LLE US: DVT LLE /post tibial artery at ankle looks old  , no popliteal or above clot  arterial flow intact +ASVD)   Impression: presumed recovering HFrEF/IHD/MI ?  Meth ?  (did not get cath or stress test after dx however)   -- acute  angina?  (neg EKG, trop, resolved) , hx cardioembolic clot to leg (s/p thrombectomy remote) /imaging negative for acute clots (arterial) , prob remote ankle /tibial DVT, is on appropriate anticoagulation  Plan: continue HF meds      may 2019: LVD, EF 15%, global hypokinesis, RVE, NANDO, normal LA ? , dilated IVC, mod to sever TR, RVSP 70, AntiCL neg, B2 microglobin 2.6 ?      PULM:   *admit(  smoker, pCXR: atelectasis  Plan: IS     GI:   *admit(  LT wnl,  MBI 28     May 2019: US: normal liver and GB/biliary tract     :   *admit(  dysuria  Plan: PCR      RENAL:   *admit(  Na 135, K 4, CO2 24, , BUN 15 CR 0.93, CA 9.3/3.9      HEME:   *admit(  WBC 15, HB 14,   Impression: FE def ? (FERRITIN < 100)    Plan: TIBC, Stool HEME     May Ferritin 68, Follate 19, B12 832      ENDO:   *admit(  TSH 1.680/May      DERM/BREAST:    *admit(       ID:   *admit(  +SIRS, joint effusion, WBC> 40704, NOS, no crystals, ESR 29, CRP 2.98 , hx gout in foot apparently   Impression: if no crystals (even if there was, would have to assume septic arthritis until cultures) , BC neg, no source of local or hematogenous staph, sexually active with some dysuria, GC ordered   Plan: look for source of bacteremia , observing off antibx for now,  trial of gout meds (no cystals however) , ID input regarding antibx and need of KIMMY drain     HIV/Hep ABC negative (2019)

## 2019-08-25 NOTE — PROGRESS NOTES
Pt dc'd at 0930. IV and monitor removed; monitor room notified. Pt left unit via ambulating  with self and cane, refused wheelchair or escort.  Pt given bus pass per request for transportation.  Personal belongings with pt when leaving unit. Pt given discharge instructions prior to leaving unit including prescription and when to visit with physician; verbalizes understanding. Copy of discharge instructions with pt and in the chart.

## 2019-08-25 NOTE — DISCHARGE INSTRUCTIONS
Discharge Instructions    Discharged to home by car with friend. Discharged via wheelchair, hospital escort: Yes.  Special equipment needed: Not Applicable    Be sure to schedule a follow-up appointment with your primary care doctor or any specialists as instructed.     Discharge Plan:   Diet Plan: Discussed  Activity Level: Discussed  Smoking Cessation Offered: Patient Refused  Confirmed Follow up Appointment: Patient to Call and Schedule Appointment  Confirmed Symptoms Management: Discussed  Medication Reconciliation Updated: Yes  Influenza Vaccine Indication: Patient Refuses    I understand that a diet low in cholesterol, fat, and sodium is recommended for good health. Unless I have been given specific instructions below for another diet, I accept this instruction as my diet prescription.   Other diet:   Heart-Healthy Eating Plan  Introduction  Heart-healthy meal planning includes:  · Limiting unhealthy fats.  · Increasing healthy fats.  · Making other small dietary changes.  You may need to talk with your doctor or a diet specialist (dietitian) to create an eating plan that is right for you.  What types of fat should I choose?  · Choose healthy fats. These include olive oil and canola oil, flaxseeds, walnuts, almonds, and seeds.  · Eat more omega-3 fats. These include salmon, mackerel, sardines, tuna, flaxseed oil, and ground flaxseeds. Try to eat fish at least twice each week.  · Limit saturated fats.  ¨ Saturated fats are often found in animal products, such as meats, butter, and cream.  ¨ Plant sources of saturated fats include palm oil, palm kernel oil, and coconut oil.  · Avoid foods with partially hydrogenated oils in them. These include stick margarine, some tub margarines, cookies, crackers, and other baked goods. These contain trans fats.  What general guidelines do I need to follow?  · Check food labels carefully. Identify foods with trans fats or high amounts of saturated fat.  · Fill one half of your  "plate with vegetables and green salads. Eat 4-5 servings of vegetables per day. A serving of vegetables is:  ¨ 1 cup of raw leafy vegetables.  ¨ ½ cup of raw or cooked cut-up vegetables.  ¨ ½ cup of vegetable juice.  · Fill one fourth of your plate with whole grains. Look for the word \"whole\" as the first word in the ingredient list.  · Fill one fourth of your plate with lean protein foods.  · Eat 4-5 servings of fruit per day. A serving of fruit is:  ¨ One medium whole fruit.  ¨ ¼ cup of dried fruit.  ¨ ½ cup of fresh, frozen, or canned fruit.  ¨ ½ cup of 100% fruit juice.  · Eat more foods that contain soluble fiber. These include apples, broccoli, carrots, beans, peas, and barley. Try to get 20-30 g of fiber per day.  · Eat more home-cooked food. Eat less restaurant, buffet, and fast food.  · Limit or avoid alcohol.  · Limit foods high in starch and sugar.  · Avoid fried foods.  · Avoid frying your food. Try baking, boiling, grilling, or broiling it instead. You can also reduce fat by:  ¨ Removing the skin from poultry.  ¨ Removing all visible fats from meats.  ¨ Skimming the fat off of stews, soups, and gravies before serving them.  ¨ Steaming vegetables in water or broth.  · Lose weight if you are overweight.  · Eat 4-5 servings of nuts, legumes, and seeds per week:  ¨ One serving of dried beans or legumes equals ½ cup after being cooked.  ¨ One serving of nuts equals 1½ ounces.  ¨ One serving of seeds equals ½ ounce or one tablespoon.  · You may need to keep track of how much salt or sodium you eat. This is especially true if you have high blood pressure. Talk with your doctor or dietitian to get more information.  What foods can I eat?  Grains   Breads, including Citizen of [place], white, ge, wheat, raisin, rye, oatmeal, and Italian. Tortillas that are neither fried nor made with lard or trans fat. Low-fat rolls, including hotdog and hamburger buns and English muffins. Biscuits. Muffins. Waffles. Pancakes. Light " popcorn. Whole-grain cereals. Flatbread. Huma toast. Pretzels. Breadsticks. Rusks. Low-fat snacks. Low-fat crackers, including oyster, saltine, matzo, bob, animal, and rye. Rice and pasta, including brown rice and pastas that are made with whole wheat.  Vegetables   All vegetables.  Fruits   All fruits, but limit coconut.  Meats and Other Protein Sources   Lean, well-trimmed beef, veal, pork, and lamb. Chicken and turkey without skin. All fish and shellfish. Wild duck, rabbit, pheasant, and venison. Egg whites or low-cholesterol egg substitutes. Dried beans, peas, lentils, and tofu. Seeds and most nuts.  Dairy   Low-fat or nonfat cheeses, including ricotta, string, and mozzarella. Skim or 1% milk that is liquid, powdered, or evaporated. Buttermilk that is made with low-fat milk. Nonfat or low-fat yogurt.  Beverages   Mineral water. Diet carbonated beverages.  Sweets and Desserts   Sherbets and fruit ices. Honey, jam, marmalade, jelly, and syrups. Meringues and gelatins. Pure sugar candy, such as hard candy, jelly beans, gumdrops, mints, marshmallows, and small amounts of dark chocolate. Torey food cake.  Eat all sweets and desserts in moderation.  Fats and Oils   Nonhydrogenated (trans-free) margarines. Vegetable oils, including soybean, sesame, sunflower, olive, peanut, safflower, corn, canola, and cottonseed. Salad dressings or mayonnaise made with a vegetable oil. Limit added fats and oils that you use for cooking, baking, salads, and as spreads.  Other   Cocoa powder. Coffee and tea. All seasonings and condiments.  The items listed above may not be a complete list of recommended foods or beverages. Contact your dietitian for more options.   What foods are not recommended?  Grains   Breads that are made with saturated or trans fats, oils, or whole milk. Croissants. Butter rolls. Cheese breads. Sweet rolls. Donuts. Buttered popcorn. Chow mein noodles. High-fat crackers, such as cheese or butter  crackers.  Meats and Other Protein Sources   Fatty meats, such as hotdogs, short ribs, sausage, spareribs, cedeno, rib eye roast or steak, and mutton. High-fat deli meats, such as salami and bologna. Caviar. Domestic duck and goose. Organ meats, such as kidney, liver, sweetbreads, and heart.  Dairy   Cream, sour cream, cream cheese, and creamed cottage cheese. Whole-milk cheeses, including blue (luzma), [place] Epifanio, Brie, Art, American, Havarti, Swiss, cheddar, Camembert, and Caribou. Whole or 2% milk that is liquid, evaporated, or condensed. Whole buttermilk. Cream sauce or high-fat cheese sauce. Yogurt that is made from whole milk.  Beverages   Regular sodas and juice drinks with added sugar.  Sweets and Desserts   Frosting. Pudding. Cookies. Cakes other than alexandra food cake. Candy that has milk chocolate or white chocolate, hydrogenated fat, butter, coconut, or unknown ingredients. Buttered syrups. Full-fat ice cream or ice cream drinks.  Fats and Oils   Gravy that has suet, meat fat, or shortening. Cocoa butter, hydrogenated oils, palm oil, coconut oil, palm kernel oil. These can often be found in baked products, candy, fried foods, nondairy creamers, and whipped toppings. Solid fats and shortenings, including cedeno fat, salt pork, lard, and butter. Nondairy cream substitutes, such as coffee creamers and sour cream substitutes. Salad dressings that are made of unknown oils, cheese, or sour cream.  The items listed above may not be a complete list of foods and beverages to avoid. Contact your dietitian for more information.   This information is not intended to replace advice given to you by your health care provider. Make sure you discuss any questions you have with your health care provider.  Document Released: 06/18/2013 Document Revised: 05/25/2017 Document Reviewed: 06/11/2015  © 2017 [employer]      Special Instructions: None    · Is patient discharged on Warfarin / Coumadin?   Yes    You are receiving the  "drug warfarin. Please understand the importance of monitoring warfarin with scheduled PT/INR blood draws.  Follow-up with a call to your personal Doctor's office in 3 days to schedule a PT/INR. .    IMPORTANT: HOW TO USE THIS INFORMATION:  This is a summary and does NOT have all possible information about this product. This information does not assure that this product is safe, effective, or appropriate for you. This information is not individual medical advice and does not substitute for the advice of your health care professional. Always ask your health care professional for complete information about this product and your specific health needs.      WARFARIN - ORAL (WARF-uh-rin)      COMMON BRAND NAME(S): Coumadin      WARNING:  Warfarin can cause very serious (possibly fatal) bleeding. This is more likely to occur when you first start taking this medication or if you take too much warfarin. To decrease your risk for bleeding, your doctor or other health care provider will monitor you closely and check your lab results (INR test) to make sure you are not taking too much warfarin. Keep all medical and laboratory appointments. Tell your doctor right away if you notice any signs of serious bleeding. See also Side Effects section.      USES:  This medication is used to treat blood clots (such as in deep vein thrombosis-DVT or pulmonary embolus-PE) and/or to prevent new clots from forming in your body. Preventing harmful blood clots helps to reduce the risk of a stroke or heart attack. Conditions that increase your risk of developing blood clots include a certain type of irregular heart rhythm (atrial fibrillation), heart valve replacement, recent heart attack, and certain surgeries (such as hip/knee replacement). Warfarin is commonly called a \"blood thinner,\" but the more correct term is \"anticoagulant.\" It helps to keep blood flowing smoothly in your body by decreasing the amount of certain substances (clotting " proteins) in your blood.      HOW TO USE:  Read the Medication Guide provided by your pharmacist before you start taking warfarin and each time you get a refill. If you have any questions, ask your doctor or pharmacist. Take this medication by mouth with or without food as directed by your doctor or other health care professional, usually once a day. It is very important to take it exactly as directed. Do not increase the dose, take it more frequently, or stop using it unless directed by your doctor. Dosage is based on your medical condition, laboratory tests (such as INR), and response to treatment. Your doctor or other health care provider will monitor you closely while you are taking this medication to determine the right dose for you. Use this medication regularly to get the most benefit from it. To help you remember, take it at the same time each day. It is important to eat a balanced, consistent diet while taking warfarin. Some foods can affect how warfarin works in your body and may affect your treatment and dose. Avoid sudden large increases or decreases in your intake of foods high in vitamin K (such as broccoli, cauliflower, cabbage, brussels sprouts, kale, spinach, and other green leafy vegetables, liver, green tea, certain vitamin supplements). If you are trying to lose weight, check with your doctor before you try to go on a diet. Cranberry products may also affect how your warfarin works. Limit the amount of cranberry juice (16 ounces/480 milliliters a day) or other cranberry products you may drink or eat.      SIDE EFFECTS:  Nausea, loss of appetite, or stomach/abdominal pain may occur. If any of these effects persist or worsen, tell your doctor or pharmacist promptly. Remember that your doctor has prescribed this medication because he or she has judged that the benefit to you is greater than the risk of side effects. Many people using this medication do not have serious side effects. This medication  can cause serious bleeding if it affects your blood clotting proteins too much (shown by unusually high INR lab results). Even if your doctor stops your medication, this risk of bleeding can continue for up to a week. Tell your doctor right away if you have any signs of serious bleeding, including: unusual pain/swelling/discomfort, unusual/easy bruising, prolonged bleeding from cuts or gums, persistent/frequent nosebleeds, unusually heavy/prolonged menstrual flow, pink/dark urine, coughing up blood, vomit that is bloody or looks like coffee grounds, severe headache, dizziness/fainting, unusual or persistent tiredness/weakness, bloody/black/tarry stools, chest pain, shortness of breath, difficulty swallowing. Tell your doctor right away if any of these unlikely but serious side effects occur: persistent nausea/vomiting, severe stomach/abdominal pain, yellowing eyes/skin. This drug rarely has caused very serious (possibly fatal) problems if its effects lead to small blood clots (usually at the beginning of treatment). This can lead to severe skin/tissue damage that may require surgery or amputation if left untreated. Patients with certain blood conditions (protein C or S deficiency) may be at greater risk. Get medical help right away if any of these rare but serious side effects occur: painful/red/purplish patches on the skin (such as on the toe, breast, abdomen), change in the amount of urine, vision changes, confusion, slurred speech, weakness on one side of the body. A very serious allergic reaction to this drug is rare. However, get medical help right away if you notice any symptoms of a serious allergic reaction, including: rash, itching/swelling (especially of the face/tongue/throat), severe dizziness, trouble breathing. This is not a complete list of possible side effects. If you notice other effects not listed above, contact your doctor or pharmacist. In the [place] - Call your doctor for medical advice about side  effects. You may report side effects to FDA at 8-755-JHF-4088. In [place] - Call your doctor for medical advice about side effects. You may report side effects to [facility] [place] at [phone].      PRECAUTIONS:  Before taking warfarin, tell your doctor or pharmacist if you are allergic to it; or if you have any other allergies. This product may contain inactive ingredients, which can cause allergic reactions or other problems. Talk to your pharmacist for more details. Before using this medication, tell your doctor or pharmacist your medical history, especially of: blood disorders (such as anemia, hemophilia), bleeding problems (such as bleeding of the stomach/intestines, bleeding in the brain), blood vessel disorders (such as aneurysms), recent major injury/surgery, liver disease, alcohol use, mental/mood disorders (including memory problems), frequent falls/injuries. It is important that all your doctors and dentists know that you take warfarin. Before having surgery or any medical/dental procedures, tell your doctor or dentist that you are taking this medication and about all the products you use (including prescription drugs, nonprescription drugs, and herbal products). Avoid getting injections into the muscles. If you must have an injection into a muscle (for example, a flu shot), it should be given in the arm. This way, it will be easier to check for bleeding and/or apply pressure bandages. This medication may cause stomach bleeding. Daily use of alcohol while using this medicine will increase your risk for stomach bleeding and may also affect how this medication works. Limit or avoid alcoholic beverages. If you have not been eating well, if you have an illness or infection that causes fever, vomiting, or diarrhea for more than 2 days, or if you start using any antibiotic medications, contact your doctor or pharmacist immediately because these conditions can affect how warfarin works. This medication can  cause heavy bleeding. To lower the chance of getting cut, bruised, or injured, use great caution with sharp objects like safety razors and nail cutters. Use an electric razor when shaving and a soft toothbrush when brushing your teeth. Avoid activities such as contact sports. If you fall or injure yourself, especially if you hit your head, call your doctor immediately. Your doctor may need to check you. The Food & Drug Administration has stated that generic warfarin products are interchangeable. However, consult your doctor or pharmacist before switching warfarin products. Be careful not to take more than one medication that contains warfarin unless specifically directed by the doctor or health care provider who is monitoring your warfarin treatment. Older adults may be at greater risk for bleeding while using this drug. This medication is not recommended for use during pregnancy because of serious (possibly fatal) harm to an unborn baby. Discuss the use of reliable forms of birth control with your doctor. If you become pregnant or think you may be pregnant, tell your doctor immediately. If you are planning pregnancy, discuss a plan for managing your condition with your doctor before you become pregnant. Your doctor may switch the type of medication you use during pregnancy. Very small amounts of this medication may pass into breast milk but is unlikely to harm a nursing infant. Consult your doctor before breast-feeding.      DRUG INTERACTIONS:  Drug interactions may change how your medications work or increase your risk for serious side effects. This document does not contain all possible drug interactions. Keep a list of all the products you use (including prescription/nonprescription drugs and herbal products) and share it with your doctor and pharmacist. Do not start, stop, or change the dosage of any medicines without your doctor's approval. Warfarin interacts with many prescription, nonprescription, vitamin,  "and herbal products. This includes medications that are applied to the skin or inside the vagina or rectum. The interactions with warfarin usually result in an increase or decrease in the \"blood-thinning\" (anticoagulant) effect. Your doctor or other health care professional should closely monitor you to prevent serious bleeding or clotting problems. While taking warfarin, it is very important to tell your doctor or pharmacist of any changes in medications, vitamins, or herbal products that you are taking. Some products that may interact with this drug include: capecitabine, imatinib, mifepristone. Aspirin, aspirin-like drugs (salicylates), and nonsteroidal anti-inflammatory drugs (NSAIDs such as ibuprofen, naproxen, celecoxib) may have effects similar to warfarin. These drugs may increase the risk of bleeding problems if taken during treatment with warfarin. Carefully check all prescription/nonprescription product labels (including drugs applied to the skin such as pain-relieving creams) since the products may contain NSAIDs or salicylates. Talk to your doctor about using a different medication (such as acetaminophen) to treat pain/fever. Low-dose aspirin and related drugs (such as clopidogrel, ticlopidine) should be continued if prescribed by your doctor for specific medical reasons such as heart attack or stroke prevention. Consult your doctor or pharmacist for more details. Many herbal products interact with warfarin. Tell your doctor before taking any herbal products, especially bromelains, coenzyme Q10, cranberry, danshen, dong quai, fenugreek, garlic, ginkgo biloba, ginseng, and Any's wort, among others. This medication may interfere with a certain laboratory test to measure theophylline levels, possibly causing false test results. Make sure laboratory personnel and all your doctors know you use this drug.      OVERDOSE:  If overdose is suspected, contact a poison control center or emergency room " immediately. [place] residents can call the [place] National Poison Hotline at 1-853.553.8313. [place] residents can call a provincial poison control center. Symptoms of overdose may include: bloody/black/tarry stools, pink/dark urine, unusual/prolonged bleeding.      NOTES:  Do not share this medication with others. Laboratory and/or medical tests (such as INR, complete blood count) must be performed periodically to monitor your progress or check for side effects. Consult your doctor for more details.      MISSED DOSE:  For the best possible benefit, do not miss any doses. If you do miss a dose and remember on the same day, take it as soon as you remember. If you remember on the next day, skip the missed dose and resume your usual dosing schedule. Do not double the dose to catch up because this could increase your risk for bleeding. Keep a record of missed doses to give to your doctor or pharmacist. Contact your doctor or pharmacist if you miss 2 or more doses in a row.      STORAGE:  Store at room temperature away from light and moisture. Do not store in the bathroom. Keep all medications away from children and pets. Do not flush medications down the toilet or pour them into a drain unless instructed to do so. Properly discard this product when it is  or no longer needed. Consult your pharmacist or local waste disposal company for more details about how to safely discard your product.      MEDICAL ALERT:  Your condition and medication can cause complications in a medical emergency. For information about enrolling in MedicAlert, call 1-408.280.8583 ([place]) or [phone] ([place]).      Information last revised 2010 Copyright(c)  [employer].       Colchicine tablets or capsules  What is this medicine?  COLCHICINE (KOL chi seen) is for joint pain and swelling due to attacks of acute gouty arthritis. The medicine is also used to treat familial Mediterranean fever.  This medicine may be used for other  purposes; ask your health care provider or pharmacist if you have questions.  COMMON BRAND NAME(S): Colcrys, MITIGARE  What should I tell my health care provider before I take this medicine?  They need to know if you have any of these conditions:  -anemia  -blood disorders like leukemia or lymphoma  -heart disease  -immune system problems  -intestinal disease  -kidney disease  -liver disease  -muscle pain or weakness  -take other medicines  -stomach problems  -an unusual or allergic reaction to colchicine, other medicines, lactose, foods, dyes, or preservatives  -pregnant or trying to get pregnant  -breast-feeding  How should I use this medicine?  Take this medicine by mouth with a full glass of water. Follow the directions on the prescription label. You can take it with or without food. If it upsets your stomach, take it with food. Take your medicine at regular intervals. Do not take your medicine more often than directed.  A special MedGuide will be given to you by the pharmacist with each prescription and refill. Be sure to read this information carefully each time.  Talk to your pediatrician regarding the use of this medicine in children. While this drug may be prescribed for children as young as 4 years old for selected conditions, precautions do apply.  Patients over 65 years old may have a stronger reaction and need a smaller dose.  Overdosage: If you think you have taken too much of this medicine contact a poison control center or emergency room at once.  NOTE: This medicine is only for you. Do not share this medicine with others.  What if I miss a dose?  If you miss a dose, take it as soon as you can. If it is almost time for your next dose, take only that dose. Do not take double or extra doses.  What may interact with this medicine?  Do not take this medicine with any of the following medications:  -certain medicines for fungal infections like itraconazole  This medicine may also interact with the  following medications:  -alcohol  -certain medicines for cholesterol like atorvastatin  -certain medicines for coughs and colds  -certain medicines to help you breathe better  -cyclosporine  -digoxin  -epinephrine  -grapefruit or grapefruit juice  -methenamine  -other medicines for fungal infection  -sodium bicarbonate  -some antibiotics like clarithromycin, erythromycin, and telithromycin  -some medicines for an irregular heartbeat or other heart problems  -some medicines for cancer, like lapatinib and tamoxifen  -some medicines for HIV  This list may not describe all possible interactions. Give your health care provider a list of all the medicines, herbs, non-prescription drugs, or dietary supplements you use. Also tell them if you smoke, drink alcohol, or use illegal drugs. Some items may interact with your medicine.  What should I watch for while using this medicine?  Visit your doctor or health care professional for regular checks on your progress. You may need periodic blood checks.  Alcohol can increase the chance of getting stomach problems and gout attacks. Do not drink alcohol.  What side effects may I notice from receiving this medicine?  Side effects that you should report to your doctor or health care professional as soon as possible:  -allergic reactions like skin rash, itching or hives, swelling of the face, lips, or tongue  -fever, chills, or sore throat  -muscle tenderness, pain, or weakness  -numbness or tingling in hands or feet  -unusual bleeding or bruising  -unusually weak or tired  -vomiting  Side effects that usually do not require medical attention (report to your doctor or health care professional if they continue or are bothersome):  -diarrhea  -hair loss  -loss of appetite  -stomach pain or nausea  This list may not describe all possible side effects. Call your doctor for medical advice about side effects. You may report side effects to FDA at 8-854-FDA-0825.  Where should I keep my  medicine?  Keep out of the reach of children.  Store at room temperature between 15 and 30 degrees C (59 and 86 degrees F). Keep container tightly closed. Protect from light. Throw away any unused medicine after the expiration date.  NOTE: This sheet is a summary. It may not cover all possible information. If you have questions about this medicine, talk to your doctor, pharmacist, or health care provider.  © 2018 [employer] (2014-06-16 16:48:38)    Prednisone tablets  What is this medicine?  PREDNISONE (PRED ni sone) is a corticosteroid. It is commonly used to treat inflammation of the skin, joints, lungs, and other organs. Common conditions treated include asthma, allergies, and arthritis. It is also used for other conditions, such as blood disorders and diseases of the adrenal glands.  This medicine may be used for other purposes; ask your health care provider or pharmacist if you have questions.  COMMON BRAND NAME(S): Deltasone, Predone, Sterapred, Sterapred DS  What should I tell my health care provider before I take this medicine?  They need to know if you have any of these conditions:  -Cushing's syndrome  -diabetes  -glaucoma  -heart disease  -high blood pressure  -infection (especially a virus infection such as chickenpox, cold sores, or herpes)  -kidney disease  -liver disease  -mental illness  -myasthenia gravis  -osteoporosis  -seizures  -stomach or intestine problems  -thyroid disease  -an unusual or allergic reaction to lactose, prednisone, other medicines, foods, dyes, or preservatives  -pregnant or trying to get pregnant  -breast-feeding  How should I use this medicine?  Take this medicine by mouth with a glass of water. Follow the directions on the prescription label. Take this medicine with food. If you are taking this medicine once a day, take it in the morning. Do not take more medicine than you are told to take. Do not suddenly stop taking your medicine because you may develop a  severe reaction. Your doctor will tell you how much medicine to take. If your doctor wants you to stop the medicine, the dose may be slowly lowered over time to avoid any side effects.  Talk to your pediatrician regarding the use of this medicine in children. Special care may be needed.  Overdosage: If you think you have taken too much of this medicine contact a poison control center or emergency room at once.  NOTE: This medicine is only for you. Do not share this medicine with others.  What if I miss a dose?  If you miss a dose, take it as soon as you can. If it is almost time for your next dose, talk to your doctor or health care professional. You may need to miss a dose or take an extra dose. Do not take double or extra doses without advice.  What may interact with this medicine?  Do not take this medicine with any of the following medications:  -metyrapone  -mifepristone  This medicine may also interact with the following medications:  -aminoglutethimide  -amphotericin B  -aspirin and aspirin-like medicines  -barbiturates  -certain medicines for diabetes, like glipizide or glyburide  -cholestyramine  -cholinesterase inhibitors  -cyclosporine  -digoxin  -diuretics  -ephedrine  -female hormones, like estrogens and birth control pills  -isoniazid  -ketoconazole  -NSAIDS, medicines for pain and inflammation, like ibuprofen or naproxen  -phenytoin  -rifampin  -toxoids  -vaccines  -warfarin  This list may not describe all possible interactions. Give your health care provider a list of all the medicines, herbs, non-prescription drugs, or dietary supplements you use. Also tell them if you smoke, drink alcohol, or use illegal drugs. Some items may interact with your medicine.  What should I watch for while using this medicine?  Visit your doctor or health care professional for regular checks on your progress. If you are taking this medicine over a prolonged period, carry an identification card with your name and  address, the type and dose of your medicine, and your doctor's name and address.  This medicine may increase your risk of getting an infection. Tell your doctor or health care professional if you are around anyone with measles or chickenpox, or if you develop sores or blisters that do not heal properly.  If you are going to have surgery, tell your doctor or health care professional that you have taken this medicine within the last twelve months.  Ask your doctor or health care professional about your diet. You may need to lower the amount of salt you eat.  This medicine may affect blood sugar levels. If you have diabetes, check with your doctor or health care professional before you change your diet or the dose of your diabetic medicine.  What side effects may I notice from receiving this medicine?  Side effects that you should report to your doctor or health care professional as soon as possible:  -allergic reactions like skin rash, itching or hives, swelling of the face, lips, or tongue  -changes in emotions or moods  -changes in vision  -depressed mood  -eye pain  -fever or chills, cough, sore throat, pain or difficulty passing urine  -increased thirst  -swelling of ankles, feet  Side effects that usually do not require medical attention (report to your doctor or health care professional if they continue or are bothersome):  -confusion, excitement, restlessness  -headache  -nausea, vomiting  -skin problems, acne, thin and shiny skin  -trouble sleeping  -weight gain  This list may not describe all possible side effects. Call your doctor for medical advice about side effects. You may report side effects to FDA at 5-855-FDA-4633.  Where should I keep my medicine?  Keep out of the reach of children.  Store at room temperature between 15 and 30 degrees C (59 and 86 degrees F). Protect from light. Keep container tightly closed. Throw away any unused medicine after the expiration date.  NOTE: This sheet is a summary. It  may not cover all possible information. If you have questions about this medicine, talk to your doctor, pharmacist, or health care provider.  © 2018 [employer] Standard (2012-08-02 10:57:14)    Oxycodone tablets or capsules  What is this medicine?  OXYCODONE (ox i KORENY done) is a pain reliever. It is used to treat moderate to severe pain.  This medicine may be used for other purposes; ask your health care provider or pharmacist if you have questions.  COMMON BRAND NAME(S): Dazidox, Endocodone, Oxaydo, OXECTA, OxyIR, Percolone, Roxicodone, ROXYBOND  What should I tell my health care provider before I take this medicine?  They need to know if you have any of these conditions:  -Metcalf's disease  -brain tumor  -head injury  -heart disease  -history of drug or alcohol abuse problem  -if you often drink alcohol  -kidney disease  -liver disease  -lung or breathing disease, like asthma  -mental illness  -pancreatic disease  -seizures  -thyroid disease  -an unusual or allergic reaction to oxycodone, codeine, hydrocodone, morphine, other medicines, foods, dyes, or preservatives  -pregnant or trying to get pregnant  -breast-feeding  How should I use this medicine?  Take this medicine by mouth with a glass of water. Follow the directions on the prescription label. You can take it with or without food. If it upsets your stomach, take it with food. Take your medicine at regular intervals. Do not take it more often than directed. Do not stop taking except on your doctor's advice.  Some brands of this medicine, like Oxecta, have special instructions. Ask your doctor or pharmacist if these directions are for you: Do not cut, crush or chew this medicine. Swallow only one tablet at a time. Do not wet, soak, or lick the tablet before you take it.  A special MedGuide will be given to you by the pharmacist with each prescription and refill. Be sure to read this information carefully each time.  Talk to your pediatrician regarding the  use of this medicine in children. Special care may be needed.  Overdosage: If you think you have taken too much of this medicine contact a poison control center or emergency room at once.  NOTE: This medicine is only for you. Do not share this medicine with others.  What if I miss a dose?  If you miss a dose, take it as soon as you can. If it is almost time for your next dose, take only that dose. Do not take double or extra doses.  What may interact with this medicine?  This medicine may interact with the following medications:  -alcohol  -antihistamines for allergy, cough and cold  -antiviral medicines for HIV or AIDS  -atropine  -certain antibiotics like clarithromycin, erythromycin, linezolid, rifampin  -certain medicines for anxiety or sleep  -certain medicines for bladder problems like oxybutynin, tolterodine  -certain medicines for depression like amitriptyline, fluoxetine, sertraline  -certain medicines for fungal infections like ketoconazole, itraconazole, voriconazole  -certain medicines for migraine headache like almotriptan, eletriptan, frovatriptan, naratriptan, rizatriptan, sumatriptan, zolmitriptan  -certain medicines for nausea or vomiting like dolasetron, ondansetron, palonosetron  -certain medicines for Parkinson's disease like benztropine, trihexyphenidyl  -certain medicines for seizures like phenobarbital, phenytoin, primidone  -certain medicines for stomach problems like dicyclomine, hyoscyamine  -certain medicines for travel sickness like scopolamine  -diuretics  -general anesthetics like halothane, isoflurane, methoxyflurane, propofol  -ipratropium  -local anesthetics like lidocaine, pramoxine, tetracaine  -MAOIs like Carbex, Eldepryl, Marplan, Nardil, and Parnate  -medicines that relax muscles for surgery  -methylene blue  -nilotinib  -other narcotic medicines for pain or cough  -phenothiazines like chlorpromazine, mesoridazine, prochlorperazine, thioridazine  This list may not describe all  possible interactions. Give your health care provider a list of all the medicines, herbs, non-prescription drugs, or dietary supplements you use. Also tell them if you smoke, drink alcohol, or use illegal drugs. Some items may interact with your medicine.  What should I watch for while using this medicine?  Tell your doctor or health care professional if your pain does not go away, if it gets worse, or if you have new or a different type of pain. You may develop tolerance to the medicine. Tolerance means that you will need a higher dose of the medicine for pain relief. Tolerance is normal and is expected if you take this medicine for a long time.  Do not suddenly stop taking your medicine because you may develop a severe reaction. Your body becomes used to the medicine. This does NOT mean you are addicted. Addiction is a behavior related to getting and using a drug for a non-medical reason. If you have pain, you have a medical reason to take pain medicine. Your doctor will tell you how much medicine to take. If your doctor wants you to stop the medicine, the dose will be slowly lowered over time to avoid any side effects.  There are different types of narcotic medicines (opiates). If you take more than one type at the same time or if you are taking another medicine that also causes drowsiness, you may have more side effects. Give your health care provider a list of all medicines you use. Your doctor will tell you how much medicine to take. Do not take more medicine than directed. Call emergency for help if you have problems breathing or unusual sleepiness.  You may get drowsy or dizzy. Do not drive, use machinery, or do anything that needs mental alertness until you know how the medicine affects you. Do not stand or sit up quickly, especially if you are an older patient. This reduces the risk of dizzy or fainting spells. Alcohol may interfere with the effect of this medicine. Avoid alcoholic drinks.  This medicine  will cause constipation. Try to have a bowel movement at least every 2 to 3 days. If you do not have a bowel movement for 3 days, call your doctor or health care professional.  Your mouth may get dry. Chewing sugarless gum or sucking hard candy, and drinking plenty of water may help. Contact your doctor if the problem does not go away or is severe.  What side effects may I notice from receiving this medicine?  Side effects that you should report to your doctor or health care professional as soon as possible:  -allergic reactions like skin rash, itching or hives, swelling of the face, lips, or tongue  -breathing problems  -confusion  -signs and symptoms of low blood pressure like dizziness; feeling faint or lightheaded, falls; unusually weak or tired  -trouble passing urine or change in the amount of urine  -trouble swallowing  Side effects that usually do not require medical attention (report to your doctor or health care professional if they continue or are bothersome):  -constipation  -dry mouth  -nausea, vomiting  -tiredness  This list may not describe all possible side effects. Call your doctor for medical advice about side effects. You may report side effects to FDA at 3-301-FDA-3595.  Where should I keep my medicine?  Keep out of the reach of children. This medicine can be abused. Keep your medicine in a safe place to protect it from theft. Do not share this medicine with anyone. Selling or giving away this medicine is dangerous and against the law.  Store at room temperature between 15 and 30 degrees C (59 and 86 degrees F). Protect from light. Keep container tightly closed.  This medicine may cause accidental overdose and death if it is taken by other adults, children, or pets. Flush any unused medicine down the toilet to reduce the chance of harm. Do not use the medicine after the expiration date.  NOTE: This sheet is a summary. It may not cover all possible information. If you have questions about this  medicine, talk to your doctor, pharmacist, or health care provider.  © 2018 [employer] (2016-11-01 16:55:57)      Gout  Introduction  Gout is painful swelling that can happen in some of your joints. Gout is a type of arthritis. This condition is caused by having too much uric acid in your body. Uric acid is a chemical that is made when your body breaks down substances called purines. If your body has too much uric acid, sharp crystals can form and build up in your joints. This causes pain and swelling.  Gout attacks can happen quickly and be very painful (acute gout). Over time, the attacks can affect more joints and happen more often (chronic gout).  Follow these instructions at home:  During a Gout Attack  · If directed, put ice on the painful area:  ¨ Put ice in a plastic bag.  ¨ Place a towel between your skin and the bag.  ¨ Leave the ice on for 20 minutes, 2-3 times a day.  · Rest the joint as much as possible. If the joint is in your leg, you may be given crutches to use.  · Raise (elevate) the painful joint above the level of your heart as often as you can.  · Drink enough fluids to keep your pee (urine) clear or pale yellow.  · Take over-the-counter and prescription medicines only as told by your doctor.  · Do not drive or use heavy machinery while taking prescription pain medicine.  · Follow instructions from your doctor about what you can or cannot eat and drink.  · Return to your normal activities as told by your doctor. Ask your doctor what activities are safe for you.  Avoiding Future Gout Attacks  · Follow a low-purine diet as told by a specialist (dietitian) or your doctor. Avoid foods and drinks that have a lot of purines, such as:  ¨ Liver.  ¨ Kidney.  ¨ Anchovies.  ¨ Asparagus.  ¨ Herring.  ¨ Mushrooms  ¨ Mussels.  ¨ Beer.  · Limit alcohol intake to no more than 1 drink a day for nonpregnant women and 2 drinks a day for men. One drink equals 12 oz of beer, 5 oz of wine, or 1½ oz of  hard liquor.  · Stay at a healthy weight or lose weight if you are overweight. If you want to lose weight, talk with your doctor. It is important that you do not lose weight too fast.  · Start or continue an exercise plan as told by your doctor.  · Drink enough fluids to keep your pee clear or pale yellow.  · Take over-the-counter and prescription medicines only as told by your doctor.  · Keep all follow-up visits as told by your doctor. This is important.  Contact a doctor if:  · You have another gout attack.  · You still have symptoms of a gout attack after10 days of treatment.  · You have problems (side effects) because of your medicines.  · You have chills or a fever.  · You have burning pain when you pee (urinate).  · You have pain in your lower back or belly.  Get help right away if:  · You have very bad pain.  · Your pain cannot be controlled.  · You cannot pee.  This information is not intended to replace advice given to you by your health care provider. Make sure you discuss any questions you have with your health care provider.  Document Released: 09/26/2009 Document Revised: 05/25/2017 Document Reviewed: 09/29/2016  © 2017 [employer]      Knee Effusion  Introduction  Knee effusion means that you have extra fluid in your knee. This can cause pain. Your knee may be more difficult to bend and move.  Follow these instructions at home:  · Use crutches as told by your doctor.  · Wear a knee brace as told by your doctor.  · Apply ice to the swollen area:  ¨ Put ice in a plastic bag.  ¨ Place a towel between your skin and the bag.  ¨ Leave the ice on for 20 minutes, 2-3 times per day.  · Keep your knee raised (elevated) when you are sitting or lying down.  · Take medicines only as told by your doctor.  · Do any rehabilitation or strengthening exercises as told by your doctor.  · Rest your knee as told by your doctor. You may start doing your normal activities again when your doctor says it is okay.  · Keep all  follow-up visits as told by your doctor. This is important.  Contact a doctor if:  · You continue to have pain in your knee.  Get help right away if:  · You have increased swelling or redness of your knee.  · You have severe pain in your knee.  · You have a fever.  This information is not intended to replace advice given to you by your health care provider. Make sure you discuss any questions you have with your health care provider.  Document Released: 01/20/2012 Document Revised: 05/25/2017 Document Reviewed: 08/03/2015  © 2017 [employer]    Depression / Suicide Risk    As you are discharged from this [facility] access hospital facility, it is important to learn how to keep safe from harming yourself.    Recognize the warning signs:  · Abrupt changes in personality, positive or negative- including increase in energy   · Giving away possessions  · Change in eating patterns- significant weight changes-  positive or negative  · Change in sleeping patterns- unable to sleep or sleeping all the time   · Unwillingness or inability to communicate  · Depression  · Unusual sadness, discouragement and loneliness  · Talk of wanting to die  · Neglect of personal appearance   · Rebelliousness- reckless behavior  · Withdrawal from people/activities they love  · Confusion- inability to concentrate     If you or a loved one observes any of these behaviors or has concerns about self-harm, here's what you can do:  · Talk about it- your feelings and reasons for harming yourself  · Remove any means that you might use to hurt yourself (examples: pills, rope, extension cords, firearm)  · Get professional help from the community (Mental Health, Substance Abuse, psychological counseling)  · Do not be alone:Call your Safe Contact- someone whom you trust who will be there for you.  · Call your local CRISIS HOTLINE 848-7169 or 475-358-8741  · Call your local Children's Mobile Crisis Response Team [place] (246) 175-6567 or www.[facility]  · Call  the toll free National Suicide Prevention Hotlines   · National Suicide Prevention Lifeline 175-606-OKOV (0274)  · National Hope Line Network 800-SUICIDE (672-1360)

## 2019-08-25 NOTE — DISCHARGE SUMMARY
Internal Medicine Discharge Summary  Note Author: Liam De Guzman M.D.       Name Lucas Agee     1970   Age/Sex 48 y.o. 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 Agee is a 44-year-old male who was admitted on 2019, patient has a past medical history significant for lower extremity arterial thrombosis status post thrombectomy, systolic heart failure, and amphetamine abuse.  Patient presented to the emergency department at the [facility] on 2019 with left knee pain which has progressively worsened over time associated with discomfort and difficulty ambulating and bearing weight on the left leg.  Patient had leukocytosis at 15,000, with arthrocentesis done by orthopedics revealing 24 780 white cells, with no crystals and Gram stain showing white blood cells with no organisms.  Cultures have revealed no growth so far, with patient denying any history of multiple sex partners, rashes, penile discharge or recent STIs.  Patient was admitted on the floor for evaluation of painful left knee.  On the medical floor, patient was treated with analgesics and pain management, pending results of arthrocentesis fluid culture, urine PCR for chlamydia  and gonorrhea came back negative.  Urine drug screen tested positive for cannabis, oxycodone and amphetamines.  Patient was treated with Toradol, 20 mg of prednisolone and colchicine 0.6 mg twice daily, during admission on the medical floor, with his pain improving dramatically associated with decrease in swelling in the right knee, and ability to weight-bear.  Patient was discharged in a stable condition with a tentative diagnosis of inflammatory arthritis, possible gout on colchicine and prednisone and oxycodone, or pseudogout with wound infection unlikely at this time with GC negative clinically not staphylococcal, as patient is improving without antibiotics.  Patient will need to follow-up with primary care practitioner in 1 week time, for follow-up evaluation and rheumatology referral/infectious disease follow-up if needed.      Patient /Hospital Summary (Details -- Problem Oriented) :          Pain and swelling of left knee  Assessment & Plan  patient reported r knee pain that started yesterday   No fever or chills  Denies history of STDs in past  Denies any recent infection/hiking or travel  Denies trauma or history of gout or pseudogout   On exam there is no erythema and mild swelling present ( after aspiration of 15 cc of fluid)  arthrocentesis done in ED :   synovial fluid analysis [place]BC of [address] (inflammatory range)                                                   gram stain showed WBC, but no organisms                                                    No crystals                                                    Not hemorrhagic( turbid clear fluid)   Ortho consulted by ED - Dr Bailey will follow the cultures  CBC shows  WBC 15.2 , ESR of 29 , CRP 2.98  Synovial fluid culture shows no growth to date  Urine PCR for gonococcal arthritis and Chlamydia negative, and hence Ceftriaxone was not started.  Will treat as an inflammatory arthritis with gouty arthritis high on the list.  -Follow-up with  primary care practitioner in 1 week time for evaluation and further investigation of inflammatory arthritis in the left knee.  -We will start him on a trial of colchicine, prednisone, and Toradol, prior to being seen by PCP.      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  Warfarin therapy   Last week INR was  3.8  and in the ED it was within the therapeutic range = 2.6  Plan :  Ultrasound of arteries left leg done in the ED didn't show any thrombus   Continue Warfarin - pharmacy to dose the medication    Congestive heart failure, NYHA class 2 and ACC/AHA stage C ([facility])  Assessment & Plan  Plan :  Previous EF was 15% ( 05/19)) but the recent echo shows EF of 45% ( 08/19)  Patient used to abuse Methamphetamine previously but has stopped since the last hospitalization    plan.  Continue the home medications   Statins , aspirin , carvedilol , furosemide , lisinopril and spironolactone     Chest discomfort-resolved as of 8/25/2019  Assessment & Plan  Chest discomfort present for an hour in the afternoon  As per patient he was having left knee pain and anxious at that time .   Denies use of methamphetamine   No associated palpitations or  SOB   Resolved after an hour - spontaneously     Plan  Admit in telemetry  Trend troponin - negative x 2   EKG    History of methamphetamine abuse  Assessment & Plan  Patient has stopped using methamphetamine since he was hospitalized for arterial thrombus .  Patient is using cannabis and smokes half a pack of cigarette everyday        Tobacco use  Assessment & Plan  Smokes Half a pack of cigarettes a day     Plan:  Nicotine patches   Smoking cessation counseling       Consultants:     Infectious disease.  Orthopedics    Procedures:         Arthrocentesis of the left knee    Imaging/ Testing:      Ultrasound of the extremity arterial-lower unilateral and left, on 8/23/2019 reveals mild atherosclerotic plaque throughout the left lower extremity without evidence for stenosis.  There is a small segment of occlusion in the posterior tibial artery at the ankle that is reconstituted more distally.    Discharge Medications:         Medication Reconciliation: Completed       Medication List      START taking these medications      Instructions   colchicine 0.6 MG Tabs  Commonly known as:  COLCRYS   Take 1 Tab by mouth 2 times a day for 7 days.  Dose:  0.6 mg     oxyCODONE immediate-release 5 MG Tabs  Commonly known as:  ROXICODONE   Take 1 Tab by mouth every 6 hours as needed for up to 6 days.  Dose:  5 mg     predniSONE 20 MG Tabs  Commonly known as:  DELTASONE   Take 1 Tab by mouth every day for 7 days.  Dose:  20 mg        CHANGE how you take these medications      Instructions   acetaminophen 500 MG Tabs  What changed:    · how much to take  · when to take this  · reasons to take this  Commonly known as:  TYLENOL   Take 2 Tabs by mouth 3 times a day for 30 days.  Dose:  1,000 mg        CONTINUE taking these medications      Instructions   aspirin 81 MG Chew chewable tablet  Commonly known as:  ASA   Take 1 Tab by mouth every day.  Dose:  81 mg     atorvastatin 80 MG tablet  Commonly known as:  LIPITOR   Doctor's comments:  Does not need refills right now, just transferring RX  Take 1 Tab by mouth every evening.  Dose:  80 mg     carvedilol 25 MG Tabs  Commonly known as:  COREG   Doctor's comments:  Doesn't need refills right now, just transferring RX, will contact with refills  Take 1 Tab by mouth 2 times a day.  Dose:  25 mg     furosemide 20 MG Tabs  Commonly known as:  LASIX   Take 1 Tab by mouth every day.  Dose:  20 mg     lisinopril 20 MG Tabs  Commonly known as:  PRINIVIL   Doctor's comments:  Doesn't need refills currently, just transferring  RX  Take 1 Tab by mouth every day.  Dose:  20 mg     spironolactone 25 MG Tabs  Commonly known as:  ALDACTONE   Take 25 mg by mouth every 48 hours.  Dose:  25 mg     warfarin 5 MG Tabs  Commonly known as:  COUMADIN   Take 7.5 mg by mouth every day.  Dose:  7.5 mg        Can use .DISCHARGEMEDSLIST if going to another facility     Disposition: For home with follow-up with primary care practitioner in 1 week time  Diet: As tolerated  Activity: As tolerated  Instructions:      Follow-up with primary care practitioner in 1 week time to evaluate for inflammatory arthritis, and left-sided joint pain.  May also need to be assessed if this is the start of more polyarticular arthritis.  May need follow-up of uric acid levels on colchicine treatment,at primary care.  Rheumatology consult by PCP if needed.  The patient was instructed to return to the ER in the event of worsening symptoms. I have counseled the patient on the importance of compliance and the patient has agreed to proceed with all medical recommendations and follow up plan indicated above.   The patient understands that all medications come with benefits and risks. Risks may include permanent injury or death and these risks can be minimized with close reassessment and monitoring.    Primary Care Provider:   Pcp Pt States None.  Discharge summary faxed to primary care provider:  Deferred  Copy of discharge summary given to the patient: Deferred      Follow up appointment details :      Future Appointments   Date Time Provider Department Center   8/26/2019  1:15 PM [facility] EXAM 4 [facility] None   11/4/2019  2:45 PM Mark Acosta M.D. [facility] None   12/30/2019  7:20 AM Edu Smith M.D. [address] [id number] [phone]  Schedule an appointment as soon as possible for a visit in 1 week  Follow up with knee swelling    Pending Studies:        Follow-up on culture results.    Time spent on  discharge day patient visit, preparing discharge paperwork and arranging for patient follow up.  45 minutes    Summary of follow up issues  Discharge Time (Minutes) :   45 minutes  Hospital Course Type:  Inpatient Stay >2 midnights  Condition on Discharge stable  ______________________________________________________________________    Interval history/exam for day of discharge:    Patient comfortable ,denied any severe pain in the left knee, and is able to ambulate and move left leg and left knee better.  Afebrile with no fever or chills, dizziness or headaches.  Swelling in the left knee decreasing and range of movements found to be increasing.  No overnight issues seen.    Most Recent Labs:    Lab Results   Component Value Date/Time    WBC 12.6 (H) 08/24/2019 01:02 AM    RBC 4.57 (L) 08/24/2019 01:02 AM    HEMOGLOBIN 14.7 08/24/2019 01:02 AM    HEMATOCRIT 43.5 08/24/2019 01:02 AM    MCV 95.2 08/24/2019 01:02 AM    MCH 32.2 08/24/2019 01:02 AM    MCHC 33.8 08/24/2019 01:02 AM    MPV 10.0 08/24/2019 01:02 AM    NEUTSPOLYS 46.50 08/24/2019 01:02 AM    LYMPHOCYTES 40.00 08/24/2019 01:02 AM    MONOCYTES 10.00 08/24/2019 01:02 AM    EOSINOPHILS 2.20 08/24/2019 01:02 AM    BASOPHILS 0.70 08/24/2019 01:02 AM      Lab Results   Component Value Date/Time    SODIUM 137 08/24/2019 01:02 AM    POTASSIUM 4.3 08/24/2019 01:02 AM    CHLORIDE 104 08/24/2019 01:02 AM    CO2 26 08/24/2019 01:02 AM    GLUCOSE 104 (H) 08/24/2019 01:02 AM    BUN 14 08/24/2019 01:02 AM    CREATININE 0.95 08/24/2019 01:02 AM      Lab Results   Component Value Date/Time    ALTSGPT 15 08/24/2019 01:02 AM    ASTSGOT 18 08/24/2019 01:02 AM    ALKPHOSPHAT 86 08/24/2019 01:02 AM    TBILIRUBIN 0.4 08/24/2019 01:02 AM    ALBUMIN 3.9 08/24/2019 01:02 AM    GLOBULIN 3.1 08/24/2019 01:02 AM    INR 2.21 (H) 08/24/2019 11:31 AM     Lab Results   Component Value Date/Time    PROTHROMBTM 25.2 (H) 08/24/2019 11:31 AM    INR 2.21 (H) 08/24/2019 11:31 AM

## 2019-08-25 NOTE — PROGRESS NOTES
Assumed care at 0700. Bedside report received from Otto. Patient's chart and MAR reviewed. Pt complains of minimal left knee pain at this time. Pt is A & O 4, anxious for discharge. Patient was updated on plan of care for the day. Questions answered and concerns addressed.  Pt denies any additional needs at this time. White board updated. Call light, phone and personal belongings within reach.

## 2019-08-25 NOTE — PROGRESS NOTES
Internal Medicine Interval Note  Note Author: Liam De Guzman M.D.     Name Lucas Agee     1970   Age/Sex 48 y.o. male   MRN [id number]   Code Status Full     After 5PM or if no immediate response to page, please call for cross-coverage  Attending/Team: /Bala See Patient List for primary contact information  Call [phone] to page    1st Call - Day Intern (R1):    2nd Call - Day Sr. Resident (R2/R3):            Reason for interval visit  (Principal Problem)   Left knee discomfort with pain    Interval Problem Daily Status Update  (24 hours, problem oriented, brief subjective history, new lab/imaging data pertinent to that problem)   Patient was seen the same with persistent left knee pain and swelling, denies any history of trauma, no rash, denies multiple sexual partners, or insect stings.  Lungs highly suspicious for possible gouty arthritis, but no crystalline arthropathy after arthrocentesis with WBC count equivocal, and Gram stain negative for organisms.  Awaiting culture of arthrocentesis fluid following which orthopedics will follow for antibiotic use.  Seen by Dr. Stevan Prieto, who suspects possible gonococcal arthritis as the cause, and we are awaiting urine gonorrhea PCR before initiating IV ceftriaxone course.  Awaiting orthopedic recommendation from       Review of Systems   Constitutional: Negative for chills and fever.   Eyes: Negative for blurred vision and double vision.   Respiratory: Negative for cough and hemoptysis.    Cardiovascular: Negative for chest pain and palpitations.   Gastrointestinal: Negative for nausea and vomiting.   Genitourinary: Negative for dysuria, frequency, hematuria and urgency.   Musculoskeletal: Positive for joint pain. Negative for myalgias.   Skin: Negative for rash.   Neurological: Negative for focal weakness and headaches.   Psychiatric/Behavioral: Negative for substance abuse and suicidal  ideas. The patient is not nervous/anxious.        Disposition/Barriers to discharge:   None    Consultants/Specialty  Infectious disease.  Orthopedics  PCP: Pcp Pt States None      Quality Measures  Quality-Core Measures   Reviewed items::  Labs reviewed and Medications reviewed  DVT prophylaxis pharmacological::  Warfarin (Coumadin)          Physical Exam       Vitals:    08/24/19 0429 08/24/19 0831 08/24/19 1219 08/24/19 1632   BP: 135/85 103/65 117/73 112/69   Pulse: 80 66 80 60   Resp: 16 16 16 18   Temp: 36.6 °C (97.9 °F) 36.2 °C (97.1 °F) 36.2 °C (97.2 °F) 36.6 °C (97.8 °F)   TempSrc: Temporal Temporal Temporal Temporal   SpO2: 93% 95% 96% 95%   Weight:       Height:         Body mass index is 28.89 kg/m². Weight: 86.2 kg (190 lb 0.6 oz)  Oxygen Therapy:  Pulse Oximetry: 95 %, O2 (LPM): 0, O2 Delivery: None (Room Air)    Physical Exam   Constitutional: He is oriented to person, place, and time and well-developed, well-nourished, and in no distress.   HENT:   Head: Normocephalic and atraumatic.   Eyes: Pupils are equal, round, and reactive to light. Conjunctivae are normal.   Neck: Normal range of motion. Neck supple.   Cardiovascular: Normal rate and regular rhythm.   Pulmonary/Chest: Effort normal and breath sounds normal.   Abdominal: Soft. He exhibits no distension. There is no tenderness. There is no rebound.   Musculoskeletal: Normal range of motion. He exhibits no edema or deformity.   Pain with swelling and discomfort in the left knee   Neurological: He is alert and oriented to person, place, and time.   Skin: Skin is warm and dry.   Psychiatric: Mood and affect normal.             Assessment/Plan     Pain and swelling of left knee- (present on admission)  Assessment & Plan  patient reported r knee pain that started yesterday   No fever or chills  Denies history of STDs in past  Denies any recent infection/hiking or travel  Denies trauma or history of gout or pseudogout   On exam there is no erythema  and mild swelling present ( after aspiration of 15 cc of fluid)  arthrocentesis done in ED :   synovial fluid analysis shows WBC of 79921 (inflammatory range)                                                   gram stain showed WBC, but no organisms                                                    No crystals                                                    Not hemorrhagic( turbid clear fluid)   Ortho consulted by ED - Dr Bailey will follow the cultures  CBC shows  WBC 15.2 , ESR of 29 , CRP 2.98      Plan :   will not start antibiotics at this time as less likely to be septic arthritis.   Follow synovial fluid culture   Urine  PCR awaited for gonococcal arthritis  blood culture  pending  Chlamydia -GC culture pending   -Start ceftriaxone if culture positive per ID note    Arterial embolism and thrombosis of lower extremity (HCC)- (present on admission)  Assessment & Plan  Patient was admitted in May 2019 for right  leg pain and swelling and found to have arterial thrombus ( anterior tibial artery) = he had thrombectomy done   Started on chronic anticoagulation treatment .  Warfarin therapy   Last week INR was  3.8  but today in the ED it was within the therapeutic range = 2.6    Plan :   Ultrasound of arteries left leg done in the ED didn't show any thrombus   Continue Warfarin - pharmacy to dose the medication    Chest discomfort  Assessment & Plan  Chest discomfort present for an hour in the afternoon  As per patient he was having left knee pain and anxious at that time .   Denies use of methamphetamine   No associated palpitations or  SOB   Resolved after an hour - spontaneously     Plan  Admit in telemetry  Trend troponin - negative x 2   EKG    Congestive heart failure, NYHA class 2 and ACC/AHA stage C (HCC)- (present on admission)  Assessment & Plan  Plan :  Previous EF was 15% ( 05/19)) but the recent echo shows EF of 45% ( 08/19)  Patient used to abuse Methamphetamine previously but has stopped since  the last hospitalization    plan.  Continue the home medications   Statins , aspirin , carvedilol , furosemide , lisinopril and spironolactone     History of methamphetamine abuse- (present on admission)  Assessment & Plan  Patient has stopped using methamphetamine since he was hospitalized for arterial thrombus .  Patient is using cannabis and smokes half a pack of cigarette everyday        Tobacco use- (present on admission)  Assessment & Plan  Smokes Half a pack of cigarettes a day     Plan:  Nicotine patches   Smoking cessation counseling

## 2019-08-26 ENCOUNTER — ANTICOAGULATION VISIT (OUTPATIENT)
Dept: VASCULAR LAB | Facility: MEDICAL CENTER | Age: 49
End: 2019-08-26
Attending: INTERNAL MEDICINE
Payer: COMMERCIAL

## 2019-08-26 DIAGNOSIS — I74.3 ARTERIAL EMBOLISM AND THROMBOSIS OF LOWER EXTREMITY (HCC): ICD-10-CM

## 2019-08-26 LAB
BACTERIA FLD AEROBE CULT: NORMAL
GRAM STN SPEC: NORMAL
INR BLD: 3.5 (ref 0.9–1.2)
INR PPP: 3.5 (ref 2–3.5)
SIGNIFICANT IND 70042: NORMAL
SITE SITE: NORMAL
SOURCE SOURCE: NORMAL

## 2019-08-26 PROCEDURE — 85610 PROTHROMBIN TIME: CPT

## 2019-08-26 PROCEDURE — 99212 OFFICE O/P EST SF 10 MIN: CPT

## 2019-08-26 NOTE — PROGRESS NOTES
Anticoagulation Summary  As of 8/26/2019    INR goal:   2.0-3.0   TTR:   69.0 % (2.6 mo)   INR used for dosing:   3.50! (8/26/2019)   Warfarin maintenance plan:   7.5 mg (5 mg x 1.5) every day   Weekly warfarin total:   52.5 mg   Plan last modified:   Alejandro Lay, PharmD (5/28/2019)   Next INR check:   8/29/2019   Target end date:   11/28/2019    Indications    Arterial embolism and thrombosis of lower extremity (HCC) [I74.3]             Anticoagulation Episode Summary     INR check location:       Preferred lab:       Send INR reminders to:       Comments:         Anticoagulation Care Providers     Provider Role Specialty Phone number    Jelani Weinstein M.D. Referring Internal Medicine [phone]    [facility] Responsible  [phone]                Anticoagulation Patient Findings  Patient Findings     Positives:   Change in medications (Started on prednisone and colchicine x 7 days on 8/26/19), Change in diet/appetite (recent hospitalization did not have normal consumption of vitamin K)    Negatives:   Signs/symptoms of thrombosis, Signs/symptoms of bleeding, Laboratory test error suspected, Change in health, Change in alcohol use, Change in activity, Upcoming invasive procedure, Emergency department visit, Upcoming dental procedure, Missed doses, Extra doses, Hospital admission, Bruising, Other complaints          HPI:  Lucas Agee seen in clinic today, on anticoagulation therapy with warfarin for arterial embolism and thrombosis of lower extremity  Changes to current medical/health status since last appt: recent hospitalization for knee inflammation.   Denies signs/symptoms of bleeding and/or thrombosis since the last appt.    Patient didn't have regular meals while in hospital.  Denies any interval changes to medications since last appt.   Denies any complications or cost restrictions with current therapy.   BP denied by patient.      A/P   INR is SUPRA-therapeutic at 3.5.    Recently discharged from ED on 8/25/19 for pain and inflammation of his knee. All work up tests were done but no clear cause of inflammation. Recently started on prednisone 20 mg daily and colchicine 0.6 mg BID x 7 days. Has yet to start treatment. Did not have normal meals while in hospital usually consumes 1-2 servings of vegetables a day. Likely increase INR with lack of vitamin K in diet during hospitalization. Patient to start oral steroid which will increase INR. Re-emphasized s/s of bleeding.  Instructed patient to hold today's dose and take 5 mg on 8/27/19 and 8/28/19 due to oral steroid. Patient agreed to plan.    Follow up appointment in 3 days.    Maikel Avitia, ChandlerD

## 2019-08-29 LAB
BACTERIA BLD CULT: NORMAL
BACTERIA BLD CULT: NORMAL
SIGNIFICANT IND 70042: NORMAL
SIGNIFICANT IND 70042: NORMAL
SITE SITE: NORMAL
SITE SITE: NORMAL
SOURCE SOURCE: NORMAL
SOURCE SOURCE: NORMAL

## 2019-09-09 ENCOUNTER — APPOINTMENT (OUTPATIENT)
Dept: VASCULAR LAB | Facility: MEDICAL CENTER | Age: 49
End: 2019-09-09
Attending: INTERNAL MEDICINE
Payer: COMMERCIAL

## 2019-09-10 ENCOUNTER — ANTICOAGULATION VISIT (OUTPATIENT)
Dept: VASCULAR LAB | Facility: MEDICAL CENTER | Age: 49
End: 2019-09-10
Attending: INTERNAL MEDICINE
Payer: COMMERCIAL

## 2019-09-10 DIAGNOSIS — I74.3 ARTERIAL EMBOLISM AND THROMBOSIS OF LOWER EXTREMITY (HCC): ICD-10-CM

## 2019-09-10 LAB — INR PPP: 2.6 (ref 2–3.5)

## 2019-09-10 PROCEDURE — 85610 PROTHROMBIN TIME: CPT

## 2019-09-10 PROCEDURE — 99211 OFF/OP EST MAY X REQ PHY/QHP: CPT

## 2019-09-10 NOTE — PROGRESS NOTES
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 Referring Internal Medicine [phone]    [facility] Responsible  [phone]        Anticoagulation Patient Findings      HPI:   Lucas Agee seen in clinic today, they are here today for a INR check on anticoagulation therapy     The reason for today's visit is to prevent morbidity and mortality from a blood clot or stroke and to reduce the risk of bleeding while on a anticoagulant.     Dose the patient in the medical group have a [facility] primary care provider and proper insurance?  Pcp Pt States None  No address on file      Additional education provided today regarding reducing bleed risk and dietary constraints:  About how vitamin K and foods work with warfarin and the bleeding risk on a anticoagulant     Any upcoming procedures:   none    Confirmed warfarin dosing regimen  Interval Changes with foods rich in vitamin K: No  Interval Changes in ETOH:   No  Interval Changes in smoking status:  No  Interval Changes in medication:  No   Cost restriction:  No  S/S of bleeding or bruising:  No  Signs/symptoms  thrombosis since the last appt:  No  Bleed risk is:  moderate,       Assessment:   INR  therapeutic.     Medications reviewed and updated--    3 vitals included with  today's appt :  (BP, HR, weight, ht, RR)   There were no vitals filed for this visit.      Plan:  Continue weekly warfarin dose as noted      Follow up:  Follow up appointment in 3 week(s)       Other info:  Pt educated to contact our clinic with any changes in medications or s/s of bleeding or thrombosis    CHEST guidelines recommend frequent INR monitoring at regular intervals (a few days up to a max of 12 weeks) to ensure they are on the proper dose of warfarin and not having any complications from therapy.  INRs can dramatically change over a short time period due to diet, medications, and medical conditions.     Kory Hardy M.S., Pharm.D., [facility], [facility]    This note was created using voice recognition software (Dragon). The accuracy of the dictation is limited by the abilities of the software. I have reviewed the note prior to signing, however some errors in grammar and context are still possible. If you have any questions related to this note please do not hesitate to contact our office.

## 2019-10-01 ENCOUNTER — APPOINTMENT (OUTPATIENT)
Dept: VASCULAR LAB | Facility: MEDICAL CENTER | Age: 49
End: 2019-10-01
Payer: COMMERCIAL

## 2019-10-03 ENCOUNTER — APPOINTMENT (OUTPATIENT)
Dept: VASCULAR LAB | Facility: MEDICAL CENTER | Age: 49
End: 2019-10-03
Attending: INTERNAL MEDICINE
Payer: COMMERCIAL

## 2019-10-07 ENCOUNTER — ANTICOAGULATION VISIT (OUTPATIENT)
Dept: VASCULAR LAB | Facility: MEDICAL CENTER | Age: 49
End: 2019-10-07
Attending: INTERNAL MEDICINE
Payer: COMMERCIAL

## 2019-10-07 DIAGNOSIS — I74.3 ARTERIAL EMBOLISM AND THROMBOSIS OF LOWER EXTREMITY (HCC): ICD-10-CM

## 2019-10-07 LAB
INR BLD: 1.7 (ref 0.9–1.2)
INR PPP: 1.7 (ref 2–3.5)

## 2019-10-07 PROCEDURE — 99211 OFF/OP EST MAY X REQ PHY/QHP: CPT

## 2019-10-07 PROCEDURE — 85610 PROTHROMBIN TIME: CPT

## 2019-10-07 NOTE — PROGRESS NOTES
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 Referring Internal Medicine [phone]    [facility] Responsible  [phone]                Anticoagulation Patient Findings      HPI:  Lucas Agee seen in clinic today, on anticoagulation therapy with warfarin for aterial embolism and thrombosis of lower extremity  Changes to current medical/health status since last appt: none  Denies signs/symptoms of bleeding and/or thrombosis since the last appt.    Denies any interval changes to diet  Denies any interval changes to medications since last appt.   Denies any complications or cost restrictions with current therapy.   BP denied by patient  Patient confirmed current dosing regimen    A/P   INR is SUB-therapeutic at 1.7.   Patient reported a significant spontaneous bruise along the underside of R leg on his hamstring with tenderness to touch over about a 2 inch area. Yesterday's dose was missed due to bruising. Bruising was noticed about a week ago. No leg swelling, not warm to touch, and no difficulty breathing. Had Joycelyn assess bruise, it appears to be healing with no significant signs of active clot. Patient will closely monitor and if any worsening will go to ED.  Instructed patient to continue with 7.5 mg daily. Did not bolus due to bruising as noted above.     Follow up appointment in 3  days.    Maikel Avitia, PharmD

## 2019-10-10 ENCOUNTER — APPOINTMENT (OUTPATIENT)
Dept: VASCULAR LAB | Facility: MEDICAL CENTER | Age: 49
End: 2019-10-10
Attending: INTERNAL MEDICINE
Payer: COMMERCIAL

## 2019-10-11 ENCOUNTER — ANTICOAGULATION VISIT (OUTPATIENT)
Dept: VASCULAR LAB | Facility: MEDICAL CENTER | Age: 49
End: 2019-10-11
Attending: INTERNAL MEDICINE
Payer: COMMERCIAL

## 2019-10-11 DIAGNOSIS — I74.3 ARTERIAL EMBOLISM AND THROMBOSIS OF LOWER EXTREMITY (HCC): ICD-10-CM

## 2019-10-11 LAB
INR BLD: 1.9 (ref 0.9–1.2)
INR PPP: 1.9 (ref 2–3.5)

## 2019-10-11 PROCEDURE — 99211 OFF/OP EST MAY X REQ PHY/QHP: CPT

## 2019-10-11 PROCEDURE — 85610 PROTHROMBIN TIME: CPT

## 2019-11-11 ENCOUNTER — ANTICOAGULATION MONITORING (OUTPATIENT)
Dept: VASCULAR LAB | Facility: MEDICAL CENTER | Age: 49
End: 2019-11-11

## 2019-11-11 DIAGNOSIS — I74.3 ARTERIAL EMBOLISM AND THROMBOSIS OF LOWER EXTREMITY (HCC): ICD-10-CM

## 2019-12-09 ENCOUNTER — ANTICOAGULATION MONITORING (OUTPATIENT)
Dept: VASCULAR LAB | Facility: MEDICAL CENTER | Age: 49
End: 2019-12-09

## 2019-12-09 DIAGNOSIS — I74.3 ARTERIAL EMBOLISM AND THROMBOSIS OF LOWER EXTREMITY (HCC): ICD-10-CM

## 2019-12-30 ENCOUNTER — OFFICE VISIT (OUTPATIENT)
Dept: MEDICAL GROUP | Facility: MEDICAL CENTER | Age: 49
End: 2019-12-30
Attending: FAMILY MEDICINE
Payer: COMMERCIAL

## 2019-12-30 VITALS
OXYGEN SATURATION: 97 % | HEIGHT: 69 IN | TEMPERATURE: 97.7 F | DIASTOLIC BLOOD PRESSURE: 68 MMHG | HEART RATE: 84 BPM | BODY MASS INDEX: 29.62 KG/M2 | RESPIRATION RATE: 16 BRPM | SYSTOLIC BLOOD PRESSURE: 112 MMHG | WEIGHT: 200 LBS

## 2019-12-30 DIAGNOSIS — Z72.0 TOBACCO ABUSE: ICD-10-CM

## 2019-12-30 DIAGNOSIS — Z72.0 TOBACCO USE: ICD-10-CM

## 2019-12-30 DIAGNOSIS — I50.9 CONGESTIVE HEART FAILURE, NYHA CLASS 2 AND ACC/AHA STAGE C (HCC): ICD-10-CM

## 2019-12-30 DIAGNOSIS — Z87.448 HISTORY OF CHANGES TO URINARY FREQUENCY: ICD-10-CM

## 2019-12-30 DIAGNOSIS — Z87.898 HISTORY OF URINARY FREQUENCY: ICD-10-CM

## 2019-12-30 PROCEDURE — 99204 OFFICE O/P NEW MOD 45 MIN: CPT | Performed by: FAMILY MEDICINE

## 2019-12-30 PROCEDURE — 99213 OFFICE O/P EST LOW 20 MIN: CPT | Performed by: FAMILY MEDICINE

## 2019-12-30 ASSESSMENT — PATIENT HEALTH QUESTIONNAIRE - PHQ9: CLINICAL INTERPRETATION OF PHQ2 SCORE: 0

## 2019-12-30 NOTE — ASSESSMENT & PLAN NOTE
Patient reports he currently smoking about one half of a pack of cigarettes per day approximately.  He is not reporting any daily cough or daily sputum production.  No hemoptysis.

## 2019-12-30 NOTE — ASSESSMENT & PLAN NOTE
Patient presents to initiate primary care.  He reports that his heart seems to be doing well.  He reports that if he exerts himself he may get briefly mildly lightheaded otherwise is not reporting orthopnea, ankle edema.  He is compliant taking his 20 mg of Lasix, half of a 25 mg spironolactone every other day, carvedilol 25 mg twice a day, and lisinopril 20 mg once a day.  Not reporting any current chest pain or palpitations.

## 2019-12-30 NOTE — PROGRESS NOTES
Chief Complaint   Patient presents with   • Establish Care         HISTORY OF THE PRESENT ILLNESS: Patient is a 49 y.o. male. This pleasant patient is here today to transfer PCP and discuss the problems below      Congestive heart failure, NYHA class 2 and ACC/AHA stage C (HCC)  Patient presents to initiate primary care.  He reports that his heart seems to be doing well.  He reports that if he exerts himself he may get briefly mildly lightheaded otherwise is not reporting orthopnea, ankle edema.  He is compliant taking his 20 mg of Lasix, half of a 25 mg spironolactone every other day, carvedilol 25 mg twice a day, and lisinopril 20 mg once a day.  Not reporting any current chest pain or palpitations.    Tobacco use  Patient reports he currently smoking about one half of a pack of cigarettes per day approximately.  He is not reporting any daily cough or daily sputum production.  No hemoptysis.    History of urinary frequency  Patient reports approximately 1 year history of decreased urinary stream and increased urinary frequency.  He does avoid fluids later in the evening.  Reports 1-2 episodes of nocturia per night.  Denies any hematuria or dysuria.    Social history-single, lives with his girlfriend, not working  Allergies: Patient has no known allergies.    Current Outpatient Medications Ordered in Epic   Medication Sig Dispense Refill   • spironolactone (ALDACTONE) 25 MG Tab Take 25 mg by mouth every 48 hours.     • warfarin (COUMADIN) 5 MG Tab Take 7.5 mg by mouth every day.     • carvedilol (COREG) 25 MG Tab Take 1 Tab by mouth 2 times a day. 60 Tab 6   • lisinopril (PRINIVIL) 20 MG Tab Take 1 Tab by mouth every day. 30 Tab 6   • atorvastatin (LIPITOR) 80 MG tablet Take 1 Tab by mouth every evening. 30 Tab 6   • aspirin (ASA) 81 MG Chew Tab chewable tablet Take 1 Tab by mouth every day. 30 Tab 6   • furosemide (LASIX) 20 MG Tab Take 1 Tab by mouth every day. 30 Tab 11     No current Epic-ordered  "facility-administered medications on file.        Past Medical History:   Diagnosis Date   • Congestive heart failure (HCC)    • DVT (deep venous thrombosis) (HCC)    • Hypertension        Past Surgical History:   Procedure Laterality Date   • THROMBECTOMY Right 5/21/2019    Procedure: THROMBECTOMY - lower extremity, anterior tibial artery ;  Surgeon: Deidra Dominguez M.D.;  Location: SURGERY [facility];  Service: General   • HAND SURGERY Right 1995    \"metal plate placed\"   • HERNIA REPAIR Right 1990    groin   • HERNIA REPAIR         Social History     Tobacco Use   • Smoking status: Current Every Day Smoker     Packs/day: 0.50     Types: Cigarettes   • Smokeless tobacco: Never Used   Substance Use Topics   • Alcohol use: No   • Drug use: Yes     Types: Inhaled, Intravenous, Methamphetamines, Marijuana     Comment: marijuana;        Family Status   Relation Name Status   • Fa  (Not Specified)   • Bro  (Not Specified)   • PGMo  (Not Specified)   • PUnc  (Not Specified)   • Neg Hx  (Not Specified)     Family History   Problem Relation Age of Onset   • Heart Disease Father    • Blood Clots Brother    • Heart Disease Brother         pacemaker   • Blood Clots Paternal Grandmother    • Heart Disease Paternal Grandmother    • Stroke Paternal Grandmother    • Heart Attack Paternal Uncle 60   • Cancer Neg Hx        Review of Systems   Constitutional: Negative for fever, chills, weight loss and malaise/fatigue.   HENT: Negative for ear pain, nosebleeds, congestion, sore throat and neck pain.    Eyes: Negative for blurred vision.   Respiratory: Negative for cough, sputum production, shortness of breath and wheezing.    Cardiovascular: Negative for chest pain, palpitations, orthopnea and leg swelling.   Gastrointestinal: Negative for heartburn, nausea, vomiting and abdominal pain.   Genitourinary: Negative for dysuria, urgency and frequency.   Musculoskeletal: Negative for myalgias, back pain and joint pain.   Skin: " "Negative for rash and itching.   Neurological: Negative for dizziness, tingling, tremors, sensory change, focal weakness and headaches.   Endo/Heme/Allergies: Does not bruise/bleed easily.   Psychiatric/Behavioral: Negative for depression, anxiety, or memory loss.            Exam: /68 (BP Location: Left arm, Patient Position: Sitting, BP Cuff Size: Adult)   Pulse 84   Temp 36.5 °C (97.7 °F) (Temporal)   Resp 16   Ht 1.753 m (5' 9\")   Wt 90.7 kg (200 lb)   SpO2 97%   General: Normal appearing. No distress.  HEENT: Normocephalic. Eyes conjunctiva clear lids without ptosis, pupils equal and reactive to light accommodation, ears normal shape and contour, canals are clear bilaterally, tympanic membranes are benign, nasal mucosa benign, oropharynx is without erythema, edema or exudates.   Neck: Supple without JVD or bruit. Thyroid is not enlarged.  Pulmonary: Clear to ausculation.  Normal effort. No rales, ronchi, or wheezing.  Cardiovascular: Regular rate and rhythm without murmur. Carotid and radial pulses are intact and equal bilaterally.  Abdomen: Soft, nontender, nondistended. Normal bowel sounds. Liver and spleen are not palpable  Rectal-prostate is 1+ without nodularity or tenderness.  Neurologic: Intact light touch and strength bilaterally,normal speech, no tremor, normal gait.   Lymph: No cervical, supraclavicular or axillary lymph nodes are palpable  Skin: Warm and dry.  No obvious lesions.  Musculoskeletal: Normal gait. No extremity cyanosis, clubbing, or edema.  Psych: Normal mood and affect. Alert and oriented x3. Judgment and insight is normal.    Please note that this dictation was created using voice recognition software. I have made every reasonable attempt to correct obvious errors, but I expect that there are errors of grammar and possibly content that I did not discover before finalizing the note.      Assessment/Plan  1. Congestive heart failure, NYHA class 2 and ACC/AHA stage C (HCC)   "   2. Tobacco abuse     3. History of changes to urinary frequency     4. Tobacco use     5. History of urinary frequency       Plan: 1.  Cardiology referral to update care of his congestive heart failure  2.  Patient is encouraged to gradually further reduce cigarette use  3.  Patient is not interested in beginning tamsulosin at this time-continue with evening restriction on fluids and follow-up as needed  4.  Revisit with me in 3 months sooner if needed

## 2019-12-30 NOTE — ASSESSMENT & PLAN NOTE
Patient reports approximately 1 year history of decreased urinary stream and increased urinary frequency.  He does avoid fluids later in the evening.  Reports 1-2 episodes of nocturia per night.  Denies any hematuria or dysuria.

## 2020-01-03 ENCOUNTER — ANTICOAGULATION VISIT (OUTPATIENT)
Dept: VASCULAR LAB | Facility: MEDICAL CENTER | Age: 50
End: 2020-01-03
Attending: INTERNAL MEDICINE
Payer: COMMERCIAL

## 2020-01-03 DIAGNOSIS — I74.3 ARTERIAL EMBOLISM AND THROMBOSIS OF LOWER EXTREMITY (HCC): ICD-10-CM

## 2020-01-03 LAB
INR BLD: 3.3 (ref 0.9–1.2)
INR PPP: 3.3 (ref 2–3.5)

## 2020-01-03 PROCEDURE — 85610 PROTHROMBIN TIME: CPT

## 2020-01-03 PROCEDURE — 99212 OFFICE O/P EST SF 10 MIN: CPT

## 2020-01-03 NOTE — PROGRESS NOTES
Anticoagulation Summary  As of 1/3/2020    INR goal:   2.0-3.0   TTR:   66.6 % (6.9 mo)   INR used for dosing:   3.30! 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 Referring Internal Medicine [phone]    [facility] Responsible  [phone]        Anticoagulation Patient Findings      HPI:  Lucas Agee seen in clinic today for follow up on anticoagulation therapy in the presence of Arterial embolism and thrombosis of lower extremity.   Denies any changes to current medical/health status since last appointment.   Denies any medication or diet changes.   No current symptoms of bleeding or thrombosis reported.    A/P:   INR is supra-therapeutic at 3.3. Patient has not been seen in over 2 months.    Pt is to take decreased dose of 5 mg tonight then continue current regimen.     He is also past tentative end date. Patient declines to schedule for length of therapy appointment, due to fear of blood clot from stopping anticoagulation. He will rather discuss this with cardiologist first. Appt with cardiology 01/23/2020.  FU in 4 weeks after appt with cardiologist, at that time will re-visit warfarin discontinuation    Follow up appointment in 4 week(s).    Next Appointment: 01/31/2020    Ugo Garland Pharm.D

## 2020-01-23 ENCOUNTER — OFFICE VISIT (OUTPATIENT)
Dept: CARDIOLOGY | Facility: MEDICAL CENTER | Age: 50
End: 2020-01-23
Payer: COMMERCIAL

## 2020-01-23 VITALS
WEIGHT: 207 LBS | SYSTOLIC BLOOD PRESSURE: 124 MMHG | OXYGEN SATURATION: 95 % | HEART RATE: 88 BPM | HEIGHT: 69 IN | BODY MASS INDEX: 30.66 KG/M2 | DIASTOLIC BLOOD PRESSURE: 82 MMHG

## 2020-01-23 DIAGNOSIS — I50.82 BIVENTRICULAR HEART FAILURE (HCC): ICD-10-CM

## 2020-01-23 DIAGNOSIS — R79.89 ELEVATED TROPONIN: ICD-10-CM

## 2020-01-23 DIAGNOSIS — I27.20 PULMONARY HYPERTENSION (HCC): ICD-10-CM

## 2020-01-23 DIAGNOSIS — I50.20 ACC/AHA STAGE C SYSTOLIC HEART FAILURE (HCC): ICD-10-CM

## 2020-01-23 DIAGNOSIS — I50.9 CONGESTIVE HEART FAILURE, NYHA CLASS 2 AND ACC/AHA STAGE C (HCC): ICD-10-CM

## 2020-01-23 DIAGNOSIS — I42.0 DILATED CARDIOMYOPATHY (HCC): ICD-10-CM

## 2020-01-23 DIAGNOSIS — K76.0 HEPATIC STEATOSIS: ICD-10-CM

## 2020-01-23 DIAGNOSIS — I07.1 TRICUSPID VALVE INSUFFICIENCY, UNSPECIFIED ETIOLOGY: ICD-10-CM

## 2020-01-23 DIAGNOSIS — F19.90 SUBSTANCE USE DISORDER: ICD-10-CM

## 2020-01-23 DIAGNOSIS — I74.3 ARTERIAL EMBOLISM AND THROMBOSIS OF LOWER EXTREMITY (HCC): ICD-10-CM

## 2020-01-23 DIAGNOSIS — Z72.0 TOBACCO USE: ICD-10-CM

## 2020-01-23 DIAGNOSIS — F15.11 HISTORY OF METHAMPHETAMINE ABUSE (HCC): ICD-10-CM

## 2020-01-23 PROCEDURE — 99406 BEHAV CHNG SMOKING 3-10 MIN: CPT | Performed by: INTERNAL MEDICINE

## 2020-01-23 PROCEDURE — 99214 OFFICE O/P EST MOD 30 MIN: CPT | Mod: 25 | Performed by: INTERNAL MEDICINE

## 2020-01-23 ASSESSMENT — ENCOUNTER SYMPTOMS
FEVER: 0
BRUISES/BLEEDS EASILY: 0
DIZZINESS: 1
ORTHOPNEA: 0
PALPITATIONS: 0
CARDIOVASCULAR NEGATIVE: 1
SORE THROAT: 0
COUGH: 0
HEMOPTYSIS: 0
MUSCULOSKELETAL NEGATIVE: 1
SPUTUM PRODUCTION: 0
GASTROINTESTINAL NEGATIVE: 1
WEAKNESS: 0
LOSS OF CONSCIOUSNESS: 0
CONSTITUTIONAL NEGATIVE: 1
EYES NEGATIVE: 1
WHEEZING: 0
RESPIRATORY NEGATIVE: 1
SHORTNESS OF BREATH: 0
PND: 0
CHILLS: 0
CLAUDICATION: 0
STRIDOR: 0

## 2020-01-23 NOTE — PROGRESS NOTES
"Chief Complaint   Patient presents with   • CHF (Systolic)     F/V: 4 MO       Subjective:   Lucas Agee is a 49 y.o. 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  His last echocardiogram 6 months ago showed an EF of 45%.  He is not necessarily getting exertional shortness of breath but is getting dizziness and lightheadedness when when he works outside for too long.  He reports sobriety from illicit substances.  He is continued to smoke 1/2 pack/day.  He says that he has problems at home with a lot of stress and he has trouble quitting smoking in the setting of that.  His significant other is drinking significantly.  He has no chest pain or chest pressure.    Past Medical History:   Diagnosis Date   • Congestive heart failure (HCC)    • DVT (deep venous thrombosis) (HCC)    • Hypertension      Past Surgical History:   Procedure Laterality Date   • THROMBECTOMY Right 5/21/2019    Procedure: THROMBECTOMY - lower extremity, anterior tibial artery ;  Surgeon: Deidra Dominguez M.D.;  Location: SURGERY [facility];  Service: General   • HAND SURGERY Right 1995    \"metal plate placed\"   • HERNIA REPAIR Right 1990    groin   • HERNIA REPAIR       Family History   Problem Relation Age of Onset   • Heart Disease Father    • Blood Clots Brother    • Heart Disease Brother         pacemaker   • Blood Clots Paternal Grandmother    • Heart Disease Paternal Grandmother    • Stroke Paternal Grandmother    • Heart Attack Paternal Uncle 60   • Cancer Neg Hx      Social History     Socioeconomic History   • Marital status: Single     Spouse name: Not on file   • Number of children: Not on file   • Years of education: Not on file   • Highest education level: Not on file   Occupational History   • Not on file   Social Needs   • Financial resource strain: Not on file   • Food insecurity:     Worry: Not on file     Inability: Not on file   • Transportation needs:     Medical: Not " on file     Non-medical: Not on file   Tobacco Use   • Smoking status: Current Every Day Smoker     Packs/day: 0.50     Types: Cigarettes   • Smokeless tobacco: Never Used   Substance and Sexual Activity   • Alcohol use: No   • Drug use: Yes     Types: Inhaled, Intravenous, Methamphetamines, Marijuana     Comment: marijuana;    • Sexual activity: Yes     Partners: Female   Lifestyle   • Physical activity:     Days per week: Not on file     Minutes per session: Not on file   • Stress: Not on file   Relationships   • Social connections:     Talks on phone: Not on file     Gets together: Not on file     Attends Gnosticist service: Not on file     Active member of club or organization: Not on file     Attends meetings of clubs or organizations: Not on file     Relationship status: Not on file   • Intimate partner violence:     Fear of current or ex partner: Not on file     Emotionally abused: Not on file     Physically abused: Not on file     Forced sexual activity: Not on file   Other Topics Concern   • Not on file   Social History Narrative   • Not on file     No Known Allergies  Outpatient Encounter Medications as of 1/23/2020   Medication Sig Dispense Refill   • spironolactone (ALDACTONE) 25 MG Tab Take 25 mg by mouth every 48 hours.     • warfarin (COUMADIN) 5 MG Tab Take 7.5 mg by mouth every day.     • carvedilol (COREG) 25 MG Tab Take 1 Tab by mouth 2 times a day. 60 Tab 6   • lisinopril (PRINIVIL) 20 MG Tab Take 1 Tab by mouth every day. 30 Tab 6   • atorvastatin (LIPITOR) 80 MG tablet Take 1 Tab by mouth every evening. 30 Tab 6   • aspirin (ASA) 81 MG Chew Tab chewable tablet Take 1 Tab by mouth every day. 30 Tab 6   • furosemide (LASIX) 20 MG Tab Take 1 Tab by mouth every day. 30 Tab 11     No facility-administered encounter medications on file as of 1/23/2020.      Review of Systems   Constitutional: Negative.  Negative for chills, fever and malaise/fatigue.   HENT: Negative.  Negative for sore throat.   "  Eyes: Negative.    Respiratory: Negative.  Negative for cough, hemoptysis, sputum production, shortness of breath, wheezing and stridor.    Cardiovascular: Negative.  Negative for chest pain, palpitations, orthopnea, claudication, leg swelling and PND.   Gastrointestinal: Negative.    Genitourinary: Negative.    Musculoskeletal: Negative.    Skin: Negative.    Neurological: Positive for dizziness. Negative for loss of consciousness and weakness.   Endo/Heme/Allergies: Negative.  Does not bruise/bleed easily.   All other systems reviewed and are negative.       Objective:   /82 (BP Location: Left arm, Patient Position: Sitting, BP Cuff Size: Adult)   Pulse 88   Ht 1.753 m (5' 9\")   Wt 93.9 kg (207 lb)   SpO2 95%   BMI 30.57 kg/m²     Physical Exam   Constitutional: He appears well-developed and well-nourished. No distress.   HENT:   Head: Normocephalic and atraumatic.   Right Ear: External ear normal.   Left Ear: External ear normal.   Nose: Nose normal.   Mouth/Throat: No oropharyngeal exudate.   Eyes: Pupils are equal, round, and reactive to light. Conjunctivae and EOM are normal. Right eye exhibits no discharge. Left eye exhibits no discharge. No scleral icterus.   Neck: Neck supple. No JVD present.   Cardiovascular: Normal rate, regular rhythm and intact distal pulses. Exam reveals no gallop and no friction rub.   No murmur heard.  Pulmonary/Chest: Effort normal. No stridor. No respiratory distress. He has no wheezes. He has no rales. He exhibits no tenderness.   Abdominal: Soft. He exhibits no distension. There is no guarding.   Musculoskeletal: Normal range of motion.         General: No tenderness, deformity or edema.   Neurological: He is alert. He has normal reflexes. He displays normal reflexes. No cranial nerve deficit. He exhibits normal muscle tone. Coordination normal.   Skin: Skin is warm and dry. No rash noted. He is not diaphoretic. No erythema. No pallor.   Psychiatric: He has a normal " mood and affect. His behavior is normal. Judgment and thought content normal.   Nursing note and vitals reviewed.      Assessment:     1. ACC/AHA stage C systolic heart failure (HCC)  EC-ECHOCARDIOGRAM COMPLETE W/O CONT   2. 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 Biventricular heart failure (HCC)  EC-ECHOCARDIOGRAM COMPLETE W/O CONT   4. Congestive heart failure, NYHA class 2 and ACC/AHA stage C (HCC)  EC-ECHOCARDIOGRAM COMPLETE W/O CONT   5. Dilated cardiomyopathy (HCC)  EC-ECHOCARDIOGRAM COMPLETE W/O CONT   6. Elevated troponin  EC-ECHOCARDIOGRAM COMPLETE W/O CONT   7. Hepatic steatosis  EC-ECHOCARDIOGRAM COMPLETE W/O CONT   8. History of methamphetamine abuse (HCC)  EC-ECHOCARDIOGRAM COMPLETE W/O CONT   9. Pulmonary hypertension (HCC)  EC-ECHOCARDIOGRAM COMPLETE W/O CONT   10. Substance use disorder  EC-ECHOCARDIOGRAM COMPLETE W/O CONT   11. Tobacco use  EC-ECHOCARDIOGRAM COMPLETE W/O CONT   12. 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  I am concerned about him developing substance-induced pulmonary hypertension.  His EF was also not normal last time and therefore we will repeat an echocardiogram.  If his EF is normalized we can stop his Coumadin for his arterial emboli.  Otherwise I counseled him for about 5 minutes regarding smoking cessation and smoking cessation strategies.  We will see him back in 2 months.

## 2020-01-31 ENCOUNTER — TELEPHONE (OUTPATIENT)
Dept: VASCULAR LAB | Facility: MEDICAL CENTER | Age: 50
End: 2020-01-31

## 2020-02-01 NOTE — TELEPHONE ENCOUNTER
Pt NO SHOWED his appointment for anticoagulation services.  Routed to MA to reschedule    Ugo Garland, [place].D

## 2020-02-04 ENCOUNTER — TELEPHONE (OUTPATIENT)
Dept: VASCULAR LAB | Facility: MEDICAL CENTER | Age: 50
End: 2020-02-04

## 2020-02-04 NOTE — TELEPHONE ENCOUNTER
Tried to call patient to reschedule missed rcc appt. Unable to leave vm and he does not have his vm box set up yet

## 2020-02-28 ENCOUNTER — TELEPHONE (OUTPATIENT)
Dept: VASCULAR LAB | Facility: MEDICAL CENTER | Age: 50
End: 2020-02-28

## 2020-02-28 NOTE — LETTER
February 28, 2020        Lucas Agee  [address] [address]        Dear Lucas Agee ,    We have been unsuccessful in our attempts to contact you regarding your Anticoagulation Service appointments. Warfarin is a potent blood-thinning agent that requires monitoring to ensure that the dosage is correct for your body.  If it isn't, you could develop serious, sometimes life-threatening bleeding problems or life-threatening blood clots or stroke could result.     It is extremely important that you have your lab work drawn as soon as possible.  We are open Monday-Friday 8 am until 5 pm.  You may reach our Service at [phone].     To monitor you effectively, we need to be able to communicate with you.  This is a requirement to be followed by our Service.  If we are unable to contact you on three separate occasions, you will be discharged from the Anticoagulation Service.     If you repeatedly fail to keep your lab appointments, you are at risk of being discharged from the Service.           Sincerely,           Jaden Mena, PharmD, [facility]  Pharmacy Clinical Supervisor  [facility]  Outpatient Anticoagulation Service

## 2020-02-28 NOTE — TELEPHONE ENCOUNTER
Unable to leave voicemail message to check INR. VM not set up.  Letter sent   Jacklyn Chen, Clinical Pharmacist, CDE, CACP

## 2020-03-11 ENCOUNTER — TELEPHONE (OUTPATIENT)
Dept: VASCULAR LAB | Facility: MEDICAL CENTER | Age: 50
End: 2020-03-11

## 2020-03-11 NOTE — TELEPHONE ENCOUNTER
Left message for pt to have INR checked    Appointment scheduled 3/12/2020  Jacklyn Chen, Clinical Pharmacist, CDE, CACP

## 2020-03-12 ENCOUNTER — ANTICOAGULATION VISIT (OUTPATIENT)
Dept: VASCULAR LAB | Facility: MEDICAL CENTER | Age: 50
End: 2020-03-12
Attending: INTERNAL MEDICINE
Payer: COMMERCIAL

## 2020-03-12 DIAGNOSIS — I74.3 ARTERIAL EMBOLISM AND THROMBOSIS OF LOWER EXTREMITY (HCC): ICD-10-CM

## 2020-03-12 LAB
INR PPP: 3.4 (ref 2–3.5)

## 2020-03-12 PROCEDURE — 99212 OFFICE O/P EST SF 10 MIN: CPT

## 2020-03-12 PROCEDURE — 85610 PROTHROMBIN TIME: CPT

## 2020-03-12 NOTE — PROGRESS NOTES
Anticoagulation Summary  As of 3/12/2020    INR goal:   2.0-3.0   TTR:   50.0 % (9.2 mo)   INR used for dosing:   3.40! 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 Referring Internal Medicine [phone]    [facility] Anticoagulation Services Responsible  [phone]        Anticoagulation Patient Findings        History of Present Illness: follow up appointment for chronic anticoagulation with the high risk medication, warfarin for arterial embolism of lower extremity    Last INR was out of range, dosage adjusted: pt remains supra therapeutic trending upward.  Will reduce weekly dose by 10%. Follow up in 2 weeks, to reduce the risk of adverse events related to this high risk medication, warfarin.    CARDS would like him to continue anticoagulation until repeat ECHO and EF is normalized.  LOT MOVED TO may     Pt declines vitals today    Jacklyn Chen, Clinical Pharmacist    ADDENDUM: CHARGES REVIEWED  Jacklyn Chen, Clinical Pharmacist, CDE, CACP

## 2020-03-13 LAB — INR BLD: 3.4 (ref 0.9–1.2)

## 2020-03-16 DIAGNOSIS — I50.9 CONGESTIVE HEART FAILURE, NYHA CLASS 2 AND ACC/AHA STAGE C (HCC): Primary | ICD-10-CM

## 2020-03-17 RX ORDER — LISINOPRIL 20 MG/1
TABLET ORAL
Qty: 90 TAB | Refills: 3 | Status: SHIPPED | OUTPATIENT
Start: 2020-03-17 | End: 2021-04-12

## 2020-03-24 ENCOUNTER — OFFICE VISIT (OUTPATIENT)
Dept: CARDIOLOGY | Facility: MEDICAL CENTER | Age: 50
End: 2020-03-24
Payer: COMMERCIAL

## 2020-03-24 VITALS
WEIGHT: 196 LBS | HEIGHT: 68 IN | HEART RATE: 91 BPM | SYSTOLIC BLOOD PRESSURE: 128 MMHG | BODY MASS INDEX: 29.7 KG/M2 | DIASTOLIC BLOOD PRESSURE: 88 MMHG

## 2020-03-24 DIAGNOSIS — I50.9 CONGESTIVE HEART FAILURE, NYHA CLASS 2 AND ACC/AHA STAGE C (HCC): ICD-10-CM

## 2020-03-24 DIAGNOSIS — I42.0 DILATED CARDIOMYOPATHY (HCC): ICD-10-CM

## 2020-03-24 DIAGNOSIS — Z72.0 TOBACCO USE: ICD-10-CM

## 2020-03-24 DIAGNOSIS — F15.11 HISTORY OF METHAMPHETAMINE ABUSE (HCC): ICD-10-CM

## 2020-03-24 DIAGNOSIS — I74.3 ARTERIAL EMBOLISM AND THROMBOSIS OF LOWER EXTREMITY (HCC): ICD-10-CM

## 2020-03-24 PROCEDURE — 99214 OFFICE O/P EST MOD 30 MIN: CPT | Performed by: NURSE PRACTITIONER

## 2020-03-24 ASSESSMENT — FIBROSIS 4 INDEX: FIB4 SCORE: 0.84

## 2020-03-24 NOTE — PROGRESS NOTES
"  Telephone Appointment Visit   As a means of avoiding spread of COVID-19, this visit is being conducted by telephone. This telephone visit was initiated by the patient and they verbally consented.    Time at start of call: 3:20 pm    Reason for Call:  Symptom Follow-up    Patient Comments / History:   Patient of Dr. Bland in the heart failure clinic.  He was last seen in clinic on 1/23/2020.  During that visit, patient was recommended to get a follow-up echocardiogram to evaluate substance-induced pulmonary hypertension and EF.    Patient reports that he did not schedule his echo and states he \"forgot about it\".    For his symptoms, he is reporting being lightheaded after activity.  He otherwise denies chest pain, palpitations, PND, orthopnea, lower extremity edema.  He does report some hand edema.  He also denies any shortness of breath with activities.    He walks 1-2 laps around the park near his home 3-4 times a week.    He reports his home weights are stable at 196 pounds.    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  Had a thrombectomy on 5/21/2019    -Hypertension    -DVT    -Former meth user, currently uses marijuana    -Current smoker, smoking about half pack a day, patient is having difficulty quitting    Labs / Images Reviewed     Lab Results   Component Value Date/Time    CHOLSTRLTOT 146 05/14/2019 08:40 PM    LDL 72 05/14/2019 08:40 PM    HDL 34 (A) 05/14/2019 08:40 PM    TRIGLYCERIDE 198 (H) 05/14/2019 08:40 PM       Lab Results   Component Value Date/Time    SODIUM 137 08/24/2019 01:02 AM    POTASSIUM 4.3 08/24/2019 01:02 AM    CHLORIDE 104 08/24/2019 01:02 AM    CO2 26 08/24/2019 01:02 AM    GLUCOSE 104 (H) 08/24/2019 01:02 AM    BUN 14 08/24/2019 01:02 AM    CREATININE 0.95 08/24/2019 01:02 AM     Lab Results   Component Value Date/Time    ALKPHOSPHAT 86 " 08/24/2019 01:02 AM    ASTSGOT 18 08/24/2019 01:02 AM    ALTSGPT 15 08/24/2019 01:02 AM    TBILIRUBIN 0.4 08/24/2019 01:02 AM     Transthoracic Echo Report 5/14/2019  No prior study is available for comparison.   Dilated cardiomyopathy, biventricular dysfunction.  LVEF 15-20%.  Moderate to severe tricuspid regurgitation.  Estimated right ventricular systolic pressure  is 70 mmHg.      Transthoracic Echo Report 5/22/2019  Limited echo with contrast to rule out source of emboli.  No intraventricular thrombus seen     Transthoracic Echo Report 8/19/2019  Mildly reduced left ventricular systolic function.  Left ventricar ejection fraction is visually estimated to be 45%.  Mild concentric left ventricular hypertrophy.  Hypokinesis is noted anteroseptum and anterior wall at the mid and   basal segments.  Grade I diastolic dysfunction.  Normal right ventricular systolic function.  No significant valve disease or flow abnormalities.   Normal estimated right atrial pressure.      Compared to the previous echocardiogram performed on 05/22/2019: The LVEF has improved from 15% to 45%.     Assessment and Plan:     1. Congestive heart failure, NYHA class 2 and ACC/AHA stage C (HCC)  - Comp Metabolic Panel; Future  - Lipid Profile; Future    2. Dilated cardiomyopathy (HCC)  - Comp Metabolic Panel; Future  - Lipid Profile; Future    3. History of methamphetamine abuse (HCC)  - Comp Metabolic Panel; Future  - Lipid Profile; Future    4. 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 Tobacco use      -Patient to schedule echocardiogram  -Patient to get CMP and lipid panel done in the next month  -Continue current medications, no additional refills needed at this time  -Encouraged smoking cessation  -Continue to rate refrain from meth use  -Continue anticoagulation and testing by the anticoagulation clinic  -Reinforced s/sx of worsening heart failure with  patient and weight monitoring. Pt verbalizes understanding. Pt to call office or RTC if present.     Follow-up: Return in about 3 months (around 6/24/2020).    Time at end of call: 3:33 PM  Total Time Spent: 11-20 minutes    SUSAN Lynn

## 2020-03-26 ENCOUNTER — ANTICOAGULATION VISIT (OUTPATIENT)
Dept: VASCULAR LAB | Facility: MEDICAL CENTER | Age: 50
End: 2020-03-26
Attending: INTERNAL MEDICINE
Payer: COMMERCIAL

## 2020-03-26 DIAGNOSIS — I74.3 ARTERIAL EMBOLISM AND THROMBOSIS OF LOWER EXTREMITY (HCC): ICD-10-CM

## 2020-03-26 LAB — INR PPP: 2.3 (ref 2–3.5)

## 2020-03-26 PROCEDURE — 85610 PROTHROMBIN TIME: CPT

## 2020-03-26 PROCEDURE — 99211 OFF/OP EST MAY X REQ PHY/QHP: CPT | Performed by: NURSE PRACTITIONER

## 2020-03-26 NOTE — PROGRESS NOTES
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 Referring Internal Medicine [phone]    [facility] Anticoagulation Services Responsible  [phone]        Anticoagulation Patient Findings      HPI:  Lucas Agee seen in clinic today for follow up on anticoagulation therapy in the presence of arterial thrombosis.   Denies any changes to current medical/health status since last appointment.   Denies any medication or diet changes.   No current symptoms of bleeding or thrombosis reported.    A/P:   INR therapeutic.   Continue current regimen.   BP recorded in vitals.    Follow up appointment in 4 week(s).    Next Appointment:  at 2:45 pm.    Joycelyn CHIRINOS

## 2020-03-27 LAB — INR BLD: 2.3 (ref 0.9–1.2)

## 2020-03-30 ENCOUNTER — TELEPHONE (OUTPATIENT)
Dept: MEDICAL GROUP | Facility: MEDICAL CENTER | Age: 50
End: 2020-03-30

## 2020-03-30 NOTE — TELEPHONE ENCOUNTER
Spoke with patient about no show to appointment today 3/30/20.  Explained this was his first no show and the no show policy.

## 2020-04-19 DIAGNOSIS — E78.5 HYPERLIPIDEMIA, UNSPECIFIED HYPERLIPIDEMIA TYPE: ICD-10-CM

## 2020-04-20 RX ORDER — ATORVASTATIN CALCIUM 80 MG/1
TABLET, FILM COATED ORAL
Qty: 90 TAB | Refills: 3 | Status: SHIPPED | OUTPATIENT
Start: 2020-04-20 | End: 2021-05-10 | Stop reason: SDUPTHER

## 2020-04-23 ENCOUNTER — ANTICOAGULATION VISIT (OUTPATIENT)
Dept: VASCULAR LAB | Facility: MEDICAL CENTER | Age: 50
End: 2020-04-23
Attending: INTERNAL MEDICINE
Payer: COMMERCIAL

## 2020-04-23 DIAGNOSIS — I74.3 ARTERIAL EMBOLISM AND THROMBOSIS OF LOWER EXTREMITY (HCC): ICD-10-CM

## 2020-04-23 LAB — INR PPP: 4 (ref 2–3.5)

## 2020-04-23 PROCEDURE — 85610 PROTHROMBIN TIME: CPT

## 2020-04-23 PROCEDURE — 99212 OFFICE O/P EST SF 10 MIN: CPT | Performed by: NURSE PRACTITIONER

## 2020-04-23 NOTE — PROGRESS NOTES
Anticoagulation Summary  As of 2020    INR goal:   2.0-3.0   TTR:   49.8 % (10.6 mo)   INR used for dosin.00! 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 Referring Internal Medicine [phone]    [facility] Responsible  [phone]        Anticoagulation Patient Findings      HPI:  Lucasanjelica Paulino Anuel seen in clinic today for follow up on anticoagulation therapy in the presence of arterial emboli of right lower extremity requiring thrombectomy.   Pt has completed 1 year of therapy. He never did follow up with vascular surgery. Dr Bland noted he could possibly stop warfarin in January depending on ECHO but patient still hasn't had ECHO done.  Denies any medication or diet changes.   No current symptoms of bleeding or thrombosis reported.    A/P:   INR supratherapeutic.   Will HOLD tonight then continue current regimen. Will discuss with Dr Bloch regarding length of therapy. Pt will stay on warfarin for now.    Follow up appointment in 2 week(s).    Next Appointment: Thursday, May 7, 2020 at 2:34 pm.    Joycelyn CHIRINOS

## 2020-04-23 NOTE — Clinical Note
This pt had a RLE arterial clot last May. Per cardiology he could stop warfarin if EF okay back in Jan but pt never had ECHO. I just kept him on it for now but I'll reach out to cardiology.

## 2020-04-23 NOTE — Clinical Note
Hi, This pt needs to stay on warfarin until he has his ECHO I believe but he still hasn't had it done one. Want me to keep him on? Thanks, Joycelyn

## 2020-04-25 DIAGNOSIS — I95.9 HYPOTENSION, UNSPECIFIED HYPOTENSION TYPE: Primary | ICD-10-CM

## 2020-04-27 RX ORDER — CARVEDILOL 25 MG/1
TABLET ORAL
Qty: 180 TAB | Refills: 3 | Status: SHIPPED | OUTPATIENT
Start: 2020-04-27 | End: 2021-05-10 | Stop reason: SDUPTHER

## 2020-04-29 ENCOUNTER — DOCUMENTATION (OUTPATIENT)
Dept: VASCULAR LAB | Facility: MEDICAL CENTER | Age: 50
End: 2020-04-29

## 2020-04-29 NOTE — PROGRESS NOTES
Received message from cardiology. Pt is to stay on warfarin until he completes his ECHO and gets permission from them to stop.    Joycelyn CHIRINOS

## 2020-05-08 ENCOUNTER — TELEPHONE (OUTPATIENT)
Dept: VASCULAR LAB | Facility: MEDICAL CENTER | Age: 50
End: 2020-05-08

## 2020-05-08 NOTE — TELEPHONE ENCOUNTER
[facility]     Pt did not come to appt that was scheduled for today. Will forward to MA to call to reschedule missed appt.     Nessa Floyd, PharmD

## 2020-05-11 ENCOUNTER — TELEPHONE (OUTPATIENT)
Dept: VASCULAR LAB | Facility: MEDICAL CENTER | Age: 50
End: 2020-05-11

## 2020-05-19 DIAGNOSIS — Z79.01 CHRONIC ANTICOAGULATION: ICD-10-CM

## 2020-05-28 ENCOUNTER — TELEPHONE (OUTPATIENT)
Dept: VASCULAR LAB | Facility: MEDICAL CENTER | Age: 50
End: 2020-05-28

## 2020-05-28 NOTE — TELEPHONE ENCOUNTER
Left message for patient to call and schedule Echo as soon as possible so our providers can determine if he will need to stay on anticoagulation. Direct lines given for both imaging and myself.

## 2020-06-01 DIAGNOSIS — I50.20 ACC/AHA STAGE C SYSTOLIC HEART FAILURE (HCC): Primary | ICD-10-CM

## 2020-06-02 RX ORDER — FUROSEMIDE 20 MG/1
20 TABLET ORAL DAILY
Qty: 90 TAB | Refills: 3 | Status: SHIPPED | OUTPATIENT
Start: 2020-06-02 | End: 2020-07-10

## 2020-06-03 ENCOUNTER — TELEPHONE (OUTPATIENT)
Dept: CARDIOLOGY | Facility: PHYSICIAN GROUP | Age: 50
End: 2020-06-03

## 2020-06-14 ENCOUNTER — APPOINTMENT (OUTPATIENT)
Dept: RADIOLOGY | Facility: MEDICAL CENTER | Age: 50
End: 2020-06-14
Attending: EMERGENCY MEDICINE
Payer: COMMERCIAL

## 2020-06-14 ENCOUNTER — HOSPITAL ENCOUNTER (EMERGENCY)
Facility: MEDICAL CENTER | Age: 50
End: 2020-06-14
Attending: EMERGENCY MEDICINE
Payer: COMMERCIAL

## 2020-06-14 VITALS
BODY MASS INDEX: 30.77 KG/M2 | OXYGEN SATURATION: 97 % | SYSTOLIC BLOOD PRESSURE: 124 MMHG | HEIGHT: 68 IN | WEIGHT: 203 LBS | HEART RATE: 80 BPM | TEMPERATURE: 97.3 F | DIASTOLIC BLOOD PRESSURE: 78 MMHG | RESPIRATION RATE: 16 BRPM

## 2020-06-14 DIAGNOSIS — M25.462 KNEE EFFUSION, LEFT: ICD-10-CM

## 2020-06-14 LAB
INR PPP: 1.45 (ref 0.87–1.13)
PROTHROMBIN TIME: 18.1 SEC (ref 12–14.6)

## 2020-06-14 PROCEDURE — 99283 EMERGENCY DEPT VISIT LOW MDM: CPT

## 2020-06-14 PROCEDURE — 85610 PROTHROMBIN TIME: CPT

## 2020-06-14 PROCEDURE — 700102 HCHG RX REV CODE 250 W/ 637 OVERRIDE(OP): Performed by: EMERGENCY MEDICINE

## 2020-06-14 PROCEDURE — A9270 NON-COVERED ITEM OR SERVICE: HCPCS | Performed by: EMERGENCY MEDICINE

## 2020-06-14 PROCEDURE — 93971 EXTREMITY STUDY: CPT | Mod: LT

## 2020-06-14 PROCEDURE — 73564 X-RAY EXAM KNEE 4 OR MORE: CPT | Mod: LT

## 2020-06-14 RX ORDER — WARFARIN SODIUM 5 MG/1
5 TABLET ORAL ONCE
Status: COMPLETED | OUTPATIENT
Start: 2020-06-14 | End: 2020-06-14

## 2020-06-14 RX ORDER — ACETAMINOPHEN 325 MG/1
650 TABLET ORAL ONCE
Status: COMPLETED | OUTPATIENT
Start: 2020-06-14 | End: 2020-06-14

## 2020-06-14 RX ADMIN — WARFARIN SODIUM 5 MG: 5 TABLET ORAL at 09:43

## 2020-06-14 RX ADMIN — ACETAMINOPHEN 650 MG: 325 TABLET, FILM COATED ORAL at 07:58

## 2020-06-14 ASSESSMENT — LIFESTYLE VARIABLES
HAVE PEOPLE ANNOYED YOU BY CRITICIZING YOUR DRINKING: NO
TOTAL SCORE: 0
CONSUMPTION TOTAL: INCOMPLETE
DO YOU DRINK ALCOHOL: NO
EVER FELT BAD OR GUILTY ABOUT YOUR DRINKING: NO
EVER HAD A DRINK FIRST THING IN THE MORNING TO STEADY YOUR NERVES TO GET RID OF A HANGOVER: NO
TOTAL SCORE: 0
HAVE YOU EVER FELT YOU SHOULD CUT DOWN ON YOUR DRINKING: NO
TOTAL SCORE: 0

## 2020-06-14 ASSESSMENT — FIBROSIS 4 INDEX: FIB4 SCORE: 0.84

## 2020-06-14 NOTE — ED TRIAGE NOTES
Chief Complaint   Patient presents with   • Knee Pain     States was doing yard work yesterday when he noticed left knee pain.  Pt states pain has become increasingly worse and has had some swelling.  + cms.  Triage process explained to patient.  Pt back to waiting room.  Pt instructed to inform RN if any changes or questions arise.

## 2020-06-14 NOTE — ED NOTES
Pt provided with discharge instructions, instructions for follow up appointment with Orthopedics, s/s of when to seek emergency care.  Pt verbalizes understanding.  Pt discharged in good condition.

## 2020-06-14 NOTE — ED PROVIDER NOTES
"ED Provider Note    CHIEF COMPLAINT  Chief Complaint   Patient presents with   • Knee Pain       HPI  Lucas Agee is a 49 y.o. male who presents complaining of knee pain, which started yesterday   There is pain and swelling at the left knee. The patient was able to bear weight directly after the injury    REVIEW OF SYSTEMS  See HPI for further details. All other systems are negative.     PAST MEDICAL HISTORY   has a past medical history of Congestive heart failure (HCC), DVT (deep venous thrombosis) ([facility]), and Hypertension.    SOCIAL HISTORY   reports that he has been smoking cigarettes. He has been smoking about 0.50 packs per day. He has never used smokeless tobacco. He reports current drug use. Drugs: Inhaled, Intravenous, Methamphetamines, and Marijuana. He reports that he does not drink alcohol.    SURGICAL HISTORY   has a past surgical history that includes hernia repair; hernia repair (Right, 1990); hand surgery (Right, 1995); and thrombectomy (Right, 5/21/2019).    CURRENT MEDICATIONS  Home Medications     Reviewed by Stevan Berger R.N. (Registered Nurse) on 06/14/20 at 0736  Med List Status: Complete   Medication Last Dose Status   aspirin (ASA) 81 MG Chew Tab chewable tablet 6/13/2020 Active   atorvastatin (LIPITOR) 80 MG tablet 6/13/2020 Active   carvedilol (COREG) 25 MG Tab 6/13/2020 Active   furosemide (LASIX) 20 MG Tab 6/13/2020 Active   lisinopril (PRINIVIL) 20 MG Tab 6/13/2020 Active   spironolactone (ALDACTONE) 25 MG Tab 6/13/2020 Active   warfarin (COUMADIN) 5 MG Tab 6/13/2020 Active                ALLERGIES  No Known Allergies    PHYSICAL EXAM  VITAL SIGNS: /83   Pulse 91   Temp (!) 35.7 °C (96.2 °F) (Temporal)   Resp 16   Ht 1.727 m (5' 8\")   Wt 92.1 kg (203 lb)   SpO2 96%   BMI 30.87 kg/m²   Constitutional: Alert in no apparent distress.  HENT: Normocephalic, Atraumatic, Bilateral external ears normal. Nose normal.   Eyes: Pupils are equal and reactive. Conjunctiva normal, " non-icteric.   Thorax & Lungs: Easy unlabored respirations  Abdomen:  No gross signs of peritonitis no pain with movement   Skin: Visualized skin is Warm, Dry, No erythema, No rash.   Extremities:     Knee with FROM of extension and flexion with mild pain  No joint laxity  Pain is located at knee joint  No fibular head TTP  Distal pulses are equal BL  Sensation intact  Compartments are soft  Neurologic: Alert, Grossly non-focal.   Psychiatric: Affect and Mood normal      COURSE & MEDICAL DECISION MAKING  Pertinent Labs & Imaging studies reviewed. (See chart for details)  the patient presents with acute ankle pain.  As the patient did have an effusion x-ray was obtained  US-EXTREMITY VENOUS LOWER UNILAT LEFT         DX-KNEE COMPLETE 4+ LEFT   Final Result      No evidence of acute fracture or dislocation.      Moderate joint effusion.        Patient also has a history of DVT ultrasound was ordered to rule out DVT which was negative.  His INR was subtherapeutic he was given an additional 5 mg and follow-up with Ortho after knee immobilizer placed.  He will have compression with an Ace bandage today which will be taken off tomorrow and he will not continue with compression due to his history of DVT, I have discussed this may be a meniscal tear and he will need orthopedic follow-up        FINAL IMPRESSION  1. Knee pain, can not exclude intrinsic injury  2.  Knee effusion, left     The patient was discharged home with an information sheet on ankle sprain and told to return immediately for numbness, color change or worsening.  The follow-up plan is documented in EPIC and provided in writing to the patient.    Electronically signed by: Duyen Allan M.D., 6/14/2020 9:09 AM

## 2020-06-15 ENCOUNTER — ANTICOAGULATION MONITORING (OUTPATIENT)
Dept: MEDICAL GROUP | Facility: PHYSICIAN GROUP | Age: 50
End: 2020-06-15

## 2020-06-15 DIAGNOSIS — I74.3 ARTERIAL EMBOLISM AND THROMBOSIS OF LOWER EXTREMITY (HCC): ICD-10-CM

## 2020-06-15 RX ORDER — WARFARIN SODIUM 5 MG/1
TABLET ORAL
Qty: 45 TAB | Refills: 0 | Status: SHIPPED | OUTPATIENT
Start: 2020-06-15 | End: 2020-07-15

## 2020-06-15 NOTE — PROGRESS NOTES
Anticoagulation Summary  As of 6/15/2020    INR goal:   2.0-3.0   TTR:   48.3 % (1 y)   INR used for dosin.45! 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 Referring Internal Medicine [phone]    [facility] Anticoagulation Services Responsible  [phone]        Anticoagulation Patient Findings    Spoke with patient by phone. He was in the ER yesterday for knee pain. Neg for DVT. INR subtherapeutic. States he is taking 7.5 mg daily (not 5 mg T/Th as previously charted). Missed his dose on Thursday last week. Denies any medication or diet changes. No current symptoms of bleeding or thrombosis reported. No recent VTEs. Will increase tonight then continue current regimen. Follow up on Friday - pt will come in for POC INR at his scheduled time of 2 pm.    Joycelyn CHIRINOS

## 2020-06-26 ENCOUNTER — TELEPHONE (OUTPATIENT)
Dept: VASCULAR LAB | Facility: MEDICAL CENTER | Age: 50
End: 2020-06-26

## 2020-06-30 ENCOUNTER — TELEPHONE (OUTPATIENT)
Dept: VASCULAR LAB | Facility: MEDICAL CENTER | Age: 50
End: 2020-06-30

## 2020-07-06 ENCOUNTER — TELEPHONE (OUTPATIENT)
Dept: VASCULAR LAB | Facility: MEDICAL CENTER | Age: 50
End: 2020-07-06

## 2020-07-06 DIAGNOSIS — I50.9 CONGESTIVE HEART FAILURE, NYHA CLASS 2 AND ACC/AHA STAGE C (HCC): Primary | ICD-10-CM

## 2020-07-07 ENCOUNTER — TELEPHONE (OUTPATIENT)
Dept: CARDIOLOGY | Facility: MEDICAL CENTER | Age: 50
End: 2020-07-07

## 2020-07-07 RX ORDER — SPIRONOLACTONE 25 MG/1
12.5 TABLET ORAL
Qty: 30 TAB | Refills: 0 | Status: SHIPPED | OUTPATIENT
Start: 2020-07-07 | End: 2021-03-05

## 2020-07-07 NOTE — TELEPHONE ENCOUNTER
Pt called re: medication refill request. Pt states he has taken his last tablet. Pt educated to call when he sees he is approx 1 week out from end supply which allows for a timely refill. Pt states understanding. Pt states he never increased to 12.5 mg daily as instructed. Pt states he has always just taken 12.5 mg every other day. We discuss timely f/u appts in regards to his cardiac care. Pt states it is difficult for him secondary to rides and family issues, etc. Pt is scheduled for Friday afternoon to see MD. The importance of understanding medication adjustments and care changes are also discussed at this time. Pt states understanding and will be at scheduled appt.

## 2020-07-08 RX ORDER — SPIRONOLACTONE 25 MG/1
TABLET ORAL
Qty: 20 TAB | Refills: 0 | Status: SHIPPED | OUTPATIENT
Start: 2020-07-08 | End: 2020-07-08

## 2020-07-09 ENCOUNTER — TELEPHONE (OUTPATIENT)
Dept: VASCULAR LAB | Facility: MEDICAL CENTER | Age: 50
End: 2020-07-09

## 2020-07-09 ENCOUNTER — ANTICOAGULATION MONITORING (OUTPATIENT)
Dept: VASCULAR LAB | Facility: MEDICAL CENTER | Age: 50
End: 2020-07-09

## 2020-07-09 DIAGNOSIS — I74.3 ARTERIAL EMBOLISM AND THROMBOSIS OF LOWER EXTREMITY (HCC): ICD-10-CM

## 2020-07-09 NOTE — TELEPHONE ENCOUNTER
[facility]    Pt is over due for PT/INR for warfarin monitoring. Left message for patient to have INR checked. Clinic phone number left for any questions or concerns.  Appears patient also needs ECHO.     Doron Simpson  PharmD

## 2020-07-10 ENCOUNTER — OFFICE VISIT (OUTPATIENT)
Dept: CARDIOLOGY | Facility: MEDICAL CENTER | Age: 50
End: 2020-07-10
Payer: MEDICAID

## 2020-07-10 VITALS
WEIGHT: 218 LBS | DIASTOLIC BLOOD PRESSURE: 60 MMHG | OXYGEN SATURATION: 94 % | HEART RATE: 107 BPM | HEIGHT: 69 IN | BODY MASS INDEX: 32.29 KG/M2 | SYSTOLIC BLOOD PRESSURE: 130 MMHG

## 2020-07-10 DIAGNOSIS — F15.11 HISTORY OF METHAMPHETAMINE ABUSE (HCC): ICD-10-CM

## 2020-07-10 DIAGNOSIS — I74.3 ARTERIAL EMBOLISM AND THROMBOSIS OF LOWER EXTREMITY (HCC): ICD-10-CM

## 2020-07-10 DIAGNOSIS — I42.0 DILATED CARDIOMYOPATHY (HCC): ICD-10-CM

## 2020-07-10 DIAGNOSIS — I07.1 TRICUSPID VALVE INSUFFICIENCY, UNSPECIFIED ETIOLOGY: ICD-10-CM

## 2020-07-10 DIAGNOSIS — I50.82 BIVENTRICULAR HEART FAILURE (HCC): ICD-10-CM

## 2020-07-10 DIAGNOSIS — I27.20 PULMONARY HYPERTENSION (HCC): ICD-10-CM

## 2020-07-10 DIAGNOSIS — R79.89 ELEVATED TROPONIN: ICD-10-CM

## 2020-07-10 DIAGNOSIS — I50.9 CONGESTIVE HEART FAILURE, NYHA CLASS 2 AND ACC/AHA STAGE C (HCC): ICD-10-CM

## 2020-07-10 DIAGNOSIS — K76.0 HEPATIC STEATOSIS: ICD-10-CM

## 2020-07-10 DIAGNOSIS — F19.90 SUBSTANCE USE DISORDER: ICD-10-CM

## 2020-07-10 DIAGNOSIS — Z72.0 TOBACCO USE: ICD-10-CM

## 2020-07-10 DIAGNOSIS — I50.20 ACC/AHA STAGE C SYSTOLIC HEART FAILURE (HCC): ICD-10-CM

## 2020-07-10 PROCEDURE — 99215 OFFICE O/P EST HI 40 MIN: CPT | Performed by: INTERNAL MEDICINE

## 2020-07-10 ASSESSMENT — ENCOUNTER SYMPTOMS
SORE THROAT: 0
ORTHOPNEA: 0
WHEEZING: 0
BRUISES/BLEEDS EASILY: 0
CARDIOVASCULAR NEGATIVE: 1
GASTROINTESTINAL NEGATIVE: 1
MUSCULOSKELETAL NEGATIVE: 1
HEMOPTYSIS: 0
CLAUDICATION: 0
SPUTUM PRODUCTION: 0
CONSTITUTIONAL NEGATIVE: 1
PND: 0
FEVER: 0
PALPITATIONS: 0
LOSS OF CONSCIOUSNESS: 0
WEAKNESS: 0
RESPIRATORY NEGATIVE: 1
SHORTNESS OF BREATH: 0
CHILLS: 0
STRIDOR: 0
DIZZINESS: 1
EYES NEGATIVE: 1
COUGH: 0

## 2020-07-10 ASSESSMENT — FIBROSIS 4 INDEX: FIB4 SCORE: 0.84

## 2020-07-10 NOTE — PROGRESS NOTES
"Chief Complaint   Patient presents with   • Congestive Heart Failure       Subjective:   Lucas Agee is a 49 y.o. male who presents today as a follow-up for his heart failure with reduced ejection fraction with arterial thrombus from likely his reduced ejection fraction.  He continues on the high-dose medication of warfarin for his thrombus.  He has been on this for greater than 6 months.  His EF is likely normalized as he no longer has any symptoms.  He is having some lightheadedness and orthostasis.  His blood pressures otherwise controlled.  He has no functional imitations.    Past Medical History:   Diagnosis Date   • Congestive heart failure (HCC)    • DVT (deep venous thrombosis) (HCC)    • Hypertension      Past Surgical History:   Procedure Laterality Date   • THROMBECTOMY Right 5/21/2019    Procedure: THROMBECTOMY - lower extremity, anterior tibial artery ;  Surgeon: Deidra Dominguez M.D.;  Location: SURGERY [facility];  Service: General   • HAND SURGERY Right 1995    \"metal plate placed\"   • HERNIA REPAIR Right 1990    groin   • HERNIA REPAIR       Family History   Problem Relation Age of Onset   • Heart Disease Father    • Blood Clots Brother    • Heart Disease Brother         pacemaker   • Blood Clots Paternal Grandmother    • Heart Disease Paternal Grandmother    • Stroke Paternal Grandmother    • Heart Attack Paternal Uncle 60   • Cancer Neg Hx      Social History     Socioeconomic History   • Marital status: Single     Spouse name: Not on file   • Number of children: Not on file   • Years of education: Not on file   • Highest education level: Not on file   Occupational History   • Not on file   Social Needs   • Financial resource strain: Not on file   • Food insecurity     Worry: Not on file     Inability: Not on file   • Transportation needs     Medical: Not on file     Non-medical: Not on file   Tobacco Use   • Smoking status: Current Every Day Smoker     Packs/day: 0.50     Types: " Cigarettes   • Smokeless tobacco: Never Used   • Tobacco comment: trying to quit   Substance and Sexual Activity   • Alcohol use: No   • Drug use: Yes     Types: Inhaled, Intravenous, Methamphetamines, Marijuana     Comment: marijuana;    • Sexual activity: Yes     Partners: Female   Lifestyle   • Physical activity     Days per week: Not on file     Minutes per session: Not on file   • Stress: Not on file   Relationships   • Social connections     Talks on phone: Not on file     Gets together: Not on file     Attends Gnosticism service: Not on file     Active member of club or organization: Not on file     Attends meetings of clubs or organizations: Not on file     Relationship status: Not on file   • Intimate partner violence     Fear of current or ex partner: Not on file     Emotionally abused: Not on file     Physically abused: Not on file     Forced sexual activity: Not on file   Other Topics Concern   • Not on file   Social History Narrative   • Not on file     No Known Allergies  Outpatient Encounter Medications as of 7/10/2020   Medication Sig Dispense Refill   • spironolactone (ALDACTONE) 25 MG Tab Take 0.5 Tabs by mouth every 48 hours. 30 Tab 0   • warfarin (COUMADIN) 5 MG Tab Take one to one and one-half tablets by mouth daily or as directed by anticoagulation clinic 45 Tab 0   • [DISCONTINUED] furosemide (LASIX) 20 MG Tab Take 1 Tab by mouth every day. 90 Tab 3   • carvedilol (COREG) 25 MG Tab Take 1 tablet by mouth twice daily 180 Tab 3   • atorvastatin (LIPITOR) 80 MG tablet TAKE 1 TABLET BY MOUTH ONCE DAILY IN THE EVENING 90 Tab 3   • lisinopril (PRINIVIL) 20 MG Tab Take 1 tablet by mouth once daily 90 Tab 3   • aspirin (ASA) 81 MG Chew Tab chewable tablet Take 1 Tab by mouth every day. 30 Tab 6     No facility-administered encounter medications on file as of 7/10/2020.      Review of Systems   Constitutional: Negative.  Negative for chills, fever and malaise/fatigue.   HENT: Negative.  Negative for  "sore throat.    Eyes: Negative.    Respiratory: Negative.  Negative for cough, hemoptysis, sputum production, shortness of breath, wheezing and stridor.    Cardiovascular: Negative.  Negative for chest pain, palpitations, orthopnea, claudication, leg swelling and PND.   Gastrointestinal: Negative.    Genitourinary: Negative.    Musculoskeletal: Negative.    Skin: Negative.    Neurological: Positive for dizziness. Negative for loss of consciousness and weakness.   Endo/Heme/Allergies: Negative.  Does not bruise/bleed easily.   All other systems reviewed and are negative.       Objective:   /60 (BP Location: Left arm, Patient Position: Sitting, BP Cuff Size: Adult)   Pulse (!) 107   Ht 1.753 m (5' 9\")   Wt 98.9 kg (218 lb)   SpO2 94%   BMI 32.19 kg/m²     Physical Exam   Constitutional: He appears well-developed and well-nourished. No distress.   HENT:   Head: Normocephalic and atraumatic.   Right Ear: External ear normal.   Left Ear: External ear normal.   Nose: Nose normal.   Mouth/Throat: No oropharyngeal exudate.   Eyes: Pupils are equal, round, and reactive to light. Conjunctivae and EOM are normal. Right eye exhibits no discharge. Left eye exhibits no discharge. No scleral icterus.   Neck: Neck supple. No JVD present.   Cardiovascular: Normal rate, regular rhythm and intact distal pulses. Exam reveals no gallop and no friction rub.   No murmur heard.  Pulmonary/Chest: Effort normal. No stridor. No respiratory distress. He has no wheezes. He has no rales. He exhibits no tenderness.   Abdominal: Soft. He exhibits no distension. There is no guarding.   Musculoskeletal: Normal range of motion.         General: No tenderness, deformity or edema.   Neurological: He is alert. He has normal reflexes. He displays normal reflexes. No cranial nerve deficit. He exhibits normal muscle tone. Coordination normal.   Skin: Skin is warm and dry. No rash noted. He is not diaphoretic. No erythema. No pallor. "   Psychiatric: He has a normal mood and affect. His behavior is normal. Judgment and thought content normal.   Nursing note and vitals reviewed.    Echocardiogram called 8/20/2019 personally viewed interpreted myself showing an EF of 45% with wall motion abnormalities.    EKG: Dated 8/24/2019 personally viewed interpreted myself showing normal sinus rhythm otherwise normal ECG.    Lab Results   Component Value Date/Time    CHOLSTRLTOT 146 05/14/2019 08:40 PM    LDL 72 05/14/2019 08:40 PM    HDL 34 (A) 05/14/2019 08:40 PM    TRIGLYCERIDE 198 (H) 05/14/2019 08:40 PM       Lab Results   Component Value Date/Time    SODIUM 137 08/24/2019 01:02 AM    POTASSIUM 4.3 08/24/2019 01:02 AM    CHLORIDE 104 08/24/2019 01:02 AM    CO2 26 08/24/2019 01:02 AM    GLUCOSE 104 (H) 08/24/2019 01:02 AM    BUN 14 08/24/2019 01:02 AM    CREATININE 0.95 08/24/2019 01:02 AM     Lab Results   Component Value Date/Time    ALKPHOSPHAT 86 08/24/2019 01:02 AM    ASTSGOT 18 08/24/2019 01:02 AM    ALTSGPT 15 08/24/2019 01:02 AM    TBILIRUBIN 0.4 08/24/2019 01:02 AM        Assessment:     1. Congestive heart failure, NYHA class 2 and ACC/AHA stage C (HCC)     2. Biventricular heart failure (HCC)     3. 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 ACC/AHA stage C systolic heart failure (HCC)  EC-ECHOCARDIOGRAM COMPLETE W/O CONT   5. Dilated cardiomyopathy (HCC)  EC-ECHOCARDIOGRAM COMPLETE W/O CONT   6. Elevated troponin     7. Hepatic steatosis     8. History of methamphetamine abuse (HCC)     9. Pulmonary hypertension (HCC)     10. Substance use disorder     11. Tobacco use     12. Tricuspid valve insufficiency, unspecified etiology         Medical Decision Making:  Today's Assessment / Status / Plan:     49-year-old male with a substance-induced cardiomyopathy with mostly normalization of his ejection fraction with mild orthostasis.  I will have him stop his Lasix.  He will stay better hydrated and monitor for lower extremity  edema.  If her weight resolves he will restart his Lasix as needed.  In meantime for his arterial thrombus I will discuss with the Coumadin clinic whether not they think it is appropriate to stop his Coumadin which I would recommend today.  If they agree we will call and have him stop his warfarin.  Otherwise we will get an echocardiogram today and see him back in 3 months.

## 2020-07-15 ENCOUNTER — ANTICOAGULATION MONITORING (OUTPATIENT)
Dept: VASCULAR LAB | Facility: MEDICAL CENTER | Age: 50
End: 2020-07-15

## 2020-07-15 DIAGNOSIS — I74.3 ARTERIAL EMBOLISM AND THROMBOSIS OF LOWER EXTREMITY (HCC): ICD-10-CM

## 2020-07-15 NOTE — PROGRESS NOTES
Left vm for pt letting him know he can stop warfarin. Instructed to continue taking ASA 81 mg and to follow up with cardiology. Med rec updated. Will discharge from the anticoagulation clinic.    Joycelyn CHIRINOS

## 2020-08-01 NOTE — PROGRESS NOTES
Report received by day shift RN. Pt. Awake alert and oriented x 4 with minimal c/o pain in left knee. Pt agitated related to anticipated discharge today. He expected to discharge but did not hear from discharge team. Pt updated to POC and verbalized understanding. Bed locked in low position with fall precautions in place and call light in reach.   01-Aug-2020 19:28

## 2020-08-10 ENCOUNTER — HOSPITAL ENCOUNTER (OUTPATIENT)
Dept: CARDIOLOGY | Facility: MEDICAL CENTER | Age: 50
End: 2020-08-10
Attending: INTERNAL MEDICINE
Payer: MEDICAID

## 2020-08-10 DIAGNOSIS — I42.0 DILATED CARDIOMYOPATHY (HCC): ICD-10-CM

## 2020-08-10 DIAGNOSIS — I50.20 ACC/AHA STAGE C SYSTOLIC HEART FAILURE (HCC): ICD-10-CM

## 2020-08-10 PROCEDURE — 93306 TTE W/DOPPLER COMPLETE: CPT

## 2020-08-11 LAB
LV EJECT FRACT  99904: 55
LV EJECT FRACT MOD 2C 99903: 60.82
LV EJECT FRACT MOD 4C 99902: 57.53
LV EJECT FRACT MOD BP 99901: 55.3

## 2020-08-11 PROCEDURE — 93306 TTE W/DOPPLER COMPLETE: CPT | Mod: 26 | Performed by: INTERNAL MEDICINE

## 2021-03-04 DIAGNOSIS — I50.20 ACC/AHA STAGE C SYSTOLIC HEART FAILURE (HCC): ICD-10-CM

## 2021-03-05 RX ORDER — SPIRONOLACTONE 25 MG/1
TABLET ORAL
Qty: 30 TABLET | Refills: 0 | Status: SHIPPED | OUTPATIENT
Start: 2021-03-05 | End: 2021-05-10 | Stop reason: SDUPTHER

## 2021-04-12 DIAGNOSIS — I50.9 CONGESTIVE HEART FAILURE, NYHA CLASS 2 AND ACC/AHA STAGE C (HCC): ICD-10-CM

## 2021-04-13 RX ORDER — LISINOPRIL 20 MG/1
TABLET ORAL
Qty: 30 TABLET | Refills: 0 | Status: SHIPPED | OUTPATIENT
Start: 2021-04-13 | End: 2021-05-10 | Stop reason: SDUPTHER

## 2021-05-10 ENCOUNTER — OFFICE VISIT (OUTPATIENT)
Dept: CARDIOLOGY | Facility: MEDICAL CENTER | Age: 51
End: 2021-05-10
Payer: MEDICAID

## 2021-05-10 VITALS
OXYGEN SATURATION: 93 % | SYSTOLIC BLOOD PRESSURE: 102 MMHG | DIASTOLIC BLOOD PRESSURE: 70 MMHG | BODY MASS INDEX: 32.23 KG/M2 | RESPIRATION RATE: 18 BRPM | HEART RATE: 92 BPM | WEIGHT: 217.6 LBS | HEIGHT: 69 IN

## 2021-05-10 DIAGNOSIS — Z72.0 TOBACCO USE: ICD-10-CM

## 2021-05-10 DIAGNOSIS — I42.0 DILATED CARDIOMYOPATHY (HCC): ICD-10-CM

## 2021-05-10 DIAGNOSIS — F15.11 HISTORY OF METHAMPHETAMINE ABUSE (HCC): ICD-10-CM

## 2021-05-10 DIAGNOSIS — I27.20 PULMONARY HYPERTENSION (HCC): ICD-10-CM

## 2021-05-10 DIAGNOSIS — F19.90 SUBSTANCE USE DISORDER: ICD-10-CM

## 2021-05-10 DIAGNOSIS — R79.89 ELEVATED TROPONIN: ICD-10-CM

## 2021-05-10 DIAGNOSIS — I50.20 ACC/AHA STAGE C SYSTOLIC HEART FAILURE (HCC): ICD-10-CM

## 2021-05-10 DIAGNOSIS — I50.9 CONGESTIVE HEART FAILURE, NYHA CLASS 2 AND ACC/AHA STAGE C (HCC): ICD-10-CM

## 2021-05-10 DIAGNOSIS — K76.0 HEPATIC STEATOSIS: ICD-10-CM

## 2021-05-10 DIAGNOSIS — I50.82 BIVENTRICULAR HEART FAILURE (HCC): ICD-10-CM

## 2021-05-10 DIAGNOSIS — E78.5 HYPERLIPIDEMIA, UNSPECIFIED HYPERLIPIDEMIA TYPE: ICD-10-CM

## 2021-05-10 DIAGNOSIS — I95.9 HYPOTENSION, UNSPECIFIED HYPOTENSION TYPE: ICD-10-CM

## 2021-05-10 PROCEDURE — 99214 OFFICE O/P EST MOD 30 MIN: CPT | Performed by: INTERNAL MEDICINE

## 2021-05-10 RX ORDER — SPIRONOLACTONE 25 MG/1
25 TABLET ORAL DAILY
Qty: 90 TABLET | Refills: 3 | Status: SHIPPED | OUTPATIENT
Start: 2021-05-10 | End: 2021-06-04

## 2021-05-10 RX ORDER — LISINOPRIL 20 MG/1
20 TABLET ORAL DAILY
Qty: 90 TABLET | Refills: 3 | Status: SHIPPED | OUTPATIENT
Start: 2021-05-10 | End: 2022-05-17

## 2021-05-10 RX ORDER — CARVEDILOL 25 MG/1
25 TABLET ORAL 2 TIMES DAILY WITH MEALS
Qty: 180 TABLET | Refills: 3 | Status: SHIPPED | OUTPATIENT
Start: 2021-05-10 | End: 2021-06-04

## 2021-05-10 RX ORDER — ATORVASTATIN CALCIUM 80 MG/1
40 TABLET, FILM COATED ORAL EVERY EVENING
Qty: 90 TABLET | Refills: 3 | Status: SHIPPED | OUTPATIENT
Start: 2021-05-10 | End: 2021-06-04

## 2021-05-10 ASSESSMENT — ENCOUNTER SYMPTOMS
CLAUDICATION: 0
MUSCULOSKELETAL NEGATIVE: 1
HEMOPTYSIS: 0
COUGH: 0
FEVER: 0
CARDIOVASCULAR NEGATIVE: 1
WHEEZING: 0
PALPITATIONS: 0
DIZZINESS: 0
STRIDOR: 0
CHILLS: 0
ORTHOPNEA: 0
GASTROINTESTINAL NEGATIVE: 1
EYES NEGATIVE: 1
SPUTUM PRODUCTION: 0
CONSTITUTIONAL NEGATIVE: 1
PND: 0
RESPIRATORY NEGATIVE: 1
WEAKNESS: 0
BRUISES/BLEEDS EASILY: 0
SHORTNESS OF BREATH: 0
SORE THROAT: 0
LOSS OF CONSCIOUSNESS: 0

## 2021-05-10 ASSESSMENT — FIBROSIS 4 INDEX: FIB4 SCORE: 0.86

## 2021-05-10 ASSESSMENT — MINNESOTA LIVING WITH HEART FAILURE QUESTIONNAIRE (MLHF)
DIFFICULTY WITH RECREATIONAL PASTIMES, SPORTS, HOBBIES: 2
GIVING YOU SIDE EFFECTS FROM TREATMENTS: 0
MAKING YOU WORRY: 2
TIRED, FATIGUED OR LOW ON ENERGY: 2
MAKING YOU SHORT OF BREATH: 1
FEELING LIKE A BURDEN TO FAMILY AND FRIENDS: 1
LOSS OF SELF CONTROL IN YOUR LIFE: 1
MAKING YOU FEEL DEPRESSED: 1
WORKING AROUND THE HOUSE OR YARD DIFFICULT: 2
TOTAL_SCORE: 25
SWELLING IN ANKLES OR LEGS: 0
WALKING ABOUT OR CLIMBING STAIRS DIFFICULT: 2
HAVING TO SIT OR LIE DOWN DURING THE DAY: 1
EATING LESS FOODS YOU LIKE: 2
DIFFICULTY WITH SEXUAL ACTIVITIES: 2
DIFFICULTY TO CONCENTRATE OR REMEMBERING THINGS: 1
COSTING YOU MONEY FOR MEDICAL CARE: 0
DIFFICULTY SLEEPING WELL AT NIGHT: 1
MAKING YOU STAY IN A HOSPITAL: 0
DIFFICULTY WORKING TO EARN A LIVING: 2
DIFFICULTY GOING AWAY FROM HOME: 1
DIFFICULTY SOCIALIZING WITH FAMILY OR FRIENDS: 1

## 2021-05-10 NOTE — PROGRESS NOTES
"Chief Complaint   Patient presents with   • Congestive Heart Failure       Subjective:   Lucas Agee is a 49 y.o. male who presents today as a follow-up for his heart failure with reduced ejection fraction with arterial thrombus from likely his reduced ejection fraction.    Since he was last seen he reports having approximate NYHA class II symptoms.  He can walk about half a mile with stopping.  He has no chest pain.  He does appear to have some decreased capillary refill.  He does continue to smoke.  He is try to quit.    Past Medical History:   Diagnosis Date   • Congestive heart failure (HCC)    • DVT (deep venous thrombosis) (HCC)    • Hypertension      Past Surgical History:   Procedure Laterality Date   • THROMBECTOMY Right 5/21/2019    Procedure: THROMBECTOMY - lower extremity, anterior tibial artery ;  Surgeon: Deidra Dominguez M.D.;  Location: SURGERY [facility];  Service: General   • HAND SURGERY Right 1995    \"metal plate placed\"   • HERNIA REPAIR Right 1990    groin   • HERNIA REPAIR       Family History   Problem Relation Age of Onset   • Heart Disease Father    • Blood Clots Brother    • Heart Disease Brother         pacemaker   • Blood Clots Paternal Grandmother    • Heart Disease Paternal Grandmother    • Stroke Paternal Grandmother    • Heart Attack Paternal Uncle 60   • Cancer Neg Hx      Social History     Socioeconomic History   • Marital status: Single     Spouse name: Not on file   • Number of children: Not on file   • Years of education: Not on file   • Highest education level: Not on file   Occupational History   • Not on file   Tobacco Use   • Smoking status: Current Every Day Smoker     Packs/day: 0.50     Types: Cigarettes   • Smokeless tobacco: Never Used   • Tobacco comment: trying to quit   Substance and Sexual Activity   • Alcohol use: No   • Drug use: Yes     Types: Inhaled, Intravenous, Methamphetamines, Marijuana     Comment: marijuana;    • Sexual activity: Yes     " Partners: Female   Other Topics Concern   • Not on file   Social History Narrative   • Not on file     Social Determinants of Health     Financial Resource Strain:    • Difficulty of Paying Living Expenses:    Food Insecurity:    • Worried About Running Out of Food in the Last Year:    • Ran Out of Food in the Last Year:    Transportation Needs:    • Lack of Transportation (Medical):    • Lack of Transportation (Non-Medical):    Physical Activity:    • Days of Exercise per Week:    • Minutes of Exercise per Session:    Stress:    • Feeling of Stress :    Social Connections:    • Frequency of Communication with Friends and Family:    • Frequency of Social Gatherings with Friends and Family:    • Attends Amish Services:    • Active Member of Clubs or Organizations:    • Attends Club or Organization Meetings:    • Marital Status:    Intimate Partner Violence:    • Fear of Current or Ex-Partner:    • Emotionally Abused:    • Physically Abused:    • Sexually Abused:      No Known Allergies  Outpatient Encounter Medications as of 5/10/2021   Medication Sig Dispense Refill   • atorvastatin (LIPITOR) 80 MG tablet Take 0.5 Tablets by mouth every evening. 90 tablet 3   • carvedilol (COREG) 25 MG Tab Take 1 tablet by mouth 2 times a day with meals. 180 tablet 3   • lisinopril (PRINIVIL) 20 MG Tab Take 1 tablet by mouth every day. 90 tablet 3   • spironolactone (ALDACTONE) 25 MG Tab Take 1 tablet by mouth every day. 90 tablet 3   • aspirin (ASA) 81 MG Chew Tab chewable tablet Take 1 Tab by mouth every day. 30 Tab 6   • [DISCONTINUED] lisinopril (PRINIVIL) 20 MG Tab Take 1 tablet by mouth once daily 30 tablet 0   • [DISCONTINUED] spironolactone (ALDACTONE) 25 MG Tab TAKE 1/2 (ONE-HALF) TABLET BY MOUTH EVERY 48 HOURS 30 tablet 0   • [DISCONTINUED] carvedilol (COREG) 25 MG Tab Take 1 tablet by mouth twice daily 180 Tab 3   • [DISCONTINUED] atorvastatin (LIPITOR) 80 MG tablet TAKE 1 TABLET BY MOUTH ONCE DAILY IN THE EVENING 90  "Tab 3     No facility-administered encounter medications on file as of 5/10/2021.     Review of Systems   Constitutional: Negative.  Negative for chills, fever and malaise/fatigue.   HENT: Negative.  Negative for sore throat.    Eyes: Negative.    Respiratory: Negative.  Negative for cough, hemoptysis, sputum production, shortness of breath, wheezing and stridor.    Cardiovascular: Negative.  Negative for chest pain, palpitations, orthopnea, claudication, leg swelling and PND.   Gastrointestinal: Negative.    Genitourinary: Negative.    Musculoskeletal: Negative.    Skin: Negative.    Neurological: Negative for dizziness, loss of consciousness and weakness.   Endo/Heme/Allergies: Negative.  Does not bruise/bleed easily.   All other systems reviewed and are negative.       Objective:   /70 (BP Location: Right arm, Patient Position: Sitting, BP Cuff Size: Adult)   Pulse 92   Resp 18   Ht 1.753 m (5' 9\")   Wt 98.7 kg (217 lb 9.6 oz)   SpO2 93%   BMI 32.13 kg/m²     Physical Exam   Constitutional: He appears well-developed and well-nourished. No distress.   HENT:   Head: Normocephalic and atraumatic.   Right Ear: External ear normal.   Left Ear: External ear normal.   Nose: Nose normal.   Mouth/Throat: No oropharyngeal exudate.   Eyes: Pupils are equal, round, and reactive to light. Conjunctivae and EOM are normal. Right eye exhibits no discharge. Left eye exhibits no discharge. No scleral icterus.   Neck: No JVD present.   Cardiovascular: Normal rate, regular rhythm and intact distal pulses. Exam reveals no gallop and no friction rub.   No murmur heard.  Pulmonary/Chest: Effort normal. No stridor. No respiratory distress. He has no wheezes. He has no rales. He exhibits no tenderness.   Abdominal: Soft. He exhibits no distension. There is no guarding.   Musculoskeletal:         General: No tenderness, deformity or edema. Normal range of motion.      Cervical back: Neck supple.   Neurological: He is alert. He " has normal reflexes. He displays normal reflexes. No cranial nerve deficit. He exhibits normal muscle tone. Coordination normal.   Skin: Skin is warm and dry. No rash noted. He is not diaphoretic. No erythema. No pallor.   Psychiatric: He has a normal mood and affect. His behavior is normal. Judgment and thought content normal.   Nursing note and vitals reviewed.    Echocardiogram called 8/20/2019 personally viewed interpreted myself showing an EF of 45% with wall motion abnormalities.    EKG: Dated 8/24/2019 personally viewed interpreted myself showing normal sinus rhythm otherwise normal ECG.    Lab Results   Component Value Date/Time    CHOLSTRLTOT 146 05/14/2019 08:40 PM    LDL 72 05/14/2019 08:40 PM    HDL 34 (A) 05/14/2019 08:40 PM    TRIGLYCERIDE 198 (H) 05/14/2019 08:40 PM       Lab Results   Component Value Date/Time    SODIUM 137 08/24/2019 01:02 AM    POTASSIUM 4.3 08/24/2019 01:02 AM    CHLORIDE 104 08/24/2019 01:02 AM    CO2 26 08/24/2019 01:02 AM    GLUCOSE 104 (H) 08/24/2019 01:02 AM    BUN 14 08/24/2019 01:02 AM    CREATININE 0.95 08/24/2019 01:02 AM     Lab Results   Component Value Date/Time    ALKPHOSPHAT 86 08/24/2019 01:02 AM    ASTSGOT 18 08/24/2019 01:02 AM    ALTSGPT 15 08/24/2019 01:02 AM    TBILIRUBIN 0.4 08/24/2019 01:02 AM        Assessment:     1. ACC/AHA stage C systolic heart failure (HCC)  spironolactone (ALDACTONE) 25 MG Tab    Comp Metabolic Panel    CBC W/ DIFF W/O PLATELETS    Lipid Profile    proBrain Natriuretic Peptide, NT   2. Biventricular heart failure (HCC)  Comp Metabolic Panel    CBC W/ DIFF W/O PLATELETS    Lipid Profile    proBrain Natriuretic Peptide, NT    EC-ECHOCARDIOGRAM COMPLETE W/O CONT   3. Congestive heart failure, NYHA class 2 and ACC/AHA stage C (HCC)  lisinopril (PRINIVIL) 20 MG Tab    Comp Metabolic Panel    CBC W/ DIFF W/O PLATELETS    Lipid Profile    proBrain Natriuretic Peptide, NT    EC-ECHOCARDIOGRAM COMPLETE W/O CONT   4. Dilated cardiomyopathy (HCC)   Comp Metabolic Panel    CBC W/ DIFF W/O PLATELETS    Lipid Profile    EC-ECHOCARDIOGRAM COMPLETE W/O CONT   5. Elevated troponin  Comp Metabolic Panel    CBC W/ DIFF W/O PLATELETS    Lipid Profile    EC-ECHOCARDIOGRAM COMPLETE W/O CONT   6. Hepatic steatosis  EC-ECHOCARDIOGRAM COMPLETE W/O CONT   7. History of methamphetamine abuse (HCC)     8. Hypotension, unspecified hypotension type  carvedilol (COREG) 25 MG Tab   9. Pulmonary hypertension (HCC)  proBrain Natriuretic Peptide, NT   10. Substance use disorder  proBrain Natriuretic Peptide, NT   11. Tobacco use     12. Hyperlipidemia, unspecified hyperlipidemia type  atorvastatin (LIPITOR) 80 MG tablet    proBrain Natriuretic Peptide, NT       Medical Decision Making:  Today's Assessment / Status / Plan:     49-year-old male with a substance-induced cardiomyopathy with mostly normalization of his ejection fraction with mild orthostasis.  Given his finding of what appears to be reduced capillary refill as well as his class II shortness of breath I will get repeat echocardiogram and if it is notable for any pulmonary hypertension.  I refilled all his medications.  I would like to check a number of labs for otitis medications.  I will see him back in 4 months unless echo is abnormal.

## 2021-06-04 ENCOUNTER — TELEPHONE (OUTPATIENT)
Dept: CARDIOLOGY | Facility: MEDICAL CENTER | Age: 51
End: 2021-06-04

## 2021-06-04 DIAGNOSIS — I95.9 HYPOTENSION, UNSPECIFIED HYPOTENSION TYPE: ICD-10-CM

## 2021-06-04 DIAGNOSIS — E78.5 HYPERLIPIDEMIA, UNSPECIFIED HYPERLIPIDEMIA TYPE: ICD-10-CM

## 2021-06-04 DIAGNOSIS — I50.20 ACC/AHA STAGE C SYSTOLIC HEART FAILURE (HCC): ICD-10-CM

## 2021-06-04 RX ORDER — ATORVASTATIN CALCIUM 40 MG/1
40 TABLET, FILM COATED ORAL EVERY EVENING
Qty: 90 TABLET | Refills: 3 | Status: SHIPPED | OUTPATIENT
Start: 2021-06-04 | End: 2022-06-29

## 2021-06-04 RX ORDER — SPIRONOLACTONE 25 MG/1
12.5 TABLET ORAL
Qty: 25 TABLET | Refills: 1 | Status: SHIPPED | OUTPATIENT
Start: 2021-06-04 | End: 2022-06-14

## 2021-06-04 RX ORDER — CARVEDILOL 25 MG/1
TABLET ORAL
Qty: 180 TABLET | Refills: 3 | Status: SHIPPED | OUTPATIENT
Start: 2021-06-04 | End: 2022-06-29

## 2021-06-04 NOTE — TELEPHONE ENCOUNTER
RO    Patient called and he believes there is a mistake with his medications. He states that before his Spironolactone was 1/2 pill now its up to 1 pill daily and his statin was 1 pill daily and now it is 1/2 pill daily. He is a bit confused and would like to clarify the dosage. He also needs a refill of his statin, please send to pharmacy on file. Please advise.    Thank you,    Lindy FLORENCE

## 2021-06-05 NOTE — TELEPHONE ENCOUNTER
Reviewed chart, Dr. Bland did not document wanting to change any medications. The changes to the prescriptions were in error. Changes rx's to what the patient has been taking and tolerating well. He was instructed to get ordered lab work completed soon, explaining importance. Pt said he will get it scheduled for next week.

## 2021-11-15 ENCOUNTER — TELEPHONE (OUTPATIENT)
Dept: CARDIOLOGY | Facility: MEDICAL CENTER | Age: 51
End: 2021-11-15

## 2022-02-11 NOTE — PROGRESS NOTES
Assumed care of pt.  AAOx4.  No c/o pain this morning.  x2 incisions with dressings to RLE.  Pedal pulse 2+, RLE warm to touch.  2 gram sodium, cardiac diet in place, no c/o n/v.  LBM PTA, + flatus, + void.  POC reviewed with pt.  Call light within reach, pt educated to call for assistance as needed.  Hourly rounding in place.    PAST SURGICAL HISTORY:  H/O shoulder surgery right    History of back surgery     S/P ACL surgery right

## 2022-06-11 DIAGNOSIS — I50.20 ACC/AHA STAGE C SYSTOLIC HEART FAILURE (HCC): ICD-10-CM

## 2022-06-14 RX ORDER — SPIRONOLACTONE 25 MG/1
TABLET ORAL
Qty: 25 TABLET | Refills: 0 | Status: SHIPPED | OUTPATIENT
Start: 2022-06-14 | End: 2023-01-20

## 2022-06-29 DIAGNOSIS — I95.9 HYPOTENSION, UNSPECIFIED HYPOTENSION TYPE: ICD-10-CM

## 2022-06-29 DIAGNOSIS — E78.5 HYPERLIPIDEMIA, UNSPECIFIED HYPERLIPIDEMIA TYPE: ICD-10-CM

## 2022-06-30 RX ORDER — CARVEDILOL 25 MG/1
25 TABLET ORAL 2 TIMES DAILY
Qty: 180 TABLET | Refills: 0 | Status: SHIPPED | OUTPATIENT
Start: 2022-06-30 | End: 2022-11-21 | Stop reason: SDUPTHER

## 2022-06-30 RX ORDER — ATORVASTATIN CALCIUM 40 MG/1
40 TABLET, FILM COATED ORAL EVERY EVENING
Qty: 90 TABLET | Refills: 0 | Status: SHIPPED | OUTPATIENT
Start: 2022-06-30 | End: 2022-11-21 | Stop reason: SDUPTHER

## 2022-06-30 NOTE — TELEPHONE ENCOUNTER
1st courtesy refill. Referral placed to [facility] for continued management.     LVM on non-descript VM to call back to discuss referral.

## 2022-11-21 ENCOUNTER — TELEPHONE (OUTPATIENT)
Dept: CARDIOLOGY | Facility: MEDICAL CENTER | Age: 52
End: 2022-11-21
Payer: MEDICAID

## 2022-11-21 DIAGNOSIS — I50.9 CONGESTIVE HEART FAILURE, NYHA CLASS 2 AND ACC/AHA STAGE C (HCC): ICD-10-CM

## 2022-11-21 DIAGNOSIS — I95.9 HYPOTENSION, UNSPECIFIED HYPOTENSION TYPE: ICD-10-CM

## 2022-11-21 DIAGNOSIS — E78.5 HYPERLIPIDEMIA, UNSPECIFIED HYPERLIPIDEMIA TYPE: ICD-10-CM

## 2022-11-21 RX ORDER — ATORVASTATIN CALCIUM 40 MG/1
40 TABLET, FILM COATED ORAL EVERY EVENING
Qty: 90 TABLET | Refills: 0 | Status: SHIPPED | OUTPATIENT
Start: 2022-11-21 | End: 2023-01-20

## 2022-11-21 RX ORDER — LISINOPRIL 20 MG/1
20 TABLET ORAL DAILY
Qty: 30 TABLET | Refills: 0 | Status: SHIPPED | OUTPATIENT
Start: 2022-11-21 | End: 2023-01-20

## 2022-11-21 RX ORDER — CARVEDILOL 25 MG/1
25 TABLET ORAL 2 TIMES DAILY
Qty: 180 TABLET | Refills: 0 | Status: SHIPPED | OUTPATIENT
Start: 2022-11-21 | End: 2023-01-20

## 2022-11-21 NOTE — TELEPHONE ENCOUNTER
Is the patient due for a refill? Yes    Was the patient seen the past year? No    Date of last office visit: 05/10/2021    Does the patient have an upcoming appointment?  No       Provider to refill: RO     Does the patients insurance require a 100 day supply?  No

## 2022-11-21 NOTE — TELEPHONE ENCOUNTER
MEDICATION REFILL : RO    Caller: Lucas Agee    Medication Name and Dosage:     LISINOPRIL 20MG  CARVEDILOL 25MG  ATORVASTATIN 40MG    Please call your pharmacy and have them send us a refill request or speak to a live representative, RX number may have changed.    Medication amount left: NONE    Preferred Pharmacy:     [id number] - [place], [place] - [address]    Other questions (Topic): NONE    Callback Number (Will only call for issues): [phone] (home)

## 2023-01-20 ENCOUNTER — APPOINTMENT (OUTPATIENT)
Dept: MEDICAL GROUP | Facility: MEDICAL CENTER | Age: 53
End: 2023-01-20

## 2023-01-20 ENCOUNTER — OFFICE VISIT (OUTPATIENT)
Dept: URGENT CARE | Facility: CLINIC | Age: 53
End: 2023-01-20
Payer: MEDICAID

## 2023-01-20 VITALS
BODY MASS INDEX: 33.95 KG/M2 | OXYGEN SATURATION: 95 % | TEMPERATURE: 96.9 F | SYSTOLIC BLOOD PRESSURE: 152 MMHG | WEIGHT: 224 LBS | RESPIRATION RATE: 16 BRPM | HEART RATE: 80 BPM | HEIGHT: 68 IN | DIASTOLIC BLOOD PRESSURE: 88 MMHG

## 2023-01-20 DIAGNOSIS — E78.5 HYPERLIPIDEMIA, UNSPECIFIED HYPERLIPIDEMIA TYPE: ICD-10-CM

## 2023-01-20 DIAGNOSIS — I10 PRIMARY HYPERTENSION: ICD-10-CM

## 2023-01-20 PROCEDURE — 99203 OFFICE O/P NEW LOW 30 MIN: CPT | Performed by: PHYSICIAN ASSISTANT

## 2023-01-20 RX ORDER — LISINOPRIL 20 MG/1
20 TABLET ORAL DAILY
Qty: 30 TABLET | Refills: 0 | Status: SHIPPED | OUTPATIENT
Start: 2023-01-20 | End: 2023-01-27 | Stop reason: SDUPTHER

## 2023-01-20 RX ORDER — CARVEDILOL 25 MG/1
25 TABLET ORAL 2 TIMES DAILY WITH MEALS
Qty: 60 TABLET | Refills: 0 | Status: SHIPPED | OUTPATIENT
Start: 2023-01-20 | End: 2023-01-27 | Stop reason: SDUPTHER

## 2023-01-20 RX ORDER — ATORVASTATIN CALCIUM 40 MG/1
40 TABLET, FILM COATED ORAL NIGHTLY
Qty: 30 TABLET | Refills: 0 | Status: SHIPPED | OUTPATIENT
Start: 2023-01-20 | End: 2023-01-27 | Stop reason: SDUPTHER

## 2023-01-20 ASSESSMENT — ENCOUNTER SYMPTOMS
PALPITATIONS: 0
SHORTNESS OF BREATH: 0
HEADACHES: 0
DIZZINESS: 0

## 2023-01-20 NOTE — PROGRESS NOTES
"  Subjective:   Lucas Agee is a 52 y.o. male who presents today with   Chief Complaint   Patient presents with    Medication Refill     States does not have a PCP and needs his meds refilled.        Other  This is a new problem. The current episode started today. The problem occurs constantly. The problem has been unchanged. Pertinent negatives include no chest pain or headaches. He has tried nothing for the symptoms. The treatment provided no relief.   Patient has been without his medications for the last month since they were last filled.  He is between primary care providers.  He states he has been on these medications for the last couple of years with no issues or side effects and states he feels much better when taking them.    PMH:  has a past medical history of Congestive heart failure (HCC), DVT (deep venous thrombosis) (HCC), and Hypertension.  MEDS:   Current Outpatient Medications:     lisinopril (PRINIVIL) 20 MG Tab, Take 1 Tablet by mouth every day., Disp: 30 Tablet, Rfl: 0    carvedilol (COREG) 25 MG Tab, Take 1 Tablet by mouth 2 times a day with meals., Disp: 60 Tablet, Rfl: 0    atorvastatin (LIPITOR) 40 MG Tab, Take 1 Tablet by mouth every evening., Disp: 30 Tablet, Rfl: 0    aspirin (ASA) 81 MG Chew Tab chewable tablet, Take 1 Tab by mouth every day., Disp: 30 Tab, Rfl: 6  ALLERGIES: No Known Allergies  SURGHX:   Past Surgical History:   Procedure Laterality Date    THROMBECTOMY Right 5/21/2019    Procedure: THROMBECTOMY - lower extremity, anterior tibial artery ;  Surgeon: Deidra Dominguez M.D.;  Location: SURGERY [facility];  Service: General    HAND SURGERY Right 1995    \"metal plate placed\"    HERNIA REPAIR Right 1990    groin    HERNIA REPAIR       SOCHX:  reports that he has been smoking cigarettes. He has been smoking an average of .5 packs per day. He has never used smokeless tobacco. He reports current drug use. Drugs: Inhaled, Intravenous, Methamphetamines, and Marijuana. " "He reports that he does not drink alcohol.  FH: Reviewed with patient, not pertinent to this visit.     Review of Systems   Respiratory:  Negative for shortness of breath.    Cardiovascular:  Negative for chest pain, palpitations and leg swelling.   Neurological:  Negative for dizziness and headaches.      Objective:   BP (!) 152/88   Pulse 80   Temp 36.1 °C (96.9 °F)   Resp 16   Ht 1.727 m (5' 8\")   Wt 102 kg (224 lb)   SpO2 95%   BMI 34.06 kg/m²   Physical Exam  Vitals and nursing note reviewed.   Constitutional:       General: He is not in acute distress.     Appearance: Normal appearance. He is well-developed. He is not ill-appearing or toxic-appearing.   HENT:      Head: Normocephalic and atraumatic.      Right Ear: Hearing normal.      Left Ear: Hearing normal.   Cardiovascular:      Rate and Rhythm: Normal rate and regular rhythm.      Heart sounds: Normal heart sounds.   Pulmonary:      Effort: Pulmonary effort is normal. No respiratory distress.      Breath sounds: No stridor. No wheezing, rhonchi or rales.   Musculoskeletal:      Comments: Normal movement in all 4 extremities   Skin:     General: Skin is warm and dry.   Neurological:      Mental Status: He is alert.      Coordination: Coordination normal.   Psychiatric:         Mood and Affect: Mood normal.       Assessment/Plan:   Assessment    1. Primary hypertension  - Referral to establish with [facility] PCP  - lisinopril (PRINIVIL) 20 MG Tab; Take 1 Tablet by mouth every day.  Dispense: 30 Tablet; Refill: 0  - carvedilol (COREG) 25 MG Tab; Take 1 Tablet by mouth 2 times a day with meals.  Dispense: 60 Tablet; Refill: 0    2. Hyperlipidemia, unspecified hyperlipidemia type  - Referral to establish with [facility] PCP  - atorvastatin (LIPITOR) 40 MG Tab; Take 1 Tablet by mouth every evening.  Dispense: 30 Tablet; Refill: 0    Decision was made to refill patient's medication today as he does not have a primary care provider currently and did discuss with " him that we cannot continue to refill medication as he will need to have blood work and close follow-up with primary care before another refill was provided and he is understanding.  Patient also would benefit from cardiology follow-up and I will place a referral for this today as well as he is to see them.    Differential diagnosis, natural history, supportive care, and indications for immediate follow-up discussed.   Patient given instructions and understanding of medications and treatment.    If not improving in 3-5 days, F/U with PCP or return to UC if symptoms worsen.    Patient agreeable to plan.      Please note that this dictation was created using voice recognition software. I have made every reasonable attempt to correct obvious errors, but I expect that there are errors of grammar and possibly content that I did not discover before finalizing the note.    Howard Oseguera PA-C

## 2023-01-26 ENCOUNTER — TELEPHONE (OUTPATIENT)
Dept: HEALTH INFORMATION MANAGEMENT | Facility: OTHER | Age: 53
End: 2023-01-26
Payer: MEDICAID

## 2023-01-27 ENCOUNTER — OFFICE VISIT (OUTPATIENT)
Dept: CARDIOLOGY | Facility: MEDICAL CENTER | Age: 53
End: 2023-01-27
Attending: PHYSICIAN ASSISTANT
Payer: MEDICAID

## 2023-01-27 VITALS
HEIGHT: 68 IN | OXYGEN SATURATION: 96 % | RESPIRATION RATE: 18 BRPM | DIASTOLIC BLOOD PRESSURE: 92 MMHG | BODY MASS INDEX: 34.86 KG/M2 | WEIGHT: 230 LBS | HEART RATE: 98 BPM | SYSTOLIC BLOOD PRESSURE: 124 MMHG

## 2023-01-27 DIAGNOSIS — E78.5 HYPERLIPIDEMIA, UNSPECIFIED HYPERLIPIDEMIA TYPE: ICD-10-CM

## 2023-01-27 DIAGNOSIS — I50.82 BIVENTRICULAR HEART FAILURE (HCC): ICD-10-CM

## 2023-01-27 DIAGNOSIS — I27.20 PULMONARY HYPERTENSION (HCC): ICD-10-CM

## 2023-01-27 DIAGNOSIS — I50.20 ACC/AHA STAGE C SYSTOLIC HEART FAILURE (HCC): ICD-10-CM

## 2023-01-27 DIAGNOSIS — I10 PRIMARY HYPERTENSION: ICD-10-CM

## 2023-01-27 PROCEDURE — 99214 OFFICE O/P EST MOD 30 MIN: CPT | Performed by: NURSE PRACTITIONER

## 2023-01-27 RX ORDER — ATORVASTATIN CALCIUM 40 MG/1
40 TABLET, FILM COATED ORAL NIGHTLY
Qty: 90 TABLET | Refills: 3 | Status: SHIPPED | OUTPATIENT
Start: 2023-01-27 | End: 2023-05-08 | Stop reason: SDUPTHER

## 2023-01-27 RX ORDER — LISINOPRIL 20 MG/1
20 TABLET ORAL DAILY
Qty: 90 TABLET | Refills: 3 | Status: SHIPPED | OUTPATIENT
Start: 2023-01-27 | End: 2023-05-08 | Stop reason: SDUPTHER

## 2023-01-27 RX ORDER — CARVEDILOL 25 MG/1
25 TABLET ORAL 2 TIMES DAILY WITH MEALS
Qty: 180 TABLET | Refills: 3 | Status: SHIPPED | OUTPATIENT
Start: 2023-01-27 | End: 2023-05-08 | Stop reason: SDUPTHER

## 2023-01-27 NOTE — PROGRESS NOTES
"Chief Complaint   Patient presents with    CHF (Systolic)     F/V Dx: ACC/AHA stage C systolic heart failure (HCC)    Cardiomyopathy (Non-ischemic)    Hypertension     F/V Dx: Pulmonary hypertension (HCC)         Subjective     Lucas Agee is a 52 y.o. male who presents today for follow up HFrEF     He is followed by Dr. Bland in our clinic, history of heart failure with reduced ejection fraction with arterial thrombus from likely his reduced ejection fraction. EF improved.     He was not able to follow up since 7/2020 due to insurance and ran out of his medication. Had to go to urgent care for refill.     Now he is back on his medication for a week. Headache from elevated bp resolved. Denies any chest pain, sob, dizziness or palpitations.     He has been abstaining from meth. Reduced his tobacco usage to 0.5pack a day.     Past Medical History:   Diagnosis Date    Congestive heart failure (HCC)     DVT (deep venous thrombosis) (HCC)     Hypertension      Past Surgical History:   Procedure Laterality Date    THROMBECTOMY Right 5/21/2019    Procedure: THROMBECTOMY - lower extremity, anterior tibial artery ;  Surgeon: Deidra Dominguez M.D.;  Location: SURGERY [facility];  Service: General    HAND SURGERY Right 1995    \"metal plate placed\"    HERNIA REPAIR Right 1990    groin    HERNIA REPAIR       Family History   Problem Relation Age of Onset    Heart Disease Father     Blood Clots Brother     Heart Disease Brother         pacemaker    Blood Clots Paternal Grandmother     Heart Disease Paternal Grandmother     Stroke Paternal Grandmother     Heart Attack Paternal Uncle 60    Cancer Neg Hx      Social History     Socioeconomic History    Marital status: Single     Spouse name: Not on file    Number of children: Not on file    Years of education: Not on file    Highest education level: Not on file   Occupational History    Not on file   Tobacco Use    Smoking status: Every Day     Packs/day: 0.50     " Types: Cigarettes    Smokeless tobacco: Never    Tobacco comments:     trying to quit   Vaping Use    Vaping Use: Never used   Substance and Sexual Activity    Alcohol use: No    Drug use: Yes     Types: Inhaled, Intravenous, Methamphetamines, Marijuana     Comment: marijuana currently    Sexual activity: Yes     Partners: Female   Other Topics Concern    Not on file   Social History Narrative    Not on file     Social Determinants of Health     Financial Resource Strain: Not on file   Food Insecurity: Not on file   Transportation Needs: Not on file   Physical Activity: Not on file   Stress: Not on file   Social Connections: Not on file   Intimate Partner Violence: Not on file   Housing Stability: Not on file     No Known Allergies  Outpatient Encounter Medications as of 1/27/2023   Medication Sig Dispense Refill    lisinopril (PRINIVIL) 20 MG Tab Take 1 Tablet by mouth every day. 90 Tablet 3    carvedilol (COREG) 25 MG Tab Take 1 Tablet by mouth 2 times a day with meals. 180 Tablet 3    atorvastatin (LIPITOR) 40 MG Tab Take 1 Tablet by mouth every evening. 90 Tablet 3    aspirin (ASA) 81 MG Chew Tab chewable tablet Take 1 Tab by mouth every day. 30 Tab 6    [DISCONTINUED] lisinopril (PRINIVIL) 20 MG Tab Take 1 Tablet by mouth every day. 30 Tablet 0    [DISCONTINUED] carvedilol (COREG) 25 MG Tab Take 1 Tablet by mouth 2 times a day with meals. 60 Tablet 0    [DISCONTINUED] atorvastatin (LIPITOR) 40 MG Tab Take 1 Tablet by mouth every evening. 30 Tablet 0     No facility-administered encounter medications on file as of 1/27/2023.     Review of Systems   Constitutional:  Negative for malaise/fatigue.   Eyes:  Negative for blurred vision and double vision.   Respiratory:  Negative for cough, shortness of breath and wheezing.    Cardiovascular:  Negative for chest pain, palpitations, orthopnea and leg swelling.   Musculoskeletal:  Negative for falls.   Neurological:  Negative for dizziness, focal weakness, loss of  "consciousness, weakness and headaches.   All other systems reviewed and are negative.           Objective     BP (!) 124/92 (BP Location: Left arm, Patient Position: Sitting, BP Cuff Size: Adult)   Pulse 98   Resp 18   Ht 1.727 m (5' 8\")   Wt 104 kg (230 lb)   SpO2 96%   BMI 34.97 kg/m²     Physical Exam  Constitutional:       General: He is not in acute distress.     Appearance: He is well-developed. He is not diaphoretic.   HENT:      Head: Normocephalic and atraumatic.   Eyes:      Pupils: Pupils are equal, round, and reactive to light.   Neck:      Vascular: No JVD.   Cardiovascular:      Rate and Rhythm: Normal rate and regular rhythm.      Heart sounds: Normal heart sounds.   Pulmonary:      Effort: Pulmonary effort is normal.      Breath sounds: Normal breath sounds.   Abdominal:      General: Bowel sounds are normal. There is no distension.      Palpations: Abdomen is soft.   Skin:     General: Skin is warm and dry.   Neurological:      Mental Status: He is alert and oriented to person, place, and time.   Psychiatric:         Behavior: Behavior normal.         Thought Content: Thought content normal.         Judgment: Judgment normal.     Echocardiography Laboratory  8/10/2020  Prior echo 8/19/19, the EF appears normalized.  Left ventricular ejection fraction is visually estimated to be 55%.  Unable to estimate pulmonary artery pressure due to an inadequate   tricuspid regurgitant jet.         Assessment & Plan     1. Biventricular heart failure (HCC)        2. ACC/AHA stage C systolic heart failure (HCC)  EC-ECHOCARDIOGRAM COMPLETE W/O CONT    Comp Metabolic Panel    Lipid Profile      3. Pulmonary hypertension (HCC)        4. Primary hypertension  lisinopril (PRINIVIL) 20 MG Tab    carvedilol (COREG) 25 MG Tab      5. Hyperlipidemia, unspecified hyperlipidemia type  atorvastatin (LIPITOR) 40 MG Tab          Medical Decision Making: Today's Assessment/Status/Plan:          HFpEF 55% previously 15%  - " euvolemic upon examination   - continue coreg 25mg BID and lisinopril 20mg qd   - repeat ECHO     2. Hypertension:  - stable     3. Hyperlipidemia:  - continue statin therapy     Follow up in 3 months after ECHO, CMP and lipid profile ordered.

## 2023-01-28 ASSESSMENT — ENCOUNTER SYMPTOMS
BLURRED VISION: 0
FALLS: 0
HEADACHES: 0
LOSS OF CONSCIOUSNESS: 0
WEAKNESS: 0
SHORTNESS OF BREATH: 0
WHEEZING: 0
COUGH: 0
PALPITATIONS: 0
DOUBLE VISION: 0
DIZZINESS: 0
ORTHOPNEA: 0
FOCAL WEAKNESS: 0

## 2023-03-14 ENCOUNTER — OFFICE VISIT (OUTPATIENT)
Dept: MEDICAL GROUP | Facility: MEDICAL CENTER | Age: 53
End: 2023-03-14
Attending: PHYSICIAN ASSISTANT
Payer: MEDICAID

## 2023-03-14 VITALS
BODY MASS INDEX: 32.67 KG/M2 | TEMPERATURE: 97.7 F | DIASTOLIC BLOOD PRESSURE: 62 MMHG | HEART RATE: 84 BPM | OXYGEN SATURATION: 99 % | HEIGHT: 68 IN | SYSTOLIC BLOOD PRESSURE: 90 MMHG | WEIGHT: 215.6 LBS | RESPIRATION RATE: 16 BRPM

## 2023-03-14 DIAGNOSIS — F15.11 HISTORY OF METHAMPHETAMINE ABUSE (HCC): ICD-10-CM

## 2023-03-14 DIAGNOSIS — Z72.0 TOBACCO USE: ICD-10-CM

## 2023-03-14 DIAGNOSIS — R74.01 TRANSAMINITIS: ICD-10-CM

## 2023-03-14 DIAGNOSIS — R73.03 PREDIABETES: ICD-10-CM

## 2023-03-14 DIAGNOSIS — I50.9 CONGESTIVE HEART FAILURE, NYHA CLASS 2 AND ACC/AHA STAGE C (HCC): ICD-10-CM

## 2023-03-14 PROBLEM — R79.89 ELEVATED TROPONIN: Status: RESOLVED | Noted: 2019-05-15 | Resolved: 2023-03-14

## 2023-03-14 PROCEDURE — 99212 OFFICE O/P EST SF 10 MIN: CPT | Performed by: PHYSICIAN ASSISTANT

## 2023-03-14 PROCEDURE — 99204 OFFICE O/P NEW MOD 45 MIN: CPT | Performed by: FAMILY MEDICINE

## 2023-03-14 SDOH — ECONOMIC STABILITY: HOUSING INSECURITY
IN THE LAST 12 MONTHS, WAS THERE A TIME WHEN YOU DID NOT HAVE A STEADY PLACE TO SLEEP OR SLEPT IN A SHELTER (INCLUDING NOW)?: PATIENT REFUSED

## 2023-03-14 SDOH — ECONOMIC STABILITY: TRANSPORTATION INSECURITY
IN THE PAST 12 MONTHS, HAS THE LACK OF TRANSPORTATION KEPT YOU FROM MEDICAL APPOINTMENTS OR FROM GETTING MEDICATIONS?: YES

## 2023-03-14 SDOH — ECONOMIC STABILITY: INCOME INSECURITY: IN THE LAST 12 MONTHS, WAS THERE A TIME WHEN YOU WERE NOT ABLE TO PAY THE MORTGAGE OR RENT ON TIME?: PATIENT REFUSED

## 2023-03-14 SDOH — HEALTH STABILITY: PHYSICAL HEALTH: ON AVERAGE, HOW MANY MINUTES DO YOU ENGAGE IN EXERCISE AT THIS LEVEL?: 30 MIN

## 2023-03-14 SDOH — ECONOMIC STABILITY: TRANSPORTATION INSECURITY
IN THE PAST 12 MONTHS, HAS LACK OF TRANSPORTATION KEPT YOU FROM MEETINGS, WORK, OR FROM GETTING THINGS NEEDED FOR DAILY LIVING?: YES

## 2023-03-14 SDOH — ECONOMIC STABILITY: TRANSPORTATION INSECURITY
IN THE PAST 12 MONTHS, HAS LACK OF RELIABLE TRANSPORTATION KEPT YOU FROM MEDICAL APPOINTMENTS, MEETINGS, WORK OR FROM GETTING THINGS NEEDED FOR DAILY LIVING?: YES

## 2023-03-14 SDOH — HEALTH STABILITY: PHYSICAL HEALTH: ON AVERAGE, HOW MANY DAYS PER WEEK DO YOU ENGAGE IN MODERATE TO STRENUOUS EXERCISE (LIKE A BRISK WALK)?: 4 DAYS

## 2023-03-14 SDOH — ECONOMIC STABILITY: HOUSING INSECURITY: IN THE LAST 12 MONTHS, HOW MANY PLACES HAVE YOU LIVED?: 1

## 2023-03-14 SDOH — ECONOMIC STABILITY: INCOME INSECURITY: HOW HARD IS IT FOR YOU TO PAY FOR THE VERY BASICS LIKE FOOD, HOUSING, MEDICAL CARE, AND HEATING?: PATIENT DECLINED

## 2023-03-14 SDOH — ECONOMIC STABILITY: FOOD INSECURITY: WITHIN THE PAST 12 MONTHS, YOU WORRIED THAT YOUR FOOD WOULD RUN OUT BEFORE YOU GOT MONEY TO BUY MORE.: PATIENT DECLINED

## 2023-03-14 SDOH — ECONOMIC STABILITY: FOOD INSECURITY: WITHIN THE PAST 12 MONTHS, THE FOOD YOU BOUGHT JUST DIDN'T LAST AND YOU DIDN'T HAVE MONEY TO GET MORE.: PATIENT DECLINED

## 2023-03-14 SDOH — HEALTH STABILITY: MENTAL HEALTH
STRESS IS WHEN SOMEONE FEELS TENSE, NERVOUS, ANXIOUS, OR CAN'T SLEEP AT NIGHT BECAUSE THEIR MIND IS TROUBLED. HOW STRESSED ARE YOU?: ONLY A LITTLE

## 2023-03-14 ASSESSMENT — SOCIAL DETERMINANTS OF HEALTH (SDOH)
IN A TYPICAL WEEK, HOW MANY TIMES DO YOU TALK ON THE PHONE WITH FAMILY, FRIENDS, OR NEIGHBORS?: TWICE A WEEK
ARE YOU MARRIED, WIDOWED, DIVORCED, SEPARATED, NEVER MARRIED, OR LIVING WITH A PARTNER?: NEVER MARRIED
HOW OFTEN DO YOU HAVE A DRINK CONTAINING ALCOHOL: NEVER
WITHIN THE PAST 12 MONTHS, YOU WORRIED THAT YOUR FOOD WOULD RUN OUT BEFORE YOU GOT THE MONEY TO BUY MORE: PATIENT DECLINED
ARE YOU MARRIED, WIDOWED, DIVORCED, SEPARATED, NEVER MARRIED, OR LIVING WITH A PARTNER?: NEVER MARRIED
HOW OFTEN DO YOU ATTEND CHURCH OR RELIGIOUS SERVICES?: NEVER
HOW OFTEN DO YOU GET TOGETHER WITH FRIENDS OR RELATIVES?: ONCE A WEEK
HOW OFTEN DO YOU HAVE SIX OR MORE DRINKS ON ONE OCCASION: NEVER
HOW MANY DRINKS CONTAINING ALCOHOL DO YOU HAVE ON A TYPICAL DAY WHEN YOU ARE DRINKING: PATIENT DOES NOT DRINK
DO YOU BELONG TO ANY CLUBS OR ORGANIZATIONS SUCH AS CHURCH GROUPS UNIONS, FRATERNAL OR ATHLETIC GROUPS, OR SCHOOL GROUPS?: NO
HOW OFTEN DO YOU GET TOGETHER WITH FRIENDS OR RELATIVES?: ONCE A WEEK
HOW OFTEN DO YOU ATTEND CHURCH OR RELIGIOUS SERVICES?: NEVER
IN A TYPICAL WEEK, HOW MANY TIMES DO YOU TALK ON THE PHONE WITH FAMILY, FRIENDS, OR NEIGHBORS?: TWICE A WEEK
DO YOU BELONG TO ANY CLUBS OR ORGANIZATIONS SUCH AS CHURCH GROUPS UNIONS, FRATERNAL OR ATHLETIC GROUPS, OR SCHOOL GROUPS?: NO
HOW OFTEN DO YOU ATTENT MEETINGS OF THE CLUB OR ORGANIZATION YOU BELONG TO?: NEVER
HOW HARD IS IT FOR YOU TO PAY FOR THE VERY BASICS LIKE FOOD, HOUSING, MEDICAL CARE, AND HEATING?: PATIENT DECLINED
HOW OFTEN DO YOU ATTENT MEETINGS OF THE CLUB OR ORGANIZATION YOU BELONG TO?: NEVER

## 2023-03-14 ASSESSMENT — LIFESTYLE VARIABLES
HOW OFTEN DO YOU HAVE SIX OR MORE DRINKS ON ONE OCCASION: NEVER
HOW OFTEN DO YOU HAVE A DRINK CONTAINING ALCOHOL: NEVER
AUDIT-C TOTAL SCORE: 0
HOW MANY STANDARD DRINKS CONTAINING ALCOHOL DO YOU HAVE ON A TYPICAL DAY: PATIENT DOES NOT DRINK
SKIP TO QUESTIONS 9-10: 1

## 2023-03-14 ASSESSMENT — PATIENT HEALTH QUESTIONNAIRE - PHQ9: CLINICAL INTERPRETATION OF PHQ2 SCORE: 0

## 2023-03-14 NOTE — ASSESSMENT & PLAN NOTE
Last checked in 2019 and was prediabetic with A1C  Lab Results   Component Value Date/Time    HBA1C 6.1 (H) 05/14/2019 03:35 PM

## 2023-03-14 NOTE — ASSESSMENT & PLAN NOTE
Patient with chronic history of CHF, non-ischemic DCM, and hyperlipidemia who follows with [facility] cardiology. Recently re-established with specialist after regaining insurance coverage. Reports compliance with his current regimen which includes lisinopril, coreg, Lipitor, and low dose ASA. Clinically asymptomatic and euvolemic.

## 2023-03-14 NOTE — PROGRESS NOTES
Subjective:     CC:    Chief Complaint   Patient presents with    Establish Care       HISTORY OF THE PRESENT ILLNESS: Patient is a 52 y.o. male. This pleasant patient is here today to establish primary care with me and discuss the following issues.   His prior PCP was Edu Smith MD; last seen around 2019. Denies prior history of routine primary care.    Specialists   [facility]    History of methamphetamine abuse  History of 27 years of use; has abstained from meth use for over 27 months    Tobacco use  Currently smoking 4-5 cigarettes/day. Previously was a 1 ppd smoker. Has been smoking since age 19. Has tried to quit in the past; was able to quit for 6 months in the past and went cold turkey. Tried gums in patches in past.     Prediabetes  Last checked in 2019 and was prediabetic with A1C  Lab Results   Component Value Date/Time    HBA1C 6.1 (H) 05/14/2019 03:35 PM          Transaminitis  Patient has repeat CMP pending    Congestive heart failure, NYHA class 2 and ACC/AHA stage C (HCC)  Patient with chronic history of CHF, non-ischemic DCM, and hyperlipidemia who follows with [facility] cardiology. Recently re-established with specialist after regaining insurance coverage. Reports compliance with his current regimen which includes lisinopril, coreg, Lipitor, and low dose ASA. Clinically asymptomatic and euvolemic.        Allergies: Patient has no known allergies.      [address] [facility]  [address] [place] [address]  Phone: [phone] Fax: [phone]    [address] [address]  Phone: [phone] Fax: [phone]      Current Outpatient Medications   Medication Sig Dispense Refill    lisinopril (PRINIVIL) 20 MG Tab Take 1 Tablet by mouth every day. 90 Tablet 3    carvedilol (COREG) 25 MG Tab Take 1 Tablet by mouth 2 times a day with meals. 180 Tablet 3    atorvastatin (LIPITOR) 40 MG Tab Take 1 Tablet by mouth every evening. 90 Tablet 3     "aspirin (ASA) 81 MG Chew Tab chewable tablet Take 1 Tab by mouth every day. 30 Tab 6     No current facility-administered medications for this visit.       Past Medical History:   Diagnosis Date    Congestive heart failure (HCC)     DVT (deep venous thrombosis) (HCC)     Hypertension        Past Surgical History:   Procedure Laterality Date    THROMBECTOMY Right 5/21/2019    Procedure: THROMBECTOMY - lower extremity, anterior tibial artery ;  Surgeon: Diedra Dominguez M.D.;  Location: SURGERY [facility];  Service: General    HAND SURGERY Right 1995    \"metal plate placed\"    HERNIA REPAIR Right 1990    groin    HERNIA REPAIR         Social History     Tobacco Use    Smoking status: Every Day     Packs/day: 0.25     Years: 30.00     Pack years: 7.50     Types: Cigarettes    Smokeless tobacco: Never    Tobacco comments:     Christy cut down even more & working on becoming a non-smoker   Vaping Use    Vaping Use: Never used   Substance Use Topics    Alcohol use: No    Drug use: Yes     Types: Marijuana, Methamphetamines, Intravenous, Inhaled     Comment: marijuana currently; h/o IV and smoked meth use- ;ast used 2021       Family History   Problem Relation Age of Onset    Heart Disease Father     Blood Clots Brother     Heart Disease Brother         pacemaker    Blood Clots Paternal Grandmother     Heart Disease Paternal Grandmother     Stroke Paternal Grandmother     Heart Attack Paternal Uncle 60    Diabetes Maternal Grandmother     Cancer Neg Hx        ROS:   ROS  Gen: no fevers/chills  CV: no chest pain  Pulm: no shortness of breath          Objective:     BP 90/62   Pulse 84   Temp 36.5 °C (97.7 °F)   Resp 16   Ht 1.727 m (5' 7.99\")   Wt 97.8 kg (215 lb 9.6 oz)   SpO2 99%   BMI 32.79 kg/m²   Physical Exam  Constitutional: Alert, no distress  Skin: No rashes in visible areas  Eye: Conjunctiva clear, lids normal  Respiratory: Unlabored respiratory effort on room air, no cough, lungs clear to auscultation " bilaterally  CV: RRR, no JVD, no LE edema  MSK: Normal gait, moves all extremities  Neuro: Grossly non-focal   Psych: Alert and oriented x3, normal affect and mood      Assessment & Plan:   52 y.o. male with the following -    1. Congestive heart failure, NYHA class 2 and ACC/AHA stage C (HCC)  - Euvolemic on exam; weight down since last cardiology visit in January  - Reports compliance with medications as noted above  - Patient has lipid and CMP already ordered by cardiology; recommend checking:  - CBC WITH DIFFERENTIAL; Future  - HEMOGLOBIN A1C; Future  - Of note, BP low normal on today's visit. Recommend he follow up in 1 week for repeat BP check to make sure it stays within normal limits.  - Recommend patient obtain BP cuff for continued monitoring; will send request via care chest    2. Prediabetes  - Overdue for test; discussed DM as CVD risk factor stressed importance of heart healthy diet and lifestyle  - HEMOGLOBIN A1C; Future    3. History of methamphetamine abuse (HCC)  - Encouraged patient to continue abstinence; last used in 2021    4. Tobacco use  - Patient motivated, but not yet ready to quit. He will consider use of medication therapy and let me know what he decided    5. Transaminitis  - Follow up CMP     Return in about 1 week (around 3/21/2023) for blood pressure check.    Please note that this dictation was created using voice recognition software. I have made every reasonable attempt to correct obvious errors, but I expect that there are errors of grammar and possibly content that I did not discover before finalizing the note.

## 2023-03-14 NOTE — ASSESSMENT & PLAN NOTE
Currently smoking 4-5 cigarettes/day. Previously was a 1 ppd smoker. Has been smoking since age 19. Has tried to quit in the past; was able to quit for 6 months in the past and went cold turkey. Tried gums in patches in past.

## 2023-03-23 ENCOUNTER — NON-PROVIDER VISIT (OUTPATIENT)
Dept: MEDICAL GROUP | Facility: MEDICAL CENTER | Age: 53
End: 2023-03-23
Attending: FAMILY MEDICINE
Payer: MEDICAID

## 2023-03-23 VITALS — DIASTOLIC BLOOD PRESSURE: 78 MMHG | SYSTOLIC BLOOD PRESSURE: 110 MMHG

## 2023-03-23 NOTE — PROGRESS NOTES
Lucas Agee is a 52 y.o. male here for a non-provider visit for BP    Encounter Vitals  Blood Pressure: 110/78    If abnormal, was the Registered Nurse (office provider if RN is unavailable) notified today? Not Indicated    Routed to PCP/Requested Provider? No

## 2023-03-27 ENCOUNTER — HOSPITAL ENCOUNTER (OUTPATIENT)
Dept: LAB | Facility: MEDICAL CENTER | Age: 53
End: 2023-03-27
Attending: NURSE PRACTITIONER
Payer: MEDICAID

## 2023-03-27 ENCOUNTER — HOSPITAL ENCOUNTER (OUTPATIENT)
Facility: MEDICAL CENTER | Age: 53
End: 2023-03-27
Attending: FAMILY MEDICINE
Payer: MEDICAID

## 2023-03-27 DIAGNOSIS — R73.03 PREDIABETES: ICD-10-CM

## 2023-03-27 DIAGNOSIS — I50.9 CONGESTIVE HEART FAILURE, NYHA CLASS 2 AND ACC/AHA STAGE C (HCC): ICD-10-CM

## 2023-03-27 DIAGNOSIS — I50.20 ACC/AHA STAGE C SYSTOLIC HEART FAILURE (HCC): ICD-10-CM

## 2023-03-27 LAB
ALBUMIN SERPL BCP-MCNC: 4 G/DL (ref 3.2–4.9)
ALBUMIN/GLOB SERPL: 1.3 G/DL
ALP SERPL-CCNC: 113 U/L (ref 30–99)
ALT SERPL-CCNC: 26 U/L (ref 2–50)
ANION GAP SERPL CALC-SCNC: 13 MMOL/L (ref 7–16)
AST SERPL-CCNC: 19 U/L (ref 12–45)
BASOPHILS # BLD AUTO: 1 % (ref 0–1.8)
BASOPHILS # BLD: 0.1 K/UL (ref 0–0.12)
BILIRUB SERPL-MCNC: 0.8 MG/DL (ref 0.1–1.5)
BUN SERPL-MCNC: 16 MG/DL (ref 8–22)
CALCIUM ALBUM COR SERPL-MCNC: 9.3 MG/DL (ref 8.5–10.5)
CALCIUM SERPL-MCNC: 9.3 MG/DL (ref 8.5–10.5)
CHLORIDE SERPL-SCNC: 103 MMOL/L (ref 96–112)
CHOLEST SERPL-MCNC: 120 MG/DL (ref 100–199)
CO2 SERPL-SCNC: 22 MMOL/L (ref 20–33)
CREAT SERPL-MCNC: 1.31 MG/DL (ref 0.5–1.4)
EOSINOPHIL # BLD AUTO: 0.25 K/UL (ref 0–0.51)
EOSINOPHIL NFR BLD: 2.4 % (ref 0–6.9)
ERYTHROCYTE [DISTWIDTH] IN BLOOD BY AUTOMATED COUNT: 45.6 FL (ref 35.9–50)
EST. AVERAGE GLUCOSE BLD GHB EST-MCNC: 146 MG/DL
FASTING STATUS PATIENT QL REPORTED: NORMAL
GFR SERPLBLD CREATININE-BSD FMLA CKD-EPI: 66 ML/MIN/1.73 M 2
GLOBULIN SER CALC-MCNC: 3.1 G/DL (ref 1.9–3.5)
GLUCOSE SERPL-MCNC: 94 MG/DL (ref 65–99)
HBA1C MFR BLD: 6.7 % (ref 4–5.6)
HCT VFR BLD AUTO: 49.1 % (ref 42–52)
HDLC SERPL-MCNC: 26 MG/DL
HGB BLD-MCNC: 16.5 G/DL (ref 14–18)
IMM GRANULOCYTES # BLD AUTO: 0.05 K/UL (ref 0–0.11)
IMM GRANULOCYTES NFR BLD AUTO: 0.5 % (ref 0–0.9)
LDLC SERPL CALC-MCNC: 50 MG/DL
LYMPHOCYTES # BLD AUTO: 3.82 K/UL (ref 1–4.8)
LYMPHOCYTES NFR BLD: 36.5 % (ref 22–41)
MCH RBC QN AUTO: 32 PG (ref 27–33)
MCHC RBC AUTO-ENTMCNC: 33.6 G/DL (ref 33.7–35.3)
MCV RBC AUTO: 95.2 FL (ref 81.4–97.8)
MONOCYTES # BLD AUTO: 1.06 K/UL (ref 0–0.85)
MONOCYTES NFR BLD AUTO: 10.1 % (ref 0–13.4)
NEUTROPHILS # BLD AUTO: 5.2 K/UL (ref 1.82–7.42)
NEUTROPHILS NFR BLD: 49.5 % (ref 44–72)
NRBC # BLD AUTO: 0 K/UL
NRBC BLD-RTO: 0 /100 WBC
PLATELET # BLD AUTO: 227 K/UL (ref 164–446)
PMV BLD AUTO: 11 FL (ref 9–12.9)
POTASSIUM SERPL-SCNC: 4.9 MMOL/L (ref 3.6–5.5)
PROT SERPL-MCNC: 7.1 G/DL (ref 6–8.2)
RBC # BLD AUTO: 5.16 M/UL (ref 4.7–6.1)
SODIUM SERPL-SCNC: 138 MMOL/L (ref 135–145)
TRIGL SERPL-MCNC: 218 MG/DL (ref 0–149)
WBC # BLD AUTO: 10.5 K/UL (ref 4.8–10.8)

## 2023-03-27 PROCEDURE — 36415 COLL VENOUS BLD VENIPUNCTURE: CPT

## 2023-03-27 PROCEDURE — 80053 COMPREHEN METABOLIC PANEL: CPT

## 2023-03-27 PROCEDURE — 85025 COMPLETE CBC W/AUTO DIFF WBC: CPT

## 2023-03-27 PROCEDURE — 80061 LIPID PANEL: CPT

## 2023-03-27 PROCEDURE — 83036 HEMOGLOBIN GLYCOSYLATED A1C: CPT

## 2023-04-25 ENCOUNTER — OFFICE VISIT (OUTPATIENT)
Dept: MEDICAL GROUP | Facility: MEDICAL CENTER | Age: 53
End: 2023-04-25
Attending: FAMILY MEDICINE
Payer: MEDICAID

## 2023-04-25 VITALS
OXYGEN SATURATION: 95 % | WEIGHT: 223 LBS | HEART RATE: 88 BPM | DIASTOLIC BLOOD PRESSURE: 78 MMHG | BODY MASS INDEX: 33.8 KG/M2 | TEMPERATURE: 97.2 F | HEIGHT: 68 IN | SYSTOLIC BLOOD PRESSURE: 120 MMHG | RESPIRATION RATE: 16 BRPM

## 2023-04-25 DIAGNOSIS — E11.9 TYPE 2 DIABETES MELLITUS WITHOUT COMPLICATION, WITHOUT LONG-TERM CURRENT USE OF INSULIN (HCC): ICD-10-CM

## 2023-04-25 PROCEDURE — 99213 OFFICE O/P EST LOW 20 MIN: CPT | Performed by: FAMILY MEDICINE

## 2023-04-25 PROCEDURE — 99214 OFFICE O/P EST MOD 30 MIN: CPT | Performed by: FAMILY MEDICINE

## 2023-04-25 ASSESSMENT — FIBROSIS 4 INDEX: FIB4 SCORE: 0.82

## 2023-04-25 NOTE — PROGRESS NOTES
"Subjective   Chief Complaint   Patient presents with    Follow-Up     Lab results          HPI:   Lucas presents today to discuss recent lab results. Currently feels well and is without any acute complaint.    Objective   Social History     Tobacco Use    Smoking status: Every Day     Packs/day: 0.25     Years: 30.00     Pack years: 7.50     Types: Cigarettes    Smokeless tobacco: Never    Tobacco comments:     Christy cut down even more & working on becoming a non-smoker   Vaping Use    Vaping Use: Never used   Substance Use Topics    Alcohol use: No    Drug use: Yes     Types: Marijuana, Methamphetamines, Intravenous, Inhaled     Comment: marijuana currently; h/o IV and smoked meth use- ;ast used 2021       Exam:  /78   Pulse 88   Temp 36.2 °C (97.2 °F)   Resp 16   Ht 1.727 m (5' 7.99\")   Wt 101 kg (223 lb)   SpO2 95%   BMI 33.92 kg/m²     Physical Exam  Constitutional: Alert, no distress  Psych: Alert and oriented x3, normal affect and mood    No Known Allergies    [facility] [address] [place], [place] [address] [address] [place], [place] [address] [address] [place] [address]  Phone: [phone] Fax: [phone]    [facility]  [address] [facility] [address]  Phone: [phone] Fax: [phone]    Current Outpatient Medications   Medication Sig Dispense Refill    lisinopril (PRINIVIL) 20 MG Tab Take 1 Tablet by mouth every day. 90 Tablet 3    carvedilol (COREG) 25 MG Tab Take 1 Tablet by mouth 2 times a day with meals. 180 Tablet 3    atorvastatin (LIPITOR) 40 MG Tab Take 1 Tablet by mouth every evening. 90 Tablet 3    aspirin (ASA) 81 MG Chew Tab chewable tablet Take 1 Tab by mouth every day. 30 Tab 6     No current facility-administered medications for this visit.     Hospital Outpatient Visit on 03/27/2023   Component Date Value Ref Range Status    Glycohemoglobin 03/27/2023 6.7 (H)  4.0 - 5.6 % Final    Comment: Increased risk for diabetes:  5.7 -6.4%  Diabetes:  >6.4%  Glycemic control for adults with diabetes:  " <7.0%    The above interpretations are per ADA guidelines.  Diagnosis  of diabetes mellitus on the basis of elevated Hemoglobin A1c  should be confirmed by repeating the Hb A1c test.      Est Avg Glucose 03/27/2023 146  mg/dL Final    Comment: The eAG calculation is based on the A1c-Derived Daily Glucose  (ADAG) study.  See the ADA's website for additional information.      WBC 03/27/2023 10.5  4.8 - 10.8 K/uL Final    RBC 03/27/2023 5.16  4.70 - 6.10 M/uL Final    Hemoglobin 03/27/2023 16.5  14.0 - 18.0 g/dL Final    Hematocrit 03/27/2023 49.1  42.0 - 52.0 % Final    MCV 03/27/2023 95.2  81.4 - 97.8 fL Final    MCH 03/27/2023 32.0  27.0 - 33.0 pg Final    MCHC 03/27/2023 33.6 (L)  33.7 - 35.3 g/dL Final    RDW 03/27/2023 45.6  35.9 - 50.0 fL Final    Platelet Count 03/27/2023 227  164 - 446 K/uL Final    MPV 03/27/2023 11.0  9.0 - 12.9 fL Final    Neutrophils-Polys 03/27/2023 49.50  44.00 - 72.00 % Final    Lymphocytes 03/27/2023 36.50  22.00 - 41.00 % Final    Monocytes 03/27/2023 10.10  0.00 - 13.40 % Final    Eosinophils 03/27/2023 2.40  0.00 - 6.90 % Final    Basophils 03/27/2023 1.00  0.00 - 1.80 % Final    Immature Granulocytes 03/27/2023 0.50  0.00 - 0.90 % Final    Nucleated RBC 03/27/2023 0.00  /100 WBC Final    Neutrophils (Absolute) 03/27/2023 5.20  1.82 - 7.42 K/uL Final    Includes immature neutrophils, if present.    Lymphs (Absolute) 03/27/2023 3.82  1.00 - 4.80 K/uL Final    Monos (Absolute) 03/27/2023 1.06 (H)  0.00 - 0.85 K/uL Final    Eos (Absolute) 03/27/2023 0.25  0.00 - 0.51 K/uL Final    Baso (Absolute) 03/27/2023 0.10  0.00 - 0.12 K/uL Final    Immature Granulocytes (abs) 03/27/2023 0.05  0.00 - 0.11 K/uL Final    NRBC (Absolute) 03/27/2023 0.00  K/uL Final   '  Lab Results   Component Value Date/Time    CHOLSTRLTOT 120 03/27/2023 12:28 PM    LDL 50 03/27/2023 12:28 PM    HDL 26 (A) 03/27/2023 12:28 PM    TRIGLYCERIDE 218 (H) 03/27/2023 12:28 PM       Lab Results   Component Value Date/Time     SODIUM 138 03/27/2023 12:28 PM    POTASSIUM 4.9 03/27/2023 12:28 PM    CHLORIDE 103 03/27/2023 12:28 PM    CO2 22 03/27/2023 12:28 PM    GLUCOSE 94 03/27/2023 12:28 PM    BUN 16 03/27/2023 12:28 PM    CREATININE 1.31 03/27/2023 12:28 PM     Lab Results   Component Value Date/Time    ALKPHOSPHAT 113 (H) 03/27/2023 12:28 PM    ASTSGOT 19 03/27/2023 12:28 PM    ALTSGPT 26 03/27/2023 12:28 PM    TBILIRUBIN 0.8 03/27/2023 12:28 PM         Assessment & Plan    52 y.o. male with the following -     1. Type 2 diabetes mellitus without complication, without long-term current use of insulin (HCC)  Long discussion with patient about etiology and treatment.  New diagnosis based off of fasting labs, A1c at goal off of medications.  - Encouraged discussion with Cardiology regarding lipid results as well as other treatment recommendations regarding new diagnosis of T2DM  - Labs and consult as below to get screening current.  To continue to follow HbA1c every 3 months with goal < 6.0.    -BP checks with each visit, appears well controlled at goal. (130/80)  -Microalbumin/Cr: pending (Microalb/Cr at goal <30)  -ARB/ACEI: not currently on ACE-I; can discuss further at next visit  -Daily Aspirin: continue  -GFR: 66  - Patient aware that DM is CAD equiv - must optimize HLP/HTN.   - Encouraged smoking cessation, but patient not yet ready to quit  - Discussed importance of healthy diet and exercise (5x/week, 20-30 minutes)  - Follow up in 3 months or sooner if needed  - Patient at this time wishes to purse intensive lifestyle changes before initiation of any meds; will re-evaluate at next visit  - HEMOGLOBIN A1C; Future  - MICROALBUMIN CREAT RATIO URINE; Future    Return in about 3 months (around 7/25/2023) for diabetes follow up, lab follow up.    Please note that this dictation was created using voice recognition software. I have made every reasonable attempt to correct obvious errors, but I expect that there are errors of grammar  and possibly content that I did not discover before finalizing the note.

## 2023-05-05 ENCOUNTER — HOSPITAL ENCOUNTER (OUTPATIENT)
Dept: CARDIOLOGY | Facility: MEDICAL CENTER | Age: 53
End: 2023-05-05
Attending: NURSE PRACTITIONER
Payer: MEDICAID

## 2023-05-05 DIAGNOSIS — I50.20 ACC/AHA STAGE C SYSTOLIC HEART FAILURE (HCC): ICD-10-CM

## 2023-05-05 LAB
LV EJECT FRACT  99904: 55
LV EJECT FRACT MOD 2C 99903: 50.17
LV EJECT FRACT MOD 4C 99902: 50.53
LV EJECT FRACT MOD BP 99901: 51.49

## 2023-05-05 PROCEDURE — 93306 TTE W/DOPPLER COMPLETE: CPT | Mod: 26 | Performed by: INTERNAL MEDICINE

## 2023-05-05 PROCEDURE — 93306 TTE W/DOPPLER COMPLETE: CPT

## 2023-05-06 ASSESSMENT — ENCOUNTER SYMPTOMS
WEAKNESS: 0
FALLS: 0
DOUBLE VISION: 0
BLURRED VISION: 0
LOSS OF CONSCIOUSNESS: 0
HEADACHES: 0
COUGH: 0
WHEEZING: 0
PALPITATIONS: 0
SHORTNESS OF BREATH: 0
ORTHOPNEA: 0
FOCAL WEAKNESS: 0
DIZZINESS: 0

## 2023-05-06 NOTE — PROGRESS NOTES
"Chief Complaint   Patient presents with    Congestive Heart Failure     F/V Dx: ACC/AHA stage C systolic heart failure (HCC)    Pulmonary Hypertension       Subjective     Lucas Agee is a 52 y.o. male who presents today for follow up ECHO.     He is followed by Dr. Bland in our clinic, history of heart failure with reduced ejection fraction with arterial thrombus from likely his reduced ejection fraction. EF improved. History of meth abuse     He was not able to follow up since 7/2020 due to insurance and ran out of his medication. Had to go to urgent care for refill.     He has been abstaining from meth. Reduced his tobacco usage to 0.5pack a day. Denies any angina, SOB, dizziness or palpitations.     Past Medical History:   Diagnosis Date    Congestive heart failure (HCC)     DVT (deep venous thrombosis) (HCC)     Hypertension      Past Surgical History:   Procedure Laterality Date    THROMBECTOMY Right 5/21/2019    Procedure: THROMBECTOMY - lower extremity, anterior tibial artery ;  Surgeon: Deidra Dominguez M.D.;  Location: SURGERY [facility];  Service: General    HAND SURGERY Right 1995    \"metal plate placed\"    HERNIA REPAIR Right 1990    groin    HERNIA REPAIR       Family History   Problem Relation Age of Onset    Heart Disease Father     Blood Clots Brother     Heart Disease Brother         pacemaker    Blood Clots Paternal Grandmother     Heart Disease Paternal Grandmother     Stroke Paternal Grandmother     Heart Attack Paternal Uncle 60    Diabetes Maternal Grandmother     Cancer Neg Hx      Social History     Socioeconomic History    Marital status: Single     Spouse name: Not on file    Number of children: Not on file    Years of education: Not on file    Highest education level: 12th grade   Occupational History    Not on file   Tobacco Use    Smoking status: Every Day     Packs/day: 0.25     Years: 30.00     Pack years: 7.50     Types: Cigarettes    Smokeless tobacco: Never    Tobacco " comments:     Christy cut down even more & working on becoming a non-smoker   Vaping Use    Vaping Use: Never used   Substance and Sexual Activity    Alcohol use: No    Drug use: Yes     Types: Marijuana, Methamphetamines, Intravenous, Inhaled     Comment: marijuana currently; h/o IV and smoked meth use- ;ast used 2021    Sexual activity: Yes     Partners: Female   Other Topics Concern    Not on file   Social History Narrative    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     Social Determinants of Health     Financial Resource Strain: Unknown    Difficulty of Paying Living Expenses: Patient refused   Food Insecurity: Unknown    Worried About Running Out of Food in the Last Year: Patient refused    Ran Out of Food in the Last Year: Patient refused   Transportation Needs: Unmet Transportation Needs    Lack of Transportation (Medical): Yes    Lack of Transportation (Non-Medical): Yes   Physical Activity: Insufficiently Active    Days of Exercise per Week: 4 days    Minutes of Exercise per Session: 30 min   Stress: No Stress Concern Present    Feeling of Stress : Only a little   Social Connections: Socially Isolated    Frequency of Communication with Friends and Family: Twice a week    Frequency of Social Gatherings with Friends and Family: Once a week    Attends Religion Services: Never    Active Member of Clubs or Organizations: No    Attends Club or Organization Meetings: Never    Marital Status: Never    Intimate Partner Violence: Not on file   Housing Stability: Unknown    Unable to Pay for Housing in the Last Year: Patient refused    Number of Places Lived in the Last Year: 1    Unstable Housing in the Last Year: Patient refused     No Known Allergies  Outpatient Encounter Medications as of 5/8/2023   Medication Sig Dispense Refill    lisinopril (PRINIVIL) 20 MG Tab Take 1 Tablet by mouth every day. 90 Tablet 3    carvedilol (COREG) 25 MG Tab Take 1 Tablet by mouth 2 times a day with meals. 180  "Tablet 3    atorvastatin (LIPITOR) 40 MG Tab Take 1 Tablet by mouth every evening. 90 Tablet 3    aspirin (ASA) 81 MG Chew Tab chewable tablet Take 1 Tab by mouth every day. 30 Tab 6    [DISCONTINUED] lisinopril (PRINIVIL) 20 MG Tab Take 1 Tablet by mouth every day. 90 Tablet 3    [DISCONTINUED] carvedilol (COREG) 25 MG Tab Take 1 Tablet by mouth 2 times a day with meals. 180 Tablet 3    [DISCONTINUED] atorvastatin (LIPITOR) 40 MG Tab Take 1 Tablet by mouth every evening. 90 Tablet 3     No facility-administered encounter medications on file as of 5/8/2023.     Review of Systems   Constitutional:  Negative for malaise/fatigue.   Eyes:  Negative for blurred vision and double vision.   Respiratory:  Negative for cough, shortness of breath and wheezing.    Cardiovascular:  Negative for chest pain, palpitations, orthopnea and leg swelling.   Musculoskeletal:  Negative for falls.   Neurological:  Negative for dizziness, focal weakness, loss of consciousness, weakness and headaches.   All other systems reviewed and are negative.           Objective     /84 (BP Location: Left arm, Patient Position: Sitting, BP Cuff Size: Adult)   Pulse 100   Resp 16   Ht 1.727 m (5' 7.99\")   Wt 103 kg (227 lb)   SpO2 91%   BMI 34.53 kg/m²     Physical Exam  Constitutional:       General: He is not in acute distress.     Appearance: He is well-developed. He is not diaphoretic.   HENT:      Head: Normocephalic and atraumatic.   Eyes:      Pupils: Pupils are equal, round, and reactive to light.   Neck:      Vascular: No JVD.   Cardiovascular:      Rate and Rhythm: Normal rate and regular rhythm.      Heart sounds: Normal heart sounds.   Pulmonary:      Effort: Pulmonary effort is normal.      Breath sounds: Normal breath sounds.   Abdominal:      General: Bowel sounds are normal. There is no distension.      Palpations: Abdomen is soft.   Skin:     General: Skin is warm and dry.   Neurological:      Mental Status: He is alert and " "oriented to person, place, and time.   Psychiatric:         Behavior: Behavior normal.         Thought Content: Thought content normal.         Judgment: Judgment normal.     Echocardiography Laboratory  8/10/2020  Prior echo 8/19/19, the EF appears normalized.  Left ventricular ejection fraction is visually estimated to be 55%.  Unable to estimate pulmonary artery pressure due to an inadequate   tricuspid regurgitant jet.       ECHO  5/5/2023  The left ventricle is normal in size.  The left ventricular ejection fraction is visually estimated to be 55%.  Mild concentric left ventricular hypertrophy.  Flattened septum in diastole (\"D\"-shaped left ventricle) consistent   with RV volume overload.  The right ventricle is dilated.  Reduced right ventricular systolic function.  Normal inferior vena cava size and inspiratory collapse.  Mild-moderate tricuspid regurgitation.  Estimated right ventricular systolic pressure is 65 mmHg.     Lab Results   Component Value Date/Time    CHOLSTRLTOT 120 03/27/2023 12:28 PM    LDL 50 03/27/2023 12:28 PM    HDL 26 (A) 03/27/2023 12:28 PM    TRIGLYCERIDE 218 (H) 03/27/2023 12:28 PM       Lab Results   Component Value Date/Time    SODIUM 138 03/27/2023 12:28 PM    POTASSIUM 4.9 03/27/2023 12:28 PM    CHLORIDE 103 03/27/2023 12:28 PM    CO2 22 03/27/2023 12:28 PM    GLUCOSE 94 03/27/2023 12:28 PM    BUN 16 03/27/2023 12:28 PM    CREATININE 1.31 03/27/2023 12:28 PM     Lab Results   Component Value Date/Time    ALKPHOSPHAT 113 (H) 03/27/2023 12:28 PM    ASTSGOT 19 03/27/2023 12:28 PM    ALTSGPT 26 03/27/2023 12:28 PM    TBILIRUBIN 0.8 03/27/2023 12:28 PM        Assessment & Plan     1. Primary hypertension  lisinopril (PRINIVIL) 20 MG Tab    carvedilol (COREG) 25 MG Tab      2. Pulmonary hypertension (HCC)  Referral to Pulmonary and Sleep Medicine      3. Hyperlipidemia, unspecified hyperlipidemia type  atorvastatin (LIPITOR) 40 MG Tab      4. Biventricular heart failure (HCC)        5. " Congestive heart failure, NYHA class 2 and ACC/AHA stage C (HCC)        6. History of methamphetamine abuse (HCC)            Medical Decision Making: Today's Assessment/Status/Plan:          HFpEF 55% previously 15%  - euvolemic upon examination   - continue coreg 25mg BID and lisinopril 20mg qd       2. Hypertension:  - stable     3. Hyperlipidemia:  - continue statin therapy   - consider omega 3 for hypertriglyceridemia     4. Pulmonary hypertension RVSP 65mmHG  - referral for sleep medicine     Follow up in 1 year, sooner as needed.

## 2023-05-08 ENCOUNTER — OFFICE VISIT (OUTPATIENT)
Dept: CARDIOLOGY | Facility: MEDICAL CENTER | Age: 53
End: 2023-05-08
Payer: MEDICAID

## 2023-05-08 VITALS
SYSTOLIC BLOOD PRESSURE: 120 MMHG | WEIGHT: 227 LBS | RESPIRATION RATE: 16 BRPM | HEIGHT: 68 IN | DIASTOLIC BLOOD PRESSURE: 84 MMHG | HEART RATE: 100 BPM | OXYGEN SATURATION: 91 % | BODY MASS INDEX: 34.4 KG/M2

## 2023-05-08 DIAGNOSIS — I10 PRIMARY HYPERTENSION: ICD-10-CM

## 2023-05-08 DIAGNOSIS — I50.82 BIVENTRICULAR HEART FAILURE (HCC): ICD-10-CM

## 2023-05-08 DIAGNOSIS — E78.5 HYPERLIPIDEMIA, UNSPECIFIED HYPERLIPIDEMIA TYPE: ICD-10-CM

## 2023-05-08 DIAGNOSIS — I50.9 CONGESTIVE HEART FAILURE, NYHA CLASS 2 AND ACC/AHA STAGE C (HCC): ICD-10-CM

## 2023-05-08 DIAGNOSIS — I27.20 PULMONARY HYPERTENSION (HCC): ICD-10-CM

## 2023-05-08 DIAGNOSIS — F15.11 HISTORY OF METHAMPHETAMINE ABUSE (HCC): ICD-10-CM

## 2023-05-08 PROCEDURE — 99214 OFFICE O/P EST MOD 30 MIN: CPT | Performed by: NURSE PRACTITIONER

## 2023-05-08 RX ORDER — ATORVASTATIN CALCIUM 40 MG/1
40 TABLET, FILM COATED ORAL NIGHTLY
Qty: 90 TABLET | Refills: 3 | Status: SHIPPED | OUTPATIENT
Start: 2023-05-08

## 2023-05-08 RX ORDER — LISINOPRIL 20 MG/1
20 TABLET ORAL DAILY
Qty: 90 TABLET | Refills: 3 | Status: SHIPPED | OUTPATIENT
Start: 2023-05-08

## 2023-05-08 RX ORDER — CARVEDILOL 25 MG/1
25 TABLET ORAL 2 TIMES DAILY WITH MEALS
Qty: 180 TABLET | Refills: 3 | Status: SHIPPED | OUTPATIENT
Start: 2023-05-08

## 2023-05-08 ASSESSMENT — FIBROSIS 4 INDEX: FIB4 SCORE: 0.82

## 2023-07-27 ENCOUNTER — OFFICE VISIT (OUTPATIENT)
Dept: MEDICAL GROUP | Facility: MEDICAL CENTER | Age: 53
End: 2023-07-27
Attending: FAMILY MEDICINE
Payer: MEDICAID

## 2023-07-27 DIAGNOSIS — Z12.11 SCREENING FOR COLORECTAL CANCER: ICD-10-CM

## 2023-07-27 DIAGNOSIS — E11.9 TYPE 2 DIABETES MELLITUS WITHOUT COMPLICATION, WITHOUT LONG-TERM CURRENT USE OF INSULIN (HCC): ICD-10-CM

## 2023-07-27 DIAGNOSIS — Z72.0 TOBACCO USE: ICD-10-CM

## 2023-07-27 DIAGNOSIS — Z23 NEED FOR VACCINATION: ICD-10-CM

## 2023-07-27 DIAGNOSIS — Z12.12 SCREENING FOR COLORECTAL CANCER: ICD-10-CM

## 2023-07-27 DIAGNOSIS — I27.20 PULMONARY HYPERTENSION (HCC): ICD-10-CM

## 2023-07-27 PROBLEM — M25.562 PAIN AND SWELLING OF LEFT KNEE: Status: RESOLVED | Noted: 2019-08-24 | Resolved: 2023-07-27

## 2023-07-27 PROBLEM — M25.462 PAIN AND SWELLING OF LEFT KNEE: Status: RESOLVED | Noted: 2019-08-24 | Resolved: 2023-07-27

## 2023-07-27 PROCEDURE — 99212 OFFICE O/P EST SF 10 MIN: CPT | Performed by: FAMILY MEDICINE

## 2023-07-27 PROCEDURE — 99214 OFFICE O/P EST MOD 30 MIN: CPT | Performed by: FAMILY MEDICINE

## 2023-07-27 PROCEDURE — 90471 IMMUNIZATION ADMIN: CPT

## 2023-07-27 NOTE — ASSESSMENT & PLAN NOTE
Since last visit has made some lifestyle changes- incorporating more vegetables, less red meat. Focusing on lean meat. Avoiding processed meats. Significantly cut down on soda and Bang-Aid. Drinking mostly water.

## 2023-07-27 NOTE — PROGRESS NOTES
Subjective   Chief Complaint   Patient presents with    Follow-Up        HPI:   Lucas presents today for routine follow up and is feeling generally well and without any acute complaint.    Tobacco use  Still smoking about 4-5 cigarettes a day. Contemplating quitting.     Type 2 diabetes mellitus without complication, without long-term current use of insulin ([facility])  Since last visit has made some lifestyle changes- incorporating more vegetables, less red meat. Focusing on lean meat. Avoiding processed meats. Significantly cut down on soda and Bang-Aid. Drinking mostly water.    Pulmonary hypertension ([facility])  Has appointment in September with Pulm/Sleep medicine for re-evaluation to rule out sleep apnea and/or COPD.    Objective   Social History     Tobacco Use    Smoking status: Every Day     Packs/day: 0.25     Years: 30.00     Pack years: 7.50     Types: Cigarettes    Smokeless tobacco: Never    Tobacco comments:     Christy cut down even more & working on becoming a non-smoker   Vaping Use    Vaping Use: Never used   Substance Use Topics    Alcohol use: No    Drug use: Yes     Types: Marijuana, Methamphetamines, Intravenous, Inhaled     Comment: marijuana currently; h/o IV and smoked meth use- ;ast used 2021       Exam:  There were no vitals taken for this visit.    Physical Exam  Constitutional: Alert, no distress  Skin: No rashes in visible areas  Eye: Conjunctiva clear, lids normal  Respiratory: Unlabored respiratory effort, no cough  MSK: Normal gait, moves all extremities  Psych: Alert and oriented x3, normal affect and mood    No Known Allergies    [facility] [address] [place], [place] - [facility]   [address] [address]  Phone: [phone] Fax: [phone]    [address] [address]  Phone: [phone] Fax: [phone]    Current Outpatient Medications   Medication Sig Dispense Refill    lisinopril (PRINIVIL) 20 MG Tab Take 1 Tablet by mouth every day. 90 Tablet 3     carvedilol (COREG) 25 MG Tab Take 1 Tablet by mouth 2 times a day with meals. 180 Tablet 3    atorvastatin (LIPITOR) 40 MG Tab Take 1 Tablet by mouth every evening. 90 Tablet 3    aspirin (ASA) 81 MG Chew Tab chewable tablet Take 1 Tab by mouth every day. 30 Tab 6     No current facility-administered medications for this visit.       Assessment & Plan    52 y.o. male with the following -     1. Type 2 diabetes mellitus without complication, without long-term current use of insulin (HCC)  Chronic, controlled.  Hemoglobin A1c is under 7.0%.  He is due for diabetic screenings- will reassess on future visit. He is on an ACE inhibitor for renal protection, and is on a statin for cardiovascular protection. Encouraged continued diet and exercise modifications to help control blood sugars. BP at goal.  We will continue the current regimen. F/U in 3-6 months.    2. Tobacco use  - Patient interested, but not yet ready to quit. Encouraged to explore various options and will message me if he would like to try any.    3. Pulmonary hypertension (HCC)  - Has consultation schedule for Sept to assess whether COPD and/or LUPE may be contributing    4. Screening for colorectal cancer  - OCCULT BLOOD FECES IMMUNOASSAY (FIT); Future      Return in about 3 months (around 10/27/2023) for chronic condition follow up, lab follow up.    Please note that this dictation was created using voice recognition software. I have made every reasonable attempt to correct obvious errors, but I expect that there are errors of grammar and possibly content that I did not discover before finalizing the note.

## 2023-07-27 NOTE — ASSESSMENT & PLAN NOTE
Has appointment in September with Pulm/Sleep medicine for re-evaluation to rule out sleep apnea and/or COPD.

## 2023-10-23 ENCOUNTER — APPOINTMENT (OUTPATIENT)
Dept: RADIOLOGY | Facility: MEDICAL CENTER | Age: 53
DRG: 603 | End: 2023-10-23
Attending: STUDENT IN AN ORGANIZED HEALTH CARE EDUCATION/TRAINING PROGRAM
Payer: MEDICAID

## 2023-10-23 ENCOUNTER — HOSPITAL ENCOUNTER (INPATIENT)
Facility: MEDICAL CENTER | Age: 53
LOS: 2 days | DRG: 603 | End: 2023-10-25
Attending: STUDENT IN AN ORGANIZED HEALTH CARE EDUCATION/TRAINING PROGRAM | Admitting: INTERNAL MEDICINE
Payer: MEDICAID

## 2023-10-23 DIAGNOSIS — L03.119 CELLULITIS OF HAND: ICD-10-CM

## 2023-10-23 LAB
ALBUMIN SERPL BCP-MCNC: 3.9 G/DL (ref 3.2–4.9)
ALBUMIN/GLOB SERPL: 1.2 G/DL
ALP SERPL-CCNC: 124 U/L (ref 30–99)
ALT SERPL-CCNC: 27 U/L (ref 2–50)
ANION GAP SERPL CALC-SCNC: 11 MMOL/L (ref 7–16)
AST SERPL-CCNC: 22 U/L (ref 12–45)
BASOPHILS # BLD AUTO: 0.8 % (ref 0–1.8)
BASOPHILS # BLD: 0.1 K/UL (ref 0–0.12)
BILIRUB SERPL-MCNC: 0.5 MG/DL (ref 0.1–1.5)
BUN SERPL-MCNC: 17 MG/DL (ref 8–22)
CALCIUM ALBUM COR SERPL-MCNC: 9.4 MG/DL (ref 8.5–10.5)
CALCIUM SERPL-MCNC: 9.3 MG/DL (ref 8.5–10.5)
CHLORIDE SERPL-SCNC: 109 MMOL/L (ref 96–112)
CO2 SERPL-SCNC: 22 MMOL/L (ref 20–33)
CREAT SERPL-MCNC: 1.11 MG/DL (ref 0.5–1.4)
CRP SERPL HS-MCNC: 1.95 MG/DL (ref 0–0.75)
EOSINOPHIL # BLD AUTO: 0.53 K/UL (ref 0–0.51)
EOSINOPHIL NFR BLD: 4.4 % (ref 0–6.9)
ERYTHROCYTE [DISTWIDTH] IN BLOOD BY AUTOMATED COUNT: 42.1 FL (ref 35.9–50)
ERYTHROCYTE [SEDIMENTATION RATE] IN BLOOD BY WESTERGREN METHOD: 17 MM/HOUR (ref 0–20)
GFR SERPLBLD CREATININE-BSD FMLA CKD-EPI: 80 ML/MIN/1.73 M 2
GLOBULIN SER CALC-MCNC: 3.2 G/DL (ref 1.9–3.5)
GLUCOSE SERPL-MCNC: 93 MG/DL (ref 65–99)
HCT VFR BLD AUTO: 44.5 % (ref 42–52)
HGB BLD-MCNC: 15.5 G/DL (ref 14–18)
IMM GRANULOCYTES # BLD AUTO: 0.09 K/UL (ref 0–0.11)
IMM GRANULOCYTES NFR BLD AUTO: 0.7 % (ref 0–0.9)
LYMPHOCYTES # BLD AUTO: 4.48 K/UL (ref 1–4.8)
LYMPHOCYTES NFR BLD: 37 % (ref 22–41)
MCH RBC QN AUTO: 32.5 PG (ref 27–33)
MCHC RBC AUTO-ENTMCNC: 34.8 G/DL (ref 32.3–36.5)
MCV RBC AUTO: 93.3 FL (ref 81.4–97.8)
MONOCYTES # BLD AUTO: 1.02 K/UL (ref 0–0.85)
MONOCYTES NFR BLD AUTO: 8.4 % (ref 0–13.4)
NEUTROPHILS # BLD AUTO: 5.9 K/UL (ref 1.82–7.42)
NEUTROPHILS NFR BLD: 48.7 % (ref 44–72)
NRBC # BLD AUTO: 0 K/UL
NRBC BLD-RTO: 0 /100 WBC (ref 0–0.2)
PLATELET # BLD AUTO: 242 K/UL (ref 164–446)
PMV BLD AUTO: 10.4 FL (ref 9–12.9)
POTASSIUM SERPL-SCNC: 5 MMOL/L (ref 3.6–5.5)
PROT SERPL-MCNC: 7.1 G/DL (ref 6–8.2)
RBC # BLD AUTO: 4.77 M/UL (ref 4.7–6.1)
SODIUM SERPL-SCNC: 142 MMOL/L (ref 135–145)
WBC # BLD AUTO: 12.1 K/UL (ref 4.8–10.8)

## 2023-10-23 PROCEDURE — 96374 THER/PROPH/DIAG INJ IV PUSH: CPT

## 2023-10-23 PROCEDURE — 36415 COLL VENOUS BLD VENIPUNCTURE: CPT

## 2023-10-23 PROCEDURE — 85652 RBC SED RATE AUTOMATED: CPT

## 2023-10-23 PROCEDURE — 80053 COMPREHEN METABOLIC PANEL: CPT

## 2023-10-23 PROCEDURE — 73130 X-RAY EXAM OF HAND: CPT | Mod: LT

## 2023-10-23 PROCEDURE — 700111 HCHG RX REV CODE 636 W/ 250 OVERRIDE (IP): Performed by: STUDENT IN AN ORGANIZED HEALTH CARE EDUCATION/TRAINING PROGRAM

## 2023-10-23 PROCEDURE — 99406 BEHAV CHNG SMOKING 3-10 MIN: CPT | Performed by: INTERNAL MEDICINE

## 2023-10-23 PROCEDURE — A9270 NON-COVERED ITEM OR SERVICE: HCPCS | Performed by: INTERNAL MEDICINE

## 2023-10-23 PROCEDURE — 99406 BEHAV CHNG SMOKING 3-10 MIN: CPT

## 2023-10-23 PROCEDURE — 87040 BLOOD CULTURE FOR BACTERIA: CPT

## 2023-10-23 PROCEDURE — 99285 EMERGENCY DEPT VISIT HI MDM: CPT

## 2023-10-23 PROCEDURE — 76882 US LMTD JT/FCL EVL NVASC XTR: CPT | Mod: LT

## 2023-10-23 PROCEDURE — 99222 1ST HOSP IP/OBS MODERATE 55: CPT | Mod: 25 | Performed by: INTERNAL MEDICINE

## 2023-10-23 PROCEDURE — 700102 HCHG RX REV CODE 250 W/ 637 OVERRIDE(OP): Performed by: INTERNAL MEDICINE

## 2023-10-23 PROCEDURE — 700111 HCHG RX REV CODE 636 W/ 250 OVERRIDE (IP): Mod: JZ | Performed by: INTERNAL MEDICINE

## 2023-10-23 PROCEDURE — 86140 C-REACTIVE PROTEIN: CPT

## 2023-10-23 PROCEDURE — 770006 HCHG ROOM/CARE - MED/SURG/GYN SEMI*

## 2023-10-23 PROCEDURE — 85025 COMPLETE CBC W/AUTO DIFF WBC: CPT

## 2023-10-23 RX ORDER — PROCHLORPERAZINE EDISYLATE 5 MG/ML
5-10 INJECTION INTRAMUSCULAR; INTRAVENOUS EVERY 4 HOURS PRN
Status: DISCONTINUED | OUTPATIENT
Start: 2023-10-23 | End: 2023-10-25 | Stop reason: HOSPADM

## 2023-10-23 RX ORDER — CEFAZOLIN 2 G/1
2 INJECTION, POWDER, FOR SOLUTION INTRAMUSCULAR; INTRAVENOUS ONCE
Status: COMPLETED | OUTPATIENT
Start: 2023-10-23 | End: 2023-10-23

## 2023-10-23 RX ORDER — LISINOPRIL 20 MG/1
20 TABLET ORAL DAILY
Status: DISCONTINUED | OUTPATIENT
Start: 2023-10-24 | End: 2023-10-25 | Stop reason: HOSPADM

## 2023-10-23 RX ORDER — BISACODYL 10 MG
10 SUPPOSITORY, RECTAL RECTAL
Status: DISCONTINUED | OUTPATIENT
Start: 2023-10-23 | End: 2023-10-25 | Stop reason: HOSPADM

## 2023-10-23 RX ORDER — ENOXAPARIN SODIUM 100 MG/ML
40 INJECTION SUBCUTANEOUS DAILY
Status: DISCONTINUED | OUTPATIENT
Start: 2023-10-23 | End: 2023-10-25 | Stop reason: HOSPADM

## 2023-10-23 RX ORDER — ONDANSETRON 2 MG/ML
4 INJECTION INTRAMUSCULAR; INTRAVENOUS EVERY 4 HOURS PRN
Status: DISCONTINUED | OUTPATIENT
Start: 2023-10-23 | End: 2023-10-25 | Stop reason: HOSPADM

## 2023-10-23 RX ORDER — LABETALOL HYDROCHLORIDE 5 MG/ML
10 INJECTION, SOLUTION INTRAVENOUS EVERY 4 HOURS PRN
Status: DISCONTINUED | OUTPATIENT
Start: 2023-10-23 | End: 2023-10-25 | Stop reason: HOSPADM

## 2023-10-23 RX ORDER — PROMETHAZINE HYDROCHLORIDE 12.5 MG/1
12.5-25 SUPPOSITORY RECTAL EVERY 4 HOURS PRN
Status: DISCONTINUED | OUTPATIENT
Start: 2023-10-23 | End: 2023-10-25 | Stop reason: HOSPADM

## 2023-10-23 RX ORDER — CARVEDILOL 25 MG/1
25 TABLET ORAL 2 TIMES DAILY WITH MEALS
Status: DISCONTINUED | OUTPATIENT
Start: 2023-10-23 | End: 2023-10-25 | Stop reason: HOSPADM

## 2023-10-23 RX ORDER — AMOXICILLIN 250 MG
2 CAPSULE ORAL 2 TIMES DAILY
Status: DISCONTINUED | OUTPATIENT
Start: 2023-10-23 | End: 2023-10-25 | Stop reason: HOSPADM

## 2023-10-23 RX ORDER — CEFAZOLIN 2 G/1
2 INJECTION, POWDER, FOR SOLUTION INTRAMUSCULAR; INTRAVENOUS ONCE
Status: DISCONTINUED | OUTPATIENT
Start: 2023-10-23 | End: 2023-10-23

## 2023-10-23 RX ORDER — ATORVASTATIN CALCIUM 40 MG/1
40 TABLET, FILM COATED ORAL NIGHTLY
Status: DISCONTINUED | OUTPATIENT
Start: 2023-10-23 | End: 2023-10-25 | Stop reason: HOSPADM

## 2023-10-23 RX ORDER — PROMETHAZINE HYDROCHLORIDE 25 MG/1
12.5-25 TABLET ORAL EVERY 4 HOURS PRN
Status: DISCONTINUED | OUTPATIENT
Start: 2023-10-23 | End: 2023-10-25 | Stop reason: HOSPADM

## 2023-10-23 RX ORDER — ONDANSETRON 4 MG/1
4 TABLET, ORALLY DISINTEGRATING ORAL EVERY 4 HOURS PRN
Status: DISCONTINUED | OUTPATIENT
Start: 2023-10-23 | End: 2023-10-25 | Stop reason: HOSPADM

## 2023-10-23 RX ORDER — POLYETHYLENE GLYCOL 3350 17 G/17G
1 POWDER, FOR SOLUTION ORAL
Status: DISCONTINUED | OUTPATIENT
Start: 2023-10-23 | End: 2023-10-25 | Stop reason: HOSPADM

## 2023-10-23 RX ORDER — ACETAMINOPHEN 325 MG/1
650 TABLET ORAL EVERY 6 HOURS PRN
Status: DISCONTINUED | OUTPATIENT
Start: 2023-10-23 | End: 2023-10-25 | Stop reason: HOSPADM

## 2023-10-23 RX ORDER — ASPIRIN 81 MG/1
81 TABLET, CHEWABLE ORAL DAILY
Status: DISCONTINUED | OUTPATIENT
Start: 2023-10-24 | End: 2023-10-25 | Stop reason: HOSPADM

## 2023-10-23 RX ADMIN — CEFAZOLIN 2 G: 2 INJECTION, POWDER, FOR SOLUTION INTRAMUSCULAR; INTRAVENOUS at 20:24

## 2023-10-23 RX ADMIN — ENOXAPARIN SODIUM 40 MG: 100 INJECTION SUBCUTANEOUS at 22:08

## 2023-10-23 RX ADMIN — CARVEDILOL 25 MG: 25 TABLET, FILM COATED ORAL at 22:07

## 2023-10-23 RX ADMIN — ATORVASTATIN CALCIUM 40 MG: 40 TABLET, FILM COATED ORAL at 22:07

## 2023-10-23 RX ADMIN — SENNOSIDES AND DOCUSATE SODIUM 2 TABLET: 50; 8.6 TABLET ORAL at 22:07

## 2023-10-23 ASSESSMENT — LIFESTYLE VARIABLES
HAVE PEOPLE ANNOYED YOU BY CRITICIZING YOUR DRINKING: NO
HOW MANY TIMES IN THE PAST YEAR HAVE YOU HAD 5 OR MORE DRINKS IN A DAY: 0
EVER FELT BAD OR GUILTY ABOUT YOUR DRINKING: NO
AVERAGE NUMBER OF DAYS PER WEEK YOU HAVE A DRINK CONTAINING ALCOHOL: 0
TOTAL SCORE: 0
HAVE YOU EVER FELT YOU SHOULD CUT DOWN ON YOUR DRINKING: NO
CONSUMPTION TOTAL: NEGATIVE
ON A TYPICAL DAY WHEN YOU DRINK ALCOHOL HOW MANY DRINKS DO YOU HAVE: 0
EVER HAD A DRINK FIRST THING IN THE MORNING TO STEADY YOUR NERVES TO GET RID OF A HANGOVER: NO
TOTAL SCORE: 0
TOTAL SCORE: 0
DOES PATIENT WANT TO STOP DRINKING: NO
ALCOHOL_USE: NO

## 2023-10-23 ASSESSMENT — COGNITIVE AND FUNCTIONAL STATUS - GENERAL
SUGGESTED CMS G CODE MODIFIER MOBILITY: CH
SUGGESTED CMS G CODE MODIFIER DAILY ACTIVITY: CH
MOBILITY SCORE: 24
DAILY ACTIVITIY SCORE: 24

## 2023-10-23 ASSESSMENT — PAIN DESCRIPTION - PAIN TYPE: TYPE: ACUTE PAIN

## 2023-10-23 ASSESSMENT — FIBROSIS 4 INDEX
FIB4 SCORE: 0.82
FIB4 SCORE: 0.87
FIB4 SCORE: 0.87

## 2023-10-23 NOTE — ED TRIAGE NOTES
"Chief Complaint   Patient presents with    Digit Pain     Pt c/o pain and swelling to his left pointer finger.  Pt states it was swollen a couple months ago but went away on its own. Pt states his finger is more swollen than before and has been swollen for the past 4 days with increased pain.  Pt denies any numbness.      Pt ambulatory from lobby with steady gait. Pt states he has history of gout.      Pt is alert and oriented, speaking in full sentences, follows commands and responds appropriately to questions. Resp are even and unlabored.      Pt placed in lobby. Pt educated on triage process. Pt encouraged to alert staff for any changes.     Patient and staff wearing appropriate PPE.    BP (!) 143/92   Pulse 83   Temp 37.1 °C (98.8 °F) (Temporal)   Resp 16   Ht 1.753 m (5' 9\")   Wt 102 kg (225 lb 5 oz)   SpO2 94%      "

## 2023-10-24 PROBLEM — E78.5 HYPERLIPIDEMIA: Status: ACTIVE | Noted: 2023-10-24

## 2023-10-24 PROBLEM — I10 HYPERTENSION: Status: ACTIVE | Noted: 2023-10-24

## 2023-10-24 LAB
ALBUMIN SERPL BCP-MCNC: 3.5 G/DL (ref 3.2–4.9)
ALBUMIN/GLOB SERPL: 1.3 G/DL
ALP SERPL-CCNC: 111 U/L (ref 30–99)
ALT SERPL-CCNC: 24 U/L (ref 2–50)
ANION GAP SERPL CALC-SCNC: 10 MMOL/L (ref 7–16)
AST SERPL-CCNC: 17 U/L (ref 12–45)
BASOPHILS # BLD AUTO: 1.5 % (ref 0–1.8)
BASOPHILS # BLD: 0.16 K/UL (ref 0–0.12)
BILIRUB SERPL-MCNC: 0.6 MG/DL (ref 0.1–1.5)
BUN SERPL-MCNC: 16 MG/DL (ref 8–22)
CALCIUM ALBUM COR SERPL-MCNC: 9 MG/DL (ref 8.5–10.5)
CALCIUM SERPL-MCNC: 8.6 MG/DL (ref 8.5–10.5)
CHLORIDE SERPL-SCNC: 106 MMOL/L (ref 96–112)
CO2 SERPL-SCNC: 23 MMOL/L (ref 20–33)
CREAT SERPL-MCNC: 1.09 MG/DL (ref 0.5–1.4)
EOSINOPHIL # BLD AUTO: 0.49 K/UL (ref 0–0.51)
EOSINOPHIL NFR BLD: 4.5 % (ref 0–6.9)
ERYTHROCYTE [DISTWIDTH] IN BLOOD BY AUTOMATED COUNT: 43.6 FL (ref 35.9–50)
GFR SERPLBLD CREATININE-BSD FMLA CKD-EPI: 82 ML/MIN/1.73 M 2
GLOBULIN SER CALC-MCNC: 2.8 G/DL (ref 1.9–3.5)
GLUCOSE SERPL-MCNC: 89 MG/DL (ref 65–99)
HCT VFR BLD AUTO: 44.9 % (ref 42–52)
HGB BLD-MCNC: 15 G/DL (ref 14–18)
IMM GRANULOCYTES # BLD AUTO: 0.06 K/UL (ref 0–0.11)
IMM GRANULOCYTES NFR BLD AUTO: 0.5 % (ref 0–0.9)
LYMPHOCYTES # BLD AUTO: 4.84 K/UL (ref 1–4.8)
LYMPHOCYTES NFR BLD: 44 % (ref 22–41)
MCH RBC QN AUTO: 32.3 PG (ref 27–33)
MCHC RBC AUTO-ENTMCNC: 33.4 G/DL (ref 32.3–36.5)
MCV RBC AUTO: 96.8 FL (ref 81.4–97.8)
MONOCYTES # BLD AUTO: 1.02 K/UL (ref 0–0.85)
MONOCYTES NFR BLD AUTO: 9.3 % (ref 0–13.4)
NEUTROPHILS # BLD AUTO: 4.42 K/UL (ref 1.82–7.42)
NEUTROPHILS NFR BLD: 40.2 % (ref 44–72)
NRBC # BLD AUTO: 0 K/UL
NRBC BLD-RTO: 0 /100 WBC (ref 0–0.2)
PLATELET # BLD AUTO: 209 K/UL (ref 164–446)
PMV BLD AUTO: 10 FL (ref 9–12.9)
POTASSIUM SERPL-SCNC: 4.6 MMOL/L (ref 3.6–5.5)
PROT SERPL-MCNC: 6.3 G/DL (ref 6–8.2)
RBC # BLD AUTO: 4.64 M/UL (ref 4.7–6.1)
SODIUM SERPL-SCNC: 139 MMOL/L (ref 135–145)
WBC # BLD AUTO: 11 K/UL (ref 4.8–10.8)

## 2023-10-24 PROCEDURE — 87040 BLOOD CULTURE FOR BACTERIA: CPT

## 2023-10-24 PROCEDURE — 770006 HCHG ROOM/CARE - MED/SURG/GYN SEMI*

## 2023-10-24 PROCEDURE — 700102 HCHG RX REV CODE 250 W/ 637 OVERRIDE(OP): Performed by: INTERNAL MEDICINE

## 2023-10-24 PROCEDURE — 99233 SBSQ HOSP IP/OBS HIGH 50: CPT | Performed by: INTERNAL MEDICINE

## 2023-10-24 PROCEDURE — 80053 COMPREHEN METABOLIC PANEL: CPT

## 2023-10-24 PROCEDURE — A9270 NON-COVERED ITEM OR SERVICE: HCPCS | Performed by: INTERNAL MEDICINE

## 2023-10-24 PROCEDURE — 85025 COMPLETE CBC W/AUTO DIFF WBC: CPT

## 2023-10-24 PROCEDURE — 700105 HCHG RX REV CODE 258: Performed by: INTERNAL MEDICINE

## 2023-10-24 PROCEDURE — 700111 HCHG RX REV CODE 636 W/ 250 OVERRIDE (IP): Mod: JZ | Performed by: INTERNAL MEDICINE

## 2023-10-24 PROCEDURE — 36415 COLL VENOUS BLD VENIPUNCTURE: CPT

## 2023-10-24 RX ADMIN — ATORVASTATIN CALCIUM 40 MG: 40 TABLET, FILM COATED ORAL at 20:49

## 2023-10-24 RX ADMIN — AMPICILLIN AND SULBACTAM 3 G: 1; 2 INJECTION, POWDER, FOR SOLUTION INTRAMUSCULAR; INTRAVENOUS at 17:08

## 2023-10-24 RX ADMIN — SENNOSIDES AND DOCUSATE SODIUM 2 TABLET: 50; 8.6 TABLET ORAL at 06:01

## 2023-10-24 RX ADMIN — ENOXAPARIN SODIUM 40 MG: 100 INJECTION SUBCUTANEOUS at 17:07

## 2023-10-24 RX ADMIN — ACETAMINOPHEN 650 MG: 325 TABLET, FILM COATED ORAL at 04:06

## 2023-10-24 RX ADMIN — AMPICILLIN AND SULBACTAM 3 G: 1; 2 INJECTION, POWDER, FOR SOLUTION INTRAMUSCULAR; INTRAVENOUS at 00:37

## 2023-10-24 RX ADMIN — LISINOPRIL 20 MG: 20 TABLET ORAL at 06:01

## 2023-10-24 RX ADMIN — CARVEDILOL 25 MG: 25 TABLET, FILM COATED ORAL at 17:07

## 2023-10-24 RX ADMIN — AMPICILLIN AND SULBACTAM 3 G: 1; 2 INJECTION, POWDER, FOR SOLUTION INTRAMUSCULAR; INTRAVENOUS at 06:05

## 2023-10-24 RX ADMIN — AMPICILLIN AND SULBACTAM 3 G: 1; 2 INJECTION, POWDER, FOR SOLUTION INTRAMUSCULAR; INTRAVENOUS at 11:20

## 2023-10-24 RX ADMIN — ASPIRIN 81 MG 81 MG: 81 TABLET ORAL at 06:01

## 2023-10-24 RX ADMIN — CARVEDILOL 25 MG: 25 TABLET, FILM COATED ORAL at 08:17

## 2023-10-24 RX ADMIN — ACETAMINOPHEN 650 MG: 325 TABLET, FILM COATED ORAL at 15:36

## 2023-10-24 ASSESSMENT — ENCOUNTER SYMPTOMS
VOMITING: 0
PALPITATIONS: 0
BLURRED VISION: 0
HEARTBURN: 0
SPEECH CHANGE: 0
BACK PAIN: 0
NECK PAIN: 0
HEADACHES: 0
NERVOUS/ANXIOUS: 0
CHILLS: 0
COUGH: 0
PHOTOPHOBIA: 0
WEIGHT LOSS: 0
HEMOPTYSIS: 0
FEVER: 0
ORTHOPNEA: 0
SPUTUM PRODUCTION: 0
NAUSEA: 0
POLYDIPSIA: 0
BRUISES/BLEEDS EASILY: 0
TREMORS: 0
HALLUCINATIONS: 0
FLANK PAIN: 0
DOUBLE VISION: 0
FOCAL WEAKNESS: 0

## 2023-10-24 ASSESSMENT — PAIN DESCRIPTION - PAIN TYPE
TYPE: ACUTE PAIN

## 2023-10-24 ASSESSMENT — LIFESTYLE VARIABLES: SUBSTANCE_ABUSE: 0

## 2023-10-24 NOTE — PROGRESS NOTES
Hospital Medicine Daily Progress Note    Date of Service  10/24/2023    Chief Complaint  Lucas Agee is a 52 y.o. male admitted 10/23/2023 with left index finger swelling and redness    Hospital Course  Lucas Agee is a 52 y.o. male with past medical history of DVT, CHF, hypertension who presented 10/23/2023 with complaints of pain, redness and swelling of the left index worsening in the last 4 days    Interval Problem Update  Patient continues to have significant redness of his right index finger with swelling and pain with flexion.  He has had some improvement of his leukocytosis.  Blood culture is pending.  Bedside ultrasound reviewed that did not reveal any evidence of abscess.  Continue empiric IV antibiotics and pain control and follow blood culture.  Patient has worsening redness and swelling will consult orthopedics for I&D    I have discussed this patient's plan of care and discharge plan at IDT rounds today with Case Management, Nursing, Nursing leadership, and other members of the IDT team.    Consultants/Specialty  none    Code Status  Full Code    Disposition  The patient is not medically cleared for discharge to home or a post-acute facility.  Anticipate discharge to: home with close outpatient follow-up    I have placed the appropriate orders for post-discharge needs.    Review of Systems  Review of Systems   Skin:         Admission swelling of left index finger        Physical Exam  Temp:  [35.9 °C (96.6 °F)-37.1 °C (98.8 °F)] 35.9 °C (96.6 °F)  Pulse:  [69-83] 83  Resp:  [16-17] 17  BP: (127-145)/() 145/95  SpO2:  [92 %-99 %] 99 %    Physical Exam  Vitals and nursing note reviewed.   Constitutional:       General: He is not in acute distress.  HENT:      Head: Normocephalic.      Mouth/Throat:      Mouth: Mucous membranes are moist.   Eyes:      Pupils: Pupils are equal, round, and reactive to light.   Cardiovascular:      Rate and Rhythm: Normal rate and regular rhythm.       Pulses: Normal pulses.      Heart sounds: Normal heart sounds.   Pulmonary:      Effort: Pulmonary effort is normal.      Breath sounds: Normal breath sounds.   Abdominal:      Palpations: Abdomen is soft.      Tenderness: There is no abdominal tenderness.   Musculoskeletal:         General: No swelling.      Cervical back: Neck supple.   Skin:     General: Skin is warm.      Coloration: Skin is not jaundiced.      Findings: Erythema present.      Comments: Cellulitis of left index finger with significant swelling   Neurological:      General: No focal deficit present.      Mental Status: He is alert and oriented to person, place, and time.   Psychiatric:         Mood and Affect: Mood normal.         Behavior: Behavior normal.         Fluids    Intake/Output Summary (Last 24 hours) at 10/24/2023 1553  Last data filed at 10/24/2023 1100  Gross per 24 hour   Intake 360 ml   Output --   Net 360 ml       Laboratory  Recent Labs     10/23/23  1740 10/24/23  0528   WBC 12.1* 11.0*   RBC 4.77 4.64*   HEMOGLOBIN 15.5 15.0   HEMATOCRIT 44.5 44.9   MCV 93.3 96.8   MCH 32.5 32.3   MCHC 34.8 33.4   RDW 42.1 43.6   PLATELETCT 242 209   MPV 10.4 10.0     Recent Labs     10/23/23  1740 10/24/23  0447   SODIUM 142 139   POTASSIUM 5.0 4.6   CHLORIDE 109 106   CO2 22 23   GLUCOSE 93 89   BUN 17 16   CREATININE 1.11 1.09   CALCIUM 9.3 8.6                   Imaging  US-EXTREMITY NON VASCULAR UNILATERAL LEFT   Final Result      1.  Soft tissue induration over the dorsal left hand with hyperemia. Findings most suggestive of superficial and deep cellulitis.   2.  No discrete fluid collection or abscess pocket.      DX-HAND 3+ LEFT   Final Result      1.  Mild degenerative osteophytic change at the DIP joint second digit.   2.  Nonspecific soft tissue swelling second digit.           Assessment/Plan  * Cellulitis of hand- (present on admission)  Assessment & Plan  Presented with swelling redness and pain in the left index  Plan: Continue  IV Unasyn  Pain control    Hyperlipidemia  Assessment & Plan  Continue Lipitor    Hypertension  Assessment & Plan  Continue Coreg    Type 2 diabetes mellitus without complication, without long-term current use of insulin (HCC)- (present on admission)  Assessment & Plan  A1c 6.7    Tobacco use- (present on admission)  Assessment & Plan  Spent approx 5 mins on Tobacco cessation education . Discussed options of nicotine patch, medical treatment with wellbutrin and chantix. Discussed the benefits of quitting smoking and risks of continued smoking including cardiovascular disease, cancer and COPD.   Code 74837      Dilated cardiomyopathy (HCC)- (present on admission)  Assessment & Plan  Compensated  Monitor volume status         VTE prophylaxis: Lovenox    I have performed a physical exam and reviewed and updated ROS and Plan today (10/24/2023). In review of yesterday's note (10/23/2023), there are no changes except as documented above.    I spent 51 minutes, reviewing the chart, obtaining and/or reviewing separately obtained history. Performing a medically appropriate examination and evaluation.  Counseling and educating the patient. Ordering and reviewing medications, tests, or procedures.  Documenting clinical information in EPIC. Independently interpreting results and communicating results to patient. Discussing future disposition of care with patient, RN and case management.

## 2023-10-24 NOTE — PROGRESS NOTES
4 Eyes Skin Assessment Completed by MANISH Darling and MANISH Manning.    Head WDL  Ears WDL  Nose WDL  Mouth WDL  Neck WDL  Breast/Chest WDL  Shoulder Blades WDL  Spine WDL  (R) Arm/Elbow/Hand WDL  (L) Arm/Elbow/Hand swelling and redness to right pointer finger and hand  Abdomen WDL  Groin WDL  Scrotum/Coccyx/Buttocks WDL  (R) Leg WDL  (L) Leg WDL  (R) Heel/Foot/Toe WDL  (L) Heel/Foot/Toe WDL          Devices In Places Blood Pressure Cuff and Pulse Ox      Interventions In Place Pillows and Heels Loaded W/Pillows    Possible Skin Injury No    Pictures Uploaded Into Epic N/A  Wound Consult Placed N/A  RN Wound Prevention Protocol Ordered No

## 2023-10-24 NOTE — H&P
Hospital Medicine History & Physical Note    Date of Service  10/23/2023    Primary Care Physician  Ophelia John M.D.      Code Status  Full Code    Chief Complaint  Chief Complaint   Patient presents with    Digit Pain     Pt c/o pain and swelling to his left pointer finger.  Pt states it was swollen a couple months ago but went away on its own. Pt states his finger is more swollen than before and has been swollen for the past 4 days with increased pain.  Pt denies any numbness.        History of Presenting Illness  Lucas Agee is a 52 y.o. male with past medical history of DVT, CHF, hypertension who presented 10/23/2023 with complaints of pain, redness and swelling of the left index on and off, worsening in the last 4 days    Bedside ultrasound did not show subcutaneous abscess.  Therefore he is being admitted with cellulitis for empiric antibiotics.    I discussed the plan of care with patient.    Review of Systems  Review of Systems   Constitutional:  Negative for chills, fever and weight loss.   HENT:  Negative for ear pain, hearing loss and tinnitus.    Eyes:  Negative for blurred vision, double vision and photophobia.   Respiratory:  Negative for cough, hemoptysis and sputum production.    Cardiovascular:  Negative for chest pain, palpitations and orthopnea.   Gastrointestinal:  Negative for heartburn, nausea and vomiting.   Genitourinary:  Negative for dysuria, flank pain, frequency and hematuria.   Musculoskeletal:  Positive for joint pain. Negative for back pain and neck pain.   Skin:  Negative for itching and rash.   Neurological:  Negative for tremors, speech change, focal weakness and headaches.   Endo/Heme/Allergies:  Negative for environmental allergies and polydipsia. Does not bruise/bleed easily.   Psychiatric/Behavioral:  Negative for hallucinations and substance abuse. The patient is not nervous/anxious.        Past Medical History   has a past medical history of Congestive heart  failure (HCC), DVT (deep venous thrombosis) (HCC), Hypertension, and Pain and swelling of left knee (8/24/2019).    Surgical History   has a past surgical history that includes hernia repair; hernia repair (Right, 1990); hand surgery (Right, 1995); and thrombectomy (Right, 5/21/2019).     Family History  family history includes Blood Clots in his brother and paternal grandmother; Diabetes in his maternal grandmother; Heart Attack (age of onset: 60) in his paternal uncle; Heart Disease in his brother, father, and paternal grandmother; Stroke in his paternal grandmother.   Family history reviewed with patient. There is no family history that is pertinent to the chief complaint.     Social History   reports that he has been smoking cigarettes. He has a 7.5 pack-year smoking history. He has never used smokeless tobacco. He reports current drug use. Drugs: Marijuana, Methamphetamines, Intravenous, and Inhaled. He reports that he does not drink alcohol.    Allergies  No Known Allergies    Medications  Prior to Admission Medications   Prescriptions Last Dose Informant Patient Reported? Taking?   aspirin (ASA) 81 MG Chew Tab chewable tablet  Patient No No   Sig: Take 1 Tab by mouth every day.   atorvastatin (LIPITOR) 40 MG Tab   No No   Sig: Take 1 Tablet by mouth every evening.   carvedilol (COREG) 25 MG Tab   No No   Sig: Take 1 Tablet by mouth 2 times a day with meals.   lisinopril (PRINIVIL) 20 MG Tab   No No   Sig: Take 1 Tablet by mouth every day.      Facility-Administered Medications: None       Physical Exam  Temp:  [37.1 °C (98.8 °F)] 37.1 °C (98.8 °F)  Pulse:  [83] 83  Resp:  [16] 16  BP: (143)/(92) 143/92  SpO2:  [94 %] 94 %  Blood Pressure: (!) 143/92   Temperature: 37.1 °C (98.8 °F)   Pulse: 83   Respiration: 16   Pulse Oximetry: 94 %       Physical Exam  Vitals and nursing note reviewed.   Constitutional:       General: He is not in acute distress.     Appearance: Normal appearance.   HENT:      Head:  "Normocephalic and atraumatic.      Nose: Nose normal.      Mouth/Throat:      Mouth: Mucous membranes are moist.   Eyes:      Extraocular Movements: Extraocular movements intact.      Pupils: Pupils are equal, round, and reactive to light.   Cardiovascular:      Rate and Rhythm: Normal rate and regular rhythm.   Pulmonary:      Effort: Pulmonary effort is normal.      Breath sounds: Normal breath sounds.   Abdominal:      General: Abdomen is flat. There is no distension.      Tenderness: There is no abdominal tenderness.   Musculoskeletal:         General: No swelling or deformity. Normal range of motion.      Cervical back: Normal range of motion and neck supple.      Comments: Edema and minimal tenderness with palpation of the dorsal aspect of the left index finger.  No fluctuance.   Skin:     General: Skin is warm and dry.   Neurological:      General: No focal deficit present.      Mental Status: He is alert and oriented to person, place, and time.   Psychiatric:         Mood and Affect: Mood normal.         Behavior: Behavior normal.         Laboratory:  Recent Labs     10/23/23  1740   WBC 12.1*   RBC 4.77   HEMOGLOBIN 15.5   HEMATOCRIT 44.5   MCV 93.3   MCH 32.5   MCHC 34.8   RDW 42.1   PLATELETCT 242   MPV 10.4     Recent Labs     10/23/23  1740   SODIUM 142   POTASSIUM 5.0   CHLORIDE 109   CO2 22   GLUCOSE 93   BUN 17   CREATININE 1.11   CALCIUM 9.3     Recent Labs     10/23/23  1740   ALTSGPT 27   ASTSGOT 22   ALKPHOSPHAT 124*   TBILIRUBIN 0.5   GLUCOSE 93         No results for input(s): \"NTPROBNP\" in the last 72 hours.      No results for input(s): \"TROPONINT\" in the last 72 hours.    Imaging:  US-EXTREMITY NON VASCULAR UNILATERAL LEFT   Final Result      1.  Soft tissue induration over the dorsal left hand with hyperemia. Findings most suggestive of superficial and deep cellulitis.   2.  No discrete fluid collection or abscess pocket.      DX-HAND 3+ LEFT   Final Result      1.  Mild degenerative " osteophytic change at the DIP joint second digit.   2.  Nonspecific soft tissue swelling second digit.          X-Ray:  I have personally reviewed the images and compared with prior images.    Assessment/Plan:  Justification for Admission Status  I anticipate this patient will require at least two midnights for appropriate medical management, necessitating inpatient admission because left hand cellulitis    Patient will need a Med/Surg bed on MEDICAL service .  The need is secondary to left hand cellulitis.    * Cellulitis of hand- (present on admission)  Assessment & Plan  Presented with swelling redness and pain in the left index  Plan: Continue IV Unasyn  Pain control    Tobacco use- (present on admission)  Assessment & Plan  Spent approx 5 mins on Tobacco cessation education . Discussed options of nicotine patch, medical treatment with wellbutrin and chantix. Discussed the benefits of quitting smoking and risks of continued smoking including cardiovascular disease, cancer and COPD.   Code 30881      Dilated cardiomyopathy (HCC)- (present on admission)  Assessment & Plan  Compensated  Monitor volume status        VTE prophylaxis: enoxaparin ppx

## 2023-10-24 NOTE — ED NOTES
Med rec updated and complete.  Allergies reviewed.  Confirmed name and date of birth.  Pt denies outpatient antibiotic use in last 30 days.  Pt denies anticoagulant medications.  Last ASA dose 10/23/23    Home pharmacy  [facility] = [phone]

## 2023-10-24 NOTE — CARE PLAN
The patient is Stable - Low risk of patient condition declining or worsening    Shift Goals  Clinical Goals: abx administration; pain management  Patient Goals: rest  Family Goals: hima    Progress made toward(s) clinical / shift goals:  Patient verbalizes understanding of plan of care; patient reports that pain in hand is well controlled through out shift.      Problem: Knowledge Deficit - Standard  Goal: Patient and family/care givers will demonstrate understanding of plan of care, disease process/condition, diagnostic tests and medications  Outcome: Progressing     Problem: Pain - Standard  Goal: Alleviation of pain or a reduction in pain to the patient’s comfort goal  Outcome: Progressing       Patient is not progressing towards the following goals:

## 2023-10-24 NOTE — PROGRESS NOTES
Report received from Tila HAMMOND in ED. Patient arrived on unit with all belongings. Patient is A&O x4, vital signs are stable, call light within reach, and all needs met at this time.

## 2023-10-24 NOTE — ASSESSMENT & PLAN NOTE
Spent approx 5 mins on Tobacco cessation education . Discussed options of nicotine patch, medical treatment with wellbutrin and chantix. Discussed the benefits of quitting smoking and risks of continued smoking including cardiovascular disease, cancer and COPD.   Code 49638

## 2023-10-24 NOTE — ASSESSMENT & PLAN NOTE
Presented with swelling redness and pain in the left index  Plan: Continue IV Unasyn  Pain control

## 2023-10-24 NOTE — ED PROVIDER NOTES
ED Provider Note    CHIEF COMPLAINT  Chief Complaint   Patient presents with    Digit Pain     Pt c/o pain and swelling to his left pointer finger.  Pt states it was swollen a couple months ago but went away on its own. Pt states his finger is more swollen than before and has been swollen for the past 4 days with increased pain.  Pt denies any numbness.        EXTERNAL RECORDS REVIEWED  Outpatient Notes patient seen by family practitioner on 7/27/2023 for follow-up of long-term type 2 diabetes mellitus, tobacco use, pulmonary hypertension.    HPI/ROS  LIMITATION TO HISTORY   Select: : None      Lucas Agee is a 52 y.o. male who presents to the emergency department for evaluation of finger pain and now hand pain.  Patient reports 4 to 5 days of symptoms that initially started with redness, swelling and pain to his left pointer finger with symptoms tracking up the finger to the hand during that time.  He reports that the finger and hand are quite swollen and stiff.  He denies fevers, chills and minimal pain at rest with increased pain with movement of the finger but that this is not severe.  He denies any numbness or weakness.  He states this happened months ago to just the distal finger but resolved on its own without intervention.  He reports a history of gout but this is usually isolated to his right great toe and very infrequent.  He denies any additional areas of concern or pain at this time.    PAST MEDICAL HISTORY   has a past medical history of Congestive heart failure (HCC), DVT (deep venous thrombosis) ([facility]), Hypertension, and Pain and swelling of left knee (8/24/2019).    SURGICAL HISTORY   has a past surgical history that includes hernia repair; hernia repair (Right, 1990); hand surgery (Right, 1995); and thrombectomy (Right, 5/21/2019).    FAMILY HISTORY  Family History   Problem Relation Age of Onset    Heart Disease Father     Blood Clots Brother     Heart Disease Brother         pacemaker     "Blood Clots Paternal Grandmother     Heart Disease Paternal Grandmother     Stroke Paternal Grandmother     Heart Attack Paternal Uncle 60    Diabetes Maternal Grandmother     Cancer Neg Hx        SOCIAL HISTORY  Social History     Tobacco Use    Smoking status: Every Day     Current packs/day: 0.25     Average packs/day: 0.3 packs/day for 30.0 years (7.5 ttl pk-yrs)     Types: Cigarettes    Smokeless tobacco: Never    Tobacco comments:     Christy cut down even more & working on becoming a non-smoker   Vaping Use    Vaping Use: Never used   Substance and Sexual Activity    Alcohol use: No    Drug use: Yes     Types: Marijuana, Methamphetamines, Intravenous, Inhaled     Comment: marijuana currently; h/o IV and smoked meth use- ;ast used 2021    Sexual activity: Yes     Partners: Female       CURRENT MEDICATIONS  Home Medications       Reviewed by Sera Pinedo (Pharmacy Tech) on 10/23/23 at 2025  Med List Status: Complete     Medication Last Dose Status   aspirin (ASA) 81 MG Chew Tab chewable tablet 10/23/2023 Active   atorvastatin (LIPITOR) 40 MG Tab 10/22/2023 Active   carvedilol (COREG) 25 MG Tab 10/23/2023 Active   lisinopril (PRINIVIL) 20 MG Tab 10/23/2023 Active                    ALLERGIES  No Known Allergies    PHYSICAL EXAM  VITAL SIGNS: BP (!) 144/102 Comment: RN notified   Pulse 78   Temp 36.6 °C (97.8 °F) (Temporal)   Resp 16   Ht 1.753 m (5' 9\")   Wt 99.3 kg (219 lb)   SpO2 95%   BMI 32.34 kg/m²    Constitutional: No acute distress  Neck: Supple, no JVD, no tracheal deviation  Cardiovascular: 2+ radial pulse on the left, cap refill less than 2 seconds in left distal index finger.  Thorax & Lungs: No respiratory distressbound  Skin: Warm, dry, erythema extending from the distal left index finger up to the dorsal mid hand.  Musculoskeletal: Moving all extremities, limited range of motion of the left index finger with flexion though full extension ability.  Edema and minimal tenderness with " "palpation of the dorsal aspect of the left index finger.  No tenderness with palpation of the palm.  No fluctuance.  No purulent material visualized.  No pain out of proportion with passive extension of the left index finger.  Patient able to actively flex without significant discomfort.  No tenderness along the flexor tendon sheath.  Neurologic: A&Ox3, at baseline mentation, normal  strength, normal sensation to both radial and ulnar aspect of the left index finger.  Psychiatric: Appropriate affect for situation at this time        DIAGNOSTIC STUDIES / PROCEDURES    LABS  Labs Reviewed   CBC WITH DIFFERENTIAL - Abnormal; Notable for the following components:       Result Value    WBC 12.1 (*)     Monos (Absolute) 1.02 (*)     Eos (Absolute) 0.53 (*)     All other components within normal limits   COMP METABOLIC PANEL - Abnormal; Notable for the following components:    Alkaline Phosphatase 124 (*)     All other components within normal limits   CRP QUANTITIVE (NON-CARDIAC) - Abnormal; Notable for the following components:    Stat C-Reactive Protein 1.95 (*)     All other components within normal limits   SED RATE   ESTIMATED GFR   BLOOD CULTURE    Narrative:     Per Hospital Policy: Only change Specimen Src: to \"Line\" if  specified by physician order.  Release to patient->Immediate   BLOOD CULTURE   CBC WITH DIFFERENTIAL   COMP METABOLIC PANEL       RADIOLOGY  I have independently interpreted the diagnostic imaging associated with this visit and am waiting the final reading from the radiologist.   My preliminary interpretation is as follows: Ultrasound with no obvious drainable abscess.    Radiologist interpretation:   US-EXTREMITY NON VASCULAR UNILATERAL LEFT   Final Result      1.  Soft tissue induration over the dorsal left hand with hyperemia. Findings most suggestive of superficial and deep cellulitis.   2.  No discrete fluid collection or abscess pocket.      DX-HAND 3+ LEFT   Final Result      1.  Mild " degenerative osteophytic change at the DIP joint second digit.   2.  Nonspecific soft tissue swelling second digit.        Point of Care Ultrasound    ED POINT OF CARE ULTRASOUND: LIMITED SKIN/SOFT TISSUE, FOREIGN BODY IDENTIFICATION    Indication: Swelling and Pain  Procedure: A limited ultrasound of the patients skin and soft tissue was performed at the area of clinical concern as noted.     There was not evidence of a foreign body present.   There was evidence of cobblestoning of the soft tissues.   A localized fluid collection was not present.     Impression: Based on this limited bedside ultrasound, there was evidence of cellulitis, and a drainable fluid collection was not seen. There was not evidence of a foreign body. Further clinical interpretation or comments:     Image retained through [facility] as seen below:         Additional interpretation: No drainable fluid cavity appreciated    This study is a limited ultrasound examination performed and interpreted to evaluate for limited conditions as outlined above. There may be other clinically important information contained in the images that is outside this scope. When clinically warranted, a comprehensive ultrasound through the appropriate department is considered.      COURSE & MEDICAL DECISION MAKING    ED Observation Status? Yes; I am placing the patient in to an observation status due to a diagnostic uncertainty as well as therapeutic intensity. Patient placed in observation status at 5:01 PM, 10/23/2023.     Observation plan is as follows: Lab work, ultrasound and x-ray, monitoring for progression    Upon Reevaluation, the patient's condition has: not improved; and will be escalated to hospitalization.    Patient discharged from ED Observation status at 7:38 PM (Time) 10/23/2023 (Date).     INITIAL ASSESSMENT, COURSE AND PLAN  Care Narrative:     Patient presents to the emergency department for evaluation of erythema, edema and pain to the left index finger  now extending up to the mid hand.  Vitals are stable and patient denies any systemic symptoms such as fever, chills, fatigue.  At this point differential includes skin and soft tissue infection, paronychia, abscess, gout flare, underlying fracture.  Unlikely necrotizing infection or flexor tenosynovitis given absence of Knavel signs, no pain out of proportion, no subcutaneous emphysema or systemic symptoms.  Plan to further evaluate for underlying purulence with formal soft tissue ultrasound however bedside ultrasound performed with no visualized.  At this point no drainable fluid collection in the distal index finger consistent with paronychia with abscess.  We will hold on antibiotic initiation given no SIRS criteria until purulent versus nonpurulent can be further assessed with formal ultrasound.  Patient declining analgesia at this time.    Laboratory work-up remarkable for leukocytosis, elevated CRP.  Complete metabolic panel largely unremarkable.  Hand x-ray with no underlying bony fracture or erosion concerning for osteomyelitis.  Ultrasound with no discrete drainable fluid collection or abscess but evidence of extensive cellulitis as above.  Ultimately given patient's baseline risk factors, elevated inflammatory markers and rapid spread of infection elected to admit for overnight observation on antibiotics.  IV Ancef ordered for treatment of nonpurulent skin and soft tissue infection.  Case discussed with Dr. Jones, hospitalist will admit the patient.    ADDITIONAL PROBLEM LIST  Type 2 diabetes mellitus    DISPOSITION AND DISCUSSIONS  I have discussed management of the patient with the following physicians and BALA's:  Dr. Jones hospitalist    Discussion of management with other [facility] or appropriate source(s): None     FINAL DIAGNOSIS  1. Cellulitis of hand           Electronically signed by: Praveen Henderson M.D., 10/23/2023 5:01 PM

## 2023-10-25 ENCOUNTER — PHARMACY VISIT (OUTPATIENT)
Dept: PHARMACY | Facility: MEDICAL CENTER | Age: 53
End: 2023-10-25
Payer: COMMERCIAL

## 2023-10-25 VITALS
RESPIRATION RATE: 17 BRPM | HEIGHT: 69 IN | BODY MASS INDEX: 32.44 KG/M2 | SYSTOLIC BLOOD PRESSURE: 134 MMHG | DIASTOLIC BLOOD PRESSURE: 83 MMHG | TEMPERATURE: 97.4 F | WEIGHT: 219 LBS | OXYGEN SATURATION: 94 % | HEART RATE: 70 BPM

## 2023-10-25 PROCEDURE — 700105 HCHG RX REV CODE 258: Performed by: INTERNAL MEDICINE

## 2023-10-25 PROCEDURE — 99239 HOSP IP/OBS DSCHRG MGMT >30: CPT | Performed by: INTERNAL MEDICINE

## 2023-10-25 PROCEDURE — 700102 HCHG RX REV CODE 250 W/ 637 OVERRIDE(OP): Performed by: INTERNAL MEDICINE

## 2023-10-25 PROCEDURE — RXMED WILLOW AMBULATORY MEDICATION CHARGE: Performed by: INTERNAL MEDICINE

## 2023-10-25 PROCEDURE — 700111 HCHG RX REV CODE 636 W/ 250 OVERRIDE (IP): Mod: JZ | Performed by: INTERNAL MEDICINE

## 2023-10-25 PROCEDURE — A9270 NON-COVERED ITEM OR SERVICE: HCPCS | Performed by: INTERNAL MEDICINE

## 2023-10-25 RX ORDER — AMOXICILLIN AND CLAVULANATE POTASSIUM 875; 125 MG/1; MG/1
1 TABLET, FILM COATED ORAL 2 TIMES DAILY
Qty: 20 TABLET | Refills: 0 | Status: ACTIVE | OUTPATIENT
Start: 2023-10-25 | End: 2023-11-04

## 2023-10-25 RX ADMIN — LISINOPRIL 20 MG: 20 TABLET ORAL at 05:48

## 2023-10-25 RX ADMIN — ACETAMINOPHEN 650 MG: 325 TABLET, FILM COATED ORAL at 02:47

## 2023-10-25 RX ADMIN — AMPICILLIN AND SULBACTAM 3 G: 1; 2 INJECTION, POWDER, FOR SOLUTION INTRAMUSCULAR; INTRAVENOUS at 05:52

## 2023-10-25 RX ADMIN — ASPIRIN 81 MG 81 MG: 81 TABLET ORAL at 05:49

## 2023-10-25 RX ADMIN — AMPICILLIN AND SULBACTAM 3 G: 1; 2 INJECTION, POWDER, FOR SOLUTION INTRAMUSCULAR; INTRAVENOUS at 00:07

## 2023-10-25 RX ADMIN — SENNOSIDES AND DOCUSATE SODIUM 2 TABLET: 50; 8.6 TABLET ORAL at 05:48

## 2023-10-25 RX ADMIN — CARVEDILOL 25 MG: 25 TABLET, FILM COATED ORAL at 08:29

## 2023-10-25 ASSESSMENT — PAIN DESCRIPTION - PAIN TYPE: TYPE: ACUTE PAIN

## 2023-10-25 NOTE — PROGRESS NOTES
Pt AXOX4. Vitals stable  Ambulates with steady gait  Redness does not exceed outline drawn on admission  Tolerating PO diet  Pain well controlled with tylenol per pt  Home medications returned to pt, meds 2 beds staff notified pt will go to d/c lounge  Pt taken to d/c lounge by wheelchair

## 2023-10-25 NOTE — CARE PLAN
The patient is Stable - Low risk of patient condition declining or worsening    Shift Goals  Clinical Goals: abx administration; pain management  Patient Goals: rest  Family Goals: hima    Progress made toward(s) clinical / shift goals:  Patient able to verbalize understanding of plan of care. Patient reports that pain is well managed on PRN medication       Problem: Knowledge Deficit - Standard  Goal: Patient and family/care givers will demonstrate understanding of plan of care, disease process/condition, diagnostic tests and medications  Outcome: Progressing     Problem: Pain - Standard  Goal: Alleviation of pain or a reduction in pain to the patient’s comfort goal  Outcome: Progressing       Patient is not progressing towards the following goals:

## 2023-10-25 NOTE — CARE PLAN
The patient is Stable - Low risk of patient condition declining or worsening    Shift Goals  Clinical Goals: Patient's redness to his hand will not exceed outline drawn this shift  Patient Goals: Rest  Family Goals: hima    Progress made toward(s) clinical / shift goals:  Goal met    Patient is not progressing towards the following goals:      Pt AXOX4. Vitals stable  Ambulates with steady gait independently   Redness does not exceed drawn outline  PRN tylenol given X 1 this shift for pain in finger  Iv antibiotics infused  Tolerating diet with good appetite  Pt resting in bed with call light in reach

## 2023-10-26 NOTE — DISCHARGE SUMMARY
Discharge Summary    CHIEF COMPLAINT ON ADMISSION  Chief Complaint   Patient presents with    Digit Pain     Pt c/o pain and swelling to his left pointer finger.  Pt states it was swollen a couple months ago but went away on its own. Pt states his finger is more swollen than before and has been swollen for the past 4 days with increased pain.  Pt denies any numbness.        Reason for Admission  Finger Swelling     Admission Date  10/23/2023    CODE STATUS  Prior    HPI & HOSPITAL COURSE  Lucas Agee is a 52 y.o. male with past medical history of DVT, CHF, hypertension who presented 10/23/2023 with complaints of pain, redness and swelling of the left index worsening in the last 4 days. Patient had leukocytosis of 12.1. Xray hand showed non specific soft tissue swelling of second digit.  US showed evidence of cellulitis without abscess. He was treated with IV Unasyn with improvement. He will be discharged with augmentin and instructed to follow up with PCP and to return for worsening symptoms.     Therefore, he is discharged in good and stable condition to home with close outpatient follow-up.    The patient met 2-midnight criteria for an inpatient stay at the time of discharge.    Discharge Date  10/25/2023    FOLLOW UP ITEMS POST DISCHARGE  PCP    DISCHARGE DIAGNOSES  Principal Problem:    Cellulitis of hand (POA: Yes)  Active Problems:    Dilated cardiomyopathy (HCC) (POA: Yes)    Tobacco use (POA: Yes)    Type 2 diabetes mellitus without complication, without long-term current use of insulin (HCC) (POA: Yes)    Hypertension (POA: Yes)    Hyperlipidemia (POA: Yes)  Resolved Problems:    * No resolved hospital problems. *      FOLLOW UP  Future Appointments   Date Time Provider Department Center   10/31/2023  2:40 PM Ophelia John M.D. [facility]     No follow-up provider specified.    MEDICATIONS ON DISCHARGE     Medication List        START taking these medications        Instructions    amoxicillin-clavulanate 875-125 MG Tabs  Commonly known as: Augmentin   Take 1 Tablet by mouth 2 times a day for 10 days.  Dose: 1 Tablet            CONTINUE taking these medications        Instructions   aspirin 81 MG Chew chewable tablet  Commonly known as: Asa   Take 1 Tab by mouth every day.  Dose: 81 mg     atorvastatin 40 MG Tabs  Commonly known as: Lipitor   Take 1 Tablet by mouth every evening.  Dose: 40 mg     carvedilol 25 MG Tabs  Commonly known as: Coreg   Take 1 Tablet by mouth 2 times a day with meals.  Dose: 25 mg     lisinopril 20 MG Tabs  Commonly known as: Prinivil   Take 1 Tablet by mouth every day.  Dose: 20 mg              Allergies  No Known Allergies    DIET  No orders of the defined types were placed in this encounter.      ACTIVITY  As tolerated.  Weight bearing as tolerated    CONSULTATIONS  none    PROCEDURES  none    LABORATORY  Lab Results   Component Value Date    SODIUM 139 10/24/2023    POTASSIUM 4.6 10/24/2023    CHLORIDE 106 10/24/2023    CO2 23 10/24/2023    GLUCOSE 89 10/24/2023    BUN 16 10/24/2023    CREATININE 1.09 10/24/2023        Lab Results   Component Value Date    WBC 11.0 (H) 10/24/2023    HEMOGLOBIN 15.0 10/24/2023    HEMATOCRIT 44.9 10/24/2023    PLATELETCT 209 10/24/2023        Total time of the discharge process exceeds 32 minutes.

## 2023-10-28 LAB
BACTERIA BLD CULT: NORMAL
SIGNIFICANT IND 70042: NORMAL
SITE SITE: NORMAL
SOURCE SOURCE: NORMAL

## 2023-10-29 LAB
BACTERIA BLD CULT: NORMAL
SIGNIFICANT IND 70042: NORMAL
SITE SITE: NORMAL
SOURCE SOURCE: NORMAL

## 2023-10-30 NOTE — DOCUMENTATION QUERY
[facility]                                                                       Query Response Note      PATIENT:               EDMUNDO OROPEZA  ACCT #:                  [id number]  MRN:                     [id number]  :                      1970  ADMIT DATE:       10/23/2023 4:50 PM  DISCH DATE:        10/25/2023 9:35 AM  RESPONDING  PROVIDER #:        [id number]           QUERY TEXT:    Patient identified with past history of Congestive heart failure (type unspecified). Considering the clinical indicators and additional documentation, please specify the type and acuity of heart failure.    The patient's Clinical Indicators include:  NOTED  ...past medical history CHF (congestive heart failure)   Compensated dilated cardiomyopathy  Coreg PO BID ; Lisinopril PO Daily   (23) Echo: EF 55% - Reduced right ventricular systolic function  (23) - Cardiology Office visit  HFpEF 55%, previously 15%  Pulmonary Hypertension  Hx of Methamphetamine abuse   Hypertension     Thank you,  Celsa Bocanegra RN, CCS  Clinical Documentation Integrity  Connect via Voalte  Options provided:   -- HFpEF, chronic   -- Other explanation, Pls specify      Query created by: Celsa Bocanegra on 10/25/2023 9:17 AM    RESPONSE TEXT:    HFpEF, chronic          Electronically signed by:  ESTELA ROMEO MD 10/30/2023 12:28 PM

## 2023-11-02 ENCOUNTER — OFFICE VISIT (OUTPATIENT)
Dept: MEDICAL GROUP | Facility: MEDICAL CENTER | Age: 53
End: 2023-11-02
Attending: FAMILY MEDICINE
Payer: MEDICAID

## 2023-11-02 DIAGNOSIS — Z23 NEED FOR VACCINATION: ICD-10-CM

## 2023-11-02 DIAGNOSIS — Z12.11 SCREENING FOR COLORECTAL CANCER: ICD-10-CM

## 2023-11-02 DIAGNOSIS — L03.119 CELLULITIS OF HAND: ICD-10-CM

## 2023-11-02 DIAGNOSIS — Z12.12 SCREENING FOR COLORECTAL CANCER: ICD-10-CM

## 2023-11-02 DIAGNOSIS — Z09 HOSPITAL DISCHARGE FOLLOW-UP: Primary | ICD-10-CM

## 2023-11-02 PROCEDURE — 90471 IMMUNIZATION ADMIN: CPT

## 2023-11-02 PROCEDURE — 99213 OFFICE O/P EST LOW 20 MIN: CPT | Performed by: FAMILY MEDICINE

## 2023-11-02 PROCEDURE — 99214 OFFICE O/P EST MOD 30 MIN: CPT | Mod: 25 | Performed by: FAMILY MEDICINE

## 2023-11-02 NOTE — PROGRESS NOTES
Subjective     Subjective:     Lucas Agee is a 52 y.o. male who presents for Hospital Follow-up.    Transitional Care Management     HPI:   Recently hospitalized for left hand cellulitis at [facility] from October 23 October 25, 2023.  At that time he had presented already with a 4-day history of worsening left finger pain.  X-rays and ultrasounds showed nonspecific soft tissue swelling no discernible abscess was found he was treated with IV antibiotics with Unasyn with improvement and discharged with a 10-day course of Augmentin.  He reports compliance with medication has been having some loose stools related to antibiotics otherwise reports swelling and redness have improved somewhat.  He reports he has 2 more days left of antibiotics.  He is not currently using anything for relief.  Denies any fevers worsening redness swelling, further diarrhea.  He otherwise feels his normal self.  He reports compliance with the remainder of his medications.  In regards to his diabetes he has been working on trying to make dietary adjustments but has found it difficult to afford certain products.  He is trying to limit red meats and added sugars.    Current medicines (including reconciliation performed today)  Current Outpatient Medications   Medication Sig Dispense Refill    mupirocin (BACTROBAN) 2 % Ointment Apply 1 Application topically 2 times a day. 22 g 0    amoxicillin-clavulanate (AUGMENTIN) 875-125 MG Tab Take 1 Tablet by mouth 2 times a day for 10 days. 20 Tablet 0    lisinopril (PRINIVIL) 20 MG Tab Take 1 Tablet by mouth every day. 90 Tablet 3    carvedilol (COREG) 25 MG Tab Take 1 Tablet by mouth 2 times a day with meals. 180 Tablet 3    atorvastatin (LIPITOR) 40 MG Tab Take 1 Tablet by mouth every evening. 90 Tablet 3    aspirin (ASA) 81 MG Chew Tab chewable tablet Take 1 Tab by mouth every day. 30 Tab 6     No current facility-administered medications for this visit.       Allergies:    Patient has no known allergies.    Social History     Tobacco Use    Smoking status: Every Day     Current packs/day: 0.25     Average packs/day: 0.3 packs/day for 30.0 years (7.5 ttl pk-yrs)     Types: Cigarettes    Smokeless tobacco: Never    Tobacco comments:     Christy cut down even more & working on becoming a non-smoker   Vaping Use    Vaping Use: Never used   Substance Use Topics    Alcohol use: No    Drug use: Yes     Types: Marijuana, Methamphetamines, Intravenous, Inhaled     Comment: marijuana currently; h/o IV and smoked meth use- ;ast used 2021       Objective:     There were no vitals filed for this visit.  There is no height or weight on file to calculate BMI.    Physical Exam:  Constitutional: Alert, no distress,  Skin:     Eye: Conjunctiva clear, lids normal  Respiratory: Unlabored respiratory effort, no cough.  CV: RRR  MSK: Normal gait, moves all extremities.  Neuro: Grossly non-focal.   Psych: Alert and oriented x3, normal affect and mood.      Assessment and Plan:   1. Hospital discharge follow-up  2. Cellulitis of hand  - Chart and discharge summary were reviewed.   - Hospitalization and results reviewed with patient.   - Medications reviewed including instructions regarding high risk medications, dosing and side effects.  - Recommended Services: No services needed at this time  - Advance directive/POLST on file?  No   -In above presentation recommend completion of antibiotics as prescribed with addition of frequent warm water soaks to help promote drainage.  Recommend addition of topical antibiotics and close follow-up to reassess need for either I&D and or prolonged antibiotic treatment with alternative oral regimen.  - mupirocin (BACTROBAN) 2 % Ointment; Apply 1 Application topically 2 times a day.  Dispense: 22 g; Refill: 0  ER/Call/Return precautions discussed. He verbalized understanding and agreed with plan.    3. Need for vaccination  - INFLUENZA VACCINE QUAD INJ (PF)  - Tdap =>6yo  IM    4. Screening for colorectal cancer  - OCCULT BLOOD FECES IMMUNOASSAY; Future      Follow-up:Return in about 1 week (around 11/9/2023), or if symptoms worsen or fail to improve.    A total of 34 minutes of time was spent on day of encounter reviewing medical record, performing history and examination, counseling, ordering medication/test/consults and documentation.

## 2023-11-09 ENCOUNTER — OFFICE VISIT (OUTPATIENT)
Dept: MEDICAL GROUP | Facility: MEDICAL CENTER | Age: 53
End: 2023-11-09
Attending: FAMILY MEDICINE
Payer: MEDICAID

## 2023-11-09 VITALS
WEIGHT: 219 LBS | RESPIRATION RATE: 16 BRPM | OXYGEN SATURATION: 91 % | HEIGHT: 69 IN | SYSTOLIC BLOOD PRESSURE: 120 MMHG | TEMPERATURE: 97.1 F | HEART RATE: 76 BPM | BODY MASS INDEX: 32.44 KG/M2 | DIASTOLIC BLOOD PRESSURE: 78 MMHG

## 2023-11-09 DIAGNOSIS — L03.119 CELLULITIS OF HAND: ICD-10-CM

## 2023-11-09 PROCEDURE — 99214 OFFICE O/P EST MOD 30 MIN: CPT | Performed by: FAMILY MEDICINE

## 2023-11-09 PROCEDURE — 99213 OFFICE O/P EST LOW 20 MIN: CPT | Performed by: FAMILY MEDICINE

## 2023-11-09 PROCEDURE — 3074F SYST BP LT 130 MM HG: CPT | Performed by: FAMILY MEDICINE

## 2023-11-09 PROCEDURE — 3078F DIAST BP <80 MM HG: CPT | Performed by: FAMILY MEDICINE

## 2023-11-09 ASSESSMENT — FIBROSIS 4 INDEX: FIB4 SCORE: 0.83

## 2023-11-09 NOTE — PROGRESS NOTES
"Subjective   Chief Complaint   Patient presents with    Follow-Up        HPI:   Lucas presents today with    Cellulitis of hand  Since last visit with me a week ago has persistent swelling and decreased range of motion to his left index finger.  He reports soaking his fingers daily and applying topical antibiotics.  Unfortunately has not had any significant improvement in his symptoms.  However he denies any signs of more systemic infection including fevers, nausea vomiting, pain or swelling in any other joints.      Health Maintenance Due   Topic Date Due    Hepatitis B Vaccine (Hep B) (1 of 3 - 3-dose series) Never done    Colorectal Cancer Screening  Never done    COVID-19 Vaccine (3 - Pfizer series) 07/09/2021    Zoster (Shingles) Vaccines (1 of 2) Never done       Objective   Social History     Tobacco Use    Smoking status: Every Day     Current packs/day: 0.25     Average packs/day: 0.3 packs/day for 30.0 years (7.5 ttl pk-yrs)     Types: Cigarettes    Smokeless tobacco: Never    Tobacco comments:     Christy cut down even more & working on becoming a non-smoker   Vaping Use    Vaping Use: Never used   Substance Use Topics    Alcohol use: No    Drug use: Yes     Types: Marijuana, Methamphetamines, Intravenous, Inhaled     Comment: marijuana currently; h/o IV and smoked meth use- ;ast used 2021       Exam:  /78   Pulse 76   Temp 36.2 °C (97.1 °F)   Resp 16   Ht 1.753 m (5' 9.02\")   Wt 99.3 kg (219 lb)   SpO2 91%   BMI 32.33 kg/m²     Physical Exam  Constitutional: Alert, no distress  Skin: Left index finger with significant swelling and erythema particularly at the DIP.  Eye: Conjunctiva clear, lids normal  Respiratory: Unlabored respiratory effort, no cough  MSK: Normal gait, moves all extremities  Psych: Alert and oriented x3, normal affect and mood    No Known Allergies    [facility] - [place], [place] - [address]  [address]  [place] [address]  Phone: [phone] Fax: " [phone]    [facility] [place] [address] [address] [place] [address]  Phone: [phone] Fax: [phone]    Current Outpatient Medications   Medication Sig Dispense Refill    mupirocin (BACTROBAN) 2 % Ointment Apply 1 Application topically 2 times a day. 22 g 0    lisinopril (PRINIVIL) 20 MG Tab Take 1 Tablet by mouth every day. 90 Tablet 3    carvedilol (COREG) 25 MG Tab Take 1 Tablet by mouth 2 times a day with meals. 180 Tablet 3    atorvastatin (LIPITOR) 40 MG Tab Take 1 Tablet by mouth every evening. 90 Tablet 3    aspirin (ASA) 81 MG Chew Tab chewable tablet Take 1 Tab by mouth every day. 30 Tab 6     No current facility-administered medications for this visit.       Assessment & Plan    52 y.o. male with the following -     1. Cellulitis of hand  -Acute condition ; uncontrolled.  - Extensive discussion with patient regarding failed response to outpatient treatment.  At this point patient has completed 10-day course with Augmentin and has been using topical mupirocin with no significant improvement.  Discussed concern for potential deeper infection and recommended wound culture to help guide further antibiotic treatment.  Recommended evaluation at local ER for further evaluation and management considerations given location of infection and potential for significant debility if left untreated. Call/Return precautions discussed. He verbalized understanding and agreed with plan.    Return pending ER evaluation.    Please note that this dictation was created using voice recognition software. I have made every reasonable attempt to correct obvious errors, but I expect that there are errors of grammar and possibly content that I did not discover before finalizing the note.

## 2023-11-09 NOTE — ASSESSMENT & PLAN NOTE
Since last visit with me a week ago has persistent swelling and decreased range of motion to his left index finger.  He reports soaking his fingers daily and applying topical antibiotics.  Unfortunately has not had any significant improvement in his symptoms.  However he denies any signs of more systemic infection including fevers, nausea vomiting, pain or swelling in any other joints.

## 2023-11-14 ENCOUNTER — HOSPITAL ENCOUNTER (EMERGENCY)
Facility: MEDICAL CENTER | Age: 53
End: 2023-11-14
Attending: EMERGENCY MEDICINE
Payer: MEDICAID

## 2023-11-14 ENCOUNTER — APPOINTMENT (OUTPATIENT)
Dept: RADIOLOGY | Facility: MEDICAL CENTER | Age: 53
End: 2023-11-14
Attending: EMERGENCY MEDICINE
Payer: MEDICAID

## 2023-11-14 VITALS
HEIGHT: 69 IN | DIASTOLIC BLOOD PRESSURE: 70 MMHG | OXYGEN SATURATION: 93 % | HEART RATE: 79 BPM | BODY MASS INDEX: 31.93 KG/M2 | TEMPERATURE: 96.9 F | WEIGHT: 215.61 LBS | SYSTOLIC BLOOD PRESSURE: 118 MMHG | RESPIRATION RATE: 20 BRPM

## 2023-11-14 DIAGNOSIS — M86.9 FINGER OSTEOMYELITIS, LEFT (HCC): ICD-10-CM

## 2023-11-14 DIAGNOSIS — R10.32 LEFT LOWER QUADRANT ABDOMINAL PAIN: ICD-10-CM

## 2023-11-14 DIAGNOSIS — R05.1 ACUTE COUGH: ICD-10-CM

## 2023-11-14 DIAGNOSIS — J21.0 RSV (ACUTE BRONCHIOLITIS DUE TO RESPIRATORY SYNCYTIAL VIRUS): ICD-10-CM

## 2023-11-14 LAB
ALBUMIN SERPL BCP-MCNC: 4.4 G/DL (ref 3.2–4.9)
ALBUMIN/GLOB SERPL: 1.4 G/DL
ALP SERPL-CCNC: 110 U/L (ref 30–99)
ALT SERPL-CCNC: 51 U/L (ref 2–50)
ANION GAP SERPL CALC-SCNC: 11 MMOL/L (ref 7–16)
AST SERPL-CCNC: 34 U/L (ref 12–45)
BASOPHILS # BLD AUTO: 1 % (ref 0–1.8)
BASOPHILS # BLD: 0.1 K/UL (ref 0–0.12)
BILIRUB SERPL-MCNC: 0.4 MG/DL (ref 0.1–1.5)
BUN SERPL-MCNC: 16 MG/DL (ref 8–22)
CALCIUM ALBUM COR SERPL-MCNC: 9.1 MG/DL (ref 8.5–10.5)
CALCIUM SERPL-MCNC: 9.4 MG/DL (ref 8.5–10.5)
CHLORIDE SERPL-SCNC: 105 MMOL/L (ref 96–112)
CO2 SERPL-SCNC: 23 MMOL/L (ref 20–33)
COMMENT 1642: NORMAL
CREAT SERPL-MCNC: 1.07 MG/DL (ref 0.5–1.4)
EOSINOPHIL # BLD AUTO: 0.41 K/UL (ref 0–0.51)
EOSINOPHIL NFR BLD: 3.9 % (ref 0–6.9)
ERYTHROCYTE [DISTWIDTH] IN BLOOD BY AUTOMATED COUNT: 40.9 FL (ref 35.9–50)
FLUAV RNA SPEC QL NAA+PROBE: NEGATIVE
FLUBV RNA SPEC QL NAA+PROBE: NEGATIVE
GFR SERPLBLD CREATININE-BSD FMLA CKD-EPI: 84 ML/MIN/1.73 M 2
GLOBULIN SER CALC-MCNC: 3.2 G/DL (ref 1.9–3.5)
GLUCOSE SERPL-MCNC: 87 MG/DL (ref 65–99)
HCT VFR BLD AUTO: 46.9 % (ref 42–52)
HGB BLD-MCNC: 16.8 G/DL (ref 14–18)
IMM GRANULOCYTES # BLD AUTO: 0.06 K/UL (ref 0–0.11)
IMM GRANULOCYTES NFR BLD AUTO: 0.6 % (ref 0–0.9)
LIPASE SERPL-CCNC: 30 U/L (ref 11–82)
LYMPHOCYTES # BLD AUTO: 3.32 K/UL (ref 1–4.8)
LYMPHOCYTES NFR BLD: 31.8 % (ref 22–41)
MCH RBC QN AUTO: 32.6 PG (ref 27–33)
MCHC RBC AUTO-ENTMCNC: 35.8 G/DL (ref 32.3–36.5)
MCV RBC AUTO: 90.9 FL (ref 81.4–97.8)
MONOCYTES # BLD AUTO: 1.1 K/UL (ref 0–0.85)
MONOCYTES NFR BLD AUTO: 10.5 % (ref 0–13.4)
MORPHOLOGY BLD-IMP: NORMAL
NEUTROPHILS # BLD AUTO: 5.45 K/UL (ref 1.82–7.42)
NEUTROPHILS NFR BLD: 52.2 % (ref 44–72)
NRBC # BLD AUTO: 0 K/UL
NRBC BLD-RTO: 0 /100 WBC (ref 0–0.2)
PLATELET # BLD AUTO: 222 K/UL (ref 164–446)
PLATELET BLD QL SMEAR: NORMAL
PMV BLD AUTO: 11 FL (ref 9–12.9)
POTASSIUM SERPL-SCNC: 4.6 MMOL/L (ref 3.6–5.5)
PROT SERPL-MCNC: 7.6 G/DL (ref 6–8.2)
RBC # BLD AUTO: 5.16 M/UL (ref 4.7–6.1)
RBC BLD AUTO: NORMAL
RSV RNA SPEC QL NAA+PROBE: POSITIVE
SARS-COV-2 RNA RESP QL NAA+PROBE: NOTDETECTED
SODIUM SERPL-SCNC: 139 MMOL/L (ref 135–145)
SPECIMEN SOURCE: ABNORMAL
WBC # BLD AUTO: 10.4 K/UL (ref 4.8–10.8)

## 2023-11-14 PROCEDURE — 36415 COLL VENOUS BLD VENIPUNCTURE: CPT

## 2023-11-14 PROCEDURE — 85025 COMPLETE CBC W/AUTO DIFF WBC: CPT

## 2023-11-14 PROCEDURE — 99284 EMERGENCY DEPT VISIT MOD MDM: CPT

## 2023-11-14 PROCEDURE — 83690 ASSAY OF LIPASE: CPT

## 2023-11-14 PROCEDURE — 71045 X-RAY EXAM CHEST 1 VIEW: CPT

## 2023-11-14 PROCEDURE — 74177 CT ABD & PELVIS W/CONTRAST: CPT

## 2023-11-14 PROCEDURE — C9803 HOPD COVID-19 SPEC COLLECT: HCPCS | Performed by: EMERGENCY MEDICINE

## 2023-11-14 PROCEDURE — 0241U HCHG SARS-COV-2 COVID-19 NFCT DS RESP RNA 4 TRGT MIC: CPT

## 2023-11-14 PROCEDURE — 80053 COMPREHEN METABOLIC PANEL: CPT

## 2023-11-14 PROCEDURE — 73140 X-RAY EXAM OF FINGER(S): CPT | Mod: LT

## 2023-11-14 PROCEDURE — 700117 HCHG RX CONTRAST REV CODE 255: Mod: UD | Performed by: EMERGENCY MEDICINE

## 2023-11-14 RX ORDER — AMOXICILLIN AND CLAVULANATE POTASSIUM 875; 125 MG/1; MG/1
1 TABLET, FILM COATED ORAL 2 TIMES DAILY
Status: SHIPPED | COMMUNITY
Start: 2023-10-25 | End: 2023-11-20

## 2023-11-14 RX ORDER — ASPIRIN 81 MG/1
81 TABLET ORAL DAILY
COMMUNITY

## 2023-11-14 RX ADMIN — IOHEXOL 100 ML: 350 INJECTION, SOLUTION INTRAVENOUS at 17:45

## 2023-11-14 ASSESSMENT — FIBROSIS 4 INDEX: FIB4 SCORE: 0.83

## 2023-11-14 ASSESSMENT — LIFESTYLE VARIABLES: DO YOU DRINK ALCOHOL: NO

## 2023-11-14 NOTE — ED PROVIDER NOTES
"  ER Provider Note    Scribed for Glen Dominguez M.D. by Katherine Ventura. 11/14/2023   2:58 PM    Primary Care Provider: Ophelia John M.D.    CHIEF COMPLAINT  Chief Complaint   Patient presents with    Abdominal Pain     Since 6 days associated with constipation    Denied any nausea or vomiting    Digit Pain     Left index finger pain, swelling since 2 weeks  Pt was recently admitted for this, given antibiotic  He went to his PCP to check this and was advised to be seen here since the swelling and pain persist    Cough     Since 2 days     EXTERNAL RECORDS REVIEWED  Outpatient Notes: The patient was seen in office 11/9/23 for cellulitis of his hand and recommended to go to the ED.    HPI/ROS  LIMITATION TO HISTORY   Select: : None  OUTSIDE HISTORIAN(S):  None noted    Lucas Agee is a 52 y.o. male who presents to the ED for evaluation of constipation onset 6 days ago. He reports associated lower abdominal pain. Per patient, he also has had left pointer finger pain and swelling for the last 2 weeks. He adds that he was admitted here on IV antibiotics for his finger. He reports that his primary care physician wanted his finger to be looked at here for infection. The patient has a history of gout.    He adds that he has had a cough for the last 2 days. He does not report in positive sick contacts.    PAST MEDICAL HISTORY  Past Medical History:   Diagnosis Date    Congestive heart failure (HCC)     DVT (deep venous thrombosis) (HCC)     Hypertension     Pain and swelling of left knee 8/24/2019       SURGICAL HISTORY  Past Surgical History:   Procedure Laterality Date    THROMBECTOMY Right 5/21/2019    Procedure: THROMBECTOMY - lower extremity, anterior tibial artery ;  Surgeon: Deidra Dominguez M.D.;  Location: [facility];  Service: General    HAND SURGERY Right 1995    \"metal plate placed\"    HERNIA REPAIR Right 1990    groin    HERNIA REPAIR         FAMILY HISTORY  Family History   Problem " "Relation Age of Onset    Heart Disease Father     Blood Clots Brother     Heart Disease Brother         pacemaker    Blood Clots Paternal Grandmother     Heart Disease Paternal Grandmother     Stroke Paternal Grandmother     Heart Attack Paternal Uncle 60    Diabetes Maternal Grandmother     Cancer Neg Hx        SOCIAL HISTORY   reports that he has been smoking cigarettes. He has a 7.5 pack-year smoking history. He has never used smokeless tobacco. He reports current drug use. Drugs: Marijuana, Methamphetamines, Intravenous, and Inhaled. He reports that he does not drink alcohol.    CURRENT MEDICATIONS  Previous Medications    ASPIRIN (ASA) 81 MG CHEW TAB CHEWABLE TABLET    Take 1 Tab by mouth every day.    ATORVASTATIN (LIPITOR) 40 MG TAB    Take 1 Tablet by mouth every evening.    CARVEDILOL (COREG) 25 MG TAB    Take 1 Tablet by mouth 2 times a day with meals.    LISINOPRIL (PRINIVIL) 20 MG TAB    Take 1 Tablet by mouth every day.    MUPIROCIN (BACTROBAN) 2 % OINTMENT    Apply 1 Application topically 2 times a day.       ALLERGIES  No Known Allergies     PHYSICAL EXAM  /88   Pulse 81   Temp 36.7 °C (98 °F) (Temporal)   Resp 18   Ht 1.753 m (5' 9\")   Wt 97.8 kg (215 lb 9.8 oz)   SpO2 93%   BMI 31.84 kg/m²    Nursing note and vitals reviewed.  Constitutional: Well-developed and well-nourished. No distress.   HENT: Head is normocephalic and atraumatic. Oropharynx is clear and moist without exudate or erythema.   Eyes: Pupils are equal, round, and reactive to light. Conjunctiva are normal.   Cardiovascular: Normal rate and regular rhythm. No murmur heard. Normal radial pulses.  Pulmonary/Chest: Breath sounds normal. No wheezes or rales.   Abdominal: Soft and tenderness left lower quadrant. No distention    Musculoskeletal: Extremities exhibit normal range of motion without edema or tenderness.   Neurological: Awake, alert and oriented to person, place, and time. No focal deficits noted.  Skin: Skin is " warm and dry. Chronically appearing deformity to the distal phalanx of the left index finger, minimal erythema, flexion and deformity at the DIP joint.  Psychiatric: Normal mood and affect. Appropriate for clinical situation    DIAGNOSTIC STUDIES    Labs:   Results for orders placed or performed during the hospital encounter of 11/14/23   Comp Metabolic Panel   Result Value Ref Range    Sodium 139 135 - 145 mmol/L    Potassium 4.6 3.6 - 5.5 mmol/L    Chloride 105 96 - 112 mmol/L    Co2 23 20 - 33 mmol/L    Anion Gap 11.0 7.0 - 16.0    Glucose 87 65 - 99 mg/dL    Bun 16 8 - 22 mg/dL    Creatinine 1.07 0.50 - 1.40 mg/dL    Calcium 9.4 8.5 - 10.5 mg/dL    Correct Calcium 9.1 8.5 - 10.5 mg/dL    AST(SGOT) 34 12 - 45 U/L    ALT(SGPT) 51 (H) 2 - 50 U/L    Alkaline Phosphatase 110 (H) 30 - 99 U/L    Total Bilirubin 0.4 0.1 - 1.5 mg/dL    Albumin 4.4 3.2 - 4.9 g/dL    Total Protein 7.6 6.0 - 8.2 g/dL    Globulin 3.2 1.9 - 3.5 g/dL    A-G Ratio 1.4 g/dL   LIPASE   Result Value Ref Range    Lipase 30 11 - 82 U/L   ESTIMATED GFR   Result Value Ref Range    GFR (CKD-EPI) 84 >60 mL/min/1.73 m 2   CoV-2, FLU A/B, and RSV by PCR (2-4 Hours CEPHEID) : Collect NP swab in VTM    Specimen: Nasal; Respirate   Result Value Ref Range    SARS-CoV-2 Source NP Swab      Radiology:   This attending emergency physician has independently interpreted the diagnostic imaging associated with this visit and is awaiting the final reading from the radiologist.   Preliminary interpretation is a follows: Hand x-ray shows findings consistent with osteomyelitis of the distal phalanx of the index finger.  Chest x-ray shows no focal pneumonia.  CT scan shows no evidence of diverticulitis or obstruction    Radiologist interpretation:   CT-ABDOMEN-PELVIS WITH   Final Result      1.  There is no acute inflammatory process in the abdomen or pelvis.   2.  Nonobstructive bowel gas with moderate colonic stool.   3.  Normal appendix.   4.  Nonobstructive right  renal calcifications.      DX-CHEST-PORTABLE (1 VIEW)   Final Result      No acute cardiac or pulmonary abnormalities are identified.      DX-FINGER(S) 2+ LEFT   Final Result         1.  Focal erosive and lytic changes involving the base of the left second distal phalanx and adjacent distal end of the middle phalanx with surrounding soft tissue swelling. Additionally number small lucency coursing through base of distal phalanx with    small adjacent bony fragment. This may be secondary to acute osteomyelitis, or possibly posttraumatic change.           INITIAL ASSESSMENT AND PLAN    2:58 PM - Patient was evaluated at bedside for multiple complaints. Ordered for Dx-chest, CT-Abdomen-Pelvis with, Dx-Fingers (left),  SARS-CoV-2, Flu A/B, and RSV, CBC with diff and UA to evaluate. Patient verbalizes understanding and support with my plan of care.  Differential diagnoses include but not limited to: Colitis, constipation, bowel obstruction, gout, osteomyelitis, diverticulitis    ED Observation Status? Yes; I am placing the patient in to an observation status due to a diagnostic uncertainty as well as therapeutic intensity. Patient placed in observation status at 3:21 PM, 11/14/2023.     Observation plan is as follows: Patient will be observed for any evidence of clinical deterioration.  Multiple abdominal exams will be performed.  Laboratory and diagnostic testing will be performed    Upon Reevaluation, the patient's condition has: Improved; and will be discharged.    Patient discharged from ED Observation status at 5:44 PM  (Time) 11/14/2023  (Date).      COURSE AND MEDICAL DECISION MAKING      Patient presents today with several complaints.  #1 is a cough.  Testing is positive for respiratory syncytial virus.  X-ray shows no evidence of pneumonia.  As this is a viral syndrome antibiotics were not prescribed.  Maintaining his O2 sats well.  Has maintained throughout his time in observation in the emergency department.   Expectant management only.    He has left lower quadrant abdominal pain.  CT scan is no evidence of obstruction, diverticulitis, or significant constipation.  He has been on numerous antibiotics recently.  May be the etiology of his GI discomfort.  No inflammatory process noted.  CT scan and work-up reassuring.    Patient has had ongoing finger infection.  Likely he now has osteomyelitis.  I spoke with the on-call hand specialist Dr. Juárez.  He will see the patient in clinic.  He does not feel that antibiotics are warranted at this time.  They will discuss options with him including surgery or ongoing antibiotic therapy when he sees him in clinic this week.    DISPOSITION AND DISCUSSIONS    I have discussed management of the patient with the following physicians and BALA's: Dr. Juárez, orthopedics      Escalation of care considered, and ultimately not performed: acute inpatient care management, however at this time, the patient is most appropriate for outpatient management.    Decision tools and prescription drugs considered including, but not limited to: Antibiotics   and Pain Medications   . I considered prescribing narcotic pain medication, however I feel pain should be adequately controlled with over the counter NSAIDs.  I considered prescribing antibiotics, however this was discussed with the specialist and are not recommended at this time.    The patient will return for new or worsening symptoms and is stable at the time of discharge.    The patient is referred to a primary physician for blood pressure management, diabetic screening, and for all other preventative health concerns.      DISPOSITION:  Patient will be discharged home in stable condition.    FOLLOW UP:  [facility], Emergency Dept  [address] [facility] [address] [phone]    If symptoms worsen    Ophelia John M.D.  [address] [id number] [phone]    Schedule an appointment as soon as  possible for a visit       Chu Juárez M.D.  [address] [place] [id number] [phone]    Schedule an appointment as soon as possible for a visit   on-call hand specialist      OUTPATIENT MEDICATIONS:  New Prescriptions    No medications on file         FINAL DIAGNOSIS  1. Acute cough    2. RSV (acute bronchiolitis due to respiratory syncytial virus)    3. Finger osteomyelitis, left (HCC)    4. Left lower quadrant abdominal pain         IKatherine (Scribe), am scribing for, and in the presence of, Glen Dominguez M.D..    Electronically signed by: Katherine Ventura (Scribe), 11/14/2023    IGlen M.D. personally performed the services described in this documentation, as scribed by Katherine Ventura in my presence, and it is both accurate and complete.      The note accurately reflects work and decisions made by me.  Glen Dominguez M.D.  11/14/2023  5:47 PM

## 2023-11-14 NOTE — ED NOTES
Seen by   [place], rechecked vitals, stable. No urge to urinate at this time.   Xray at bedside

## 2023-11-14 NOTE — ED NOTES
Pt ambulated to Pur 81 with a steady gait. C/C is Abd pain, digit pain and cough. Pt is in a gown, on the monitor and call light within reach. Chart up for ERP.

## 2023-11-14 NOTE — ED TRIAGE NOTES
Chief Complaint   Patient presents with    Abdominal Pain     Since 6 days associated with constipation    Denied any nausea or vomiting    Digit Pain     Left index finger pain, swelling since 2 weeks  Pt was recently admitted for this, given antibiotic  He went to his PCP to check this and was advised to be seen here since the swelling and pain persist    Cough     Since 2 days       Pain: 3/10    Pt came in to triage ambulatory with steady gait for the above complaints.     Pt is alert and oriented x 4, speaking in full sentences, follows commands and responds appropriately to questions.     Respirations are even and unlabored.    Pt placed in lobby. Pt educated on triage process.     Pt encouraged to inform staff for any changes in condition or if needs help while waiting to be room in.      Vitals:    11/14/23 1308   BP: 135/88   Pulse: 81   Resp: 18   Temp: 36.7 °C (98 °F)   SpO2: 93%

## 2023-11-15 NOTE — ED NOTES
Discharge instructions and follow up care discussed with patient. Patient given time to ask questions and verbalized understanding. PIV removed. Patient encouraged to f/u with ortho surg within the week, encouraged to return immediately for SOB, fevers, chills, redness/streaking of L hand. Patient AOx4 at discharge and ambulatory to lobby with steady gait.

## 2023-11-15 NOTE — ED NOTES
Med Rec complete per patient   Allergies reviewed  Antibiotics in the past 30 days:YES  Anticoagulant in past 14 days:NO  Pharmacy patient utilizes:[facility] on [address]

## 2023-11-20 ENCOUNTER — OFFICE VISIT (OUTPATIENT)
Dept: MEDICAL GROUP | Facility: MEDICAL CENTER | Age: 53
End: 2023-11-20
Attending: FAMILY MEDICINE
Payer: MEDICAID

## 2023-11-20 VITALS
SYSTOLIC BLOOD PRESSURE: 102 MMHG | HEART RATE: 80 BPM | TEMPERATURE: 97 F | WEIGHT: 220 LBS | HEIGHT: 69 IN | DIASTOLIC BLOOD PRESSURE: 78 MMHG | OXYGEN SATURATION: 94 % | RESPIRATION RATE: 16 BRPM | BODY MASS INDEX: 32.58 KG/M2

## 2023-11-20 DIAGNOSIS — L03.119 CELLULITIS OF HAND: ICD-10-CM

## 2023-11-20 DIAGNOSIS — B33.8 RSV (RESPIRATORY SYNCYTIAL VIRUS INFECTION): ICD-10-CM

## 2023-11-20 PROCEDURE — 99214 OFFICE O/P EST MOD 30 MIN: CPT | Performed by: FAMILY MEDICINE

## 2023-11-20 PROCEDURE — 3078F DIAST BP <80 MM HG: CPT | Performed by: FAMILY MEDICINE

## 2023-11-20 PROCEDURE — 3074F SYST BP LT 130 MM HG: CPT | Performed by: FAMILY MEDICINE

## 2023-11-20 PROCEDURE — 99213 OFFICE O/P EST LOW 20 MIN: CPT | Performed by: FAMILY MEDICINE

## 2023-11-20 ASSESSMENT — FIBROSIS 4 INDEX: FIB4 SCORE: 1.07

## 2023-11-20 NOTE — PROGRESS NOTES
"Subjective   Chief Complaint   Patient presents with    Follow-Up     Pt positive for rsv          HPI:   Lucas presents today for ER follow-up.  On our last visit together patient was recommended ER evaluation to help rule out osteomyelitis.  While in the ER he was found to be positive for RSV as he is as he also had develop a cough.  He was recommended symptomatic treatment for this as he was otherwise clinically stable from a respiratory standpoint.  He had an x-ray of his finger which showed focal erosive and lytic changes as well as other radial logical findings suspicious for acute osteomyelitis and/or posttraumatic changes.  Fortunately patient continues to deny any signs of systemic infection including fevers, chills, nausea vomiting.  Following x-ray findings the ER spoke with on-call hand specialist who recommended deferring treatment until he is evaluated in the office.      Health Maintenance Due   Topic Date Due    Hepatitis B Vaccine (Hep B) (1 of 3 - 3-dose series) Never done    Colorectal Cancer Screening  Never done    COVID-19 Vaccine (3 - Pfizer series) 07/09/2021    Zoster (Shingles) Vaccines (1 of 2) Never done       Objective   Social History     Tobacco Use    Smoking status: Every Day     Current packs/day: 0.25     Average packs/day: 0.3 packs/day for 30.0 years (7.5 ttl pk-yrs)     Types: Cigarettes    Smokeless tobacco: Never    Tobacco comments:     Christy cut down even more & working on becoming a non-smoker   Vaping Use    Vaping Use: Never used   Substance Use Topics    Alcohol use: No    Drug use: Yes     Types: Marijuana, Methamphetamines, Intravenous, Inhaled     Comment: marijuana currently; h/o IV and smoked meth use- ;ast used 2021       Exam:  /78   Pulse 80   Temp 36.1 °C (97 °F)   Resp 16   Ht 1.753 m (5' 9.02\")   Wt 99.8 kg (220 lb)   SpO2 94%   BMI 32.47 kg/m²     Physical Exam  Constitutional: Alert, no distress  Skin:        Eye: Conjunctiva clear, lids " "normal  Respiratory: Unlabored respiratory effort, no cough  MSK: Normal gait, moves all extremities  Psych: Alert and oriented x3, normal affect and mood    No Known Allergies    [facility] - [place], [place] - [address] [place] [address]  Phone: [phone] Fax: [phone]    [facility] [place]  [address] [facility] [place] [address]  Phone: [phone] Fax: [phone]    Current Outpatient Medications   Medication Sig Dispense Refill    aspirin 81 MG EC tablet Take 81 mg by mouth every day.      mupirocin (BACTROBAN) 2 % Ointment Apply 1 Application topically 2 times a day. 22 g 0    lisinopril (PRINIVIL) 20 MG Tab Take 1 Tablet by mouth every day. 90 Tablet 3    carvedilol (COREG) 25 MG Tab Take 1 Tablet by mouth 2 times a day with meals. 180 Tablet 3    atorvastatin (LIPITOR) 40 MG Tab Take 1 Tablet by mouth every evening. 90 Tablet 3     No current facility-administered medications for this visit.       Assessment & Plan    52 y.o. male with the following -     1. Cellulitis of hand  -Acute condition; uncontrolled.  - After last visit patient was evaluated at ER due to concern for failed response to outpatient treatment. Xray done showed radiographic findings concerning for OM. He has ortho appointment scheduled for tomorrow morning. Discussed various treatment options. He was told in the ER that he may potentially need amputation, patient states that if that were the case he would be willing to proceed with surgery as he would \"rather lose a finger and not entire hand\" which is a concern he has as he reports previous issues with swelling in that finger. Discuss that osteomyelitis treatment does in fact involve surgical treatment and can also be managed with long-term anabiotic's..At this point patient has completed 10-day course with Augmentin and has been using topical mupirocin with no significant improvement. Will defer treatment to specialist which he is seeing tomorrow " morning.  ER/Call/Return precautions discussed. He verbalized understanding and agreed with plan.      2. RSV (respiratory syncytial virus infection)  - Acute; improving  - Diagnosed 11/14/23  - Recommend continue with symptomatic treatment as needed  - Tylenol or Motrin q6-8 hrs, may alternate q4 hours  - Mucinex for congestion  - Chloraseptic for sore throat  - Warm salt water gargles  - Clinic vs. ER for respiratory distress or inability to hydrate per our discussion   - Pt understands and verbalizes agreement.    Return if symptoms worsen or fail to improve.    Please note that this dictation was created using voice recognition software. I have made every reasonable attempt to correct obvious errors, but I expect that there are errors of grammar and possibly content that I did not discover before finalizing the note.

## 2023-11-22 ENCOUNTER — HOSPITAL ENCOUNTER (EMERGENCY)
Facility: MEDICAL CENTER | Age: 53
End: 2023-11-22
Attending: EMERGENCY MEDICINE
Payer: MEDICAID

## 2023-11-22 ENCOUNTER — APPOINTMENT (OUTPATIENT)
Dept: RADIOLOGY | Facility: MEDICAL CENTER | Age: 53
End: 2023-11-22
Attending: EMERGENCY MEDICINE
Payer: MEDICAID

## 2023-11-22 VITALS
DIASTOLIC BLOOD PRESSURE: 86 MMHG | BODY MASS INDEX: 31.87 KG/M2 | RESPIRATION RATE: 16 BRPM | OXYGEN SATURATION: 92 % | HEART RATE: 82 BPM | TEMPERATURE: 97.2 F | SYSTOLIC BLOOD PRESSURE: 138 MMHG | WEIGHT: 215.17 LBS | HEIGHT: 69 IN

## 2023-11-22 DIAGNOSIS — K59.00 CONSTIPATION, UNSPECIFIED CONSTIPATION TYPE: ICD-10-CM

## 2023-11-22 DIAGNOSIS — R10.32 LLQ ABDOMINAL PAIN: ICD-10-CM

## 2023-11-22 LAB
ALBUMIN SERPL BCP-MCNC: 4.3 G/DL (ref 3.2–4.9)
ALBUMIN/GLOB SERPL: 1.3 G/DL
ALP SERPL-CCNC: 112 U/L (ref 30–99)
ALT SERPL-CCNC: 36 U/L (ref 2–50)
ANION GAP SERPL CALC-SCNC: 10 MMOL/L (ref 7–16)
AST SERPL-CCNC: 26 U/L (ref 12–45)
BASOPHILS # BLD AUTO: 0.7 % (ref 0–1.8)
BASOPHILS # BLD: 0.1 K/UL (ref 0–0.12)
BILIRUB SERPL-MCNC: 0.7 MG/DL (ref 0.1–1.5)
BUN SERPL-MCNC: 21 MG/DL (ref 8–22)
CALCIUM ALBUM COR SERPL-MCNC: 9.3 MG/DL (ref 8.5–10.5)
CALCIUM SERPL-MCNC: 9.5 MG/DL (ref 8.5–10.5)
CHLORIDE SERPL-SCNC: 103 MMOL/L (ref 96–112)
CO2 SERPL-SCNC: 25 MMOL/L (ref 20–33)
CREAT SERPL-MCNC: 1.16 MG/DL (ref 0.5–1.4)
EOSINOPHIL # BLD AUTO: 0.46 K/UL (ref 0–0.51)
EOSINOPHIL NFR BLD: 3.1 % (ref 0–6.9)
ERYTHROCYTE [DISTWIDTH] IN BLOOD BY AUTOMATED COUNT: 40 FL (ref 35.9–50)
GFR SERPLBLD CREATININE-BSD FMLA CKD-EPI: 76 ML/MIN/1.73 M 2
GLOBULIN SER CALC-MCNC: 3.2 G/DL (ref 1.9–3.5)
GLUCOSE SERPL-MCNC: 115 MG/DL (ref 65–99)
HCT VFR BLD AUTO: 48.3 % (ref 42–52)
HGB BLD-MCNC: 17.1 G/DL (ref 14–18)
IMM GRANULOCYTES # BLD AUTO: 0.1 K/UL (ref 0–0.11)
IMM GRANULOCYTES NFR BLD AUTO: 0.7 % (ref 0–0.9)
LIPASE SERPL-CCNC: 91 U/L (ref 11–82)
LYMPHOCYTES # BLD AUTO: 3.71 K/UL (ref 1–4.8)
LYMPHOCYTES NFR BLD: 24.8 % (ref 22–41)
MCH RBC QN AUTO: 32.4 PG (ref 27–33)
MCHC RBC AUTO-ENTMCNC: 35.4 G/DL (ref 32.3–36.5)
MCV RBC AUTO: 91.5 FL (ref 81.4–97.8)
MONOCYTES # BLD AUTO: 1 K/UL (ref 0–0.85)
MONOCYTES NFR BLD AUTO: 6.7 % (ref 0–13.4)
NEUTROPHILS # BLD AUTO: 9.56 K/UL (ref 1.82–7.42)
NEUTROPHILS NFR BLD: 64 % (ref 44–72)
NRBC # BLD AUTO: 0 K/UL
NRBC BLD-RTO: 0 /100 WBC (ref 0–0.2)
PLATELET # BLD AUTO: 205 K/UL (ref 164–446)
PMV BLD AUTO: 10.1 FL (ref 9–12.9)
POTASSIUM SERPL-SCNC: 4.7 MMOL/L (ref 3.6–5.5)
PROT SERPL-MCNC: 7.5 G/DL (ref 6–8.2)
RBC # BLD AUTO: 5.28 M/UL (ref 4.7–6.1)
SODIUM SERPL-SCNC: 138 MMOL/L (ref 135–145)
WBC # BLD AUTO: 14.9 K/UL (ref 4.8–10.8)

## 2023-11-22 PROCEDURE — 96374 THER/PROPH/DIAG INJ IV PUSH: CPT | Mod: XU

## 2023-11-22 PROCEDURE — 74174 CTA ABD&PLVS W/CONTRAST: CPT

## 2023-11-22 PROCEDURE — 85025 COMPLETE CBC W/AUTO DIFF WBC: CPT

## 2023-11-22 PROCEDURE — 99284 EMERGENCY DEPT VISIT MOD MDM: CPT

## 2023-11-22 PROCEDURE — 36415 COLL VENOUS BLD VENIPUNCTURE: CPT

## 2023-11-22 PROCEDURE — 80053 COMPREHEN METABOLIC PANEL: CPT

## 2023-11-22 PROCEDURE — 700117 HCHG RX CONTRAST REV CODE 255: Mod: UD | Performed by: EMERGENCY MEDICINE

## 2023-11-22 PROCEDURE — 700111 HCHG RX REV CODE 636 W/ 250 OVERRIDE (IP): Mod: JZ,UD | Performed by: EMERGENCY MEDICINE

## 2023-11-22 PROCEDURE — 83690 ASSAY OF LIPASE: CPT

## 2023-11-22 RX ORDER — MORPHINE SULFATE 4 MG/ML
4 INJECTION INTRAVENOUS ONCE
Status: COMPLETED | OUTPATIENT
Start: 2023-11-22 | End: 2023-11-22

## 2023-11-22 RX ORDER — AMOXICILLIN 250 MG
1 CAPSULE ORAL DAILY
Qty: 30 TABLET | Refills: 0 | Status: SHIPPED | OUTPATIENT
Start: 2023-11-22

## 2023-11-22 RX ORDER — DICYCLOMINE HCL 20 MG
20 TABLET ORAL EVERY 6 HOURS
Qty: 120 TABLET | Refills: 0 | Status: SHIPPED | OUTPATIENT
Start: 2023-11-22

## 2023-11-22 RX ADMIN — IOHEXOL 100 ML: 350 INJECTION, SOLUTION INTRAVENOUS at 15:30

## 2023-11-22 RX ADMIN — MORPHINE SULFATE 4 MG: 4 INJECTION, SOLUTION INTRAMUSCULAR; INTRAVENOUS at 14:04

## 2023-11-22 ASSESSMENT — FIBROSIS 4 INDEX: FIB4 SCORE: 1.07

## 2023-11-22 NOTE — ED PROVIDER NOTES
ED Provider Note    CHIEF COMPLAINT  Chief Complaint   Patient presents with    Abdominal Pain     LLQ pain x 2 hrs. Sudden onset. +Nausea.        EXTERNAL RECORDS REVIEWED  CT scan 11/14/2023 which failed to reveal any acute surgical pathology    HPI/ERIK      Lucas Agee is a 52 y.o. male who presents with cc of LLQ pain.  Patient reports that he was recently treated for a finger infection.  He reports his finger infection has resolved.  On the 14th of this month, approximately 1 week ago he was seen here for some mild lower abdominal pain.  At that time labs were reassuring and CT abdomen pelvis was unremarkable.  Patient reports his pain had resolved up until today when he developed severe sudden onset left lower quadrant abdominal pain that has been persistent since it started hours ago.  Patient denies any associated nausea or vomiting.  She denies associated diarrhea or change in his bowel movements.  Denies any black or bloody stool.  Patient denies any testicular pain, dysuria urgency or frequency.      PAST MEDICAL HISTORY   has a past medical history of Congestive heart failure (HCC), DVT (deep venous thrombosis) ([facility]), Hypertension, and Pain and swelling of left knee (8/24/2019).    SURGICAL HISTORY   has a past surgical history that includes hernia repair; hernia repair (Right, 1990); hand surgery (Right, 1995); and thrombectomy (Right, 5/21/2019).    FAMILY HISTORY  Family History   Problem Relation Age of Onset    Heart Disease Father     Blood Clots Brother     Heart Disease Brother         pacemaker    Blood Clots Paternal Grandmother     Heart Disease Paternal Grandmother     Stroke Paternal Grandmother     Heart Attack Paternal Uncle 60    Diabetes Maternal Grandmother     Cancer Neg Hx        SOCIAL HISTORY  Social History     Tobacco Use    Smoking status: Every Day     Current packs/day: 0.25     Average packs/day: 0.3 packs/day for 30.0 years (7.5 ttl pk-yrs)     Types: Cigarettes     "Smokeless tobacco: Never    Tobacco comments:     Christy cut down even more & working on becoming a non-smoker   Vaping Use    Vaping Use: Never used   Substance and Sexual Activity    Alcohol use: No    Drug use: Yes     Types: Marijuana, Methamphetamines, Intravenous, Inhaled     Comment: marijuana currently; h/o IV and smoked meth use- ;ast used 2021    Sexual activity: Yes     Partners: Female       CURRENT MEDICATIONS  Home Medications       Reviewed by Jessica Shahid R.N. (Registered Nurse) on 11/22/23 at 1249  Med List Status: Not Addressed     Medication Last Dose Status   aspirin 81 MG EC tablet  Active   atorvastatin (LIPITOR) 40 MG Tab  Active   carvedilol (COREG) 25 MG Tab  Active   lisinopril (PRINIVIL) 20 MG Tab  Active   mupirocin (BACTROBAN) 2 % Ointment  Active                    ALLERGIES  No Known Allergies    PHYSICAL EXAM  VITAL SIGNS: BP (!) 144/94   Pulse 83   Temp 36 °C (96.8 °F) (Temporal)   Resp 16   Ht 1.753 m (5' 9\")   Wt 97.6 kg (215 lb 2.7 oz)   SpO2 94%   BMI 31.77 kg/m²    Physical Exam  Constitutional:       Appearance: Normal appearance.   HENT:      Head: Normocephalic.      Right Ear: Tympanic membrane normal.      Left Ear: Tympanic membrane normal.      Nose: Nose normal.      Mouth/Throat:      Mouth: Mucous membranes are moist.   Eyes:      Extraocular Movements: Extraocular movements intact.      Pupils: Pupils are equal, round, and reactive to light.   Cardiovascular:      Rate and Rhythm: Normal rate and regular rhythm.   Pulmonary:      Effort: Pulmonary effort is normal. No respiratory distress.      Breath sounds: Normal breath sounds. No stridor. No wheezing or rales.   Chest:      Chest wall: No tenderness.   Abdominal:      General: Abdomen is flat. There is no distension.      Palpations: Abdomen is soft. There is no mass.      Tenderness: There is abdominal tenderness in the left lower quadrant. There is no guarding or rebound.   Musculoskeletal:      " Cervical back: Normal range of motion.   Skin:     General: Skin is warm.      Capillary Refill: Capillary refill takes less than 2 seconds.   Neurological:      General: No focal deficit present.      Mental Status: He is alert and oriented to person, place, and time.   Psychiatric:         Mood and Affect: Mood normal.         DIAGNOSTIC STUDIES / PROCEDURES      LABS  Results for orders placed or performed during the hospital encounter of 11/22/23   CBC WITH DIFFERENTIAL   Result Value Ref Range    WBC 14.9 (H) 4.8 - 10.8 K/uL    RBC 5.28 4.70 - 6.10 M/uL    Hemoglobin 17.1 14.0 - 18.0 g/dL    Hematocrit 48.3 42.0 - 52.0 %    MCV 91.5 81.4 - 97.8 fL    MCH 32.4 27.0 - 33.0 pg    MCHC 35.4 32.3 - 36.5 g/dL    RDW 40.0 35.9 - 50.0 fL    Platelet Count 205 164 - 446 K/uL    MPV 10.1 9.0 - 12.9 fL    Neutrophils-Polys 64.00 44.00 - 72.00 %    Lymphocytes 24.80 22.00 - 41.00 %    Monocytes 6.70 0.00 - 13.40 %    Eosinophils 3.10 0.00 - 6.90 %    Basophils 0.70 0.00 - 1.80 %    Immature Granulocytes 0.70 0.00 - 0.90 %    Nucleated RBC 0.00 0.00 - 0.20 /100 WBC    Neutrophils (Absolute) 9.56 (H) 1.82 - 7.42 K/uL    Lymphs (Absolute) 3.71 1.00 - 4.80 K/uL    Monos (Absolute) 1.00 (H) 0.00 - 0.85 K/uL    Eos (Absolute) 0.46 0.00 - 0.51 K/uL    Baso (Absolute) 0.10 0.00 - 0.12 K/uL    Immature Granulocytes (abs) 0.10 0.00 - 0.11 K/uL    NRBC (Absolute) 0.00 K/uL   COMP METABOLIC PANEL   Result Value Ref Range    Sodium 138 135 - 145 mmol/L    Potassium 4.7 3.6 - 5.5 mmol/L    Chloride 103 96 - 112 mmol/L    Co2 25 20 - 33 mmol/L    Anion Gap 10.0 7.0 - 16.0    Glucose 115 (H) 65 - 99 mg/dL    Bun 21 8 - 22 mg/dL    Creatinine 1.16 0.50 - 1.40 mg/dL    Calcium 9.5 8.5 - 10.5 mg/dL    Correct Calcium 9.3 8.5 - 10.5 mg/dL    AST(SGOT) 26 12 - 45 U/L    ALT(SGPT) 36 2 - 50 U/L    Alkaline Phosphatase 112 (H) 30 - 99 U/L    Total Bilirubin 0.7 0.1 - 1.5 mg/dL    Albumin 4.3 3.2 - 4.9 g/dL    Total Protein 7.5 6.0 - 8.2 g/dL     Globulin 3.2 1.9 - 3.5 g/dL    A-G Ratio 1.3 g/dL   LIPASE   Result Value Ref Range    Lipase 91 (H) 11 - 82 U/L   ESTIMATED GFR   Result Value Ref Range    GFR (CKD-EPI) 76 >60 mL/min/1.73 m 2         RADIOLOGY  I have independently interpreted the diagnostic imaging associated with this visit and am waiting the final reading from the radiologist.   My preliminary interpretation is as follows: No acute surgical process, significant stool burden  Radiologist interpretation:   CTA ABDOMEN PELVIS W & W/O POST PROCESS   Final Result      1.  No abdominal aortic aneurysm or dissection.   2.  No mesenteric artery stenosis or occlusion.   3.  No bowel obstruction or perforation.   4.  Mild descending colon wall thickening which are due to peristaltic contraction however segmental colitis is difficult to exclude.   5.  Normal appendix.   6.  Nonobstructing RIGHT kidney stones.   7.  Scarring at the LEFT mid kidney laterally.               COURSE & MEDICAL DECISION MAKING        INITIAL ASSESSMENT, COURSE AND PLAN  Care Narrative: Very well-appearing patient here with symptoms consistent with possible diverticulitis.  Patient did recently have CT which was reassuring however is pain is now more localized, he does have reproducible tenderness on exam.  Given his history of DVT will check CTA to evaluate for possible vascular etiology of patient's pain.  Basic labs checked for further risk stratification.  Patient with associated leukocytosis.  Patient given morphine for pain.  Patient basic labs are all very reassuring.  Patient urinalysis unremarkable.  CT reveals no concerning surgical pathology or vascular pathology.  Patient does have significant stool burden.  Suspect patient's symptoms are likely secondary to constipation.  Will place patient on bowel regimen with MiraLAX and senna docusate.  Will also start patient on Bentyl.  He is feeling improved here in the emergency department.  Patient able to eat and drink here  without any issue.  Patient discharged home in good condition.        DISPOSITION AND DISCUSSIONS      FINAL DIAGNOSIS  1. LLQ abdominal pain  dicyclomine (BENTYL) 20 MG Tab    senna-docusate (PERICOLACE OR SENOKOT S) 8.6-50 MG Tab      2. Constipation, unspecified constipation type

## 2023-11-22 NOTE — ED TRIAGE NOTES
"Chief Complaint   Patient presents with    Abdominal Pain     LLQ pain x 2 hrs. Sudden onset. +Nausea.      Pt says he's had this abd pain on and off for awhile. \"Comes and goes\". Reports normal bowel movements. Pt amb to triage with steady gait. Protocol ordered. Pt given urine cup. Pt educated to inform staff of any changes.     BP (!) 144/94   Pulse 83   Temp 36 °C (96.8 °F) (Temporal)   Resp 16   Ht 1.753 m (5' 9\")   Wt 97.6 kg (215 lb 2.7 oz)   SpO2 94%   BMI 31.77 kg/m²     "

## 2023-11-22 NOTE — DISCHARGE INSTRUCTIONS
Take MiraLAX once in the morning, 1 capful and 1 capful in the evening.  You have significant constipation.  Your CT was very reassuring.  I would like to to start you on a stool softener as you do have significant constipation but also some Bentyl to help with your cramping.  Follow-up closely with your primary care physician

## 2023-11-22 NOTE — ED NOTES
Pt medicated per MAR. Educated on medications. Allergies verified. Verbalized understanding.   Pt on continuous monitoring

## 2023-11-22 NOTE — ED NOTES
Pt ambulate to yel 56 independently. PT states he cannot provide urine at this time. Pt to stephanie, in gown and on monitor. Chart up for ERP. This RN agree with triage.

## 2023-11-23 PROBLEM — M19.042 ARTHRITIS OF LEFT HAND: Status: ACTIVE | Noted: 2023-11-23

## 2023-11-23 PROBLEM — M72.0 DUPUYTREN'S CONTRACTURE OF LEFT HAND: Status: ACTIVE | Noted: 2023-11-23

## 2023-11-29 ENCOUNTER — OFFICE VISIT (OUTPATIENT)
Dept: MEDICAL GROUP | Facility: MEDICAL CENTER | Age: 53
End: 2023-11-29
Attending: FAMILY MEDICINE
Payer: MEDICAID

## 2023-11-29 VITALS
WEIGHT: 216.8 LBS | SYSTOLIC BLOOD PRESSURE: 104 MMHG | OXYGEN SATURATION: 96 % | TEMPERATURE: 97.3 F | DIASTOLIC BLOOD PRESSURE: 62 MMHG | BODY MASS INDEX: 32.11 KG/M2 | HEIGHT: 69 IN | RESPIRATION RATE: 16 BRPM | HEART RATE: 76 BPM

## 2023-11-29 DIAGNOSIS — M65.10 INFECTIOUS TENOSYNOVITIS: ICD-10-CM

## 2023-11-29 PROCEDURE — 99213 OFFICE O/P EST LOW 20 MIN: CPT | Performed by: FAMILY MEDICINE

## 2023-11-29 PROCEDURE — 3078F DIAST BP <80 MM HG: CPT | Performed by: FAMILY MEDICINE

## 2023-11-29 PROCEDURE — 99214 OFFICE O/P EST MOD 30 MIN: CPT | Performed by: FAMILY MEDICINE

## 2023-11-29 PROCEDURE — 3074F SYST BP LT 130 MM HG: CPT | Performed by: FAMILY MEDICINE

## 2023-11-29 ASSESSMENT — FIBROSIS 4 INDEX: FIB4 SCORE: 1.06

## 2023-11-29 NOTE — PROGRESS NOTES
"Subjective   Chief Complaint   Patient presents with    Follow-Up     Lt index finger edema        HPI:   Lucas presents today with worsening pain and swelling of his left index finger. Since our last visit he did end up going to the ER for concern of OM. During ER visit imaging was done that was also suspicious for OM. However, patient was advised to delay antibiotic treatment until further assessed by Ortho as an outpatient. He did have appointment but it appears that specialist felt symptoms were  2/2 gout flare and instead recommend surgical treatment to address DIP joint fusion due to chronic gout flare. Patient however is adamant that symptoms are completely different from previous gout flare in his toe. He is most concerned about infectious etiology which is reasonable given significant swelling despite NSAID use and previous antibiotic treatment.      Health Maintenance Due   Topic Date Due    Hepatitis B Vaccine (Hep B) (1 of 3 - 3-dose series) Never done    Colorectal Cancer Screening  Never done    COVID-19 Vaccine (3 - Pfizer series) 07/09/2021    Zoster (Shingles) Vaccines (1 of 2) Never done       Objective   Social History     Tobacco Use    Smoking status: Every Day     Current packs/day: 0.25     Average packs/day: 0.3 packs/day for 30.0 years (7.5 ttl pk-yrs)     Types: Cigarettes    Smokeless tobacco: Never    Tobacco comments:     Christy cut down even more & working on becoming a non-smoker   Vaping Use    Vaping Use: Never used   Substance Use Topics    Alcohol use: No    Drug use: Yes     Types: Marijuana, Methamphetamines, Intravenous, Inhaled     Comment: marijuana currently; h/o IV and smoked meth use- ;ast used 2021       Exam:  /62   Pulse 76   Temp 36.3 °C (97.3 °F)   Resp 16   Ht 1.753 m (5' 9.02\")   Wt 98.3 kg (216 lb 12.8 oz)   SpO2 96%   BMI 32.00 kg/m²     Physical Exam  Constitutional: Alert, no distress  MSK: Left index finger, warm, swollen, white nodule noted " DIP          Psych: Alert and oriented x3, normal affect and mood    No Known Allergies    [facility] [place], [place] - [address]  [address]  [place] [address]  Phone: [phone] Fax: [phone]    [facility] [place]  [address] [facility] [address]  Phone: [phone] Fax: [phone]    Current Outpatient Medications   Medication Sig Dispense Refill    dicyclomine (BENTYL) 20 MG Tab Take 1 Tablet by mouth every 6 hours. 120 Tablet 0    senna-docusate (PERICOLACE OR SENOKOT S) 8.6-50 MG Tab Take 1 Tablet by mouth every day. 30 Tablet 0    aspirin 81 MG EC tablet Take 81 mg by mouth every day.      mupirocin (BACTROBAN) 2 % Ointment Apply 1 Application topically 2 times a day. 22 g 0    lisinopril (PRINIVIL) 20 MG Tab Take 1 Tablet by mouth every day. 90 Tablet 3    carvedilol (COREG) 25 MG Tab Take 1 Tablet by mouth 2 times a day with meals. 180 Tablet 3    atorvastatin (LIPITOR) 40 MG Tab Take 1 Tablet by mouth every evening. 90 Tablet 3     No current facility-administered medications for this visit.       Assessment & Plan    52 y.o. male with the following -     1. Infectious tenosynovitis  - Acute; worsening. Suspected diagnosis  - Long discussion with patient regarding potential etiologies and treatment options. Discussed that gout/pseudogout is high on differential, however, given progressive symptoms and previous signs on imaging (Xray dated 11/14/23-Focal erosive and lytic changes involving the base of the left second distal phalanx and adjacent distal end of the middle phalanx with surrounding soft tissue swelling. Additionally number small lucency coursing through base of distal phalanx with small adjacent bony fragment. This may be secondary to acute osteomyelitis, or possibly posttraumatic change)  I am still concerned that patient has deeper space infection in addition to or instead of gout. He would benefit from additional diagnostic testing with synovial fluid testing to help  confirm diagnosis. He states he was never formally diagnosed or treated for gout previously. Advised urgent evaluation given progressive worsening of his symptoms.    Return if symptoms worsen or fail to improve.    Please note that this dictation was created using voice recognition software. I have made every reasonable attempt to correct obvious errors, but I expect that there are errors of grammar and possibly content that I did not discover before finalizing the note.

## 2024-07-01 ENCOUNTER — OFFICE VISIT (OUTPATIENT)
Dept: MEDICAL GROUP | Facility: MEDICAL CENTER | Age: 54
End: 2024-07-01
Attending: FAMILY MEDICINE
Payer: MEDICAID

## 2024-07-01 VITALS
HEIGHT: 69 IN | WEIGHT: 217 LBS | BODY MASS INDEX: 32.14 KG/M2 | RESPIRATION RATE: 16 BRPM | OXYGEN SATURATION: 95 % | TEMPERATURE: 98.6 F | DIASTOLIC BLOOD PRESSURE: 80 MMHG | SYSTOLIC BLOOD PRESSURE: 124 MMHG | HEART RATE: 84 BPM

## 2024-07-01 DIAGNOSIS — I15.9 SECONDARY HYPERTENSION: ICD-10-CM

## 2024-07-01 DIAGNOSIS — R74.01 TRANSAMINITIS: ICD-10-CM

## 2024-07-01 DIAGNOSIS — E78.5 HYPERLIPIDEMIA, UNSPECIFIED HYPERLIPIDEMIA TYPE: ICD-10-CM

## 2024-07-01 DIAGNOSIS — R73.03 PREDIABETES: ICD-10-CM

## 2024-07-01 DIAGNOSIS — G56.12 MEDIAN NEUROPATHY, LEFT: ICD-10-CM

## 2024-07-01 DIAGNOSIS — Z87.898 HISTORY OF SUBSTANCE USE: ICD-10-CM

## 2024-07-01 PROCEDURE — 99214 OFFICE O/P EST MOD 30 MIN: CPT | Performed by: FAMILY MEDICINE

## 2024-07-01 PROCEDURE — 3074F SYST BP LT 130 MM HG: CPT | Performed by: FAMILY MEDICINE

## 2024-07-01 PROCEDURE — 3079F DIAST BP 80-89 MM HG: CPT | Performed by: FAMILY MEDICINE

## 2024-07-01 ASSESSMENT — FIBROSIS 4 INDEX: FIB4 SCORE: 1.08

## 2024-07-01 ASSESSMENT — PATIENT HEALTH QUESTIONNAIRE - PHQ9: CLINICAL INTERPRETATION OF PHQ2 SCORE: 0

## 2024-07-19 ENCOUNTER — OFFICE VISIT (OUTPATIENT)
Dept: CARDIOLOGY | Facility: MEDICAL CENTER | Age: 54
End: 2024-07-19
Attending: NURSE PRACTITIONER
Payer: MEDICAID

## 2024-07-19 VITALS
DIASTOLIC BLOOD PRESSURE: 66 MMHG | WEIGHT: 215 LBS | HEART RATE: 78 BPM | SYSTOLIC BLOOD PRESSURE: 104 MMHG | HEIGHT: 69 IN | BODY MASS INDEX: 31.84 KG/M2 | OXYGEN SATURATION: 96 % | RESPIRATION RATE: 16 BRPM

## 2024-07-19 DIAGNOSIS — I50.9 CONGESTIVE HEART FAILURE, NYHA CLASS 2 AND ACC/AHA STAGE C (HCC): ICD-10-CM

## 2024-07-19 DIAGNOSIS — I42.0 DILATED CARDIOMYOPATHY (HCC): ICD-10-CM

## 2024-07-19 DIAGNOSIS — I50.20 ACC/AHA STAGE C SYSTOLIC HEART FAILURE (HCC): ICD-10-CM

## 2024-07-19 DIAGNOSIS — I10 PRIMARY HYPERTENSION: ICD-10-CM

## 2024-07-19 DIAGNOSIS — E78.5 HYPERLIPIDEMIA, UNSPECIFIED HYPERLIPIDEMIA TYPE: ICD-10-CM

## 2024-07-19 DIAGNOSIS — E11.9 TYPE 2 DIABETES MELLITUS WITHOUT COMPLICATION, WITHOUT LONG-TERM CURRENT USE OF INSULIN (HCC): ICD-10-CM

## 2024-07-19 DIAGNOSIS — I27.20 PULMONARY HYPERTENSION (HCC): ICD-10-CM

## 2024-07-19 PROCEDURE — 99214 OFFICE O/P EST MOD 30 MIN: CPT | Performed by: NURSE PRACTITIONER

## 2024-07-19 PROCEDURE — 3074F SYST BP LT 130 MM HG: CPT | Performed by: NURSE PRACTITIONER

## 2024-07-19 PROCEDURE — 3078F DIAST BP <80 MM HG: CPT | Performed by: NURSE PRACTITIONER

## 2024-07-19 PROCEDURE — 99211 OFF/OP EST MAY X REQ PHY/QHP: CPT | Performed by: NURSE PRACTITIONER

## 2024-07-19 RX ORDER — LISINOPRIL 10 MG/1
10 TABLET ORAL DAILY
Qty: 90 TABLET | Refills: 3 | Status: SHIPPED | OUTPATIENT
Start: 2024-07-19

## 2024-07-19 RX ORDER — ATORVASTATIN CALCIUM 40 MG/1
40 TABLET, FILM COATED ORAL NIGHTLY
Qty: 90 TABLET | Refills: 3 | Status: SHIPPED | OUTPATIENT
Start: 2024-07-19

## 2024-07-19 RX ORDER — CARVEDILOL 25 MG/1
25 TABLET ORAL 2 TIMES DAILY WITH MEALS
Qty: 180 TABLET | Refills: 3 | Status: SHIPPED | OUTPATIENT
Start: 2024-07-19

## 2024-07-19 ASSESSMENT — ENCOUNTER SYMPTOMS
FOCAL WEAKNESS: 0
DOUBLE VISION: 0
DIZZINESS: 0
SHORTNESS OF BREATH: 0
LOSS OF CONSCIOUSNESS: 0
COUGH: 0
WEAKNESS: 0
HEADACHES: 0
BLURRED VISION: 0
PALPITATIONS: 0
WHEEZING: 0
FALLS: 0
ORTHOPNEA: 0

## 2024-07-19 ASSESSMENT — FIBROSIS 4 INDEX: FIB4 SCORE: 1.08

## 2024-07-25 ENCOUNTER — HOSPITAL ENCOUNTER (OUTPATIENT)
Dept: LAB | Facility: MEDICAL CENTER | Age: 54
End: 2024-07-25
Attending: NURSE PRACTITIONER
Payer: MEDICAID

## 2024-07-25 DIAGNOSIS — I50.20 ACC/AHA STAGE C SYSTOLIC HEART FAILURE (HCC): ICD-10-CM

## 2024-07-25 DIAGNOSIS — I27.20 PULMONARY HYPERTENSION (HCC): ICD-10-CM

## 2024-07-25 DIAGNOSIS — E11.9 TYPE 2 DIABETES MELLITUS WITHOUT COMPLICATION, WITHOUT LONG-TERM CURRENT USE OF INSULIN (HCC): ICD-10-CM

## 2024-07-25 DIAGNOSIS — I42.0 DILATED CARDIOMYOPATHY (HCC): ICD-10-CM

## 2024-07-25 LAB
ANION GAP SERPL CALC-SCNC: 10 MMOL/L (ref 7–16)
BUN SERPL-MCNC: 13 MG/DL (ref 8–22)
CALCIUM SERPL-MCNC: 9 MG/DL (ref 8.5–10.5)
CHLORIDE SERPL-SCNC: 108 MMOL/L (ref 96–112)
CHOLEST SERPL-MCNC: 130 MG/DL (ref 100–199)
CO2 SERPL-SCNC: 21 MMOL/L (ref 20–33)
CREAT SERPL-MCNC: 0.7 MG/DL (ref 0.5–1.4)
EST. AVERAGE GLUCOSE BLD GHB EST-MCNC: 120 MG/DL
GFR SERPLBLD CREATININE-BSD FMLA CKD-EPI: 111 ML/MIN/1.73 M 2
GLUCOSE SERPL-MCNC: 98 MG/DL (ref 65–99)
HBA1C MFR BLD: 5.8 % (ref 4–5.6)
HDLC SERPL-MCNC: 30 MG/DL
LDLC SERPL CALC-MCNC: 66 MG/DL
POTASSIUM SERPL-SCNC: 4.1 MMOL/L (ref 3.6–5.5)
SODIUM SERPL-SCNC: 139 MMOL/L (ref 135–145)
TRIGL SERPL-MCNC: 172 MG/DL (ref 0–149)

## 2024-07-25 PROCEDURE — 36415 COLL VENOUS BLD VENIPUNCTURE: CPT

## 2024-07-25 PROCEDURE — 83036 HEMOGLOBIN GLYCOSYLATED A1C: CPT

## 2024-07-25 PROCEDURE — 80061 LIPID PANEL: CPT

## 2024-07-25 PROCEDURE — 80048 BASIC METABOLIC PNL TOTAL CA: CPT

## 2024-07-29 ENCOUNTER — TELEPHONE (OUTPATIENT)
Dept: HEALTH INFORMATION MANAGEMENT | Facility: OTHER | Age: 54
End: 2024-07-29
Payer: MEDICAID

## 2024-07-31 ENCOUNTER — TELEPHONE (OUTPATIENT)
Dept: HEALTH INFORMATION MANAGEMENT | Facility: OTHER | Age: 54
End: 2024-07-31
Payer: MEDICAID

## 2024-08-01 ENCOUNTER — OFFICE VISIT (OUTPATIENT)
Dept: MEDICAL GROUP | Facility: MEDICAL CENTER | Age: 54
End: 2024-08-01
Attending: FAMILY MEDICINE
Payer: MEDICAID

## 2024-08-01 ENCOUNTER — APPOINTMENT (OUTPATIENT)
Dept: PHYSICAL MEDICINE AND REHAB | Facility: MEDICAL CENTER | Age: 54
End: 2024-08-01
Payer: MEDICAID

## 2024-08-01 VITALS
DIASTOLIC BLOOD PRESSURE: 74 MMHG | RESPIRATION RATE: 16 BRPM | HEIGHT: 69 IN | SYSTOLIC BLOOD PRESSURE: 126 MMHG | TEMPERATURE: 98.7 F | HEART RATE: 72 BPM | OXYGEN SATURATION: 97 % | WEIGHT: 207 LBS | BODY MASS INDEX: 30.66 KG/M2

## 2024-08-01 DIAGNOSIS — F19.90 SUBSTANCE USE DISORDER: ICD-10-CM

## 2024-08-01 DIAGNOSIS — Z00.00 ROUTINE GENERAL MEDICAL EXAMINATION AT A HEALTH CARE FACILITY: ICD-10-CM

## 2024-08-01 DIAGNOSIS — Z12.12 SCREENING FOR COLORECTAL CANCER: ICD-10-CM

## 2024-08-01 DIAGNOSIS — Z72.0 TOBACCO USE: ICD-10-CM

## 2024-08-01 DIAGNOSIS — Z23 NEED FOR VACCINATION: ICD-10-CM

## 2024-08-01 DIAGNOSIS — Z12.11 SCREENING FOR COLORECTAL CANCER: ICD-10-CM

## 2024-08-01 PROCEDURE — 3078F DIAST BP <80 MM HG: CPT | Performed by: FAMILY MEDICINE

## 2024-08-01 PROCEDURE — 99396 PREV VISIT EST AGE 40-64: CPT | Performed by: FAMILY MEDICINE

## 2024-08-01 PROCEDURE — 3074F SYST BP LT 130 MM HG: CPT | Performed by: FAMILY MEDICINE

## 2024-08-01 PROCEDURE — 99213 OFFICE O/P EST LOW 20 MIN: CPT | Performed by: FAMILY MEDICINE

## 2024-08-01 RX ORDER — ZOSTER VACCINE RECOMBINANT, ADJUVANTED 50 MCG/0.5
KIT INTRAMUSCULAR
Qty: 0.5 ML | Refills: 1 | Status: SHIPPED
Start: 2024-08-01 | End: 2024-08-01

## 2024-08-01 RX ORDER — ZOSTER VACCINE RECOMBINANT, ADJUVANTED 50 MCG/0.5
KIT INTRAMUSCULAR
Qty: 0.5 ML | Refills: 1 | Status: SHIPPED | OUTPATIENT
Start: 2024-08-01

## 2024-08-01 SDOH — HEALTH STABILITY: PHYSICAL HEALTH: ON AVERAGE, HOW MANY MINUTES DO YOU ENGAGE IN EXERCISE AT THIS LEVEL?: 30 MIN

## 2024-08-01 SDOH — ECONOMIC STABILITY: INCOME INSECURITY: IN THE LAST 12 MONTHS, WAS THERE A TIME WHEN YOU WERE NOT ABLE TO PAY THE MORTGAGE OR RENT ON TIME?: PATIENT DECLINED

## 2024-08-01 SDOH — ECONOMIC STABILITY: HOUSING INSECURITY
IN THE LAST 12 MONTHS, WAS THERE A TIME WHEN YOU DID NOT HAVE A STEADY PLACE TO SLEEP OR SLEPT IN A SHELTER (INCLUDING NOW)?: NO

## 2024-08-01 SDOH — ECONOMIC STABILITY: INCOME INSECURITY: HOW HARD IS IT FOR YOU TO PAY FOR THE VERY BASICS LIKE FOOD, HOUSING, MEDICAL CARE, AND HEATING?: HARD

## 2024-08-01 SDOH — ECONOMIC STABILITY: FOOD INSECURITY: WITHIN THE PAST 12 MONTHS, YOU WORRIED THAT YOUR FOOD WOULD RUN OUT BEFORE YOU GOT MONEY TO BUY MORE.: NEVER TRUE

## 2024-08-01 SDOH — ECONOMIC STABILITY: HOUSING INSECURITY: IN THE LAST 12 MONTHS, HOW MANY PLACES HAVE YOU LIVED?: 1

## 2024-08-01 SDOH — ECONOMIC STABILITY: TRANSPORTATION INSECURITY
IN THE PAST 12 MONTHS, HAS THE LACK OF TRANSPORTATION KEPT YOU FROM MEDICAL APPOINTMENTS OR FROM GETTING MEDICATIONS?: NO

## 2024-08-01 SDOH — ECONOMIC STABILITY: FOOD INSECURITY: WITHIN THE PAST 12 MONTHS, THE FOOD YOU BOUGHT JUST DIDN'T LAST AND YOU DIDN'T HAVE MONEY TO GET MORE.: SOMETIMES TRUE

## 2024-08-01 SDOH — ECONOMIC STABILITY: HOUSING INSECURITY
IN THE LAST 12 MONTHS, WAS THERE A TIME WHEN YOU DID NOT HAVE A STEADY PLACE TO SLEEP OR SLEPT IN A SHELTER (INCLUDING NOW)?: PATIENT DECLINED

## 2024-08-01 SDOH — HEALTH STABILITY: PHYSICAL HEALTH: ON AVERAGE, HOW MANY DAYS PER WEEK DO YOU ENGAGE IN MODERATE TO STRENUOUS EXERCISE (LIKE A BRISK WALK)?: 4 DAYS

## 2024-08-01 ASSESSMENT — SOCIAL DETERMINANTS OF HEALTH (SDOH)
HOW OFTEN DO YOU GET TOGETHER WITH FRIENDS OR RELATIVES?: THREE TIMES A WEEK
HOW OFTEN DO YOU GET TOGETHER WITH FRIENDS OR RELATIVES?: THREE TIMES A WEEK
DO YOU BELONG TO ANY CLUBS OR ORGANIZATIONS SUCH AS CHURCH GROUPS UNIONS, FRATERNAL OR ATHLETIC GROUPS, OR SCHOOL GROUPS?: NO
HOW OFTEN DO YOU HAVE SIX OR MORE DRINKS ON ONE OCCASION: NEVER
IN THE PAST 12 MONTHS, HAS THE ELECTRIC, GAS, OIL, OR WATER COMPANY THREATENED TO SHUT OFF SERVICE IN YOUR HOME?: NO
HOW OFTEN DO YOU ATTEND CHURCH OR RELIGIOUS SERVICES?: NEVER
ARE YOU MARRIED, WIDOWED, DIVORCED, SEPARATED, NEVER MARRIED, OR LIVING WITH A PARTNER?: NEVER MARRIED
HOW OFTEN DO YOU HAVE A DRINK CONTAINING ALCOHOL: NEVER
HOW OFTEN DO YOU ATTENT MEETINGS OF THE CLUB OR ORGANIZATION YOU BELONG TO?: PATIENT DECLINED
HOW MANY DRINKS CONTAINING ALCOHOL DO YOU HAVE ON A TYPICAL DAY WHEN YOU ARE DRINKING: PATIENT DOES NOT DRINK
ARE YOU MARRIED, WIDOWED, DIVORCED, SEPARATED, NEVER MARRIED, OR LIVING WITH A PARTNER?: NEVER MARRIED
IN A TYPICAL WEEK, HOW MANY TIMES DO YOU TALK ON THE PHONE WITH FAMILY, FRIENDS, OR NEIGHBORS?: MORE THAN THREE TIMES A WEEK
IN A TYPICAL WEEK, HOW MANY TIMES DO YOU TALK ON THE PHONE WITH FAMILY, FRIENDS, OR NEIGHBORS?: MORE THAN THREE TIMES A WEEK
DO YOU BELONG TO ANY CLUBS OR ORGANIZATIONS SUCH AS CHURCH GROUPS UNIONS, FRATERNAL OR ATHLETIC GROUPS, OR SCHOOL GROUPS?: NO
WITHIN THE PAST 12 MONTHS, YOU WORRIED THAT YOUR FOOD WOULD RUN OUT BEFORE YOU GOT THE MONEY TO BUY MORE: NEVER TRUE
HOW HARD IS IT FOR YOU TO PAY FOR THE VERY BASICS LIKE FOOD, HOUSING, MEDICAL CARE, AND HEATING?: HARD
HOW OFTEN DO YOU ATTEND CHURCH OR RELIGIOUS SERVICES?: NEVER
HOW OFTEN DO YOU ATTENT MEETINGS OF THE CLUB OR ORGANIZATION YOU BELONG TO?: PATIENT DECLINED

## 2024-08-01 ASSESSMENT — LIFESTYLE VARIABLES
HOW OFTEN DO YOU HAVE A DRINK CONTAINING ALCOHOL: NEVER
SKIP TO QUESTIONS 9-10: 1
HOW MANY STANDARD DRINKS CONTAINING ALCOHOL DO YOU HAVE ON A TYPICAL DAY: PATIENT DOES NOT DRINK
HOW OFTEN DO YOU HAVE SIX OR MORE DRINKS ON ONE OCCASION: NEVER
AUDIT-C TOTAL SCORE: 0

## 2024-08-01 ASSESSMENT — FIBROSIS 4 INDEX: FIB4 SCORE: 1.08

## 2024-08-01 NOTE — PROGRESS NOTES
Subjective:     CC:   Chief Complaint   Patient presents with    Annual Wellness Visit       HPI:   Lucas Agee is a 53 y.o. male who presents for an annual exam. He is feeling well and has no complaints.    Health Maintenance  PT/vit D for falls prevention:   Cholesterol Screening:  Lab Results   Component Value Date/Time    CHOLSTRLTOT 130 07/25/2024 09:20 AM    LDL 66 07/25/2024 09:20 AM    HDL 30 (A) 07/25/2024 09:20 AM    TRIGLYCERIDE 172 (H) 07/25/2024 09:20 AM       Lab Results   Component Value Date/Time    SODIUM 139 07/25/2024 09:20 AM    POTASSIUM 4.1 07/25/2024 09:20 AM    CHLORIDE 108 07/25/2024 09:20 AM    CO2 21 07/25/2024 09:20 AM    GLUCOSE 98 07/25/2024 09:20 AM    BUN 13 07/25/2024 09:20 AM    CREATININE 0.70 07/25/2024 09:20 AM     Lab Results   Component Value Date/Time    ALKPHOSPHAT 112 (H) 11/22/2023 01:11 PM    ASTSGOT 26 11/22/2023 01:11 PM    ALTSGPT 36 11/22/2023 01:11 PM    TBILIRUBIN 0.7 11/22/2023 01:11 PM      The 10-year ASCVD risk score (Arelis MCNEIL, et al., 2019) is: 8.7%    Values used to calculate the score:      Age: 53 years      Sex: Male      Is Non- : No      Diabetic: No      Tobacco smoker: Yes      Systolic Blood Pressure: 126 mmHg      Is BP treated: Yes      HDL Cholesterol: 30 mg/dL      Total Cholesterol: 130 mg/dL    Diabetes Screening  Lab Results   Component Value Date/Time    HBA1C 5.8 (H) 07/25/2024 09:20 AM   AAA Screening: not applicable   Aspirin Use: yes    Anticipatory Guidance  Diet: Cutting back on soda- drinking sparkling water more now; eating more fruits and vegetables. Mostly Mediterranean  Exercise: Looking to exercise more; cycling and walking.   Substance Abuse: No alcohol or drugs (on probation); current cigarette smoker- pack every 2 days- looking to quit  Seat belts, bike helmet, gun safety discussed.  Sun protection used.    Cancer screening;'  Colorectal Cancer Screening: ordered  Lung Cancer Screening:  Ordered  Prostate Cancer Screening/PSA: Discussed; deferred. No known family history of cancer.    Infectious disease screening/Immunizations  --Practices safe sex. Currently single and not sexually active. No history of STDs. Uses condoms consistently  --HIV Screenin/15/19   --Hepatitis C Screening: Ordered  --Immunizations:    Influenza: due yearly   HPV: not applicable    Tetanus: up to date    Shingles: due   Pneumococcal : up to date    Hepatitis B: due  COVID-19: due for seasonal booster    He  has a past medical history of Congestive heart failure (HCC), DVT (deep venous thrombosis) (HCC), Hypertension, and Pain and swelling of left knee (2019).  He  has a past surgical history that includes hernia repair; hernia repair (Right, ); hand surgery (Right, ); thrombectomy (Right, 2019); and open reduction.  Family History   Problem Relation Age of Onset    Heart Disease Father     Cancer Father         I don't have the details about what type or anything else really. [place] & it happened fairly quickly.    Drug abuse Father     Alcohol abuse Father     Blood Clots Brother     Heart Disease Brother         pacemaker    Drug abuse Brother     Alcohol abuse Brother     Blood Clots Paternal Grandmother     Heart Disease Paternal Grandmother     Stroke Paternal Grandmother     Heart Attack Paternal Uncle 60    Diabetes Maternal Grandmother     Drug abuse Mother     Alcohol abuse Mother     Drug abuse Maternal Uncle     Alcohol abuse Maternal Uncle      Social History     Tobacco Use    Smoking status: Every Day     Current packs/day: 0.25     Average packs/day: 0.3 packs/day for 30.0 years (7.5 ttl pk-yrs)     Types: Cigarettes    Smokeless tobacco: Never    Tobacco comments:     Christy cut down even more & working on becoming a non-smoker   Vaping Use    Vaping status: Never Used   Substance Use Topics    Alcohol use: No    Drug use: Not Currently     Types: Marijuana,  Methamphetamines, Intravenous, Inhaled     Comment: marijuana not currently; h/o IV and smoked meth use- ;ast used 2021       Patient Active Problem List    Diagnosis Date Noted    Arthritis of left hand 11/23/2023    Dupuytren's contracture of left hand 11/23/2023    RSV (respiratory syncytial virus infection) 11/20/2023    Hypertension 10/24/2023    Hyperlipidemia 10/24/2023    Cellulitis of hand 10/23/2023    Type 2 diabetes mellitus without complication, without long-term current use of insulin ([facility]) 07/27/2023    History of urinary frequency 12/30/2019    Biventricular heart failure ([facility]) 06/13/2019    Congestive heart failure, NYHA class 2 and ACC/AHA stage C ([facility]) 05/31/2019    History of methamphetamine abuse ([facility]) 05/31/2019    Hypotension 05/21/2019    Pulmonary hypertension ([facility]) 05/16/2019    Hepatic steatosis 05/15/2019    Transaminitis 05/15/2019    ACC/AHA stage C systolic heart failure ([facility]) 05/15/2019    Dilated cardiomyopathy ([facility]) 05/15/2019    Tricuspid regurgitation 05/15/2019    Substance use disorder 05/15/2019    Tobacco use 05/15/2019       Current Outpatient Medications   Medication Sig Dispense Refill    Zoster Vac Recomb Adjuvanted (SHINGRIX) 50 MCG/0.5ML Recon Susp Inject 0.5 mL into the shoulder, thigh, or buttock at 0 month and 3 months 0.5 mL 1    lisinopril (PRINIVIL) 10 MG Tab Take 1 Tablet by mouth every day. 90 Tablet 3    carvedilol (COREG) 25 MG Tab Take 1 Tablet by mouth 2 times a day with meals. 180 Tablet 3    atorvastatin (LIPITOR) 40 MG Tab Take 1 Tablet by mouth every evening. 90 Tablet 3    aspirin 81 MG EC tablet Take 81 mg by mouth every day.       No current facility-administered medications for this visit.    (including changes today)  Allergies: Patient has no known allergies.    Review of Systems   Constitutional: Negative for fever, chills and malaise/fatigue.   HENT: Negative for congestion.    Eyes: Negative for pain.   Respiratory: Negative for cough and  "shortness of breath.    Cardiovascular: Negative for leg swelling.   Gastrointestinal: Negative for nausea, vomiting, abdominal pain and diarrhea.   Genitourinary: Negative for dysuria and hematuria.   Skin: Negative for rash.   Neurological: Negative for dizziness, focal weakness and headaches.   Endo/Heme/Allergies: Does not bleed easily.   Psychiatric/Behavioral: Negative for depression.  The patient is not nervous/anxious.      Objective:     /74   Pulse 72   Temp 37.1 °C (98.7 °F)   Resp 16   Ht 1.753 m (5' 9.02\")   Wt 93.9 kg (207 lb)   SpO2 97%   BMI 30.55 kg/m²   Body mass index is 30.55 kg/m².  Wt Readings from Last 4 Encounters:   08/01/24 93.9 kg (207 lb)   07/19/24 97.5 kg (215 lb)   07/01/24 98.4 kg (217 lb)   11/29/23 98.3 kg (216 lb 12.8 oz)       Physical Exam:  Constitutional: Well-developed and well-nourished. Not diaphoretic. No distress.   Skin: Skin is warm and dry. No rash noted.  Head: Atraumatic without lesions.  Eyes: Conjunctivae and extraocular motions are normal. Pupils are equal, round, and reactive to light. No scleral icterus.   Ears:  External ears unremarkable. Tympanic membranes clear and intact.  Nose: Nares patent. Septum midline. Turbinates without erythema nor edema. No discharge.   Mouth/Throat: Dentition is poor. Tongue normal. Oropharynx is clear and moist. Posterior pharynx without erythema or exudates.  Neck: Supple, trachea midline. Normal range of motion. No thyromegaly present. No lymphadenopathy--cervical or supraclavicular.  Cardiovascular: Regular rate and rhythm, S1 and S2 without murmur, rubs, or gallops.    Lungs: Effort normal. Clear to auscultation throughout. No adventitious sounds. No CVA tenderness.  Abdomen: Soft, non tender, and without distention. Active bowel sounds in all four quadrants. No rebound, guarding, masses or HSM.  Extremities: No cyanosis, clubbing, erythema, nor edema. Distal pulses intact and symmetric.   Musculoskeletal: All " "major joints AROM full in all directions without pain.  Neurological: Alert and oriented x 3.  No cranial nerve deficit. 5/5 myotomes. Sensation intact.  Psychiatric:  Behavior, mood, and affect are appropriate.      Assessment and Plan:     1. Routine general medical examination at a health care facility        2. Need for vaccination  Zoster Vac Recomb Adjuvanted (SHINGRIX) 50 MCG/0.5ML Recon Susp    DISCONTINUED: Zoster Vac Recomb Adjuvanted (SHINGRIX) 50 MCG/0.5ML Recon Susp      3. Screening for colorectal cancer  OCCULT BLOOD FECES IMMUNOASSAY (FIT)      4. Tobacco use  REFERRAL TO LUNG CANCER SCREENING PROGRAM      5. Substance use disorder  Referral to Behavioral Health          Preventative Medicine / HCM visit with no emergent concerns.  - Recommended yearly HCM visits  - Discussed importance of healthy diet and exercise (5x/week, 20-30 minutes of sustained cardiovascular training)  -Advised on maintaining routine vaccinations  -Advised pt to wear helmets. seatbelt and sunscreen as discussed; continue safer sex pracatices  - Anticipatory guidance including:  Exercise:   - Try for at least 150 minutes of cardiovascular (strenuous) exercise per week.   Safety:   - Wear your seat belt at all times when in a car and NO TEXTING/CELL PHONES while driving.   - Wear a helmet while biking, using a motorcycle, skiing/snow boarding, skate/long boarding, or any activities faster than running.   - Avoid smoking.   - If you have a gun it needs to be locked up and stored unloaded away from children.   Nutrition:   - Drink enough water so that urine is light yellow/clear  - Try to avoid alcoholic drinks; or consume no more than 2 alcoholic drinks per day for men. No more than 1 alcoholic drink per day for woman.   - Read labels for calories   - Use website \"My Fitness Pal\" for free calorie counting tool when possible.   Stress:   - Make time for activities that allow you to decrease stress and recognize any red flags of " stress for you to know when to activate your stress release mechanisms.   Sleep:   - Try to get 7-9 hours of sleep each night.   Preventative Care:   - Follow-up yearly for your annual exams   - Colon cancer screening starting at age 45 until age 75  - Annual flu vaccination   - Pneumonia vaccination at age 65   - Shingles vaccination at age 50      Follow-up: Return if symptoms worsen or fail to improve, for chronic condition follow up.

## 2024-08-18 NOTE — ED NOTES
Pt back from CT now, transferred to GR 28.   Pt report given to Camelia HAMMOND.     Vaccine status unknown

## 2024-10-16 DIAGNOSIS — I10 PRIMARY HYPERTENSION: ICD-10-CM

## 2024-10-16 RX ORDER — LISINOPRIL 20 MG/1
20 TABLET ORAL DAILY
Qty: 90 TABLET | Refills: 3 | Status: SHIPPED | OUTPATIENT
Start: 2024-10-16

## 2025-03-29 ENCOUNTER — APPOINTMENT (OUTPATIENT)
Dept: RADIOLOGY | Facility: MEDICAL CENTER | Age: 55
End: 2025-03-29
Attending: STUDENT IN AN ORGANIZED HEALTH CARE EDUCATION/TRAINING PROGRAM
Payer: MEDICAID

## 2025-03-29 ENCOUNTER — HOSPITAL ENCOUNTER (OUTPATIENT)
Facility: MEDICAL CENTER | Age: 55
End: 2025-03-30
Attending: STUDENT IN AN ORGANIZED HEALTH CARE EDUCATION/TRAINING PROGRAM | Admitting: INTERNAL MEDICINE
Payer: MEDICAID

## 2025-03-29 DIAGNOSIS — M79.89 HAND SWELLING: ICD-10-CM

## 2025-03-29 DIAGNOSIS — M25.431 EFFUSION OF RIGHT WRIST: ICD-10-CM

## 2025-03-29 DIAGNOSIS — I50.20 ACC/AHA STAGE C SYSTOLIC HEART FAILURE (HCC): ICD-10-CM

## 2025-03-29 DIAGNOSIS — M19.039 WRIST ARTHRITIS: ICD-10-CM

## 2025-03-29 LAB
ALBUMIN SERPL BCP-MCNC: 3.4 G/DL (ref 3.2–4.9)
ALBUMIN/GLOB SERPL: 0.9 G/DL
ALP SERPL-CCNC: 108 U/L (ref 30–99)
ALT SERPL-CCNC: 13 U/L (ref 2–50)
ANION GAP SERPL CALC-SCNC: 11 MMOL/L (ref 7–16)
APTT PPP: 24.2 SEC (ref 24.7–36)
AST SERPL-CCNC: 18 U/L (ref 12–45)
BACTERIA BLD CULT: NORMAL
BACTERIA BLD CULT: NORMAL
BASOPHILS # BLD AUTO: 0.6 % (ref 0–1.8)
BASOPHILS # BLD: 0.08 K/UL (ref 0–0.12)
BILIRUB SERPL-MCNC: 0.2 MG/DL (ref 0.1–1.5)
BODY FLD TYPE: NORMAL
BUN SERPL-MCNC: 11 MG/DL (ref 8–22)
CALCIUM ALBUM COR SERPL-MCNC: 9.6 MG/DL (ref 8.5–10.5)
CALCIUM SERPL-MCNC: 9.1 MG/DL (ref 8.5–10.5)
CHLORIDE SERPL-SCNC: 106 MMOL/L (ref 96–112)
CK SERPL-CCNC: 82 U/L (ref 0–154)
CO2 SERPL-SCNC: 22 MMOL/L (ref 20–33)
CREAT SERPL-MCNC: 0.96 MG/DL (ref 0.5–1.4)
CRP SERPL HS-MCNC: 2.16 MG/DL (ref 0–0.75)
CRYSTALS FLD MICRO: NORMAL
EKG IMPRESSION: NORMAL
EOSINOPHIL # BLD AUTO: 0.29 K/UL (ref 0–0.51)
EOSINOPHIL NFR BLD: 2.3 % (ref 0–6.9)
ERYTHROCYTE [DISTWIDTH] IN BLOOD BY AUTOMATED COUNT: 43.9 FL (ref 35.9–50)
ERYTHROCYTE [SEDIMENTATION RATE] IN BLOOD BY WESTERGREN METHOD: 20 MM/HOUR (ref 0–20)
GFR SERPLBLD CREATININE-BSD FMLA CKD-EPI: 95 ML/MIN/1.73 M 2
GLOBULIN SER CALC-MCNC: 3.9 G/DL (ref 1.9–3.5)
GLUCOSE SERPL-MCNC: 95 MG/DL (ref 65–99)
GRAM STN SPEC: NORMAL
HCT VFR BLD AUTO: 42.8 % (ref 42–52)
HGB BLD-MCNC: 14.8 G/DL (ref 14–18)
IMM GRANULOCYTES # BLD AUTO: 0.06 K/UL (ref 0–0.11)
IMM GRANULOCYTES NFR BLD AUTO: 0.5 % (ref 0–0.9)
INR PPP: 0.94 (ref 0.87–1.13)
LACTATE SERPL-SCNC: 1.8 MMOL/L (ref 0.5–2)
LYMPHOCYTES # BLD AUTO: 3.09 K/UL (ref 1–4.8)
LYMPHOCYTES NFR BLD: 24.9 % (ref 22–41)
MCH RBC QN AUTO: 31.8 PG (ref 27–33)
MCHC RBC AUTO-ENTMCNC: 34.6 G/DL (ref 32.3–36.5)
MCV RBC AUTO: 91.8 FL (ref 81.4–97.8)
MONOCYTES # BLD AUTO: 1.33 K/UL (ref 0–0.85)
MONOCYTES NFR BLD AUTO: 10.7 % (ref 0–13.4)
NEUTROPHILS # BLD AUTO: 7.58 K/UL (ref 1.82–7.42)
NEUTROPHILS NFR BLD: 61 % (ref 44–72)
NRBC # BLD AUTO: 0 K/UL
NRBC BLD-RTO: 0 /100 WBC (ref 0–0.2)
PLATELET # BLD AUTO: 242 K/UL (ref 164–446)
PMV BLD AUTO: 10.2 FL (ref 9–12.9)
POTASSIUM SERPL-SCNC: 3.9 MMOL/L (ref 3.6–5.5)
PROCALCITONIN SERPL-MCNC: 0.05 NG/ML
PROT SERPL-MCNC: 7.3 G/DL (ref 6–8.2)
PROTHROMBIN TIME: 12.6 SEC (ref 12–14.6)
RBC # BLD AUTO: 4.66 M/UL (ref 4.7–6.1)
SIGNIFICANT IND 70042: NORMAL
SITE SITE: NORMAL
SODIUM SERPL-SCNC: 139 MMOL/L (ref 135–145)
SOURCE SOURCE: NORMAL
TROPONIN T SERPL-MCNC: 13 NG/L (ref 6–19)
WBC # BLD AUTO: 12.4 K/UL (ref 4.8–10.8)

## 2025-03-29 PROCEDURE — 85652 RBC SED RATE AUTOMATED: CPT

## 2025-03-29 PROCEDURE — 99285 EMERGENCY DEPT VISIT HI MDM: CPT

## 2025-03-29 PROCEDURE — 80053 COMPREHEN METABOLIC PANEL: CPT

## 2025-03-29 PROCEDURE — 87070 CULTURE OTHR SPECIMN AEROBIC: CPT | Mod: XU

## 2025-03-29 PROCEDURE — 73130 X-RAY EXAM OF HAND: CPT | Mod: RT

## 2025-03-29 PROCEDURE — 89051 BODY FLUID CELL COUNT: CPT

## 2025-03-29 PROCEDURE — 85730 THROMBOPLASTIN TIME PARTIAL: CPT

## 2025-03-29 PROCEDURE — 96375 TX/PRO/DX INJ NEW DRUG ADDON: CPT | Mod: XU

## 2025-03-29 PROCEDURE — 84484 ASSAY OF TROPONIN QUANT: CPT

## 2025-03-29 PROCEDURE — 700117 HCHG RX CONTRAST REV CODE 255: Mod: UD | Performed by: STUDENT IN AN ORGANIZED HEALTH CARE EDUCATION/TRAINING PROGRAM

## 2025-03-29 PROCEDURE — 89060 EXAM SYNOVIAL FLUID CRYSTALS: CPT

## 2025-03-29 PROCEDURE — 83605 ASSAY OF LACTIC ACID: CPT

## 2025-03-29 PROCEDURE — 73206 CT ANGIO UPR EXTRM W/O&W/DYE: CPT | Mod: RT

## 2025-03-29 PROCEDURE — 93005 ELECTROCARDIOGRAM TRACING: CPT | Mod: TC,XE | Performed by: STUDENT IN AN ORGANIZED HEALTH CARE EDUCATION/TRAINING PROGRAM

## 2025-03-29 PROCEDURE — 36415 COLL VENOUS BLD VENIPUNCTURE: CPT

## 2025-03-29 PROCEDURE — 85025 COMPLETE CBC W/AUTO DIFF WBC: CPT

## 2025-03-29 PROCEDURE — 82550 ASSAY OF CK (CPK): CPT

## 2025-03-29 PROCEDURE — 93971 EXTREMITY STUDY: CPT | Mod: RT

## 2025-03-29 PROCEDURE — 84145 PROCALCITONIN (PCT): CPT

## 2025-03-29 PROCEDURE — 86140 C-REACTIVE PROTEIN: CPT

## 2025-03-29 PROCEDURE — 20605 DRAIN/INJ JOINT/BURSA W/O US: CPT

## 2025-03-29 PROCEDURE — 700111 HCHG RX REV CODE 636 W/ 250 OVERRIDE (IP): Mod: JZ,UD | Performed by: STUDENT IN AN ORGANIZED HEALTH CARE EDUCATION/TRAINING PROGRAM

## 2025-03-29 PROCEDURE — 87040 BLOOD CULTURE FOR BACTERIA: CPT | Mod: 91

## 2025-03-29 PROCEDURE — 87205 SMEAR GRAM STAIN: CPT

## 2025-03-29 PROCEDURE — 85610 PROTHROMBIN TIME: CPT

## 2025-03-29 PROCEDURE — 96376 TX/PRO/DX INJ SAME DRUG ADON: CPT | Mod: XU

## 2025-03-29 RX ORDER — HYDROMORPHONE HYDROCHLORIDE 2 MG/ML
0.5 INJECTION, SOLUTION INTRAMUSCULAR; INTRAVENOUS; SUBCUTANEOUS ONCE
Status: COMPLETED | OUTPATIENT
Start: 2025-03-29 | End: 2025-03-29

## 2025-03-29 RX ORDER — CEFTRIAXONE 2 G/1
2000 INJECTION, POWDER, FOR SOLUTION INTRAMUSCULAR; INTRAVENOUS ONCE
Status: COMPLETED | OUTPATIENT
Start: 2025-03-29 | End: 2025-03-29

## 2025-03-29 RX ADMIN — HYDROMORPHONE HYDROCHLORIDE 0.5 MG: 2 INJECTION INTRAMUSCULAR; INTRAVENOUS; SUBCUTANEOUS at 19:10

## 2025-03-29 RX ADMIN — HYDROMORPHONE HYDROCHLORIDE 0.5 MG: 2 INJECTION, SOLUTION INTRAMUSCULAR; INTRAVENOUS; SUBCUTANEOUS at 21:27

## 2025-03-29 RX ADMIN — CEFTRIAXONE SODIUM 2000 MG: 2 INJECTION, POWDER, FOR SOLUTION INTRAMUSCULAR; INTRAVENOUS at 23:52

## 2025-03-29 RX ADMIN — IOHEXOL 100 ML: 350 INJECTION, SOLUTION INTRAVENOUS at 20:45

## 2025-03-29 ASSESSMENT — PAIN DESCRIPTION - PAIN TYPE
TYPE: ACUTE PAIN

## 2025-03-29 ASSESSMENT — FIBROSIS 4 INDEX: FIB4 SCORE: 1.1

## 2025-03-30 ENCOUNTER — APPOINTMENT (OUTPATIENT)
Dept: RADIOLOGY | Facility: MEDICAL CENTER | Age: 55
End: 2025-03-30
Attending: NURSE PRACTITIONER
Payer: MEDICAID

## 2025-03-30 ENCOUNTER — PHARMACY VISIT (OUTPATIENT)
Dept: PHARMACY | Facility: MEDICAL CENTER | Age: 55
End: 2025-03-30
Payer: COMMERCIAL

## 2025-03-30 ENCOUNTER — APPOINTMENT (OUTPATIENT)
Dept: CARDIOLOGY | Facility: MEDICAL CENTER | Age: 55
End: 2025-03-30
Attending: NURSE PRACTITIONER
Payer: MEDICAID

## 2025-03-30 ENCOUNTER — APPOINTMENT (OUTPATIENT)
Dept: RADIOLOGY | Facility: MEDICAL CENTER | Age: 55
End: 2025-03-30
Attending: INTERNAL MEDICINE
Payer: MEDICAID

## 2025-03-30 VITALS
DIASTOLIC BLOOD PRESSURE: 91 MMHG | TEMPERATURE: 96.6 F | BODY MASS INDEX: 32.74 KG/M2 | OXYGEN SATURATION: 93 % | SYSTOLIC BLOOD PRESSURE: 148 MMHG | RESPIRATION RATE: 16 BRPM | WEIGHT: 216.05 LBS | HEIGHT: 68 IN

## 2025-03-30 PROBLEM — M19.039 WRIST ARTHRITIS: Status: ACTIVE | Noted: 2025-03-30

## 2025-03-30 PROBLEM — R09.02 HYPOXIA: Status: RESOLVED | Noted: 2025-03-30 | Resolved: 2025-03-30

## 2025-03-30 PROBLEM — R09.02 HYPOXIA: Status: ACTIVE | Noted: 2025-03-30

## 2025-03-30 LAB
AMPHET UR QL SCN: POSITIVE
APPEARANCE FLD: NORMAL
BARBITURATES UR QL SCN: NEGATIVE
BENZODIAZ UR QL SCN: NEGATIVE
BODY FLD TYPE: NORMAL
BZE UR QL SCN: NEGATIVE
CANNABINOIDS UR QL SCN: POSITIVE
COLOR FLD: NORMAL
CSF COMMENTS 1658: NORMAL
EOSINOPHIL NFR FLD: 1 %
FENTANYL UR QL: NEGATIVE
LV EJECT FRACT MOD 2C 99903: 55.94
LV EJECT FRACT MOD 4C 99902: 55.88
LV EJECT FRACT MOD BP 99901: 56.24
LYMPHOCYTES NFR FLD: 8 %
METHADONE UR QL SCN: NEGATIVE
MONOS+MACROS NFR FLD MANUAL: 68 %
NEUTROPHILS NFR FLD: 23 %
NUC CELL # FLD: 330 CELLS/UL
OPIATES UR QL SCN: NEGATIVE
OXYCODONE UR QL SCN: NEGATIVE
PCP UR QL SCN: NEGATIVE
PROPOXYPH UR QL SCN: NEGATIVE
RBC # FLD: NORMAL CELLS/UL
SCCMEC + MECA PNL NOSE NAA+PROBE: NEGATIVE

## 2025-03-30 PROCEDURE — 96375 TX/PRO/DX INJ NEW DRUG ADDON: CPT | Mod: XU

## 2025-03-30 PROCEDURE — A9270 NON-COVERED ITEM OR SERVICE: HCPCS | Mod: UD | Performed by: INTERNAL MEDICINE

## 2025-03-30 PROCEDURE — 96376 TX/PRO/DX INJ SAME DRUG ADON: CPT | Mod: XU

## 2025-03-30 PROCEDURE — 700102 HCHG RX REV CODE 250 W/ 637 OVERRIDE(OP): Mod: UD | Performed by: INTERNAL MEDICINE

## 2025-03-30 PROCEDURE — 93306 TTE W/DOPPLER COMPLETE: CPT | Mod: 26 | Performed by: INTERNAL MEDICINE

## 2025-03-30 PROCEDURE — 96365 THER/PROPH/DIAG IV INF INIT: CPT | Mod: XU

## 2025-03-30 PROCEDURE — RXMED WILLOW AMBULATORY MEDICATION CHARGE: Performed by: NURSE PRACTITIONER

## 2025-03-30 PROCEDURE — 700105 HCHG RX REV CODE 258: Mod: UD | Performed by: INTERNAL MEDICINE

## 2025-03-30 PROCEDURE — 700102 HCHG RX REV CODE 250 W/ 637 OVERRIDE(OP): Mod: UD | Performed by: STUDENT IN AN ORGANIZED HEALTH CARE EDUCATION/TRAINING PROGRAM

## 2025-03-30 PROCEDURE — A9270 NON-COVERED ITEM OR SERVICE: HCPCS | Mod: UD | Performed by: STUDENT IN AN ORGANIZED HEALTH CARE EDUCATION/TRAINING PROGRAM

## 2025-03-30 PROCEDURE — 71045 X-RAY EXAM CHEST 1 VIEW: CPT

## 2025-03-30 PROCEDURE — 99406 BEHAV CHNG SMOKING 3-10 MIN: CPT | Performed by: INTERNAL MEDICINE

## 2025-03-30 PROCEDURE — 96366 THER/PROPH/DIAG IV INF ADDON: CPT

## 2025-03-30 PROCEDURE — 99238 HOSP IP/OBS DSCHRG MGMT 30/<: CPT | Performed by: STUDENT IN AN ORGANIZED HEALTH CARE EDUCATION/TRAINING PROGRAM

## 2025-03-30 PROCEDURE — A9270 NON-COVERED ITEM OR SERVICE: HCPCS | Mod: UD | Performed by: NURSE PRACTITIONER

## 2025-03-30 PROCEDURE — 700111 HCHG RX REV CODE 636 W/ 250 OVERRIDE (IP): Mod: JZ,UD | Performed by: NURSE PRACTITIONER

## 2025-03-30 PROCEDURE — 93306 TTE W/DOPPLER COMPLETE: CPT

## 2025-03-30 PROCEDURE — 700105 HCHG RX REV CODE 258: Mod: UD | Performed by: STUDENT IN AN ORGANIZED HEALTH CARE EDUCATION/TRAINING PROGRAM

## 2025-03-30 PROCEDURE — 700102 HCHG RX REV CODE 250 W/ 637 OVERRIDE(OP): Mod: UD | Performed by: NURSE PRACTITIONER

## 2025-03-30 PROCEDURE — G0378 HOSPITAL OBSERVATION PER HR: HCPCS

## 2025-03-30 PROCEDURE — 87641 MR-STAPH DNA AMP PROBE: CPT

## 2025-03-30 PROCEDURE — 99223 1ST HOSP IP/OBS HIGH 75: CPT | Mod: 25 | Performed by: INTERNAL MEDICINE

## 2025-03-30 PROCEDURE — 73200 CT UPPER EXTREMITY W/O DYE: CPT | Mod: RT

## 2025-03-30 PROCEDURE — 700111 HCHG RX REV CODE 636 W/ 250 OVERRIDE (IP): Mod: UD | Performed by: INTERNAL MEDICINE

## 2025-03-30 PROCEDURE — 700111 HCHG RX REV CODE 636 W/ 250 OVERRIDE (IP): Mod: JZ,UD | Performed by: STUDENT IN AN ORGANIZED HEALTH CARE EDUCATION/TRAINING PROGRAM

## 2025-03-30 PROCEDURE — 96375 TX/PRO/DX INJ NEW DRUG ADDON: CPT

## 2025-03-30 PROCEDURE — 700117 HCHG RX CONTRAST REV CODE 255: Mod: UD | Performed by: NURSE PRACTITIONER

## 2025-03-30 PROCEDURE — 99406 BEHAV CHNG SMOKING 3-10 MIN: CPT

## 2025-03-30 PROCEDURE — 80307 DRUG TEST PRSMV CHEM ANLYZR: CPT

## 2025-03-30 RX ORDER — AMLODIPINE BESYLATE 5 MG/1
5 TABLET ORAL
Status: DISCONTINUED | OUTPATIENT
Start: 2025-03-30 | End: 2025-03-30

## 2025-03-30 RX ORDER — POTASSIUM CHLORIDE 1500 MG/1
20 TABLET, EXTENDED RELEASE ORAL DAILY
Qty: 14 TABLET | Refills: 0 | Status: SHIPPED | OUTPATIENT
Start: 2025-03-30 | End: 2025-04-13

## 2025-03-30 RX ORDER — ONDANSETRON 4 MG/1
4 TABLET, ORALLY DISINTEGRATING ORAL EVERY 4 HOURS PRN
Status: DISCONTINUED | OUTPATIENT
Start: 2025-03-30 | End: 2025-03-30 | Stop reason: HOSPADM

## 2025-03-30 RX ORDER — LABETALOL HYDROCHLORIDE 5 MG/ML
10 INJECTION, SOLUTION INTRAVENOUS EVERY 4 HOURS PRN
Status: DISCONTINUED | OUTPATIENT
Start: 2025-03-30 | End: 2025-03-30 | Stop reason: HOSPADM

## 2025-03-30 RX ORDER — HYDROMORPHONE HYDROCHLORIDE 2 MG/ML
0.5 INJECTION, SOLUTION INTRAMUSCULAR; INTRAVENOUS; SUBCUTANEOUS ONCE
Status: COMPLETED | OUTPATIENT
Start: 2025-03-30 | End: 2025-03-30

## 2025-03-30 RX ORDER — LISINOPRIL 10 MG/1
10 TABLET ORAL
Status: DISCONTINUED | OUTPATIENT
Start: 2025-03-30 | End: 2025-03-30 | Stop reason: HOSPADM

## 2025-03-30 RX ORDER — KETOROLAC TROMETHAMINE 15 MG/ML
15 INJECTION, SOLUTION INTRAMUSCULAR; INTRAVENOUS ONCE
Status: COMPLETED | OUTPATIENT
Start: 2025-03-30 | End: 2025-03-30

## 2025-03-30 RX ORDER — FUROSEMIDE 20 MG/1
20 TABLET ORAL DAILY
Qty: 14 TABLET | Refills: 0 | Status: SHIPPED | OUTPATIENT
Start: 2025-03-30 | End: 2025-04-13

## 2025-03-30 RX ORDER — AMOXICILLIN 250 MG
2 CAPSULE ORAL EVERY EVENING
Status: DISCONTINUED | OUTPATIENT
Start: 2025-03-30 | End: 2025-03-30 | Stop reason: HOSPADM

## 2025-03-30 RX ORDER — ACETAMINOPHEN 500 MG
1000 TABLET ORAL EVERY 8 HOURS
Status: DISCONTINUED | OUTPATIENT
Start: 2025-03-30 | End: 2025-03-30 | Stop reason: HOSPADM

## 2025-03-30 RX ORDER — DOXYCYCLINE 100 MG/1
100 CAPSULE ORAL 2 TIMES DAILY
Qty: 14 CAPSULE | Refills: 0 | Status: SHIPPED | OUTPATIENT
Start: 2025-03-30 | End: 2025-04-06

## 2025-03-30 RX ORDER — PROCHLORPERAZINE EDISYLATE 5 MG/ML
5-10 INJECTION INTRAMUSCULAR; INTRAVENOUS EVERY 4 HOURS PRN
Status: DISCONTINUED | OUTPATIENT
Start: 2025-03-30 | End: 2025-03-30 | Stop reason: HOSPADM

## 2025-03-30 RX ORDER — MORPHINE SULFATE 4 MG/ML
4 INJECTION INTRAVENOUS
Status: DISCONTINUED | OUTPATIENT
Start: 2025-03-30 | End: 2025-03-30 | Stop reason: HOSPADM

## 2025-03-30 RX ORDER — OXYCODONE HYDROCHLORIDE 10 MG/1
10 TABLET ORAL
Refills: 0 | Status: DISCONTINUED | OUTPATIENT
Start: 2025-03-30 | End: 2025-03-30 | Stop reason: HOSPADM

## 2025-03-30 RX ORDER — CARVEDILOL 6.25 MG/1
6.25 TABLET ORAL 2 TIMES DAILY WITH MEALS
Status: DISCONTINUED | OUTPATIENT
Start: 2025-03-30 | End: 2025-03-30 | Stop reason: HOSPADM

## 2025-03-30 RX ORDER — FUROSEMIDE 10 MG/ML
40 INJECTION INTRAMUSCULAR; INTRAVENOUS ONCE
Status: COMPLETED | OUTPATIENT
Start: 2025-03-30 | End: 2025-03-30

## 2025-03-30 RX ORDER — ENOXAPARIN SODIUM 100 MG/ML
40 INJECTION SUBCUTANEOUS DAILY
Status: DISCONTINUED | OUTPATIENT
Start: 2025-03-30 | End: 2025-03-30 | Stop reason: HOSPADM

## 2025-03-30 RX ORDER — METHYLPREDNISOLONE 4 MG/1
TABLET ORAL
Qty: 21 TABLET | Refills: 0 | Status: SHIPPED | OUTPATIENT
Start: 2025-03-30

## 2025-03-30 RX ORDER — LISINOPRIL 20 MG/1
20 TABLET ORAL ONCE
Status: COMPLETED | OUTPATIENT
Start: 2025-03-30 | End: 2025-03-30

## 2025-03-30 RX ORDER — ONDANSETRON 2 MG/ML
4 INJECTION INTRAMUSCULAR; INTRAVENOUS EVERY 4 HOURS PRN
Status: DISCONTINUED | OUTPATIENT
Start: 2025-03-30 | End: 2025-03-30 | Stop reason: HOSPADM

## 2025-03-30 RX ORDER — PROMETHAZINE HYDROCHLORIDE 25 MG/1
12.5-25 TABLET ORAL EVERY 4 HOURS PRN
Status: DISCONTINUED | OUTPATIENT
Start: 2025-03-30 | End: 2025-03-30 | Stop reason: HOSPADM

## 2025-03-30 RX ORDER — POTASSIUM CHLORIDE 1500 MG/1
40 TABLET, EXTENDED RELEASE ORAL ONCE
Status: COMPLETED | OUTPATIENT
Start: 2025-03-30 | End: 2025-03-30

## 2025-03-30 RX ORDER — LISINOPRIL 10 MG/1
10 TABLET ORAL DAILY
Qty: 30 TABLET | Refills: 0 | Status: SHIPPED | OUTPATIENT
Start: 2025-03-31 | End: 2025-04-30

## 2025-03-30 RX ORDER — POLYETHYLENE GLYCOL 3350 17 G/17G
1 POWDER, FOR SOLUTION ORAL
Status: DISCONTINUED | OUTPATIENT
Start: 2025-03-30 | End: 2025-03-30 | Stop reason: HOSPADM

## 2025-03-30 RX ORDER — PROMETHAZINE HYDROCHLORIDE 25 MG/1
12.5-25 SUPPOSITORY RECTAL EVERY 4 HOURS PRN
Status: DISCONTINUED | OUTPATIENT
Start: 2025-03-30 | End: 2025-03-30 | Stop reason: HOSPADM

## 2025-03-30 RX ORDER — ACETAMINOPHEN 500 MG
1000 TABLET ORAL ONCE
Status: COMPLETED | OUTPATIENT
Start: 2025-03-30 | End: 2025-03-30

## 2025-03-30 RX ORDER — OXYCODONE HYDROCHLORIDE 5 MG/1
5 TABLET ORAL
Refills: 0 | Status: DISCONTINUED | OUTPATIENT
Start: 2025-03-30 | End: 2025-03-30 | Stop reason: HOSPADM

## 2025-03-30 RX ORDER — CARVEDILOL 6.25 MG/1
6.25 TABLET ORAL 2 TIMES DAILY WITH MEALS
Qty: 60 TABLET | Refills: 0 | Status: SHIPPED | OUTPATIENT
Start: 2025-03-30 | End: 2025-04-29

## 2025-03-30 RX ADMIN — VANCOMYCIN HYDROCHLORIDE 2500 MG: 5 INJECTION, POWDER, LYOPHILIZED, FOR SOLUTION INTRAVENOUS at 00:13

## 2025-03-30 RX ADMIN — FUROSEMIDE 40 MG: 10 INJECTION, SOLUTION INTRAVENOUS at 11:26

## 2025-03-30 RX ADMIN — HUMAN ALBUMIN MICROSPHERES AND PERFLUTREN 3 ML: 10; .22 INJECTION, SOLUTION INTRAVENOUS at 14:15

## 2025-03-30 RX ADMIN — ACETAMINOPHEN 1000 MG: 500 TABLET ORAL at 01:24

## 2025-03-30 RX ADMIN — KETOROLAC TROMETHAMINE 15 MG: 15 INJECTION, SOLUTION INTRAMUSCULAR; INTRAVENOUS at 00:28

## 2025-03-30 RX ADMIN — ACETAMINOPHEN 1000 MG: 500 TABLET ORAL at 09:00

## 2025-03-30 RX ADMIN — LISINOPRIL 20 MG: 20 TABLET ORAL at 00:27

## 2025-03-30 RX ADMIN — CARVEDILOL 6.25 MG: 6.25 TABLET, FILM COATED ORAL at 09:14

## 2025-03-30 RX ADMIN — LISINOPRIL 10 MG: 10 TABLET ORAL at 05:54

## 2025-03-30 RX ADMIN — POTASSIUM CHLORIDE 40 MEQ: 1500 TABLET, EXTENDED RELEASE ORAL at 11:27

## 2025-03-30 RX ADMIN — HYDROMORPHONE HYDROCHLORIDE 0.5 MG: 2 INJECTION, SOLUTION INTRAMUSCULAR; INTRAVENOUS; SUBCUTANEOUS at 00:29

## 2025-03-30 RX ADMIN — ACETAMINOPHEN 1000 MG: 500 TABLET ORAL at 00:27

## 2025-03-30 RX ADMIN — VANCOMYCIN HYDROCHLORIDE 1500 MG: 5 INJECTION, POWDER, LYOPHILIZED, FOR SOLUTION INTRAVENOUS at 13:35

## 2025-03-30 RX ADMIN — ACETAMINOPHEN 1000 MG: 500 TABLET ORAL at 15:29

## 2025-03-30 ASSESSMENT — ENCOUNTER SYMPTOMS
SPEECH CHANGE: 0
ORTHOPNEA: 0
POLYDIPSIA: 0
WEIGHT LOSS: 0
COUGH: 0
CHILLS: 0
NECK PAIN: 0
BRUISES/BLEEDS EASILY: 0
SPUTUM PRODUCTION: 0
FLANK PAIN: 0
BLURRED VISION: 0
NERVOUS/ANXIOUS: 0
HALLUCINATIONS: 0
FEVER: 0
PHOTOPHOBIA: 0
TREMORS: 0
VOMITING: 0
NAUSEA: 0
HEMOPTYSIS: 0
PALPITATIONS: 0
BACK PAIN: 0
DOUBLE VISION: 0
HEADACHES: 0
FOCAL WEAKNESS: 0
HEARTBURN: 0

## 2025-03-30 ASSESSMENT — COGNITIVE AND FUNCTIONAL STATUS - GENERAL
SUGGESTED CMS G CODE MODIFIER DAILY ACTIVITY: CH
SUGGESTED CMS G CODE MODIFIER MOBILITY: CH
MOBILITY SCORE: 24
DAILY ACTIVITIY SCORE: 24

## 2025-03-30 ASSESSMENT — SOCIAL DETERMINANTS OF HEALTH (SDOH)
WITHIN THE LAST YEAR, HAVE YOU BEEN HUMILIATED OR EMOTIONALLY ABUSED IN OTHER WAYS BY YOUR PARTNER OR EX-PARTNER?: NO
WITHIN THE PAST 12 MONTHS, THE FOOD YOU BOUGHT JUST DIDN'T LAST AND YOU DIDN'T HAVE MONEY TO GET MORE: SOMETIMES TRUE
WITHIN THE LAST YEAR, HAVE YOU BEEN KICKED, HIT, SLAPPED, OR OTHERWISE PHYSICALLY HURT BY YOUR PARTNER OR EX-PARTNER?: NO
WITHIN THE PAST 12 MONTHS, YOU WORRIED THAT YOUR FOOD WOULD RUN OUT BEFORE YOU GOT THE MONEY TO BUY MORE: PATIENT DECLINED
WITHIN THE LAST YEAR, HAVE TO BEEN RAPED OR FORCED TO HAVE ANY KIND OF SEXUAL ACTIVITY BY YOUR PARTNER OR EX-PARTNER?: NO
WITHIN THE LAST YEAR, HAVE YOU BEEN AFRAID OF YOUR PARTNER OR EX-PARTNER?: NO
IN THE PAST 12 MONTHS, HAS THE ELECTRIC, GAS, OIL, OR WATER COMPANY THREATENED TO SHUT OFF SERVICE IN YOUR HOME?: NO

## 2025-03-30 ASSESSMENT — LIFESTYLE VARIABLES
TOTAL SCORE: 0
AVERAGE NUMBER OF DAYS PER WEEK YOU HAVE A DRINK CONTAINING ALCOHOL: 0
SUBSTANCE_ABUSE: 0
HAVE YOU EVER FELT YOU SHOULD CUT DOWN ON YOUR DRINKING: NO
ON A TYPICAL DAY WHEN YOU DRINK ALCOHOL HOW MANY DRINKS DO YOU HAVE: 0
EVER HAD A DRINK FIRST THING IN THE MORNING TO STEADY YOUR NERVES TO GET RID OF A HANGOVER: NO
EVER FELT BAD OR GUILTY ABOUT YOUR DRINKING: NO
TOTAL SCORE: 0
CONSUMPTION TOTAL: NEGATIVE
ALCOHOL_USE: NO
TOTAL SCORE: 0
HAVE PEOPLE ANNOYED YOU BY CRITICIZING YOUR DRINKING: NO
HOW MANY TIMES IN THE PAST YEAR HAVE YOU HAD 5 OR MORE DRINKS IN A DAY: 0

## 2025-03-30 ASSESSMENT — FIBROSIS 4 INDEX: FIB4 SCORE: 1.07

## 2025-03-30 ASSESSMENT — PAIN DESCRIPTION - PAIN TYPE
TYPE: ACUTE PAIN

## 2025-03-30 NOTE — ASSESSMENT & PLAN NOTE
Uncontrolled.  Patient has not been taking prescribed lisinopril and carvedilol, unable to afford  Will restart carvedilol, lisinopril  Monitor blood pressure  IV labetalol as needed

## 2025-03-30 NOTE — PROGRESS NOTES
Pt in bed awake,a&ox4,right hand swollen,pt states mod pain,pt had CT scan,poc explained to pt,MRSA PCR and need for urine sample explained.

## 2025-03-30 NOTE — ASSESSMENT & PLAN NOTE
54-year-old male with a right wrist arthritis, onset today..  Arterial and venous ultrasound of the right upper extremity negative for thrombosis  Status post arthrocentesis in ER.  Gram negative, FU WBC and Gram stain.  Negative for crystals.  Cell count: RBC 50,000, , 23% neutrophils, 68% macrophages  Will continue Unasyn and vancomycin for now until culture result  Will plan to consult orthopedic surgery  Pain control  N.p.o. for now  Monitor for respiratory depression secondary to morphine or oxycodone

## 2025-03-30 NOTE — ED TRIAGE NOTES
Chief Complaint   Patient presents with    Hand Pain     Pt woke up with R hand pain and swelling. Denies injury to that area. Pt took tylenol PTA.      Pt ambulatory to triage for above complaint. A&Ox4, GCS 15, speaking in full sentences. Respirations equal and unlabored. NAD.  Appropriate protocols ordered.   Pt educated on triage process and instructed to notify staff of worsening condition.   Pt placed in the lobby in stable condition.

## 2025-03-30 NOTE — CARE PLAN
The patient is Stable - Low risk of patient condition declining or worsening    Shift Goals  Clinical Goals: ABX, pain control, ortho consult  Patient Goals: Comfort, pain relief  Family Goals: Not at bedside    Progress made toward(s) clinical / shift goals:      Problem: Pain - Standard  Goal: Alleviation of pain or a reduction in pain to the patient’s comfort goal  Description: Target End Date:  Prior to discharge or change in level of careDocument on Vitals flowsheet1.  Document pain using the appropriate pain scale per order or unit policy2.  Educate and implement non-pharmacologic comfort measures (i.e. relaxation, distraction, massage, cold/heat therapy, etc.)3.  Pain management medications as ordered4.  Reassess pain after pain med administration per policy5.  If opiods administered assess patient's response to pain medication is appropriate per POSS sedation scale6.  Follow pain management plan developed in collaboration with patient and interdisciplinary team (including palliative care or pain specialists if applicable)  Outcome: Progressing     Problem: Knowledge Deficit - Standard  Goal: Patient and family/care givers will demonstrate understanding of plan of care, disease process/condition, diagnostic tests and medications  Description: Target End Date:  1-3 days or as soon as patient condition allowsDocument in Patient Education1.  Patient and family/caregiver oriented to unit, equipment, visitation policy and means for communicating concern2.  Complete/review Learning Assessment3.  Assess knowledge level of disease process/condition, treatment plan, diagnostic tests and medications4.  Explain disease process/condition, treatment plan, diagnostic tests and medications  Outcome: Progressing       Patient is not progressing towards the following goals:

## 2025-03-30 NOTE — H&P
Hospital Medicine History & Physical Note    Date of Service  3/30/2025    Primary Care Physician  Ophelia John M.D.        Code Status  Full Code    Chief Complaint  Chief Complaint   Patient presents with    Hand Pain     Pt woke up with R hand pain and swelling. Denies injury to that area. Pt took tylenol PTA.        History of Presenting Illness  Lucas Agee is a 54 y.o. male with history of gout, arthritis, hypertension, pulmonary hypertension, type 2 diabetes, biventricular heart failure, methamphetamine abuse, right foot thrombosis requiring thrombectomy in 2019, smoking, right hand surgery in 1995 who presented 3/29/2025 with concern for right hand infection  The patient woke up today with pain and swelling in the right hand without preceding injury.  He noticed pain with movement in the right wrist, limited range of motion due to pain today.  There is some numbness and tingling in the fingers of the right hand.    Blood pressure elevated 194/124, heart rate 122, not on any blood pressure medications.  He was placed on 2 L nasal cannula after desaturation to 85%, probably after IV Dilaudid.  Due to prior history of thrombosis, he was evaluated with CTA and ultrasound DVT study for the right upper extremity, negative for arterial or venous thrombosis.  Patient undergone arthrocentesis of right wrist with acquisition of 1 cc of synovial fluid.  Gram stain negative.  Synovial fluid is negative for crystals.  Cells are mostly RBCs, with , 23% neutrophils and 68% macrophages.  Patient was given Unasyn and vancomycin.  I discussed the plan of care with patient, bedside RN, and ERP .    Review of Systems  Review of Systems   Constitutional:  Negative for chills, fever and weight loss.   HENT:  Negative for ear pain, hearing loss and tinnitus.    Eyes:  Negative for blurred vision, double vision and photophobia.   Respiratory:  Negative for cough, hemoptysis and sputum production.     Cardiovascular:  Negative for chest pain, palpitations and orthopnea.   Gastrointestinal:  Negative for heartburn, nausea and vomiting.   Genitourinary:  Negative for dysuria, flank pain, frequency and hematuria.   Musculoskeletal:  Positive for joint pain. Negative for back pain and neck pain.   Skin:  Negative for itching and rash.   Neurological:  Negative for tremors, speech change, focal weakness and headaches.   Endo/Heme/Allergies:  Negative for environmental allergies and polydipsia. Does not bruise/bleed easily.   Psychiatric/Behavioral:  Negative for hallucinations and substance abuse. The patient is not nervous/anxious.        Past Medical History   has a past medical history of Congestive heart failure ([facility]), DVT (deep venous thrombosis) ([facility]), Hypertension, and Pain and swelling of left knee (8/24/2019).    Surgical History   has a past surgical history that includes hernia repair; hernia repair (Right, 1990); hand surgery (Right, 1995); thrombectomy (Right, 05/21/2019); and open reduction.     Family History  family history includes Alcohol abuse in his brother, father, maternal uncle, and mother; Blood Clots in his brother and paternal grandmother; Cancer in his father; Diabetes in his maternal grandmother; Drug abuse in his brother, father, maternal uncle, and mother; Heart Attack (age of onset: 60) in his paternal uncle; Heart Disease in his brother, father, and paternal grandmother; Stroke in his paternal grandmother.   Family history reviewed with patient. There is no family history that is pertinent to the chief complaint.     Social History   reports that he has been smoking cigarettes. He has a 7.5 pack-year smoking history. He has never used smokeless tobacco. He reports that he does not currently use drugs after having used the following drugs: Marijuana, Methamphetamines, Intravenous, and Inhaled. He reports that he does not drink alcohol.    Allergies  No Known  Allergies    Medications  None       Physical Exam  Temp:  [37.1 °C (98.7 °F)] 37.1 °C (98.7 °F)  Pulse:  [] 92  Resp:  [16-24] 19  BP: (163-194)/(104-124) 179/115  SpO2:  [85 %-97 %] 95 %  Blood Pressure: (!) 179/115   Temperature: 37.1 °C (98.7 °F)   Pulse: 92   Respiration: 19   Pulse Oximetry: 95 %       Physical Exam  Vitals and nursing note reviewed.   Constitutional:       General: He is not in acute distress.     Appearance: Normal appearance.   HENT:      Head: Normocephalic and atraumatic.      Nose: Nose normal.      Mouth/Throat:      Mouth: Mucous membranes are moist.   Eyes:      Extraocular Movements: Extraocular movements intact.      Pupils: Pupils are equal, round, and reactive to light.   Cardiovascular:      Rate and Rhythm: Regular rhythm. Tachycardia present.   Pulmonary:      Effort: Pulmonary effort is normal.      Breath sounds: Normal breath sounds.   Abdominal:      General: Abdomen is flat. There is no distension.      Tenderness: There is no abdominal tenderness.   Musculoskeletal:         General: No swelling or deformity.      Right wrist: Swelling and tenderness present. Decreased range of motion.      Cervical back: Normal range of motion and neck supple.      Comments: Right hand and right wrist is swollen.  Limited range of motion in the right wrist.  Pulse is palpated on right radialis artery.  Hand appears to be well-perfused.  Sensation is preserved.   Skin:     General: Skin is warm and dry.   Neurological:      General: No focal deficit present.      Mental Status: He is alert and oriented to person, place, and time.   Psychiatric:         Mood and Affect: Mood normal.         Behavior: Behavior normal.         Laboratory:  Recent Labs     03/29/25 1905   WBC 12.4*   RBC 4.66*   HEMOGLOBIN 14.8   HEMATOCRIT 42.8   MCV 91.8   MCH 31.8   MCHC 34.6   RDW 43.9   PLATELETCT 242   MPV 10.2     Recent Labs     03/29/25 1905   SODIUM 139   POTASSIUM 3.9   CHLORIDE 106   CO2 22  "  GLUCOSE 95   BUN 11   CREATININE 0.96   CALCIUM 9.1     Recent Labs     03/29/25 1905   ALTSGPT 13   ASTSGOT 18   ALKPHOSPHAT 108*   TBILIRUBIN 0.2   GLUCOSE 95     Recent Labs     03/29/25 1905   APTT 24.2*   INR 0.94     No results for input(s): \"NTPROBNP\" in the last 72 hours.      Recent Labs     03/29/25 1905   TROPONINT 13       Imaging:  CT-CTA UPPER EXT WITH & W/O-POST PROCESS RIGHT   Final Result      No occlusion or stenosis seen in the forearm and hand.      US-EXTREMITY VENOUS UPPER UNILAT RIGHT   Final Result      DX-HAND 3+ RIGHT   Final Result      Soft tissue swelling without acute osseous abnormality.      Plate and screw fixation of the fifth metacarpal.            Assessment/Plan:  Justification for Admission Status  I anticipate this patient is appropriate for observation status at this time because arthritis of the right wrist    Patient will need a Med/Surg bed on MEDICAL service .  The need is secondary to arthritis of the right wrist.    * Right hand cellulitis and right wrist arthritis, rule out septic arthritis- (present on admission)  Assessment & Plan  54-year-old male with a right wrist arthritis, onset today..  Arterial and venous ultrasound of the right upper extremity negative for thrombosis  Status post arthrocentesis in ER.  Gram negative, FU WBC and Gram stain.  Negative for crystals.  Cell count: RBC 50,000, , 23% neutrophils, 68% macrophages  Will continue Unasyn and vancomycin for now until culture result  Will plan to consult orthopedic surgery  Pain control  N.p.o. for now  Monitor for respiratory depression secondary to morphine or oxycodone    Hypoxia  Assessment & Plan  Chest x-ray.  Incentive spirometry  Continuous pulse ox  Limit opiates as needed    Hypertension- (present on admission)  Assessment & Plan  Uncontrolled.  Patient has not been taking prescribed lisinopril and carvedilol, unable to afford  Will restart carvedilol, lisinopril  Monitor blood " pressure  IV labetalol as needed    Type 2 diabetes mellitus without complication, without long-term current use of insulin ([facility])- (present on admission)  Assessment & Plan  Most recent A1c 5.8.  Glucose 95.    History of methamphetamine abuse ([facility])- (present on admission)  Assessment & Plan  Will check urine drug screen    Tobacco use- (present on admission)  Assessment & Plan  Spent 4  mins on Tobacco cessation education of the patient Lucas Agee . Discussed options of nicotine patch, medical treatment with wellbutrin and chantix. Discussed the benefits of quitting smoking and risks of continued smoking including cardiovascular disease, cancer and COPD.   Code 62802      Dilated cardiomyopathy (HCC)- (present on admission)  Assessment & Plan  Monitor input and output and daily weight  Restart carvedilol, lisinopril        VTE prophylaxis: enoxaparin ppx

## 2025-03-30 NOTE — PROGRESS NOTES
Pt arrived to unit. Assumed care of pt. Pt A&Ox4, NAD, VSS on 2 L NC. C/o mild R hand pain, denies need for medication. Call light in reach. Bed in lowest position. Plan of care discussed with pt. Pt agreeing to plan of care. Communication board updated. All questions answered. Assessment completed.

## 2025-03-30 NOTE — ASSESSMENT & PLAN NOTE
Spent 4  mins on Tobacco cessation education of the patient Lucas Agee . Discussed options of nicotine patch, medical treatment with wellbutrin and chantix. Discussed the benefits of quitting smoking and risks of continued smoking including cardiovascular disease, cancer and COPD.   Code 07223

## 2025-03-30 NOTE — DISCHARGE INSTRUCTIONS
FOLLOW UP ITEMS POST DISCHARGE  Please call [phone] to schedule PCP appointment for patient.    Required specialty appointments include:       Discharge Instructions per MARY Agudelo.    Follow-up with PCP/rehospitalization  Start taking Augmentin and doxycycline twice daily for 7 days for management of right hand cellulitis  Also start taking Medrol Dosepak and furosemide to help with the swelling  Refilled all medication including lisinopril and carvedilol.  Start taking potassium 20 mg daily for 7 days while on furosemide  Resume all other home medications  Avoid using amphetamine and cannabis products    DIET: As tolerated    ACTIVITY: As tolerated    DIAGNOSIS: Right hand swelling    Return to ER if symptoms persist, chest pain, palpitations, shortness of breath, numbness, tingling, weakness, and high fevers.

## 2025-03-30 NOTE — PROGRESS NOTES
Pharmacy Vancomycin Kinetics Note for 3/30/2025     54 y.o. male on Vancomycin day # 1     Vancomycin Indication (AUC Dosing): Skin/skin structure infection    Provider specified end date: 04/05/25    Active Antibiotics (From admission, onward)      Ordered     Ordering Provider       Sun Mar 30, 2025 12:55 AM    03/30/25 0055  vancomycin (Vancocin) 1,500 mg in  mL IVPB  (vancomycin (VANCOCIN) IV (LD + Maintenance))  EVERY 12 HOURS         Heraclio Jones M.D.       [place] Mar 30, 2025 12:34 AM    03/30/25 0034  ampicillin/sulbactam (Unasyn) 3 g in  mL IVPB  EVERY 6 HOURS         Heraclio Jones M.D.    03/30/25 0034  MD Alert...Vancomycin per Pharmacy  PHARMACY TO DOSE        Question:  Indication(s) for vancomycin?  Answer:  Skin and soft tissue infection    Heraclio Jones M.D.       Sat Mar 29, 2025 11:09 PM    03/29/25 2309  vancomycin (Vancocin) 2,500 mg in  mL IVPB  (vancomycin (VANCOCIN) IV (LD + Maintenance))  ONCE         Praveen Henderson M.D.            Dosing Weight: 98.2 kg (216 lb 7.9 oz)      Admission History: Admitted on 3/29/2025 for Wrist arthritis [M19.039]  Pertinent history: HX of DM, patient presents with right hand pain and swelling. Elevated WBC. Cultures pending.    Allergies:     Patient has no known allergies.     Pertinent cultures to date:     Results       Procedure Component Value Units Date/Time    GRAM STAIN [[id number]] Collected: 03/29/25 2150    Order Status: Completed Specimen: Body Fluid Updated: 03/29/25 2350     Significant Indicator .     Source BF     Site OTHER BODY FLUID     Gram Stain Result Few WBCs.  No organisms seen.      FLUID CULTURE W/GRAM STAIN [[id number]] Collected: 03/29/25 2150    Order Status: Sent Specimen: Other Body Fluid Updated: 03/29/25 2350     Significant Indicator NEG     Source BF     Site OTHER BODY FLUID     Culture Result -     Gram Stain Result Few WBCs.  No organisms seen.      BLOOD CULTURE [[id number]] Collected:  "25    Order Status: Completed Specimen: Blood from Peripheral Updated: 25     Significant Indicator NEG     Source BLD     Site PERIPHERAL     Culture Result No Growth  Note: Blood cultures are incubated for 5 days and  are monitored continuously.Positive blood cultures  are called to the RN and reported as soon as  they are identified.      BLOOD CULTURE [[id number]] Collected: 25    Order Status: Completed Specimen: Blood from Peripheral Updated: 25     Significant Indicator NEG     Source BLD     Site PERIPHERAL     Culture Result No Growth  Note: Blood cultures are incubated for 5 days and  are monitored continuously.Positive blood cultures  are called to the RN and reported as soon as  they are identified.              Labs:     Estimated Creatinine Clearance: 102.1 mL/min (by C-G formula based on SCr of 0.96 mg/dL).  Recent Labs     25   WBC 12.4*   NEUTSPOLYS 61.00     Recent Labs     25   BUN 11   CREATININE 0.96   ALBUMIN 3.4     No intake or output data in the 24 hours ending 25 0125   BP (!) 179/115   Pulse 92   Temp 36.6 °C (97.9 °F) (Temporal)   Resp 19   Ht 1.727 m (5' 8\")   Wt 98 kg (216 lb 0.8 oz)   SpO2 95%  Temp (24hrs), Av.8 °C (98.3 °F), Min:36.6 °C (97.9 °F), Max:37.1 °C (98.7 °F)      List concerns for Vancomycin clearance:     Obesity    Pharmacokinetics:     AUC kinetics:   Ke (hr ^-1): 0.0891 hr^-1  Half life: 7.78 hr  Clearance: 6.009  Estimated TDD: 3004.5  Estimated Dose: 1227  Estimated interval: 9.8        -  Vancomycin dose: 1500 mg every 12 hours     -  Next vancomycin level(s):     -  Predicted vancomycin AUC from initial AUC test calculator: 499 mg·hr/L        -  Comments: Cultures pending. Pharmacy will continue to monitor.     Matilde Wilhelm, PharmD    "

## 2025-03-30 NOTE — ED NOTES
Notified to the provider that pt's blood pressure is elevated, pt is unable to take his HTN medicine for quite sometime, also his right hand pain is 8/10

## 2025-03-30 NOTE — ED NOTES
Bedside report received from off going RN/tech: Chu HAMMOND, assumed care of patient.  POC discussed with patient. Call light within reach, all needs addressed at this time.       Fall risk interventions in place: Patient's personal possessions are with in their safe reach, Place socks on patient, Place fall risk sign on patient's door, Give patient urinal if applicable, and Keep floor surfaces clean and dry (all applicable per [place] Fall risk assessment)   Continuous monitoring: Cardiac Leads, Pulse Ox, or Blood Pressure  IVF/IV medications: Not Applicable   Oxygen: Room Air  Bedside sitter: Not Applicable   Isolation: Not Applicable

## 2025-03-30 NOTE — DISCHARGE SUMMARY
Discharge Summary    CHIEF COMPLAINT ON ADMISSION  Chief Complaint   Patient presents with    Hand Pain     Pt woke up with R hand pain and swelling. Denies injury to that area. Pt took tylenol PTA.        Reason for Admission  hand swelling     Admission Date  3/29/2025    CODE STATUS  Full Code    HPI & HOSPITAL COURSE    Mr. Lucas Agee is a 54 y.o. male with history of gout, arthritis, hypertension, pulmonary hypertension, type 2 diabetes, biventricular heart failure, methamphetamine abuse, right foot thrombosis requiring thrombectomy in 2019, smoking, right hand surgery in 1995 who presented 3/29/2025 with concern for right hand infection    The patient woke up today with pain and swelling in the right hand without preceding injury.  He noticed pain with movement in the right wrist, limited range of motion due to pain today.  There is some numbness and tingling in the fingers of the right hand.     Blood pressure elevated 194/124, heart rate 122, not on any blood pressure medications.  He was placed on 2 L nasal cannula after desaturation to 85%, probably after IV Dilaudid.  Due to prior history of thrombosis, he was evaluated with CTA and ultrasound DVT study for the right upper extremity, negative for arterial or venous thrombosis. Patient undergone arthrocentesis of right wrist with acquisition of 1 cc of synovial fluid.  Gram stain negative.  Synovial fluid is negative for crystals.  Cells are mostly RBCs, with , 23% neutrophils and 68% macrophages. Patient was given Unasyn and vancomycin.    During this hospitalization, CT of the right hand was obtained which noted diffuse edema and or cellulitis of the hand, no evidence of drainable fluid collection, no acute osseous abnormality and prior fifth metacarpal ORIF.  An echocardiogram was also obtained due to noted edema.  Echocardiogram noted no significant changes with study compared to on 05/2023 with LVEF approximately 56% with no regional  wall motion abnormalities, normal diastolic function, and flattened septum in the diastole consistent with RV volume overload and no significant valvular disease.  UDS obtained during this admission of which patient is positive for amphetamine and cannabis.  Patient endorsed that he relapsed in use of methamphetamine this past couple of weeks.    Patient seen and examined prior to being discharged.  Patient will be discharged on Augmentin and doxycycline for right hand cellulitis.  Vancomycin de-escalated as MRSA nare swab was negative.  Patient will also be put in steroids and Lasix to help with the swelling due to noted history of gout.  All patient's medication refilled during this admission.  Patient referred to outpatient PCP follow-up.  Patient counseled and educated in regards to cannabis and methamphetamine use.  All questions and concerns answered prior to being discharged.  Patient discharged home.    Therefore, he is discharged in good and stable condition to home with close outpatient follow-up.    The patient recovered much more quickly than anticipated on admission.    Discharge Date  .today       FOLLOW UP ITEMS POST DISCHARGE  Please call [phone] to schedule PCP appointment for patient.    Required specialty appointments include:       Discharge Instructions per JULIA AgudeloRCHARISSA.    Follow-up with PCP/rehospitalization  Start taking Augmentin and doxycycline twice daily for 7 days for management of right hand cellulitis  Also start taking Medrol Dosepak and furosemide to help with the swelling  Refilled all medication including lisinopril and carvedilol.  Start taking potassium 20 mg daily for 7 days while on furosemide  Resume all other home medications  Avoid using amphetamine and cannabis products    DIET: As tolerated    ACTIVITY: As tolerated    DIAGNOSIS: Right hand swelling    Return to ER if symptoms persist, chest pain, palpitations, shortness of breath, numbness,  tingling, weakness, and high fevers.        DISCHARGE DIAGNOSES  Principal Problem:    Right hand cellulitis and right wrist arthritis, rule out septic arthritis (POA: Yes)  Active Problems:    Dilated cardiomyopathy (HCC) (POA: Yes)    Tobacco use (POA: Yes)    History of methamphetamine abuse (HCC) (POA: Yes)    Type 2 diabetes mellitus without complication, without long-term current use of insulin (HCC) (POA: Yes)    Hypertension (POA: Yes)  Resolved Problems:    Hypoxia (POA: Unknown)      FOLLOW UP    Ophelia John M.D.  [address] [address]  [phone]    Schedule an appointment as soon as possible for a visit in 1 week(s)        MEDICATIONS ON DISCHARGE     Medication List        START taking these medications        Instructions   amoxicillin-clavulanate 875-125 MG Tabs  Commonly known as: Augmentin   Take 1 Tablet by mouth 2 times a day for 7 days.  Dose: 1 Tablet     carvedilol 6.25 MG Tabs  Commonly known as: Coreg   Take 1 Tablet by mouth 2 times a day with meals for 30 days.  Dose: 6.25 mg     doxycycline 100 MG capsule  Commonly known as: Monodox   Take 1 Capsule by mouth 2 times a day for 7 days.  Dose: 100 mg     furosemide 20 MG Tabs  Commonly known as: Lasix   Take 1 Tablet by mouth every day for 14 days.  Dose: 20 mg     lisinopril 10 MG Tabs  Start taking on: March 31, 2025  Commonly known as: Prinivil   Take 1 Tablet by mouth every day for 30 days.  Dose: 10 mg     methylPREDNISolone 4 MG Tbpk  Commonly known as: Medrol Dosepak   Follow schedule on package instructions.     potassium chloride SA 20 MEQ Tbcr  Commonly known as: Kdur   Take 1 Tablet by mouth every day for 14 days.  Dose: 20 mEq              Allergies  No Known Allergies    DIET  Orders Placed This Encounter   Procedures    Diet Order Diet: Regular     Standing Status:   Standing     Number of Occurrences:   1     Diet::   Regular [1]       ACTIVITY  As tolerated.  Weight bearing as  tolerated    CONSULTATIONS  NONE    PROCEDURES  NONE    IMAGING  EC-ECHOCARDIOGRAM COMPLETE W/ CONT   Final Result      CT-HAND W/O PLUS RECONS RIGHT   Final Result      Diffuse edema and/or cellulitis of the hand. No evidence of a drainable fluid collection on this noncontrast study.      No acute osseous abnormality.      Prior fifth metacarpal ORIF.      DX-CHEST-LIMITED (1 VIEW)   Final Result         No acute cardiopulmonary abnormalities are identified.      CT-CTA UPPER EXT WITH & W/O-POST PROCESS RIGHT   Final Result      No occlusion or stenosis seen in the forearm and hand.      US-EXTREMITY VENOUS UPPER UNILAT RIGHT   Final Result      DX-HAND 3+ RIGHT   Final Result      Soft tissue swelling without acute osseous abnormality.      Plate and screw fixation of the fifth metacarpal.            LABORATORY  Lab Results   Component Value Date    SODIUM 139 03/29/2025    POTASSIUM 3.9 03/29/2025    CHLORIDE 106 03/29/2025    CO2 22 03/29/2025    GLUCOSE 95 03/29/2025    BUN 11 03/29/2025    CREATININE 0.96 03/29/2025        Lab Results   Component Value Date    WBC 12.4 (H) 03/29/2025    HEMOGLOBIN 14.8 03/29/2025    HEMATOCRIT 42.8 03/29/2025    PLATELETCT 242 03/29/2025

## 2025-03-30 NOTE — ED NOTES
Ultrasound at bedside.    Patient noted to have oxygen saturation of 85% on room air following medication administration. Patient placed on 2 L oxygen via nasal cannula. Oxygen saturation is currently 93%.

## 2025-03-30 NOTE — ED NOTES
Admission report given to the assigned RN in  T207   for continuity of care and management.  Provided opportunity to asks questions.  All pt belongings at bedside

## 2025-03-30 NOTE — ED NOTES
Med rec updated and complete. Allergies reviewed.     Pt confirmed name and date of birth.    Pt reports that he has not taken any medications since 10/2024.   Pt stated he has not been able to to afford his medications.  In the past pt has taken ASA 81 mg, atorvastatin 40 mg, carvedilol 25 mg, lisinopril 20 mg.     No  additional information in dispense report at this time.      [facility]  [facility]  [phone]

## 2025-03-30 NOTE — ED NOTES
First set of cultures collected. Left PIV established via ultrasound guidance. Phlebotomy notified for second set of cultures.     [place] in [place]  Link sent to [phone]    Chief Complaint   Patient presents with    Cough     Sx for month and now abdominal and neck pain  Has not tested for Covid, is not taking seasonal allergy medication      No VS taken      \"Have you been to the ER, urgent care clinic since your last visit?  Hospitalized since your last visit?\"    NO    “Have you seen or consulted any other health care providers outside of [facility] since your last visit?”    NO

## 2025-03-30 NOTE — PROGRESS NOTES
4 Eyes Skin Assessment Completed by MANISH Mayes and MANISH Price.    Head WDL  Ears WDL  Nose WDL  Mouth WDL  Neck WDL  Breast/Chest WDL  Shoulder Blades WDL  Spine WDL  (R) Arm/Elbow/Hand Redness, swelling  (L) Arm/Elbow/Hand WDL  Abdomen WDL  Groin WDL  Scrotum/Coccyx/Buttocks WDL  (R) Leg WDL  (L) Leg WDL  (R) Heel/Foot/Toe WDL  (L) Heel/Foot/Toe WDL          Devices In Places Pulse Ox, Nasal cannula      Interventions In Place Gray Ear Foams and Pillows    Possible Skin Injury No    Pictures Uploaded Into Epic N/A  Wound Consult Placed N/A  RN Wound Prevention Protocol Ordered No

## 2025-03-30 NOTE — ED NOTES
Bedside report received from off going RN/tech: Torrey, assumed care of patient.  POC discussed with patient. Call light within reach, all needs addressed at this time.       Fall risk interventions in place: Not Applicable (all applicable per [place] Fall risk assessment)   Continuous monitoring: Cardiac Leads, Pulse Ox, or Blood Pressure  IVF/IV medications: Not Applicable   Oxygen: How many liters 2L  Bedside sitter: Not Applicable   Isolation: Not Applicable

## 2025-03-30 NOTE — PROGRESS NOTES
Pt dc'd home. IV and monitor removed; monitor room notified. Pt left unit via ambulation. Personal belongings with pt when leaving unit. Pt given discharge instructions prior to leaving unit including where to  prescriptions and when to follow-up; verbalizes understanding. Copy of discharge instructions with pt and in the chart.

## 2025-03-30 NOTE — ED PROVIDER NOTES
ED Provider Note    CHIEF COMPLAINT  Chief Complaint   Patient presents with    Hand Pain     Pt woke up with R hand pain and swelling. Denies injury to that area. Pt took tylenol PTA.        EXTERNAL RECORDS REVIEWED  Outpatient Notes patient saw family practitioner for annual wellness visit on 8/1/2024.  Notably with a history of chronic gout arthritis of the left hand, right foot, type 2 diabetes, and cellulitis, biventricular heart failure, pulmonary hypertension, arterial thrombosis of the leg.  Also with possible infectious tenosynovitis of the left finger previously.    HPI/ROS  LIMITATION TO HISTORY   Select: : None      Lucas Agee is a 54 y.o. male who presents to the emergency department for evaluation of right hand pain and swelling.  Notably with a history of gout but also acute arterial thrombosis of the right foot status post thrombectomy in May 2019, not currently anticoagulated.  Denies any falls injuries or trauma to the hand and noted it has been swollen all day.  He reports it is severely painful.  He has never had gout in the hand before.  The pain extends to his right wrist and he states he cannot bend his wrist secondary to the severity of the pain.  He denies fevers or chills.  He reports that the fingers feel somewhat numb and tingling.    PAST MEDICAL HISTORY   has a past medical history of Congestive heart failure (HCC), DVT (deep venous thrombosis) ([facility]), Hypertension, and Pain and swelling of left knee (8/24/2019).    SURGICAL HISTORY   has a past surgical history that includes hernia repair; hernia repair (Right, 1990); hand surgery (Right, 1995); thrombectomy (Right, 05/21/2019); and open reduction.    FAMILY HISTORY  Family History   Problem Relation Age of Onset    Heart Disease Father     Cancer Father         I don't have the details about what type or anything else really. [place] & it happened fairly quickly.    Drug abuse Father     Alcohol abuse Father  "    Blood Clots Brother     Heart Disease Brother         pacemaker    Drug abuse Brother     Alcohol abuse Brother     Blood Clots Paternal Grandmother     Heart Disease Paternal Grandmother     Stroke Paternal Grandmother     Heart Attack Paternal Uncle 60    Diabetes Maternal Grandmother     Drug abuse Mother     Alcohol abuse Mother     Drug abuse Maternal Uncle     Alcohol abuse Maternal Uncle        SOCIAL HISTORY  Social History     Tobacco Use    Smoking status: Every Day     Current packs/day: 0.25     Average packs/day: 0.3 packs/day for 30.0 years (7.5 ttl pk-yrs)     Types: Cigarettes    Smokeless tobacco: Never    Tobacco comments:     Christy cut down even more & working on becoming a non-smoker   Vaping Use    Vaping status: Never Used   Substance and Sexual Activity    Alcohol use: No    Drug use: Not Currently     Types: Marijuana, Methamphetamines, Intravenous, Inhaled     Comment: marijuana not currently; h/o IV and smoked meth use- ;ast used 2021    Sexual activity: Not Currently     Partners: Female     Birth control/protection: Condom       CURRENT MEDICATIONS  Home Medications    **Home medications have not yet been reviewed for this encounter**       Audit from Redirected Encounters    **Home medications have not yet been reviewed for this encounter**         ALLERGIES  No Known Allergies    PHYSICAL EXAM  VITAL SIGNS: BP (!) 189/105   Pulse 97   Temp 37.1 °C (98.7 °F) (Temporal)   Resp (!) 21   Ht 1.727 m (5' 8\")   Wt 98.2 kg (216 lb 7.9 oz)   SpO2 94%   BMI 32.92 kg/m²    Constitutional: No acute distress, anxious appearing  HEENT: Atraumatic, normocephalic, pupils are equal round reactive to light, nose normal, mouth shows moist mucous membranes  Cardiovascular: Tachycardic, regular rhythm, 2+ radial pulse on the right.  Capillary refill less than 2 seconds in all fingers of the right hand.  Thorax & Lungs: No respiratory distress, no wheezes, rales or rhonchi, no chest wall " tenderness.  GI: Soft, non-distended, non-tender, no rebound  Skin: Fingers of the right hand are somewhat colder than the left but capillary refill preserved as above.  No erythema.  Mild pallor to the hands bilaterally.  Musculoskeletal: Significant edema extending from the right wrist to the right hand including all fingers, most pronounced dorsally.  Limited range of motion of the right wrist and fingers secondary to pain  Neurologic: A&Ox3, at baseline mentation, preserved sensation in all fingertips of the right hand  Psychiatric: Appropriate affect for situation at this time      EKG/LABS  Labs Reviewed   CBC WITH DIFFERENTIAL - Abnormal; Notable for the following components:       Result Value    WBC 12.4 (*)     RBC 4.66 (*)     Neutrophils (Absolute) 7.58 (*)     Monos (Absolute) 1.33 (*)     All other components within normal limits   COMP METABOLIC PANEL - Abnormal; Notable for the following components:    Alkaline Phosphatase 108 (*)     Globulin 3.9 (*)     All other components within normal limits   APTT - Abnormal; Notable for the following components:    APTT 24.2 (*)     All other components within normal limits   CRP QUANTITIVE (NON-CARDIAC) - Abnormal; Notable for the following components:    Stat C-Reactive Protein 2.16 (*)     All other components within normal limits   TROPONIN   PROTHROMBIN TIME   BLOOD CULTURE   BLOOD CULTURE   PROCALCITONIN   SED RATE   CREATINE KINASE   LACTIC ACID   ESTIMATED GFR   FLUID CRYSTALS   FLUID CELL COUNT   FLUID CULTURE W/GRAM STAIN     Results for orders placed or performed during the hospital encounter of 25   EKG (Now)   Result Value Ref Range    Report       [facility] Emergency Dept.    Test Date:  2025  Pt Name:    EDMUNDO OROPEZA                 Department: ER  MRN:        [id number]                      Room:       [facility]  Gender:     Male                         Technician: [id number]  :        1970                    Requested By:OBDULIO HENDERSON  Order #:    [id number]                    Reading MD: Obdulio Henderson    Measurements  Intervals                                Axis  Rate:       109                          P:          66  HI:         151                          QRS:        87  QRSD:       92                           T:          58  QT:         357  QTc:        481    Interpretive Statements  Sinus tachycardia  Borderline prolonged QT interval  Baseline wander in lead(s) V3  Compared to ECG 08/23/2019 22:11:22  Sinus rhythm no longer present  Electronically Signed On 03- 23:08:12 PDT by Obdulio Henderson           RADIOLOGY/PROCEDURES   I have independently interpreted the diagnostic imaging associated with this visit and am waiting the final reading from the radiologist.   My preliminary interpretation is as follows: CTA not demonstrating any clear vascular occlusion.  Hand x-ray with no underlying fracture    Radiologist interpretation:  CT-CTA UPPER EXT WITH & W/O-POST PROCESS RIGHT   Final Result      No occlusion or stenosis seen in the forearm and hand.      US-EXTREMITY VENOUS UPPER UNILAT RIGHT   Final Result      DX-HAND 3+ RIGHT   Final Result      Soft tissue swelling without acute osseous abnormality.      Plate and screw fixation of the fifth metacarpal.        Arthrocentesis Procedure Note    Indication: Joint pain and joint swelling    Consent: The patient was counseled regarding the procedure, it's indications, risks, potential complications and alternatives and any questions were answered. Consent was obtained.    Procedure: The right wrist was positioned appropriately and the landmarks were identified.  Local anesthesia was not performed at the patient's request.  The area was then prepped and draped in the usual sterile fashion.  A needle was then introduced into the joint space at which point 1 cc of slightly yellow fluid was aspirated.  A joint injection was not performed.  The aspirated  fluid was sent to the lab for appropriate testing.  A sterile dressing was then applied to the site.    The patient tolerated the procedure well.    Complications: None        COURSE & MEDICAL DECISION MAKING    ASSESSMENT, COURSE AND PLAN  Care Narrative:     Patient presents to the emergency room for evaluation of atraumatic right wrist and hand swelling and pain.  Notably with a history of gout but also atypical infection of the left index finger concerning for tenosynovitis previously.  Afebrile with no reported fever prior to arrival but is tachycardic and quite uncomfortable appearing.  Distal pulses intact in the extremity and capillary refill is preserved.  Given his history of arterial thromboembolism however I did consider potential acute arterial thrombosis and ordered a CT angiogram of the upper extremity.  I also ordered an ultrasound to assess for DVT.  Neither of these demonstrated evidence of thromboembolic disease.  Laboratory workup demonstrating a slight leukocytosis, minimally elevated inflammatory markers but normal procalcitonin.  Given concern for gout versus septic arthritis and arthrocentesis of the wrist was performed.  Only a very small amount of synovial fluid could be obtained and was sent to lab for testing though they report they may not be able to run both a cell count and crystals evaluation.  Fluid was sent for culture and Gram stain however.  Patient medicated for pain with Dilaudid with some improvement.  Given no crystals ultimately seen on synovial fluid analysis antibiotic therapy was initiated with ceftriaxone and vancomycin to cover potentially for septic arthritis though I feel the chances of this are quite low given absence of fever.  Will plan for admission to the hospital for ongoing care.  Case discussed with Dr. Jones, hospitalist who is comfortable following up results of cell count in the event that it  results and follow-up with orthopedic surgery if  needed.      ADDITIONAL PROBLEMS MANAGED  Hypertension managed with home lisinopril    DISPOSITION AND DISCUSSIONS  I have discussed management of the patient with the following physicians and BALA's: Hospitalist as above    Decision tools and prescription drugs considered including, but not limited to: Antibiotics ceftriaxone and vancomycin .    FINAL DIAGNOSIS  1. Hand swelling    2. Effusion of right wrist         Electronically signed by: Praveen Henderson M.D., 3/29/2025 6:41 PM

## 2025-03-30 NOTE — HOSPITAL COURSE
Mr. Lucas Agee is a 54 y.o. male with history of gout, arthritis, hypertension, pulmonary hypertension, type 2 diabetes, biventricular heart failure, methamphetamine abuse, right foot thrombosis requiring thrombectomy in 2019, smoking, right hand surgery in 1995 who presented 3/29/2025 with concern for right hand infection    The patient woke up today with pain and swelling in the right hand without preceding injury.  He noticed pain with movement in the right wrist, limited range of motion due to pain today.  There is some numbness and tingling in the fingers of the right hand.     Blood pressure elevated 194/124, heart rate 122, not on any blood pressure medications.  He was placed on 2 L nasal cannula after desaturation to 85%, probably after IV Dilaudid.  Due to prior history of thrombosis, he was evaluated with CTA and ultrasound DVT study for the right upper extremity, negative for arterial or venous thrombosis. Patient undergone arthrocentesis of right wrist with acquisition of 1 cc of synovial fluid.  Gram stain negative.  Synovial fluid is negative for crystals.  Cells are mostly RBCs, with , 23% neutrophils and 68% macrophages. Patient was given Unasyn and vancomycin.    During this hospitalization, CT of the right hand was obtained which noted diffuse edema and or cellulitis of the hand, no evidence of drainable fluid collection, no acute osseous abnormality and prior fifth metacarpal ORIF.  An echocardiogram was also obtained due to noted edema.  Echocardiogram noted no significant changes with study compared to on 05/2023 with LVEF approximately 56% with no regional wall motion abnormalities, normal diastolic function, and flattened septum in the diastole consistent with RV volume overload and no significant valvular disease.  UDS obtained during this admission of which patient is positive for amphetamine and cannabis.  Patient endorsed that he relapsed in use of methamphetamine this  past couple of weeks.    Patient seen and examined prior to being discharged.  Patient will be discharged on Augmentin and doxycycline for right hand cellulitis.  Vancomycin de-escalated as MRSA nare swab was negative.  Patient will also be put in steroids and Lasix to help with the swelling due to noted history of gout.  All patient's medication refilled during this admission.  Patient referred to outpatient PCP follow-up.  Patient counseled and educated in regards to cannabis and methamphetamine use.  All questions and concerns answered prior to being discharged.  Patient discharged home.

## 2025-03-31 LAB
BACTERIA FLD AEROBE CULT: NORMAL
GRAM STN SPEC: NORMAL
SIGNIFICANT IND 70042: NORMAL
SITE SITE: NORMAL
SOURCE SOURCE: NORMAL

## 2025-07-10 NOTE — CONSULTS
Critical Care Consultation    Date of consult: 5/21/2019    Referring Physician  Dr. Deidra Dominguez    Reason for Consultation  Shock     History of Presenting Illness  48 y.o. 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  He went to [facility] today to initiate outpatient primary care but was declined as he did not have identification.  He subsequently noticed increasing numbness in his right lower extremity that was worse after taking a nap with associated pain.  He presented to the emergency department where on imaging he was found to have arterial cut off of the tibial peroneal trunk worrisome for possible central embolic source. Dr. Dominguez was consulted and graciously agreed to take him to the OR for right lower extremity thrombectomy which was completed this evening.  Postoperatively patient has been hypotensive.  He apparently received 250 mL's of crystalloid and was started on a phenylephrine infusion by anesthesia.  Patient has also been on heparin drip since surgery.  Patient is asymptomatic from his hypotension and has an arterial catheter in his left wrist monitoring blood pressure closely.  He does not know what his baseline blood pressure is.    Code Status  Prior    Review of Systems  Review of Systems   Constitutional: Negative for chills and fever.   HENT: Negative for congestion.    Eyes: Negative for blurred vision.   Respiratory: Negative for cough, sputum production and shortness of breath.    Cardiovascular: Positive for claudication. Negative for chest pain, palpitations and leg swelling.   Gastrointestinal: Negative for nausea and vomiting.   Genitourinary: Negative for flank pain.   Musculoskeletal: Negative for back pain and neck pain.   Skin: Negative for rash.   Neurological: Negative for dizziness, focal  weakness and headaches.   Endo/Heme/Allergies: Does not bruise/bleed easily.   Psychiatric/Behavioral: Negative for depression. The patient is not nervous/anxious.    All other systems reviewed and are negative.      Past Medical History   has a past medical history of Congestive heart failure (HCC) and Hypertension.    Surgical History   has a past surgical history that includes hernia repair; hernia repair (Right, 1990); and hand surgery (Right, 1995).    Family History  family history includes Blood Clots in his brother and paternal grandmother; Diabetes in his mother; Heart Disease in his brother, father, and paternal grandmother.    Social History   reports that he has been smoking Cigarettes.  He has been smoking about 0.50 packs per day. He has never used smokeless tobacco. He reports that he uses drugs, including Inhaled and Intravenous. He reports that he does not drink alcohol.    Medications  Home Medications     Reviewed by Khushboo Silva R.N. (Registered Nurse) on 05/21/19 at 2017  Med List Status: Complete   Medication Last Dose Status   aspirin (ASA) 81 MG Chew Tab chewable tablet 5/21/2019 Active   atorvastatin (LIPITOR) 80 MG tablet 5/20/2019 Active   carvedilol (COREG) 25 MG Tab 5/21/2019 Active   ceFAZolin (ANCEF) injection  Active   ePHEDrine injection  Active   EPINEPHrine injection  Active   furosemide (LASIX) 80 MG Tab 5/21/2019 Active   lidocaine (XYLOCAINE) injection  Active   lisinopril (PRINIVIL) 20 MG Tab 5/21/2019 Active   midazolam (VERSED) injection  Active   phenylephrine (BRENDA-SYNEPHRINE) injection  Active   propofol (DIPRIVAN) 10 mg/mL infusion  Active   rocuronium (ZEMURON) injection  Active              Current Facility-Administered Medications   Medication Dose Route Frequency Provider Last Rate Last Dose   • ondansetron (ZOFRAN) syringe/vial injection 4 mg  4 mg Intravenous Once PRN Humberto Ashton M.D.       • haloperidol lactate (HALDOL) injection 1 mg  1 mg Intravenous Q15 MIN  PRN Humberto Ashton M.D.       • diphenhydrAMINE (BENADRYL) injection 12.5 mg  12.5 mg Intravenous Q15 MIN PRN Humberto Ashton M.D.       • fentaNYL (SUBLIMAZE) injection 25 mcg  25 mcg Intravenous Q2 MIN PRN Humberto Ashton M.D.   25 mcg at 05/21/19 2250    Or   • fentaNYL (SUBLIMAZE) injection 50 mcg  50 mcg Intravenous Q2 MIN PRN Humberto Ashton M.D.       • phenylephrine (BRENDA-SYNEPHRINE) 40 mg in  mL Infusion  0-300 mcg/min Intravenous Continuous Humberto Ashton M.D. 37.5 mL/hr at 05/21/19 2252 100 mcg/min at 05/21/19 2252   • MD Alert...HEPARIN WEIGHT BASED PROTOCOL Pharmacist to implement   Other PRN Deidra Dominguez M.D.           Allergies  No Known Allergies    Vital Signs last 24 hours  Temp:  [36.4 °C (97.6 °F)-36.9 °C (98.4 °F)] 36.9 °C (98.4 °F)  Pulse:  [86-94] 86  Resp:  [16-17] 17  BP: ()/(68-76) 101/76  SpO2:  [91 %-97 %] 94 %    Physical Exam  Physical Exam   Constitutional: He is oriented to person, place, and time. He appears well-developed and well-nourished. No distress.   HENT:   Head: Normocephalic and atraumatic.   Nose: Nose normal.   Dry oral mucosa membranes   Eyes: Pupils are equal, round, and reactive to light. Conjunctivae are normal. No scleral icterus.   Neck: Neck supple. No JVD present. No tracheal deviation present.   Cardiovascular: Normal rate, regular rhythm and intact distal pulses.   Occasional extrasystoles are present. PMI is displaced.    No murmur heard.  Pulses:       Femoral pulses are 2+ on the right side.       Popliteal pulses are 2+ on the right side.        Dorsalis pedis pulses are 1+ on the right side, and 2+ on the left side.        Posterior tibial pulses are 1+ on the right side, and 2+ on the left side.   Pulmonary/Chest: Effort normal and breath sounds normal. No respiratory distress. He has no wheezes. He has no rales.   Abdominal: Soft. Bowel sounds are normal. He exhibits no distension. There is no tenderness. There is no rebound.   Musculoskeletal: He  exhibits no edema or deformity.   Right lower extremity surgical site is clean/dry/intact with bandages, warm extremity, brisk capillary refill, sensation intact   Lymphadenopathy:     He has no cervical adenopathy.   Neurological: He is alert and oriented to person, place, and time. No cranial nerve deficit. He exhibits normal muscle tone.   Skin: Skin is warm and dry. No pallor.   Psychiatric: He has a normal mood and affect. His behavior is normal.   Nursing note and vitals reviewed.      Fluids    Intake/Output Summary (Last 24 hours) at 19  Last data filed at 19 220   Gross per 24 hour   Intake             1200 ml   Output                0 ml   Net             1200 ml       Laboratory  Recent Results (from the past 48 hour(s))   EKG (NOW)    Collection Time: 19  5:16 PM   Result Value Ref Range    Report       [facility]    Test Date:  2019  Pt Name:    EDMUNDO OROPEZA                 Department: ER  MRN:        [id number]                      Room:       ORTHO  Gender:     Male                         Technician: [id number]  :        1970                   Requested By:OLIVIA BARILLAS  Order #:    [id number]                    Reading MD: Olivia Barillas MD    Measurements  Intervals                                Axis  Rate:       84                           P:          70  DC:         140                          QRS:        29  QRSD:       102                          T:          145  QT:         404  QTc:        478    Interpretive Statements  SINUS RHYTHM  PROBABLE LVH WITH SECONDARY REPOL ABNRM  BORDERLINE PROLONGED QT INTERVAL  T wave inversions in lateral leads  No ST change or STEMI  Compared to ECG 2019 14:52:53  No significant change from prior    Electronically Signed On 2019 18:19:44 PDT by Olivia Barillas MD     CBC WITH DIFFERENTIAL    Collection Time: 19  5:40 PM   Result Value Ref Range    WBC 15.2 (H) 4.8 - 10.8  K/uL    RBC 5.74 4.70 - 6.10 M/uL    Hemoglobin 18.3 (H) 14.0 - 18.0 g/dL    Hematocrit 55.3 (H) 42.0 - 52.0 %    MCV 96.3 81.4 - 97.8 fL    MCH 31.9 27.0 - 33.0 pg    MCHC 33.1 (L) 33.7 - 35.3 g/dL    RDW 45.3 35.9 - 50.0 fL    Platelet Count 280 164 - 446 K/uL    MPV 10.1 9.0 - 12.9 fL    Neutrophils-Polys 53.40 44.00 - 72.00 %    Lymphocytes 31.70 22.00 - 41.00 %    Monocytes 11.20 0.00 - 13.40 %    Eosinophils 2.10 0.00 - 6.90 %    Basophils 0.90 0.00 - 1.80 %    Immature Granulocytes 0.70 0.00 - 0.90 %    Nucleated RBC 0.00 /100 WBC    Neutrophils (Absolute) 8.12 (H) 1.82 - 7.42 K/uL    Lymphs (Absolute) 4.81 (H) 1.00 - 4.80 K/uL    Monos (Absolute) 1.70 (H) 0.00 - 0.85 K/uL    Eos (Absolute) 0.32 0.00 - 0.51 K/uL    Baso (Absolute) 0.13 (H) 0.00 - 0.12 K/uL    Immature Granulocytes (abs) 0.11 0.00 - 0.11 K/uL    NRBC (Absolute) 0.00 K/uL   COMP METABOLIC PANEL    Collection Time: 05/21/19  5:40 PM   Result Value Ref Range    Sodium 131 (L) 135 - 145 mmol/L    Potassium 4.3 3.6 - 5.5 mmol/L    Chloride 94 (L) 96 - 112 mmol/L    Co2 27 20 - 33 mmol/L    Anion Gap 10.0 0.0 - 11.9    Glucose 109 (H) 65 - 99 mg/dL    Bun 34 (H) 8 - 22 mg/dL    Creatinine 1.36 0.50 - 1.40 mg/dL    Calcium 9.0 8.5 - 10.5 mg/dL    AST(SGOT) 32 12 - 45 U/L    ALT(SGPT) 51 (H) 2 - 50 U/L    Alkaline Phosphatase 85 30 - 99 U/L    Total Bilirubin 0.5 0.1 - 1.5 mg/dL    Albumin 4.3 3.2 - 4.9 g/dL    Total Protein 7.3 6.0 - 8.2 g/dL    Globulin 3.0 1.9 - 3.5 g/dL    A-G Ratio 1.4 g/dL   TROPONIN    Collection Time: 05/21/19  5:40 PM   Result Value Ref Range    Troponin I 0.05 (H) 0.00 - 0.04 ng/mL   PROTHROMBIN TIME (INR)    Collection Time: 05/21/19  5:40 PM   Result Value Ref Range    PT 12.9 12.0 - 14.6 sec    INR 0.96 0.87 - 1.13   APTT    Collection Time: 05/21/19  5:40 PM   Result Value Ref Range    APTT 23.6 (L) 24.7 - 36.0 sec   ESTIMATED GFR    Collection Time: 05/21/19  5:40 PM   Result Value Ref Range    GFR If   >60 >60 mL/min/1.73 m 2    GFR If Non  56 (A) >60 mL/min/1.73 m 2       Imaging  DX-PORTABLE FLUORO > 1 HOUR   Final Result      Fluoroscopic image(s) obtained during right lower extremity angiography. Please see the patient's chart for full procedural details.      Fluoroscopy time 6 seconds.         CT-CTA LOWER EXT WITH & W/O-POST PROCESS RIGHT   Final Result      1.  Abrupt arterial cutoff of the tibial peroneal trunk just after its origin, the anterior tibial artery in the distal leg and the proximal profunda femoral artery. These findings are worrisome for a central embolic source given the degree of    atherosclerotic change is relatively mild      2.  Moderate atherosclerotic plaque involving the right common femoral greater than superficial femoral arteries with some positive remodeling but no significant stenosis      US-EXTREMITY ARTERY LOWER UNILAT RIGHT   Final Result      EC-ECHOCARDIOGRAM COMPLETE W/ CONT    (Results Pending)    *Bedside limited ultrasound of his heart reveals dilated heart with biventricular failure left greater than right without pericardial effusion.  IVC is small and collapses easily with respiratory effort measuring 0.6 cm    Assessment/Plan  Arterial embolism and thrombosis of lower extremity (HCC)   Assessment & Plan    Involving the right lower extremity, presumed to be cardioembolic in nature status post right lower extremity thrombectomy 5/21  Continue heparin drip  Evaluate for possible cardiac source  Close lower extremity monitoring including pulse, sensation, analgesia  Vascular surgery consultation     Hypotension   Assessment & Plan    Postoperatively, undifferentiated  Transition to norepinephrine given the additional benefit of beta-1 antagonism and titrate to a mean arterial pressure greater than 60  Additional fluid bolus given findings on bedside echo and no evidence of decompensation of heart failure  Recheck CBC to ensure no significant blood  loss requiring transfusion     Pulmonary hypertension (HCC)- (present on admission)   Assessment & Plan    Avoid hypoxemia/hypercarbia  Caution with fluid status     Substance use disorder- (present on admission)   Assessment & Plan    Drug cessation education provided     Tricuspid regurgitation- (present on admission)   Assessment & Plan          Dilated cardiomyopathy (HCC)- (present on admission)   Assessment & Plan    Due to methamphetamine abuse  Eventual need to resume heart failure medications  Drug cessation education provided  Repeat echocardiography to evaluate for possible thrombus, may need KWAME     Elevated troponin- (present on admission)   Assessment & Plan    Likely secondary to demand ischemia  Repeat check postoperatively  Continue aspirin     Chronic systolic heart failure (HCC)- (present on admission)   Assessment & Plan    Without acute decompensation at this time  Monitor pulmonary status and lower extremity swelling with fluid resuscitation  Unable to tolerate beta-blockade nor ACE inhibitor at this time in the setting of shock  Hold diuresis for now     Tobacco use- (present on admission)   Assessment & Plan    Nicotine patch  Tobacco cessation education     Hepatic steatosis- (present on admission)   Assessment & Plan    Avoid hepatotoxins         Discussed patient condition and risk of morbidity and/or mortality with RN, RT, Charge nurse / hot rounds, Patient and vascular surgery.    The patient remains critically ill.  Critical care time = 32 minutes in directly providing and coordinating critical care and extensive data review.  No time overlap and excludes procedures.       (3) slightly limited

## 2025-07-23 ENCOUNTER — OFFICE VISIT (OUTPATIENT)
Dept: MEDICAL GROUP | Facility: MEDICAL CENTER | Age: 55
End: 2025-07-23
Attending: INTERNAL MEDICINE
Payer: MEDICAID

## 2025-07-23 VITALS
OXYGEN SATURATION: 97 % | RESPIRATION RATE: 16 BRPM | BODY MASS INDEX: 32.12 KG/M2 | DIASTOLIC BLOOD PRESSURE: 60 MMHG | SYSTOLIC BLOOD PRESSURE: 128 MMHG | TEMPERATURE: 97 F | HEIGHT: 68 IN | WEIGHT: 211.9 LBS | HEART RATE: 105 BPM

## 2025-07-23 DIAGNOSIS — I50.20 ACC/AHA STAGE C SYSTOLIC HEART FAILURE (HCC): Primary | ICD-10-CM

## 2025-07-23 DIAGNOSIS — E78.5 HYPERLIPIDEMIA, UNSPECIFIED HYPERLIPIDEMIA TYPE: ICD-10-CM

## 2025-07-23 PROBLEM — L03.119 CELLULITIS OF HAND: Status: RESOLVED | Noted: 2023-10-23 | Resolved: 2025-07-23

## 2025-07-23 PROBLEM — I95.9 HYPOTENSION: Status: RESOLVED | Noted: 2019-05-21 | Resolved: 2025-07-23

## 2025-07-23 PROCEDURE — 3074F SYST BP LT 130 MM HG: CPT | Performed by: INTERNAL MEDICINE

## 2025-07-23 PROCEDURE — 3078F DIAST BP <80 MM HG: CPT | Performed by: INTERNAL MEDICINE

## 2025-07-23 PROCEDURE — 99214 OFFICE O/P EST MOD 30 MIN: CPT | Performed by: INTERNAL MEDICINE

## 2025-07-23 PROCEDURE — 99213 OFFICE O/P EST LOW 20 MIN: CPT | Performed by: INTERNAL MEDICINE

## 2025-07-23 RX ORDER — CARVEDILOL 25 MG/1
25 TABLET ORAL 2 TIMES DAILY WITH MEALS
Qty: 60 TABLET | Refills: 3 | Status: ON HOLD | OUTPATIENT
Start: 2025-07-23

## 2025-07-23 RX ORDER — LISINOPRIL 20 MG/1
20 TABLET ORAL DAILY
Qty: 30 TABLET | Refills: 3 | Status: ON HOLD | OUTPATIENT
Start: 2025-07-23 | End: 2026-08-27

## 2025-07-23 RX ORDER — ATORVASTATIN CALCIUM 40 MG/1
40 TABLET, FILM COATED ORAL NIGHTLY
Qty: 30 TABLET | Refills: 5 | Status: ON HOLD | OUTPATIENT
Start: 2025-07-23 | End: 2026-08-27

## 2025-07-23 ASSESSMENT — FIBROSIS 4 INDEX: FIB4 SCORE: 1.07

## 2025-07-23 NOTE — ASSESSMENT & PLAN NOTE
He presents today requesting to resume his previous cardiac medications.  He was recently seen in the hospital for an unrelated issue of hand cellulitis.  Over the past several months he states that he had old prescriptions for lisinopril and carvedilol which he was taking however he thinks they were not the correct dose.  He reports blood pressures at home have been elevated in the 140s over 90s.  He is not sure of the dose of his lisinopril but thinks it was either 10 or 20 mg.  He says that the bottle was so old that the label was worn off but that he could recognize the pills by their appearance.  Chart review shows that he has been given both of these doses previously.  He was taking the 25 mg twice daily of carvedilol although he was discharged with the 6.25 mg twice daily.  He is also requesting refills on his atorvastatin.  Of note, echocardiogram done recently while in the hospital showed recovered ejection fraction of 55% but there was some septal bowing to suggest pulmonary hypertension.  Previously in 2019 he had an ejection fraction as low as 15%.  Is not following with cardiology currently.

## 2025-07-23 NOTE — PROGRESS NOTES
Subjective:   Lucas Agee is a 54 y.o. male here today for med refill    ACC/AHA stage C systolic heart failure (HCC)  He presents today requesting to resume his previous cardiac medications.  He was recently seen in the hospital for an unrelated issue of hand cellulitis.  Over the past several months he states that he had old prescriptions for lisinopril and carvedilol which he was taking however he thinks they were not the correct dose.  He reports blood pressures at home have been elevated in the 140s over 90s.  He is not sure of the dose of his lisinopril but thinks it was either 10 or 20 mg.  He says that the bottle was so old that the label was worn off but that he could recognize the pills by their appearance.  Chart review shows that he has been given both of these doses previously.  He was taking the 25 mg twice daily of carvedilol although he was discharged with the 6.25 mg twice daily.  He is also requesting refills on his atorvastatin.  Of note, echocardiogram done recently while in the hospital showed recovered ejection fraction of 55% but there was some septal bowing to suggest pulmonary hypertension.  Previously in 2019 he had an ejection fraction as low as 15%.  Is not following with cardiology currently.    Hyperlipidemia  Supposed to be taking atorvastatin 40 mg daily, requesting a refill.       Current medicines (including changes today)  Current Medications[1]  He  has a past medical history of Congestive heart failure (HCC), DVT (deep venous thrombosis) (HCC), Hypertension, and Pain and swelling of left knee (8/24/2019).         Objective:     Vitals:    07/23/25 1111   BP: 128/60   Pulse: (!) 105   Resp: 16   Temp: 36.1 °C (97 °F)   SpO2: 97%     Body mass index is 32.22 kg/m².   Physical Exam:  Constitutional: Alert, no distress.  Skin: Warm, dry, good turgor, no rashes in visible areas.  Eye: Equal, round and reactive, conjunctiva clear, lids normal.  Psych: Alert and oriented x3,  normal affect and mood.    Assessment and Plan:   The following treatment plan was discussed    1. ACC/AHA stage C systolic heart failure (HCC) (Primary)  I have resumed his previous doses of carvedilol and lisinopril.  Given his uncertainty about the doses he has been recently taking, we discussed follow-up in 2 weeks with home blood pressure monitoring to make sure we are not overtreating.  He will see his PCP.  - carvedilol (COREG) 25 MG Tab; Take 1 Tablet by mouth 2 times a day with meals.  Dispense: 60 Tablet; Refill: 3  - lisinopril (PRINIVIL) 20 MG Tab; Take 1 Tablet by mouth every day.  Dispense: 30 Tablet; Refill: 3    2. Hyperlipidemia  Atorvastatin has been refilled  - atorvastatin (LIPITOR) 40 MG Tab; Take 1 Tablet by mouth every evening.  Dispense: 30 Tablet; Refill: 5        Followup: Return in about 2 weeks (around 8/6/2025) for hypertension with PCP.                [1]   Current Outpatient Medications   Medication Sig Dispense Refill    carvedilol (COREG) 25 MG Tab Take 1 Tablet by mouth 2 times a day with meals. 60 Tablet 3    lisinopril (PRINIVIL) 20 MG Tab Take 1 Tablet by mouth every day. 30 Tablet 3    atorvastatin (LIPITOR) 40 MG Tab Take 1 Tablet by mouth every evening. 30 Tablet 5     No current facility-administered medications for this visit.

## 2025-07-31 ENCOUNTER — APPOINTMENT (OUTPATIENT)
Dept: RADIOLOGY | Facility: MEDICAL CENTER | Age: 55
DRG: 175 | End: 2025-07-31
Attending: INTERNAL MEDICINE
Payer: MEDICAID

## 2025-07-31 ENCOUNTER — APPOINTMENT (OUTPATIENT)
Dept: RADIOLOGY | Facility: MEDICAL CENTER | Age: 55
DRG: 175 | End: 2025-07-31
Attending: EMERGENCY MEDICINE
Payer: MEDICAID

## 2025-07-31 ENCOUNTER — HOSPITAL ENCOUNTER (INPATIENT)
Facility: MEDICAL CENTER | Age: 55
End: 2025-07-31
Attending: EMERGENCY MEDICINE | Admitting: INTERNAL MEDICINE
Payer: MEDICAID

## 2025-07-31 PROBLEM — I26.09 ACUTE PULMONARY EMBOLISM WITH ACUTE COR PULMONALE, UNSPECIFIED PULMONARY EMBOLISM TYPE (HCC): Status: ACTIVE | Noted: 2025-07-31

## 2025-07-31 PROBLEM — F15.10 METHAMPHETAMINE ABUSE (HCC): Status: ACTIVE | Noted: 2025-07-31

## 2025-07-31 PROBLEM — I50.33 ACUTE ON CHRONIC HEART FAILURE WITH PRESERVED EJECTION FRACTION (HFPEF, >= 50%) (HCC): Status: ACTIVE | Noted: 2025-07-31

## 2025-07-31 PROBLEM — Z71.6 TOBACCO ABUSE COUNSELING: Status: ACTIVE | Noted: 2025-07-31

## 2025-07-31 PROBLEM — E66.811 CLASS 1 OBESITY IN ADULT: Status: ACTIVE | Noted: 2025-07-31

## 2025-07-31 PROBLEM — I26.02 ACUTE SADDLE PULMONARY EMBOLISM WITH ACUTE COR PULMONALE (HCC): Status: ACTIVE | Noted: 2025-07-31

## 2025-07-31 PROCEDURE — 71275 CT ANGIOGRAPHY CHEST: CPT

## 2025-07-31 PROCEDURE — 71045 X-RAY EXAM CHEST 1 VIEW: CPT

## 2025-07-31 ASSESSMENT — COGNITIVE AND FUNCTIONAL STATUS - GENERAL
SUGGESTED CMS G CODE MODIFIER DAILY ACTIVITY: CH
DAILY ACTIVITIY SCORE: 24
MOBILITY SCORE: 24
SUGGESTED CMS G CODE MODIFIER MOBILITY: CH

## 2025-07-31 ASSESSMENT — LIFESTYLE VARIABLES
SUBSTANCE_ABUSE: 1
SUBSTANCE_ABUSE: 0

## 2025-07-31 ASSESSMENT — PAIN DESCRIPTION - PAIN TYPE
TYPE: ACUTE PAIN
TYPE: ACUTE PAIN

## 2025-07-31 ASSESSMENT — FIBROSIS 4 INDEX
FIB4 SCORE: 1.85
FIB4 SCORE: 1.07

## 2025-07-31 ASSESSMENT — ENCOUNTER SYMPTOMS
HEADACHES: 0
HEMOPTYSIS: 0
PALPITATIONS: 1
DEPRESSION: 0
NAUSEA: 0
MYALGIAS: 1
DOUBLE VISION: 0
COUGH: 1
FEVER: 0
SHORTNESS OF BREATH: 1
BRUISES/BLEEDS EASILY: 0
COUGH: 0
VOMITING: 0
DIZZINESS: 0
WEAKNESS: 1
HEARTBURN: 1
BLURRED VISION: 0
SPUTUM PRODUCTION: 0
ABDOMINAL PAIN: 0
ORTHOPNEA: 1
CHILLS: 0
DIAPHORESIS: 1

## 2025-07-31 NOTE — ED TRIAGE NOTES
"Chief Complaint   Patient presents with    Chest Pain     X2 days \"tightness\"     Shortness of Breath     X3 days   Worse with exertion   Hx of CHF     Denies cough      BP (!) 143/94   Pulse (!) 105   Temp 36.9 °C (98.5 °F) (Temporal)   Resp 18   Ht 1.727 m (5' 8\")   Wt 95.3 kg (210 lb)   SpO2 97%   BMI 31.93 kg/m²   Pt is alert/oriented and follows commands. Pt speaking in full sentences and responds appropriately to questions.Mild increased WOB in triage      Pt placed in lobby and educated on triage process. Pt encouraged to alert staff for any changes in condition.    "

## 2025-07-31 NOTE — ED PROVIDER NOTES
ED Provider Note    Patient was seen by me and workup was initiated.  Patient was triaged by ER physician and transferred to Dr. Mendez       Electronically signed by: Mika Figueredo M.D., 7/31/2025 1:42 PM

## 2025-07-31 NOTE — ED PROVIDER NOTES
"ED Provider Note    Scribed for Clarence Mendez by Clarence Mendez. 7/31/2025  3:35 PM    Primary care provider: Ophelia John M.D.  Means of arrival: Walk- In  History obtained from: Patient  History limited by: None    CHIEF COMPLAINT  Chief Complaint   Patient presents with    Chest Pain     X2 days \"tightness\"     Shortness of Breath     X3 days   Worse with exertion   Hx of CHF     Denies cough      EXTERNAL RECORDS REVIEWED  Patient was admitted for care on 3/29/25 for right hand cellulitis and right wrist arthritis; rule out septic arthritis.     HPI/ROS  LIMITATION TO HISTORY   Select: : None    HPI  Lucas Agee is a 54 y.o. male who presents to the Emergency Department with a past medical history of gout, arthritis, hypertension, pulmonary hypertension, diabetes, biventricular heart failure, methamphetamine abuse, right foot thrombosis requiring thrombectomy in 2019, CHF who presents today for worsening shortness of breath over the last few days. The patient reports associated chest pain described as chest \"tightness\", also worsening over the last few days. He reports he has noticed an increase in congestion as well. He reports last methamphetamine use was one week ago. The patient reports smoking cigarettes, however has tried decreasing use lately. The patient denies any recent coughing up blood. He is unsure if he has lower extremity edema, denies use of water pills. He denies any alcohol use at this time, denies any other drug use. Denies any prior known history of MI. The patient reports compliance with carvedilol, lisinopril, and atorvastatin.     REVIEW OF SYSTEMS  As above, all other systems reviewed and are negative.   See HPI for further details.     PAST MEDICAL HISTORY   has a past medical history of Congestive heart failure (HCC), DVT (deep venous thrombosis) (HCC), Hypertension, and Pain and swelling of left knee (8/24/2019).  SURGICAL HISTORY   has a past surgical history " "that includes hernia repair; hernia repair (Right, 1990); hand surgery (Right, 1995); thrombectomy (Right, 05/21/2019); and open reduction.  SOCIAL HISTORY  Social History[1]   Social History     Substance and Sexual Activity   Drug Use Yes    Types: Marijuana, Methamphetamines, Intravenous, Inhaled    Comment: h/0 of meth/currenlty smoke marijuana.     FAMILY HISTORY  Family History   Problem Relation Age of Onset    Heart Disease Father     Cancer Father         I don't have the details about what type or anything else really. [place] & it happened fairly quickly.    Drug abuse Father     Alcohol abuse Father     Blood Clots Brother     Heart Disease Brother         pacemaker    Drug abuse Brother     Alcohol abuse Brother     Blood Clots Paternal Grandmother     Heart Disease Paternal Grandmother     Stroke Paternal Grandmother     Heart Attack Paternal Uncle 60    Diabetes Maternal Grandmother     Drug abuse Mother     Alcohol abuse Mother     Drug abuse Maternal Uncle     Alcohol abuse Maternal Uncle      CURRENT MEDICATIONS  Home Medications    **Home medications have not yet been reviewed for this encounter**       Audit from Redirected Encounters    **Home medications have not yet been reviewed for this encounter**       ALLERGIES  Allergies[2]    PHYSICAL EXAM    VITAL SIGNS:   Vitals:    07/31/25 1059 07/31/25 1336 07/31/25 1500 07/31/25 1700   BP:  (!) 123/91 106/66 (!) 142/90   Pulse:  (!) 110 (!) 106 (!) 114   Resp:  18 18 16   Temp:  37.1 °C (98.8 °F)  36.7 °C (98 °F)   TempSrc:  Temporal  Temporal   SpO2:  95% 95% 90%   Weight: 95.3 kg (210 lb)      Height: 1.727 m (5' 8\")        Vitals: My interpretation: normotensive, borderline tachycardic, afebrile, not hypoxic    Reinterpretation of vitals: Unchanged    Cardiac Monitor Interpretation: The cardiac monitor revealed normal Sinus Rhythm tachycardic as interpreted by me. The cardiac monitor was ordered secondary to the patient's " history of tachycardia and to monitor for dysrhythmia and/or tachycardia.    PE:   Gen: sitting comfortably, speaking clearly, appears in no acute distress   ENT: Mucous membranes moist, posterior pharynx clear, uvula midline, nares patent bilaterally   Neck: Supple, FROM  Pulmonary: Lungs are clear to auscultation bilaterally. No tachypnea  CV: Tachycardia, no murmur appreciated, pulses 2+ in both upper and lower extremities  Abdomen: soft, NT/ND; no rebound/guarding  : no CVA or suprapubic tenderness   Neuro: A&Ox4 (person, place, time, situation), speech fluent, gait steady, no focal deficits appreciated  Skin: No rash or lesions.  No pallor or jaundice.  No cyanosis.  Warm and dry.     DIAGNOSTIC STUDIES / PROCEDURES    LABS  Results for orders placed or performed during the hospital encounter of 25   EKG    Collection Time: 25  1:01 PM   Result Value Ref Range    Report       [facility]    Test Date:  2025  Pt Name:    EDMUNDO OROPEZA                 Department: ER  MRN:        [id number]                      Room:  Gender:     Male                         Technician: [id number]  :        1970                   Requested By:ER TRIAGE PROTOCOL  Order #:    [id number]                    Reading MD: ETHEL BREEN. AMD    Measurements  Intervals                                Axis  Rate:       103                          P:          70  NJ:         147                          QRS:        88  QRSD:       105                          T:          44  QT:         364  QTc:        477    Interpretive Statements  Sinus tachycardia  Borderline prolonged QT interval  Compared to ECG 2025 18:52:27  No significant changes  Electronically Signed On 2025 13:01:25 PDT by ETHEL BREEN. AMD     CBC with Differential    Collection Time: 25  1:27 PM   Result Value Ref Range    WBC 15.7 (H) 4.8 - 10.8 K/uL    RBC 5.07 4.70 - 6.10 M/uL    Hemoglobin 16.7  14.0 - 18.0 g/dL    Hematocrit 50.8 42.0 - 52.0 %    .2 (H) 81.4 - 97.8 fL    MCH 32.9 27.0 - 33.0 pg    MCHC 32.9 32.3 - 36.5 g/dL    RDW 50.0 35.9 - 50.0 fL    Platelet Count 193 164 - 446 K/uL    MPV 9.6 9.0 - 12.9 fL    Neutrophils-Polys 63.20 44.00 - 72.00 %    Lymphocytes 22.70 22.00 - 41.00 %    Monocytes 10.70 0.00 - 13.40 %    Eosinophils 1.60 0.00 - 6.90 %    Basophils 0.60 0.00 - 1.80 %    Immature Granulocytes 1.20 (H) 0.00 - 0.90 %    Nucleated RBC 0.10 0.00 - 0.20 /100 WBC    Neutrophils (Absolute) 9.90 (H) 1.82 - 7.42 K/uL    Lymphs (Absolute) 3.55 1.00 - 4.80 K/uL    Monos (Absolute) 1.68 (H) 0.00 - 0.85 K/uL    Eos (Absolute) 0.25 0.00 - 0.51 K/uL    Baso (Absolute) 0.09 0.00 - 0.12 K/uL    Immature Granulocytes (abs) 0.18 (H) 0.00 - 0.11 K/uL    NRBC (Absolute) 0.02 K/uL   Troponins in two (2) hours    Collection Time: 07/31/25  2:30 PM   Result Value Ref Range    Troponin T 34 (H) 6 - 19 ng/L   LACTIC ACID    Collection Time: 07/31/25  2:30 PM   Result Value Ref Range    Lactic Acid 2.0 0.5 - 2.0 mmol/L   Comp Metabolic Panel    Collection Time: 07/31/25  3:45 PM   Result Value Ref Range    Sodium 135 135 - 145 mmol/L    Potassium 4.5 3.6 - 5.5 mmol/L    Chloride 106 96 - 112 mmol/L    Co2 16 (L) 20 - 33 mmol/L    Anion Gap 13.0 7.0 - 16.0    Glucose 142 (H) 65 - 99 mg/dL    Bun 12 8 - 22 mg/dL    Creatinine 1.00 0.50 - 1.40 mg/dL    Calcium 8.6 8.5 - 10.5 mg/dL    Correct Calcium 9.1 8.5 - 10.5 mg/dL    AST(SGOT) 89 (H) 12 - 45 U/L    ALT(SGPT) 168 (H) 2 - 50 U/L    Alkaline Phosphatase 225 (H) 30 - 99 U/L    Total Bilirubin 0.8 0.1 - 1.5 mg/dL    Albumin 3.4 3.2 - 4.9 g/dL    Total Protein 6.4 6.0 - 8.2 g/dL    Globulin 3.0 1.9 - 3.5 g/dL    A-G Ratio 1.1 g/dL   proBrain Natriuretic Peptide, NT    Collection Time: 07/31/25  3:45 PM   Result Value Ref Range    NT-proBNP 3309 (H) 0 - 125 pg/mL   TROPONIN    Collection Time: 07/31/25  3:45 PM   Result Value Ref Range    Troponin T 30 (H) 6 -  19 ng/L   ESTIMATED GFR    Collection Time: 07/31/25  3:45 PM   Result Value Ref Range    GFR (CKD-EPI) 90 >60 mL/min/1.73 m 2   URINE DRUG SCREEN    Collection Time: 07/31/25  4:16 PM   Result Value Ref Range    Amphetamines Urine Positive (A) Negative    Barbiturates Negative Negative    Benzodiazepines Negative Negative    Cocaine Metabolite Negative Negative    Fentanyl, Urine Negative Negative    Methadone Negative Negative    Opiates Negative Negative    Oxycodone Negative Negative    Phencyclidine -Pcp Negative Negative    Propoxyphene Negative Negative    Cannabinoid Metab Positive (A) Negative      All labs reviewed by me. Labs were compared to prior labs if they were available. Significant for mild leukocytosis of 15, no anemia, troponin minimally elevated at 34, lactic acid normal, normal electrolytes although bicarb of 16, normal glucose, normal renal function, transaminitis, normal bilirubin, BNP of 3300, repeat troponin of 30.  Urine drug screen positive for methamphetamine and cannabinoid.    RADIOLOGY  I have independently interpreted the diagnostic imaging associated with this visit and am waiting the final reading from the radiologist.   My preliminary interpretation is a follows: Cardiomegaly but without signs of consolidative process  Radiologist interpretation is as follows:  CT-CTA CHEST PULMONARY ARTERY W/ RECONS   Final Result      1.  Acute pulmonary emboli in the distal left main pulmonary artery. Bilateral segmental pulmonary emboli.   2.  Evidence of right heart strain.   3.  Small area of consolidation in the left lower lobe. This could represent pulmonary infarct or pneumonia.   4.  Scattered ground glass densities of the bilateral lower lobes. This could represent atelectasis or pneumonitis.   5.  Punctate, nonobstructing right renal stone.   6.  Bilateral perinephric fat stranding. Correlate with urinalysis for acute infection.               Dr. Mckeon discussed these findings with   Andrea at 7/31/2025 5:58 PM      DX-CHEST-PORTABLE (1 VIEW)   Final Result      Cardiomegaly.        COURSE & MEDICAL DECISION MAKING  Nursing notes, VS, PMSFHx, labs, imaging, EKG reviewed in chart.    Heart Score: Moderate    ED Observation Status? No; Patient does not meet criteria for ED Observation.     Ddx: PE, MI, PNA, CHF, IVDU    MDM: 3:35 PM Lucas Agee is a 54 y.o. male who presented with history of homelessness, methamphetamine use, CHF, presenting with 2 to 3 days of some intermittent chest tightness and shortness of breath, worse with exertion.  Also complains of lower extremity edema.  He says last methamphetamine use was about a week ago.  Also smokes cigarettes.  Patient is a poor historian overall and not very forthcoming.  History of leaving AMA in the past.  Difficult IV stick and ED physician placed large ultrasound-guided IV.  Labs ordered.  Ordered CTA PE study considering patient's tachycardia.   EKG shows sinus tachycardia. All labs reviewed by me. Labs were compared to prior labs if they were available. Significant for mild leukocytosis of 15, no anemia, troponin minimally elevated at 34, lactic acid normal, normal electrolytes although bicarb of 16, normal glucose, normal renal function, transaminitis, normal bilirubin, BNP of 3300, repeat troponin of 30.  Urine drug screen positive for methamphetamine and cannabinoid.  Discussed with the radiologist and unfortunately patient has acute bilateral pulmonary emboli with right heart strain.  Discussed with the intensivist who agrees to start heparinization and will review the case.  Patient be admitted to the hospitalist.  Pharmacist agrees with heparin bolus and drip.  Patient critically ill but stable at time of admission and vital signs remained HemoCue stable.    7:28 PM on reevaluation with pulmonary critical care at bedside, patient does appear to be slowly worsening with tachypnea, worsening tachycardia, and concerning  "findings of clots in the patient's IJ.  After shared decision-making with the pharmacist, intensivist, we will proceed with pushing half dose tPA for submassive PE in the setting and admit the patient to the ICU now.    Procedure Note:  Tobacco Cessation Counselling, Intermediate Timeframe, Greater than 4 minutes, less than 10 minutes:  Patient has been smoking for 25 years, averaging 1 packs per day.  Has not tried to stop in the past.  The patient knows that smoking is bad for general health and for the patient's disease process in particular.  I have recommended the use of a \"quit delores\" to facilitate success.  I have also talked about resources with the [employer], the American Heart Association, as well as 1-800-QUIT-NOW.  Finally, I briefly mentioned that pharmacologic adjuncts might be prescribed by a primary care physician, failing over-the-counter modalities.  The patient is given aftercare instructions on tobacco cessation.    CRITICAL CARE TIME 80 minutes for bilateral PE with right heart strain, requiring consultation with intensivist, ICU placement, pharmacy consultation, half dose tPA lytics  There was a very real possibility of deterioration of the patient's condition.  This patient required the highest level of care.  I provided critical care services which included: review of the medical record, treatment orders, ordering and reviewing test results, frequent reevaluation of the patient's condition and response to treatment, as well as discussing the case with appropriate personnel and various consultants. The critical care time associated with the care of this patient is exclusive of any procedures or specific interventions.    ADDITIONAL PROBLEM LIST AND DISPOSITION    I have discussed management of the patient with the following physicians and BALA's: Hospitalist    Discussion of management with other QHP or appropriate source(s): Pharmacy regarding heparin administration     Barriers " to care at this time, including but not limited to: Patient is homeless, Patient lacks transportation , Patient does not have insurance, Patient had difficult affording medications, and Patient lacks financial resources.     Decision tools and prescription drugs considered including, but not limited to: Heparinization.    FINAL IMPRESSION  1. Pulmonary embolism, bilateral (HCC) Acute   2. History of methamphetamine abuse (HCC) Acute   3. Elevated troponin Acute   4. Shortness of breath Acute   5. Tachycardia Acute   6. History of DVT (deep vein thrombosis) Acute   7. Leukocytosis, unspecified type Acute   8. Chest pain, unspecified type Acute   9. Cardiomegaly Acute   10. Hypervolemia, unspecified hypervolemia type Acute   11. Lower extremity edema Acute   12. Transaminitis Acute   13. Acute on chronic congestive heart failure, unspecified heart failure type (HCC) Acute   14. Tobacco abuse Acute   15. Tobacco abuse counseling Acute      The note accurately reflects work and decisions made by me.  Clarence Mendez  7/31/2025  5:41 PM         [1]   Social History  Tobacco Use    Smoking status: Every Day     Current packs/day: 0.25     Average packs/day: 0.3 packs/day for 30.0 years (7.5 ttl pk-yrs)     Types: Cigarettes    Smokeless tobacco: Never    Tobacco comments:     Christy cut down even more & working on becoming a non-smoker   Vaping Use    Vaping status: Never Used   Substance Use Topics    Alcohol use: No    Drug use: Yes     Types: Marijuana, Methamphetamines, Intravenous, Inhaled     Comment: h/0 of meth/currenlty smoke marijuana.   [2] No Known Allergies

## 2025-08-01 ENCOUNTER — APPOINTMENT (OUTPATIENT)
Dept: CARDIOLOGY | Facility: MEDICAL CENTER | Age: 55
DRG: 175 | End: 2025-08-01
Attending: INTERNAL MEDICINE
Payer: MEDICAID

## 2025-08-01 ENCOUNTER — APPOINTMENT (OUTPATIENT)
Dept: RADIOLOGY | Facility: MEDICAL CENTER | Age: 55
DRG: 175 | End: 2025-08-01
Attending: INTERNAL MEDICINE
Payer: MEDICAID

## 2025-08-01 PROCEDURE — 93306 TTE W/DOPPLER COMPLETE: CPT

## 2025-08-01 PROCEDURE — 93306 TTE W/DOPPLER COMPLETE: CPT | Mod: 26 | Performed by: INTERNAL MEDICINE

## 2025-08-01 PROCEDURE — 93970 EXTREMITY STUDY: CPT

## 2025-08-01 ASSESSMENT — ENCOUNTER SYMPTOMS
ABDOMINAL PAIN: 0
DEPRESSION: 0
WEAKNESS: 0
MYALGIAS: 0
CLAUDICATION: 0
DOUBLE VISION: 0
FOCAL WEAKNESS: 0
VOMITING: 0
SHORTNESS OF BREATH: 0
COUGH: 0
DIZZINESS: 0
FEVER: 0
SEIZURES: 0
NAUSEA: 0

## 2025-08-01 ASSESSMENT — PAIN DESCRIPTION - PAIN TYPE
TYPE: ACUTE PAIN

## 2025-08-01 ASSESSMENT — FIBROSIS 4 INDEX: FIB4 SCORE: 1.63

## 2025-08-01 ASSESSMENT — LIFESTYLE VARIABLES: SUBSTANCE_ABUSE: 1

## 2025-08-01 NOTE — H&P
"Hospital Medicine History & Physical Note    Date of Service  7/31/2025    Primary Care Physician  Ophelia John M.D.    Consultants  Pulmonary (Dr. Hernandez) --> to ICU  ICU (Dr. Hernandez) --> agreeable for ICU    Code Status  Full Code    Chief Complaint  Chief Complaint   Patient presents with    Chest Pain     X2 days \"tightness\"     Shortness of Breath     X3 days   Worse with exertion   Hx of CHF     Denies cough        History of Presenting Illness  Lucas Agee is a 54 y.o. male with history of methamphetamine abuse, CHF, pulmonary hypertension, right foot thrombosis requiring thrombectomy, tobacco abuse, diabetes, hypertension, hyperlipidemia who presented 7/31/2025 with evaluation for shortness of breath.  Patient reported ongoing symptoms for 3 days, endorsed increased shortness of breath with exertion.  In ER, found to have elevated troponin T and proBNP.  He was also noted to have elevated D-dimer.  Subsequent CTA chest concerning for acute pulmonary emboli in distal left main pulmonary artery, bilateral segmental PE with radiological evidence of RV strain, as well as small area of consolidation in LLL.    Due to concern of acute bilateral PE, admission requested by ERP.  Patient has been started on heparin drip.  Pulmonology was consulted, agree with admission to medical unit, no ICU/IMCU.  Admitted to medicine service for further evaluation and treatment.    Initially plan to admit to telemetry unit.  However, after discussion with pulmonary, will proceed with giving tPA and admit to ICU.    I discussed the plan of care with patient, bedside RN, charge RN, pharmacy, and pulmonary, critical care/ICU.    Review of Systems  Review of Systems   Constitutional:  Positive for malaise/fatigue. Negative for chills and fever.   HENT:  Negative for hearing loss and tinnitus.    Eyes:  Negative for blurred vision and double vision.   Respiratory:  Positive for cough and shortness of breath.  "   Cardiovascular:  Positive for palpitations. Negative for chest pain.   Gastrointestinal:  Positive for heartburn. Negative for abdominal pain, nausea and vomiting.   Genitourinary:  Negative for dysuria and urgency.   Musculoskeletal:  Positive for myalgias. Negative for joint pain.   Neurological:  Positive for weakness. Negative for dizziness and headaches.   Endo/Heme/Allergies:  Negative for environmental allergies. Does not bruise/bleed easily.   Psychiatric/Behavioral:  Negative for depression and substance abuse.    All other systems reviewed and are negative.      Past Medical History   has a past medical history of Congestive heart failure (HCC), DVT (deep venous thrombosis) (HCC), Hypertension, and Pain and swelling of left knee (8/24/2019).    Surgical History   has a past surgical history that includes hernia repair; hernia repair (Right, 1990); hand surgery (Right, 1995); thrombectomy (Right, 05/21/2019); and open reduction.     Family History  Family History   Problem Relation Age of Onset    Heart Disease Father     Cancer Father         I don't have the details about what type or anything else really. [place] & it happened fairly quickly.    Drug abuse Father     Alcohol abuse Father     Blood Clots Brother     Heart Disease Brother         pacemaker    Drug abuse Brother     Alcohol abuse Brother     Blood Clots Paternal Grandmother     Heart Disease Paternal Grandmother     Stroke Paternal Grandmother     Heart Attack Paternal Uncle 60    Diabetes Maternal Grandmother     Drug abuse Mother     Alcohol abuse Mother     Drug abuse Maternal Uncle     Alcohol abuse Maternal Uncle         Family history reviewed with patient. There is family history that is pertinent to the chief complaint.     Social History   reports that he has been smoking cigarettes. He has a 7.5 pack-year smoking history. He has never used smokeless tobacco. He reports current drug use. Drugs: Marijuana,  Methamphetamines, Intravenous, and Inhaled. He reports that he does not drink alcohol.    Allergies  Allergies[1]    Medications  Prior to Admission Medications   Prescriptions Last Dose Informant Patient Reported? Taking?   atorvastatin (LIPITOR) 40 MG Tab   No No   Sig: Take 1 Tablet by mouth every evening.   carvedilol (COREG) 25 MG Tab   No No   Sig: Take 1 Tablet by mouth 2 times a day with meals.   lisinopril (PRINIVIL) 20 MG Tab   No No   Sig: Take 1 Tablet by mouth every day.      Facility-Administered Medications: None       Physical Exam  Temp:  [36.7 °C (98 °F)-37.1 °C (98.8 °F)] 36.7 °C (98 °F)  Pulse:  [105-118] 108  Resp:  [16-22] 22  BP: (106-172)/() 138/94  SpO2:  [87 %-97 %] 94 %  Blood Pressure: (!) 142/90   Temperature: 36.7 °C (98 °F)   Pulse: (!) 114   Respiration: 16   Pulse Oximetry: 90 %       Physical Exam  Vitals and nursing note reviewed.   Constitutional:       General: He is not in acute distress.  HENT:      Head: Normocephalic and atraumatic.      Nose: Nose normal.      Mouth/Throat:      Mouth: Mucous membranes are moist.      Pharynx: Oropharynx is clear.   Eyes:      General: No scleral icterus.     Extraocular Movements: Extraocular movements intact.   Cardiovascular:      Rate and Rhythm: Regular rhythm. Tachycardia present.      Pulses: Normal pulses.      Heart sounds:      No friction rub.   Pulmonary:      Effort: No respiratory distress.      Breath sounds: No stridor. Rales present. No wheezing.      Comments: Appreciable crackles  Chest:      Chest wall: No tenderness.   Abdominal:      General: There is distension.      Tenderness: There is no abdominal tenderness. There is no guarding or rebound.   Musculoskeletal:         General: Normal range of motion.      Cervical back: Neck supple. No tenderness.      Right lower leg: No edema.      Left lower leg: No edema.      Comments: RLE -- mild coldness, faint pulse, no purple/blue discoloration   Skin:     General:  Skin is warm and dry.      Capillary Refill: Capillary refill takes less than 2 seconds.   Neurological:      General: No focal deficit present.      Mental Status: He is alert and oriented to person, place, and time.   Psychiatric:         Mood and Affect: Mood normal.         Laboratory:  Recent Labs     07/31/25  1327   WBC 15.7*   RBC 5.07   HEMOGLOBIN 16.7   HEMATOCRIT 50.8   .2*   MCH 32.9   MCHC 32.9   RDW 50.0   PLATELETCT 193   MPV 9.6     Recent Labs     07/31/25  1545   SODIUM 135   POTASSIUM 4.5   CHLORIDE 106   CO2 16*   GLUCOSE 142*   BUN 12   CREATININE 1.00   CALCIUM 8.6     Recent Labs     07/31/25  1545   ALTSGPT 168*   ASTSGOT 89*   ALKPHOSPHAT 225*   TBILIRUBIN 0.8   GLUCOSE 142*     Recent Labs     07/31/25  1725   APTT 26.5   INR 1.23*     Recent Labs     07/31/25  1545   NTPROBNP 3309*         Recent Labs     07/31/25  1430 07/31/25  1545 07/31/25  1709   TROPONINT 34* 30* 29*       Imaging:  CT-CTA CHEST PULMONARY ARTERY W/ RECONS   Final Result      1.  Acute pulmonary emboli in the distal left main pulmonary artery. Bilateral segmental pulmonary emboli.   2.  Evidence of right heart strain.   3.  Small area of consolidation in the left lower lobe. This could represent pulmonary infarct or pneumonia.   4.  Scattered ground glass densities of the bilateral lower lobes. This could represent atelectasis or pneumonitis.   5.  Punctate, nonobstructing right renal stone.   6.  Bilateral perinephric fat stranding. Correlate with urinalysis for acute infection.               Dr. Mckeon discussed these findings with Dr. Mendez at 7/31/2025 5:58 PM      DX-CHEST-PORTABLE (1 VIEW)   Final Result      Cardiomegaly.      EC-ECHOCARDIOGRAM COMPLETE W/O CONT    (Results Pending)   US-EXTREMITY VENOUS LOWER BILAT    (Results Pending)       X-Ray:  I have personally reviewed the images and compared with prior images.  EKG:  I have personally reviewed the images and compared with prior  images.    Assessment/Plan:  Justification for Admission Status  I anticipate this patient will require at least two midnights of hospitalization, therefore appropriate for inpatient status      * Acute saddle pulmonary embolism with acute cor pulmonale (HCC)- (present on admission)  Assessment & Plan  Noted to have acute extensive bilateral PE on CTA chest, radiographic RV strain  STAT heparin IV 6300 units bolus  Initiated on heparin drip  Titrate to Xa achieve therapeutic goal  Transition to DOAC when appropriate    Check TTE for evaluation of possible RV strain    Initially plan for hospitalization to medicine service in telemetry unit  However, after discussing with pulmonary attending, plan to proceed with giving tPA and admit to ICU    tPA as per pulmonary  Admit to ICU  Defer further care to ICU    Acute on chronic heart failure with preserved ejection fraction (HFpEF, >= 50%) (HCC)  Assessment & Plan  Cessation of methamphetamine advised  Diuresis as able to tolerate-Lasix 40 mg IV twice daily  Optimize CHF meds as able to tolerate  Strict ins and outs  Check TTE    Methamphetamine abuse (HCC)  Assessment & Plan  Cessation counseling provided    Tobacco abuse counseling  Assessment & Plan  Cessation counseling provided    Hyperlipidemia- (present on admission)  Assessment & Plan  Statin    Hypertension- (present on admission)  Assessment & Plan  On CHF meds    Type 2 diabetes mellitus without complication, without long-term current use of insulin (HCC)- (present on admission)  Assessment & Plan  ISS while inpatient    Pulmonary hypertension (HCC)- (present on admission)  Assessment & Plan  Diuresis as tolerated    Elevated troponin- (present on admission)  Assessment & Plan  Likely secondary to demand ischemia  CHF and PE contributory  On heparin drip  Aspirin, statin, BB    Class 1 obesity in adult  Assessment & Plan  Diet and lifestyle modification  Body mass index is 31.93 kg/m².        VTE prophylaxis:  therapeutic anticoagulation with heparin drip    Patient is critically ill due to acute pulmonary embolism with concern for high clot burden, high propensity of going to further respiratory failure and cardiac arrest, requiring immediate interventions and close hemodynamic monitoring    Critical care time: 42 minutes spent in chart review, medical management, coordinating care with patient/ER staff/telemetry staff/RN/pharmacy/consulting providers/frequent reevaluation of patient clinical response to treatment    Initially admitted to telemetry.  However, upon reevaluation by pulmonary (Dr. Hernandez) recommended tPA infusion and admitted to ICU post tPA. ICU (Dr. Abdul) agreeable to admit to ICU. Defer further care to ICU team         [1] No Known Allergies

## 2025-08-01 NOTE — ASSESSMENT & PLAN NOTE
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   Recheck trop and EKG  Start diuresis for right sided congestion

## 2025-08-01 NOTE — PROGRESS NOTES
Patient home medications sent to pharmacy in security bag No. [id number]. Verified by Gabby Cristobal RN and Mitul Story RN.

## 2025-08-01 NOTE — PROGRESS NOTES
Pulmonary and Critical Care Medicine Progress Note    I was informed by Dr. Hernandez that he did a Pulmonary/Critical Care Medicine consultation on a gentleman in ED with a submassive pulmonary embolism.  Dr. Hernandez is administering alteplase and this gentleman is gong to be admitted to the ICU.    Chu Abdul MD  Pulmonary and Critical Care Medicine

## 2025-08-01 NOTE — ASSESSMENT & PLAN NOTE
Hx of due to ongoing meth abuse now with small PE but of note had significant disease prior to PE  Could consider follow up on PH clinic upon discharge

## 2025-08-01 NOTE — ED NOTES
Pt medicated per MAR. With pharmacy at bedside. Call light within reach. Pt educated on POC. Pt verbalized understanding.

## 2025-08-01 NOTE — ED NOTES
Medication history reviewed with patient at bedside.     Med rec is complete    Allergies reviewed.     Patient has not had any outpatient anti-MICROBIAL in the last 30 days.     Anticoagulants: No    Sera Ferrell

## 2025-08-01 NOTE — PROGRESS NOTES
Assumed care of patient, report received from MANISH Bauman.  Patient is resting in bed, arouses easily to voice.  Oriented x4, GARCIA, 5/5 strengths throughout.  No c/o pain.  Discussed POC for the day, patient voiced understanding.  Safety precautions in place, no needs at this time.

## 2025-08-01 NOTE — ASSESSMENT & PLAN NOTE
Cessation of methamphetamine advised  Diuresis as able to tolerate-Lasix 40 mg IV twice daily  Optimize CHF meds as able to tolerate  Strict ins and outs  Check TTE

## 2025-08-01 NOTE — DISCHARGE PLANNING
Case Management Discharge Planning    Admission Date: 7/31/2025  GMLOS: 3.8  ALOS: 1    6-Clicks ADL Score: 24  6-Clicks Mobility Score: 24    Anticipated Discharge Dispo: Discharge Disposition: Discharged to home/self care (01)  Discharge Address: [address]  [facility] [address]    DME Needed: No    Action(s) Taken:   Chart review was completed, and patient was discussed during IDT rounds.    Discharge assessment was completed (see below).     Escalations Completed: None    Medically Clear: No    Next Steps:  CM RN to follow up with medical team to discuss discharge barriers or needs.     Barriers to Discharge: Medical clearance    Is the patient up for discharge tomorrow: No     Care Transition Team Assessment    CM RN met with pt at the bedside to complete discharge assessment. Pt appeared A/Ox4, answered all questions appropriately, and verbalized agreement to this assessment.      Case management role discussed; understanding of case management role verbalized.   Demographics on face sheet verified.   Please see H&P for pertinent PMH and reason for admission.   Housing: Lives with his mother Mary in a single story house located in [place], [place]. The address listed on the face sheet was verified to be correct mailing and discharge address.  IADLS/ADLS: Independent with IADLS/ADLS  Transportation: Pt does not drive and relies on friends or public transportation.   DME: None owned/used prior to this admission.   O2: RA at baseline   Discharge support: Pt has his mother and brother Alvarado for support in the community.   PCP: Ophelia John M.D.   Preferred pharmacy: [facility] on [address]   Insurance: [place] Medicaid   AD: None on file   Discharge planning: Home    Information Source  Orientation Level: Oriented X4  Information Given By: Patient  Who is responsible for making decisions for patient? : Patient    Readmission Evaluation  Is this a readmission?: No    Elopement Risk  Legal Hold: No  Ambulatory or Self  Mobile in Wheelchair: Yes  Disoriented: No  Psychiatric Symptoms: None  History of Wandering: No  Elopement this Admit: No  Vocalizing Wanting to Leave: No  Displays Behaviors, Body Language Wanting to Leave: No-Not at Risk for Elopement  Elopement Risk: Not at Risk for Elopement    Discharge Preparedness  What is your plan after discharge?: Home with help, Uncertain - pending medical team collaboration  What are your discharge supports?: Parent, Sibling  Prior Functional Level: Ambulatory, Independent with Activities of Daily Living, Independent with Medication Management  Difficulity with ADLs: None  Difficulity with IADLs: None    Functional Assesment  Prior Functional Level: Ambulatory, Independent with Activities of Daily Living, Independent with Medication Management    Finances  Financial Barriers to Discharge: No  Prescription Coverage: Yes    Advance Directive  Advance Directive?: None    Psychological Assessment  History of Substance Abuse: Methamphetamine  Date Last Used - Methamphetamine: 7/29/2025  History of Psychiatric Problems: No  Non-compliant with Treatment: No  Newly Diagnosed Illness: No    Discharge Risks or Barriers  Discharge risks or barriers?: Complex medical needs  Patient risk factors: Complex medical needs    Anticipated Discharge Information  Discharge Disposition: Discharged to home/self care (01)  Discharge Address: [address] [place] [id number]

## 2025-08-01 NOTE — ED PROVIDER NOTES
ED Provider Note    See primary ERP's note for full details.  Asked to assist with this patient with history of polysubstance abuse and very difficult IV stick.  In short patient is here with elevated troponin shortness of breath tachycardia leukocytosis and mildly elevated troponin.  Patient consents to ultrasound-guided IV placement    Point of Care Ultrasound    ED ULTRASOUND GUIDED PERIPHERAL VENOUS ACCESS    Limited Diagnostic Exam: Venipuncture requiring physician skill with ultrasound guidance  Ultrasound guidance required secondary to:REASONS: Multiple failed attempts  Patient Identity confirmed: Yes  Risks, benefits and alternatives were discussed  Consent obtained verbally  Indication for Exam: Vascular Access     Vein/Location: Right basilic  Normal Compressibility and Visualized Patency   Catheter Size: 10 cm 18-gauge extended peripheral IV  Number of Attempts: 1  Successful Catheter Insertion: yes  Complications: none    Image retained through [facility] as seen below:       Additional interpretation:      This study is a limited ultrasound examination performed and interpreted to evaluate for limited conditions as outlined above. There may be other clinically important information contained in the images that is outside this scope. When clinically warranted, a comprehensive ultrasound through the appropriate department is considered.            FINAL DIAGNOSIS  1.  Multiple medical issues  2.  Difficult IV access  3.  Ultrasound-guided IV by ERP     Electronically signed by: Duncan Shah M.D.

## 2025-08-01 NOTE — PROGRESS NOTES
Critical Care Progress Note    Date of admission  7/31/2025    Chief Complaint  54 y.o. male who presented 7/31/2025 with complaints of shortness of breath and chest pain for the past week.  He has a past medical history of heart failure with preserved ejection fraction, pulmonary hypertension attributed to amphetamine use, last use 4 days ago, unstable housing circumstances currently homeless, who reports he has had an insidious onset of shortness of breath and chest pain for the past week or so that is progressively gotten to the point that he is short of breath at rest and unable to do any activity whatsoever without severe symptoms.  He denies any loss of consciousness.  He does have a history of an arterial thrombus in his right leg however no history of VTE.  CT angiogram of the chest demonstrated acute pulmonary emboli in the distal left main pulmonary artery and bilateral segmental pulmonary emboli.  He is tachycardic on carvedilol.  He is normotensive with systolics in the 120s to 140s.  He is ill-appearing and diaphoretic and tachypneic.  Labs are notable for a BNP of 3300, previous baseline 1300, lactic acid 2, troponin mildly elevated.  ECG shows sinus tachycardia. He denies any recent trauma, no peptic ulcer disease, GI bleed, hematuria, hematochezia or melena.  No blood thinners or anticoagulants. Taken from Dr Hernandez note. Given 1/2 dose TPA and admitted to ICU for monitoring.        Hospital Course  7/31 admitted post TPA for submassive PE    Interval Problem Update  Reviewed last 24 hour events:  Neuro: aox4 moves all ext, rass 0   HR: 90's  SBP: 120-170's  Tmax: afebrle  GI: diet regular  UOP: 2.1L  Lines: peripheral IVs  Resp: 2l n/c   Vte: heparin gtt  PPI/H2:n/a  Antibx: none  -1L net -527ml  Force diuresis for acute on chronic pulm htn  Follow up echo and lower ext doppler  Check u/a per CT chest report pending  Echo with new depressed LV function check trop, EKG  Restart home lisinopril      Review of Systems  Review of Systems   Constitutional:  Negative for fever and malaise/fatigue.   HENT:  Negative for congestion.    Eyes:  Negative for double vision.   Respiratory:  Negative for cough and shortness of breath.    Cardiovascular:  Negative for chest pain, claudication and leg swelling.   Gastrointestinal:  Negative for abdominal pain, nausea and vomiting.   Genitourinary:  Negative for dysuria.   Musculoskeletal:  Negative for myalgias.   Neurological:  Negative for dizziness, focal weakness, seizures and weakness.   Psychiatric/Behavioral:  Positive for substance abuse. Negative for depression.         Vital Signs for last 24 hours   Temp:  [36.6 °C (97.8 °F)-37.1 °C (98.8 °F)] 36.7 °C (98 °F)  Pulse:  [] 89  Resp:  [16-65] 28  BP: (106-172)/() 130/81  SpO2:  [87 %-97 %] 95 %    Hemodynamic parameters for last 24 hours       Respiratory Information for the last 24 hours       Physical Exam   Physical Exam  Vitals and nursing note reviewed.   Constitutional:       General: He is not in acute distress.     Appearance: Normal appearance. He is not ill-appearing.      Comments: Not ill appearing getting Echo at time of evaluation   HENT:      Head: Normocephalic.      Mouth/Throat:      Mouth: Mucous membranes are moist.   Eyes:      Pupils: Pupils are equal, round, and reactive to light.   Cardiovascular:      Rate and Rhythm: Normal rate.      Heart sounds: No murmur heard.  Pulmonary:      Effort: No respiratory distress.      Breath sounds: No stridor. No wheezing or rhonchi.   Abdominal:      General: There is no distension.      Palpations: There is no mass.      Tenderness: There is no abdominal tenderness.      Hernia: No hernia is present.   Musculoskeletal:         General: No swelling or tenderness.      Comments: Warm extremities without any asymmetry of edema   Skin:     Coloration: Skin is not jaundiced or pale.   Neurological:      General: No focal deficit present.       Mental Status: He is alert and oriented to person, place, and time.      Cranial Nerves: No cranial nerve deficit.      Sensory: No sensory deficit.      Motor: No weakness.      Coordination: Coordination normal.   Psychiatric:         Mood and Affect: Mood normal.         Medications  Current Medications[1]    Fluids    Intake/Output Summary (Last 24 hours) at 8/1/2025 1124  Last data filed at 8/1/2025 0800  Gross per 24 hour   Intake 2500.81 ml   Output 2975 ml   Net -474.19 ml       Laboratory          Recent Labs     07/31/25  1545 07/31/25  1709 08/01/25  0433   SODIUM 135  --  137   POTASSIUM 4.5  --  3.7   CHLORIDE 106  --  101   CO2 16*  --  22   BUN 12  --  14   CREATININE 1.00  --  1.11   MAGNESIUM  --  1.7 1.9   PHOSPHORUS  --  2.2* 2.9   CALCIUM 8.6  --  8.6     Recent Labs     07/31/25  1545 08/01/25  0433   ALTSGPT 168* 150*   ASTSGOT 89* 69*   ALKPHOSPHAT 225* 210*   TBILIRUBIN 0.8 1.1   GLUCOSE 142* 113*     Recent Labs     07/31/25  1327 07/31/25  1545 08/01/25  0433   WBC 15.7*  --  16.0*   NEUTSPOLYS 63.20  --  65.60   LYMPHOCYTES 22.70  --  20.60*   MONOCYTES 10.70  --  10.60   EOSINOPHILS 1.60  --  1.60   BASOPHILS 0.60  --  0.50   ASTSGOT  --  89* 69*   ALTSGPT  --  168* 150*   ALKPHOSPHAT  --  225* 210*   TBILIRUBIN  --  0.8 1.1     Recent Labs     07/31/25  1327 07/31/25  1725 08/01/25  0100 08/01/25  0433   RBC 5.07  --   --  4.88   HEMOGLOBIN 16.7  --   --  15.6   HEMATOCRIT 50.8  --   --  45.6   PLATELETCT 193  --   --  180   PROTHROMBTM  --  15.5* 17.3*  --    APTT  --  26.5 29.7  --    INR  --  1.23* 1.41*  --        Imaging  X-Ray:  I have personally reviewed the images and compared with prior images.  EKG:  I have personally reviewed the images and compared with prior images.  CT:    Reviewed  Echo:   Reviewed    Assessment/Plan  Acute pulmonary embolism with acute cor pulmonale, unspecified pulmonary embolism type (HCC)- (present on admission)  Assessment & Plan  Very difficult case  with his pre-existing pulmonary hypertension from meth abuse seen on his prior echo 3/2025  Due to his poor reserve given half dose lytics since not an IR candidate due to the peripheral location.   Continue ICU monitoring for 24hrs post lytics  Of note this is not CTEPH  Will need strict avoidance of methamphetamine and his homeless nature and medication compliance will unfortunately have poor outcome.   Follow up official read of echo, lower ext doppler  Heparin gtt during post TPA then go to [facility] for at least 3 months and could consider longer with his CHF hx.     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   Recheck trop and EKG  Start diuresis for right sided congestion    Methamphetamine abuse (HCC)  Assessment & Plan  Ongoing active abuse will need to stop this behavior  Console when appropriate    Tobacco abuse counseling  Assessment & Plan  Recommend cessation    Pulmonary hypertension (HCC)- (present on admission)  Assessment & Plan  Hx of due to ongoing meth abuse now with small PE but of note had significant disease prior to PE  Could consider follow up on PH clinic upon discharge      Elevated troponin- (present on admission)  Assessment & Plan  Due to acute stress of PE serial monitor         VTE:  Heparin  Ulcer: Not Indicated  Lines: None    I have performed a physical exam and reviewed and updated ROS and Plan today (8/1/2025). In review of yesterday's note (7/31/2025), there are no changes except as documented above.     Discussed patient condition and risk of morbidity and/or mortality with RN, RT, Pharmacy, Charge nurse / hot rounds, and Patient    The patient remains critically ill post TPA for submassive  PE on heparin gtt with active titration.  Critical care time = 40 minutes in directly providing and coordinating critical care and extensive data review.  No time overlap and excludes procedures.         [1]   Current Facility-Administered Medications   Medication Dose Route Frequency Provider Last Rate Last Admin    magnesium sulfate IVPB premix 2 g  2 g Intravenous Once Ko Dawson M.D. 25 mL/hr at 08/01/25 0927 2 g at 08/01/25 0927    furosemide (Lasix) injection 20 mg  20 mg Intravenous DAILY Ko Dawson M.D.   20 mg at 08/01/25 1056    acetaminophen (Tylenol) tablet 650 mg  650 mg Oral Q6HRS PRN Stevan Tony M.D.        labetalol (Normodyne/Trandate) injection 10 mg  10 mg Intravenous Q4HRS PRN Stevan Tony M.D.        ondansetron (Zofran) syringe/vial injection 4 mg  4 mg Intravenous Q4HRS PRN Stevan Tony M.D.        ondansetron (Zofran ODT) dispertab 4 mg  4 mg Oral Q4HRS PRN Stevan Tony M.D.        promethazine (Phenergan) tablet 12.5-25 mg  12.5-25 mg Oral Q4HRS PRN Stevan Tony M.D.        promethazine (Phenergan) suppository 12.5-25 mg  12.5-25 mg Rectal Q4HRS PRN Stevan Tony M.D.        prochlorperazine (Compazine) injection 5-10 mg  5-10 mg Intravenous Q4HRS PRN Stevan Tony M.D.        guaiFENesin dextromethorphan (Robitussin DM) 100-10 MG/5ML syrup 10 mL  10 mL Oral Q6HRS PRN Stevan Tony M.D.        senna-docusate (Pericolace Or Senokot S) 8.6-50 MG per tablet 2 Tablet  2 Tablet Oral Q EVENING Stevan Tony M.D.   2 Tablet at 07/31/25 2019    And    polyethylene glycol/lytes (Miralax) Packet 1 Packet  1 Packet Oral QDAY PRN Stevan Tony M.D.        atorvastatin (Lipitor) tablet 40 mg  40 mg Oral Nightly Stevan Tony M.D.   40 mg at 07/31/25 2020    carvedilol (Coreg) tablet 25 mg  25 mg Oral BID WITH MEALS Stevan Tony M.D.   25 mg at 08/01/25 0749    lisinopril (Prinivil) tablet 20 mg  20 mg Oral DAILY Ko Dawson M.D.        insulin lispro (HumaLOG,AdmeLOG)  subcutaneous injection  2-9 Units Subcutaneous 4X/DAY ACHS Stevan Tony M.D.        And    dextrose 50 % (D50W) injection 25 g  25 g Intravenous Q15 MIN PRN Stevan Tony M.D.        aspirin EC tablet 81 mg  81 mg Oral DAILY Stevan Tony M.D.   81 mg at 08/01/25 0613    nicotine (Nicoderm) 21 MG/24HR 21 mg  21 mg Transdermal Daily-0600 Stevan Tony M.D.        And    nicotine polacrilex (Nicorette) 2 MG piece 2 mg  2 mg Oral Q HOUR PRN Stevan Tony M.D.        mometasone-formoterol (Dulera) 200-5 MCG/ACT inhaler 2 Puff  2 Puff Inhalation BID (RT) Stevan Toyn M.D.   2 Puff at 08/01/25 1049    tiotropium (Spiriva Respimat) 2.5 MCG/ACT inhalation spray 5 mcg  5 mcg Inhalation QDAILY (RT) Stevan Tony M.D.   5 mcg at 08/01/25 1050    montelukast (Singulair) tablet 10 mg  10 mg Oral Nightly Stevan Tony M.D.   10 mg at 07/31/25 2212    benzonatate (Tessalon) capsule 100 mg  100 mg Oral TID PRN Stevan oTny M.D.        heparin infusion 25,000 units in 500 mL 0.45% NACL  0-30 Units/kg/hr (Adjusted) Intravenous Continuous Antonio Hernandez M.D. 25.3 mL/hr at 08/01/25 0900 16 Units/kg/hr at 08/01/25 0900    heparin injection 2,000 Units  2,000 Units Intravenous PRN Antonio Hernandez M.D.   2,000 Units at 08/01/25 0900    MD Alert...ICU Electrolyte Replacement per Pharmacy   Other PHARMACY TO DOSE Chu Abdul M.D.        NS infusion   Intravenous Continuous Maria Ines Olsen   Stopped at 08/01/25 0052

## 2025-08-01 NOTE — CONSULTS
Pulmonary Consultation    Date of consult: 7/31/2025    Referring Physician  Stevan Tony M.D.    Reason for Consultation  Acute submassive high risk pulmonary embolism    History of Presenting Illness  54 y.o. male who presented 7/31/2025 with complaints of shortness of breath and chest pain for the past week.  He has a past medical history of heart failure with preserved ejection fraction, pulmonary hypertension attributed to amphetamine use, last use 4 days ago, unstable housing circumstances currently homeless, who reports he has had an insidious onset of shortness of breath and chest pain for the past week or so that is progressively gotten to the point that he is short of breath at rest and unable to do any activity whatsoever without severe symptoms.  He denies any loss of consciousness.  He does have a history of an arterial thrombus in his right leg however no history of VTE.  CT angiogram of the chest demonstrated acute pulmonary emboli in the distal left main pulmonary artery and bilateral segmental pulmonary emboli.  He is tachycardic on carvedilol.  He is normotensive with systolics in the 120s to 140s.  He is ill-appearing and diaphoretic and tachypneic.  Labs are notable for a BNP of 3300, previous baseline 1300, lactic acid 2, troponin mildly elevated.  ECG shows sinus tachycardia    He denies any recent trauma, no peptic ulcer disease, GI bleed, hematuria, hematochezia or melena.  No blood thinners or anticoagulants    Code Status  Full Code    Review of Systems  Review of Systems   Constitutional:  Positive for diaphoresis and malaise/fatigue.   Respiratory:  Positive for shortness of breath. Negative for cough, hemoptysis and sputum production.    Cardiovascular:  Positive for chest pain and orthopnea.   Psychiatric/Behavioral:  Positive for substance abuse.    All other systems reviewed and are negative.    Past Medical History   has a past medical history of Congestive heart failure (HCC), DVT  (deep venous thrombosis) (HCC), Hypertension, and Pain and swelling of left knee (8/24/2019).    Surgical History   has a past surgical history that includes hernia repair; hernia repair (Right, 1990); hand surgery (Right, 1995); thrombectomy (Right, 05/21/2019); and open reduction.    Family History  Family History   Problem Relation Age of Onset    Heart Disease Father     Cancer Father         I don't have the details about what type or anything else really. [place] & it happened fairly quickly.    Drug abuse Father     Alcohol abuse Father     Blood Clots Brother     Heart Disease Brother         pacemaker    Drug abuse Brother     Alcohol abuse Brother     Blood Clots Paternal Grandmother     Heart Disease Paternal Grandmother     Stroke Paternal Grandmother     Heart Attack Paternal Uncle 60    Diabetes Maternal Grandmother     Drug abuse Mother     Alcohol abuse Mother     Drug abuse Maternal Uncle     Alcohol abuse Maternal Uncle        Social History   reports that he has been smoking cigarettes. He has a 7.5 pack-year smoking history. He has never used smokeless tobacco. He reports current drug use. Drugs: Marijuana, Methamphetamines, Intravenous, and Inhaled. He reports that he does not drink alcohol.    Medications  Home Medications       Reviewed by Sera Ferrell (Pharmacy Tech) on 07/31/25 at 1855  Med List Status: Complete     Medication Last Dose Status   acetaminophen (TYLENOL) 500 MG Tab 7/30/2025 Active   aspirin 81 MG EC tablet 7/30/2025 Active   atorvastatin (LIPITOR) 40 MG Tab 7/30/2025 Active   carvedilol (COREG) 25 MG Tab 7/30/2025 Active   lisinopril (PRINIVIL) 20 MG Tab 7/30/2025 Active                  Audit from Redirected Encounters    **Home medications have not yet been reviewed for this encounter**       Current Medications[1]    Allergies  Allergies[2]    Vital Signs last 24 hours  Temp:  [36.7 °C (98 °F)-37.1 °C (98.8 °F)] 36.7 °C (98 °F)  Pulse:  [105-118]  118  Resp:  [16-22] 22  BP: (106-147)/(66-96) 147/96  SpO2:  [87 %-97 %] 93 %    Physical Exam  Physical Exam  Vitals and nursing note reviewed.   Constitutional:       General: He is in acute distress.      Appearance: He is well-developed. He is ill-appearing and diaphoretic.      Comments: Very pleasant   HENT:      Nose: Nose normal.      Mouth/Throat:      Pharynx: No oropharyngeal exudate.   Eyes:      General: No scleral icterus.        Right eye: No discharge.         Left eye: No discharge.      Conjunctiva/sclera: Conjunctivae normal.      Pupils: Pupils are equal, round, and reactive to light.   Neck:      Thyroid: No thyromegaly.      Vascular: No JVD.      Trachea: No tracheal deviation.   Cardiovascular:      Rate and Rhythm: Regular rhythm. Tachycardia present.      Heart sounds: Murmur heard.   Pulmonary:      Effort: Respiratory distress present.      Breath sounds: Normal breath sounds. No stridor. No wheezing or rales.   Abdominal:      General: There is no distension.      Palpations: Abdomen is soft.      Tenderness: There is no abdominal tenderness. There is no guarding.   Musculoskeletal:         General: No tenderness. Normal range of motion.      Cervical back: Neck supple.      Right lower leg: Edema present.      Left lower leg: Edema present.   Lymphadenopathy:      Cervical: No cervical adenopathy.   Skin:     General: Skin is warm.      Capillary Refill: Capillary refill takes less than 2 seconds.      Coloration: Skin is not pale.      Findings: No erythema.   Neurological:      Mental Status: He is alert and oriented to person, place, and time.      Sensory: No sensory deficit.      Motor: No abnormal muscle tone.      Coordination: Coordination normal.      Deep Tendon Reflexes: Reflexes normal.   Psychiatric:         Behavior: Behavior normal.         Thought Content: Thought content normal.         Judgment: Judgment normal.       Fluids    Intake/Output Summary (Last 24 hours) at  2025  Last data filed at 2025 1752  Gross per 24 hour   Intake --   Output 1000 ml   Net -1000 ml       Laboratory  Recent Results (from the past 48 hours)   EKG    Collection Time: 25  1:01 PM   Result Value Ref Range    Report       [facility] Emergency Dept.    Test Date:  2025  Pt Name:    EDMUNDO OROPEZA                 Department: ER  MRN:        [id number]                      Room:  Gender:     Male                         Technician: [id number]  :        1970                   Requested By:ER TRIAGE PROTOCOL  Order #:    [id number]                    Reading MD: ETHEL BREEN. AMD    Measurements  Intervals                                Axis  Rate:       103                          P:          70  UT:         147                          QRS:        88  QRSD:       105                          T:          44  QT:         364  QTc:        477    Interpretive Statements  Sinus tachycardia  Borderline prolonged QT interval  Compared to ECG 2025 18:52:27  No significant changes  Electronically Signed On 2025 13:01:25 PDT by ETHEL BREEN. AMD     CBC with Differential    Collection Time: 25  1:27 PM   Result Value Ref Range    WBC 15.7 (H) 4.8 - 10.8 K/uL    RBC 5.07 4.70 - 6.10 M/uL    Hemoglobin 16.7 14.0 - 18.0 g/dL    Hematocrit 50.8 42.0 - 52.0 %    .2 (H) 81.4 - 97.8 fL    MCH 32.9 27.0 - 33.0 pg    MCHC 32.9 32.3 - 36.5 g/dL    RDW 50.0 35.9 - 50.0 fL    Platelet Count 193 164 - 446 K/uL    MPV 9.6 9.0 - 12.9 fL    Neutrophils-Polys 63.20 44.00 - 72.00 %    Lymphocytes 22.70 22.00 - 41.00 %    Monocytes 10.70 0.00 - 13.40 %    Eosinophils 1.60 0.00 - 6.90 %    Basophils 0.60 0.00 - 1.80 %    Immature Granulocytes 1.20 (H) 0.00 - 0.90 %    Nucleated RBC 0.10 0.00 - 0.20 /100 WBC    Neutrophils (Absolute) 9.90 (H) 1.82 - 7.42 K/uL    Lymphs (Absolute) 3.55 1.00 - 4.80 K/uL    Monos (Absolute) 1.68 (H) 0.00 - 0.85 K/uL    Eos  (Absolute) 0.25 0.00 - 0.51 K/uL    Baso (Absolute) 0.09 0.00 - 0.12 K/uL    Immature Granulocytes (abs) 0.18 (H) 0.00 - 0.11 K/uL    NRBC (Absolute) 0.02 K/uL   Troponins in two (2) hours    Collection Time: 07/31/25  2:30 PM   Result Value Ref Range    Troponin T 34 (H) 6 - 19 ng/L   LACTIC ACID    Collection Time: 07/31/25  2:30 PM   Result Value Ref Range    Lactic Acid 2.0 0.5 - 2.0 mmol/L   BLOOD CULTURE    Collection Time: 07/31/25  2:30 PM    Specimen: Peripheral; Blood   Result Value Ref Range    Significant Indicator NEG     Source BLD     Site PERIPHERAL     Culture Result       No Growth  Note: Blood cultures are incubated for 5 days and  are monitored continuously.Positive blood cultures  are called to the RN and reported as soon as  they are identified.     BLOOD CULTURE    Collection Time: 07/31/25  3:45 PM    Specimen: Peripheral; Blood   Result Value Ref Range    Significant Indicator NEG     Source BLD     Site PERIPHERAL     Culture Result       No Growth  Note: Blood cultures are incubated for 5 days and  are monitored continuously.Positive blood cultures  are called to the RN and reported as soon as  they are identified.     Comp Metabolic Panel    Collection Time: 07/31/25  3:45 PM   Result Value Ref Range    Sodium 135 135 - 145 mmol/L    Potassium 4.5 3.6 - 5.5 mmol/L    Chloride 106 96 - 112 mmol/L    Co2 16 (L) 20 - 33 mmol/L    Anion Gap 13.0 7.0 - 16.0    Glucose 142 (H) 65 - 99 mg/dL    Bun 12 8 - 22 mg/dL    Creatinine 1.00 0.50 - 1.40 mg/dL    Calcium 8.6 8.5 - 10.5 mg/dL    Correct Calcium 9.1 8.5 - 10.5 mg/dL    AST(SGOT) 89 (H) 12 - 45 U/L    ALT(SGPT) 168 (H) 2 - 50 U/L    Alkaline Phosphatase 225 (H) 30 - 99 U/L    Total Bilirubin 0.8 0.1 - 1.5 mg/dL    Albumin 3.4 3.2 - 4.9 g/dL    Total Protein 6.4 6.0 - 8.2 g/dL    Globulin 3.0 1.9 - 3.5 g/dL    A-G Ratio 1.1 g/dL   proBrain Natriuretic Peptide, NT    Collection Time: 07/31/25  3:45 PM   Result Value Ref Range    NT-proBNP 3309  (H) 0 - 125 pg/mL   TROPONIN    Collection Time: 07/31/25  3:45 PM   Result Value Ref Range    Troponin T 30 (H) 6 - 19 ng/L   ESTIMATED GFR    Collection Time: 07/31/25  3:45 PM   Result Value Ref Range    GFR (CKD-EPI) 90 >60 mL/min/1.73 m 2   URINE DRUG SCREEN    Collection Time: 07/31/25  4:16 PM   Result Value Ref Range    Amphetamines Urine Positive (A) Negative    Barbiturates Negative Negative    Benzodiazepines Negative Negative    Cocaine Metabolite Negative Negative    Fentanyl, Urine Negative Negative    Methadone Negative Negative    Opiates Negative Negative    Oxycodone Negative Negative    Phencyclidine -Pcp Negative Negative    Propoxyphene Negative Negative    Cannabinoid Metab Positive (A) Negative   DIAGNOSTIC ALCOHOL    Collection Time: 07/31/25  5:09 PM   Result Value Ref Range    Diagnostic Alcohol <10.1 <10.1 mg/dL   TROPONIN    Collection Time: 07/31/25  5:09 PM   Result Value Ref Range    Troponin T 29 (H) 6 - 19 ng/L   D-DIMER    Collection Time: 07/31/25  5:25 PM   Result Value Ref Range    D-Dimer >20.00 (H) 0.00 - 0.50 ug/mL (FEU)   Heparin Anti-Xa    Collection Time: 07/31/25  5:25 PM   Result Value Ref Range    Heparin Xa (UFH) <0.10 IU/mL   Prothrombin Time    Collection Time: 07/31/25  5:25 PM   Result Value Ref Range    PT 15.5 (H) 12.0 - 14.6 sec    INR 1.23 (H) 0.87 - 1.13   APTT    Collection Time: 07/31/25  5:25 PM   Result Value Ref Range    APTT 26.5 24.7 - 36.0 sec     Imaging  CT-CTA CHEST PULMONARY ARTERY W/ RECONS   Final Result      1.  Acute pulmonary emboli in the distal left main pulmonary artery. Bilateral segmental pulmonary emboli.   2.  Evidence of right heart strain.   3.  Small area of consolidation in the left lower lobe. This could represent pulmonary infarct or pneumonia.   4.  Scattered ground glass densities of the bilateral lower lobes. This could represent atelectasis or pneumonitis.   5.  Punctate, nonobstructing right renal stone.   6.  Bilateral  perinephric fat stranding. Correlate with urinalysis for acute infection.               Dr. Mckeon discussed these findings with Dr. Mendez at 7/31/2025 5:58 PM      DX-CHEST-PORTABLE (1 VIEW)   Final Result      Cardiomegaly.      EC-ECHOCARDIOGRAM COMPLETE W/O CONT    (Results Pending)   US-EXTREMITY VENOUS LOWER BILAT    (Results Pending)       Assessment/Plan    #Acute submassive high risk pulmonary embolism-  with myocardial necrosis and right heart-strain as evidenced by elevated cardiac enzymes, BNP, elevated RV/LV ratio  #Chronic HFpEF  #Chronic pulmonary hypertension  #Elevated transaminitis, potential congestive hepatopathy  #Active amphetamine use    He is ill appearing and diaphoretic, tachypneic and tachycardic on beta-blockade.  He has significant underlying cardiac disease (HFpEF) and pulmonary hypertension, and I have serious concern about his heart's ability to compensate for the additional burden of this thromboembolism.  Imaging reviewed with IR and does not appear to be amendable to catheter directed therapy due to the segmental/subsegmental/distal burden of the thrombus.  LUKE 5. sPESI 2.     The treatment options were discussed with patient and pharmacy. Benefits of low-dose TPA discussed: decreased mortality and hemodynamic collapse (PEITHO, NNT 33; MOPPETT NNT 12), reduced recurrent PE (PEITHO ), reduced pulmonary HTN (MOPPETT NNT 2). Risks of low dose TPA discussed: Extracranial Bleeding 6.3% (PEITHO NNH 20), Intracranial Bleeding 2% (PEITHO NNH 45)    Risk/benefits/alternatives discussed extensively with the patient, who has agreed to proceed with low dose tPA    Plan:  - 1/2 dose tPA protocol  - Admit to ICU  - Post-thrombolysis protocol including heparin infusion monitoring Xa levels  - Cardiac monitoring   - RT/O2 Protocols  - Titrate supplemental FiO2 to maintain SpO2 >88%  - LE US, Echo    Discussed patient condition and risk of morbidity and/or mortality with Hospitalist, RN,  Pharmacy, and ERP.      The patient remains critically ill.  Critical care time = 100 minutes in directly providing and coordinating critical care and extensive data review.  No time overlap and excludes procedures.    This note was generated using voice recognition software which has a chance of producing errors of grammar and content.  I have made every reasonable attempt to find and correct any errors, but it should be expected that some may not be found prior to finalization of this note.  __________  Antonio Hernandez MD  Pulmonary and Critical Care Medicine  Counts include 234 beds at the [facility]         [1]   Current Facility-Administered Medications   Medication Dose Route Frequency Provider Last Rate Last Admin    LR (Bolus) infusion 500 mL  500 mL Intravenous Once PRN Stevan Tony M.D.        acetaminophen (Tylenol) tablet 650 mg  650 mg Oral Q6HRS PRN Stevan Tony M.D.        labetalol (Normodyne/Trandate) injection 10 mg  10 mg Intravenous Q4HRS PRN Stevan Tony M.D.        ondansetron (Zofran) syringe/vial injection 4 mg  4 mg Intravenous Q4HRS PRN Stevan Tony M.D.        ondansetron (Zofran ODT) dispertab 4 mg  4 mg Oral Q4HRS PRN Stevan Tony M.D.        promethazine (Phenergan) tablet 12.5-25 mg  12.5-25 mg Oral Q4HRS PRHENRIK Tony M.D.        promethazine (Phenergan) suppository 12.5-25 mg  12.5-25 mg Rectal Q4HRS PRN Stevan Tony M.D.        prochlorperazine (Compazine) injection 5-10 mg  5-10 mg Intravenous Q4HRS PRN Stevan Tony M.D.        guaiFENesin dextromethorphan (Robitussin DM) 100-10 MG/5ML syrup 10 mL  10 mL Oral Q6HRS PRN Stevan Tony M.D.        senna-docusate (Pericolace Or Senokot S) 8.6-50 MG per tablet 2 Tablet  2 Tablet Oral Q EVENING Stevan Tony M.D.        And    polyethylene glycol/lytes (Miralax) Packet 1 Packet  1 Packet Oral QDAY PRN Stevan Tony M.D.        atorvastatin (Lipitor) tablet 40 mg  40 mg Oral Nightly Stevan Tony M.D.        carvedilol (Coreg) tablet 25 mg  25 mg Oral BID  WITH MEALS Stevan Tony M.D.        [Held by provider] lisinopril (Prinivil) tablet 20 mg  20 mg Oral DAILY Stevan Tony M.D.        furosemide (Lasix) injection 40 mg  40 mg Intravenous BID Stevan Tony M.D.        insulin lispro (HumaLOG,AdmeLOG) subcutaneous injection  2-9 Units Subcutaneous 4X/DAY ACHS Stevan Tony M.D.        And    dextrose 50 % (D50W) injection 25 g  25 g Intravenous Q15 MIN PRN Stevan Tony M.D.        aspirin EC tablet 81 mg  81 mg Oral DAILY Stevan Tony M.D.        [START ON 8/1/2025] nicotine (Nicoderm) 21 MG/24HR 21 mg  21 mg Transdermal Daily-0600 Stevan Tony M.D.        And    nicotine polacrilex (Nicorette) 2 MG piece 2 mg  2 mg Oral Q HOUR PRN Stevan Tony M.D.        mometasone-formoterol (Dulera) 200-5 MCG/ACT inhaler 2 Puff  2 Puff Inhalation BID (RT) Stevan Tony M.D.        tiotropium (Spiriva Respimat) 2.5 MCG/ACT inhalation spray 5 mcg  5 mcg Inhalation QDAILY (RT) Stevan Tony M.D.        montelukast (Singulair) tablet 10 mg  10 mg Oral Nightly Stevan Tony M.D.        benzonatate (Tessalon) capsule 100 mg  100 mg Oral TID PRN Stevan Tony M.D.        alteplase (Activase) SOLR 10 mg  10 mg Intravenous Once Antonio Hernandez M.D.        Followed by    alteplase (Activase) SOLR 40 mg  40 mg Intravenous Once Antonio Hernandez M.D.        Followed by    NS infusion   Intravenous Once Antonio Hernandez M.D.        heparin infusion 25,000 units in 500 mL 0.45% NACL  0-30 Units/kg/hr (Adjusted) Intravenous Continuous Antonio Hernandez M.D.        heparin injection 2,000 Units  2,000 Units Intravenous PRN Antonio Hernandez M.D.         Current Outpatient Medications   Medication Sig Dispense Refill    aspirin 81 MG EC tablet Take 81 mg by mouth every day.      acetaminophen (TYLENOL) 500 MG Tab Take 1,500 mg by mouth one time as needed for Mild Pain. 3 tablets= 1500mg      carvedilol (COREG) 25 MG Tab Take 1 Tablet by mouth 2 times a day with meals. 60 Tablet 3     lisinopril (PRINIVIL) 20 MG Tab Take 1 Tablet by mouth every day. 30 Tablet 3    atorvastatin (LIPITOR) 40 MG Tab Take 1 Tablet by mouth every evening. 30 Tablet 5   [2] No Known Allergies

## 2025-08-01 NOTE — ASSESSMENT & PLAN NOTE
Noted to have acute extensive bilateral PE on CTA chest, radiographic RV strain  STAT heparin IV 6300 units bolus  Initiated on heparin drip  Titrate to Xa achieve therapeutic goal  Transition to DOAC when appropriate    Check TTE for evaluation of possible RV strain    Initially plan for hospitalization to medicine service in telemetry unit  However, after discussing with pulmonary attending, plan to proceed with giving tPA and admit to ICU    tPA as per pulmonary  Admit to ICU  Defer further care to ICU

## 2025-08-01 NOTE — PROGRESS NOTES
4 Eyes Skin Assessment Completed by MANISH Bauman and MANISH Lester.    Skin assessment is primarily focused on high risk bony prominences. Pay special attention to skin beneath and around medical devices, high risk bony prominences, skin to skin areas and areas where the patient lacks sensation to feel pain and areas where the patient previously had breakdown.     Head (Occipital):  WDL   Ears (Under Medical Devices): Red and Blanching   Nose (Under Medical Devices): WDL   Mouth:  Missing Teeth   Neck: WDL   Breast/Chest:  Pink and Blanching   Shoulder Blades:  WDL   Spine:   WDL   (R) Arm/Elbow/Hand: WDL   (L) Arm/Elbow/Hand: WDL   Abdomen: Pink, Purple/maroon, Blanching, and R & L lower abdomen redness   Pannus/Groin:  WDL   Sacrum/Coccyx:   WDL   (R) Ischial Tuberosity (Sit Bones):  WDL   (L) Ischial Tuberosity (Sit Bones):  WDL   (R) Leg:  Rash, Red, Blanching, and Scar   (L) Leg:  Rash, Red, and Blanching   (R) Heel:  WDL   (R) Foot/Toe: WDL, Pink, and Blanching   (L) Heel: WDL   (L) Foot/Toe:  Pink and Blanching       DEVICES IN USE:   Respiratory Devices:  Nasal cannula  Feeding Devices:  N/A   Lines & BP Monitoring Devices:  Midline IV, BP cuff, and Pulse ox    Orthopedic Devices:  N/A  Miscellaneous Devices:  Telemetry monitor and SCDs    PROTOCOL INTERVENTIONS:   ICU Low Airloss Bed:  Already in place  Nasal Cannula with Gray Foams:  Already in place    WOUND PHOTOS:   N/A no wounds identified    WOUND CONSULT:   N/A, no advanced wound care needs identified

## 2025-08-01 NOTE — CARE PLAN
The patient is Watcher - Medium risk of patient condition declining or worsening    Shift Goals  Clinical Goals: Thrombolytic therapy, Monitor respirations  Patient Goals: Clinical improvement  Family Goals: hima    Progress made toward(s) clinical / shift goals:  Patient received Alteplase and is receiving heparin, Lungs remain clear, Respirations mildly elevated work of breathing      Problem: Hemodynamics  Goal: Patient's hemodynamics, fluid balance and neurologic status will be stable or improve  8/1/2025 0623 by Mitul Story R.N.  Outcome: Progressing  8/1/2025 0622 by Mitul Story R.N.  Outcome: Progressing     Problem: Respiratory  Goal: Patient will achieve/maintain optimum respiratory ventilation and gas exchange  8/1/2025 0623 by Mitul Story R.N.  Outcome: Progressing  8/1/2025 0622 by Mitul Story, R.N.  Outcome: Progressing     Problem: Physical Regulation  Goal: Diagnostic test results will improve  8/1/2025 0623 by Mitul Story R.N.  Outcome: Progressing  8/1/2025 0622 by Mitul Story, R.N.  Outcome: Progressing       Patient is not progressing towards the following goals:

## 2025-08-01 NOTE — ASSESSMENT & PLAN NOTE
Likely secondary to demand ischemia  CHF and PE contributory  On heparin drip  Aspirin, statin, BB

## 2025-08-01 NOTE — ASSESSMENT & PLAN NOTE
Very difficult case with his pre-existing pulmonary hypertension from meth abuse seen on his prior echo 3/2025  Due to his poor reserve given half dose lytics since not an IR candidate due to the peripheral location.   Continue ICU monitoring for 24hrs post lytics  Of note this is not CTEPH  Will need strict avoidance of methamphetamine and his homeless nature and medication compliance will unfortunately have poor outcome.   Follow up official read of echo, lower ext doppler  Heparin gtt during post TPA then go to [facility] for at least 3 months and could consider longer with his CHF hx.

## 2025-08-02 PROBLEM — I26.09 PULMONARY EMBOLISM WITH ACUTE COR PULMONALE, UNSPECIFIED CHRONICITY, UNSPECIFIED PULMONARY EMBOLISM TYPE (HCC): Status: ACTIVE | Noted: 2025-08-02

## 2025-08-02 ASSESSMENT — PATIENT HEALTH QUESTIONNAIRE - PHQ9
1. LITTLE INTEREST OR PLEASURE IN DOING THINGS: NOT AT ALL
SUM OF ALL RESPONSES TO PHQ9 QUESTIONS 1 AND 2: 0
2. FEELING DOWN, DEPRESSED, IRRITABLE, OR HOPELESS: NOT AT ALL

## 2025-08-02 ASSESSMENT — ENCOUNTER SYMPTOMS
NEUROLOGICAL NEGATIVE: 1
EYES NEGATIVE: 1
NERVOUS/ANXIOUS: 1
GASTROINTESTINAL NEGATIVE: 1
SHORTNESS OF BREATH: 1
MUSCULOSKELETAL NEGATIVE: 1

## 2025-08-02 ASSESSMENT — LIFESTYLE VARIABLES: SUBSTANCE_ABUSE: 1

## 2025-08-02 ASSESSMENT — PAIN DESCRIPTION - PAIN TYPE
TYPE: ACUTE PAIN

## 2025-08-02 NOTE — PROGRESS NOTES
Patient can transfer to [facility] post TPA for high risk submassive PE w/ chronic pulmonary hypertension secondary to ongoing methamphetamine abuse.    Ko Dawson MD  Critical Care Medicine

## 2025-08-02 NOTE — PROGRESS NOTES
Bedside report received from MANISH Garvin. Patient resting in bed. Call bell within reach, bed alarm on and in place. All belongings within reach for patient. No further needs at this time.

## 2025-08-02 NOTE — CARE PLAN
The patient is Stable - Low risk of patient condition declining or worsening    Shift Goals  Clinical Goals: Stable neuro exam, stable respiratory status  Patient Goals: rest  Family Goals: ANNA    Progress made toward(s) clinical / shift goals:    Problem: Knowledge Deficit - Standard  Goal: Patient and family/care givers will demonstrate understanding of plan of care, disease process/condition, diagnostic tests and medications  Outcome: Progressing     Problem: Hemodynamics  Goal: Patient's hemodynamics, fluid balance and neurologic status will be stable or improve  Outcome: Progressing     Problem: Urinary - Renal Perfusion  Goal: Ability to achieve and maintain adequate renal perfusion and functioning will improve  Outcome: Progressing     Problem: Neuro Status  Goal: Neuro status will remain stable or improve  Outcome: Progressing

## 2025-08-02 NOTE — PROGRESS NOTES
4 Eyes Skin Assessment Completed by MANISH Avery and MANISH Hoffman.    Skin assessment is primarily focused on high risk bony prominences. Pay special attention to skin beneath and around medical devices, high risk bony prominences, skin to skin areas and areas where the patient lacks sensation to feel pain and areas where the patient previously had breakdown.     Head (Occipital):  WDL   Ears (Under Medical Devices): WDL   Nose (Under Medical Devices): WDL   Mouth:  WDL   Neck: WDL   Breast/Chest:  WDL   Shoulder Blades:  WDL   Spine:   WDL   (R) Arm/Elbow/Hand: WDL   (L) Arm/Elbow/Hand: WDL   Abdomen: WDL   Pannus/Groin:  WDL   Sacrum/Coccyx:   Pink and Blanching   (R) Ischial Tuberosity (Sit Bones):  WDL   (L) Ischial Tuberosity (Sit Bones):  WDL   (R) Leg:  Scarring to top of foot   (L) Leg:  WDL   (R) Heel:  WDL   (R) Foot/Toe: WDL   (L) Heel: WDL   (L) Foot/Toe:  WDL       DEVICES IN USE:   Respiratory Devices:  Nasal cannula  Feeding Devices:  N/A   Lines & BP Monitoring Devices:  Peripheral IV and Midline IV    Orthopedic Devices:  N/A  Miscellaneous Devices:  N/A    PROTOCOL INTERVENTIONS:   Standard/Trauma Bed:  Already in place  Dri-Stephen/Micro Climate Pads:  Already in place  Barrier Paste:  Already in place  Silicone Nasal Cannula Tubing:  Already in place  Nasal Cannula with Gray Foams:  Already in place    WOUND PHOTOS:   N/A no wounds identified    WOUND CONSULT:   N/A, no advanced wound care needs identified

## 2025-08-02 NOTE — CARE PLAN
The patient is Stable - Low risk of patient condition declining or worsening    Shift Goals  Clinical Goals: Monitor Xa, Heparin gtt, Mobility  Patient Goals: Comfort  Family Goals: ANNA    Progress made toward(s) clinical / shift goals:    Problem: Respiratory  Goal: Patient will achieve/maintain optimum respiratory ventilation and gas exchange  Outcome: Progressing  Note: Pt is tachypneic on assessment. Increased WOB, currently on 3L NC, BL is RA     Problem: Physical Regulation  Goal: Diagnostic test results will improve  Outcome: Progressing  Note: Q6H Xa lab draws in place. Pt had 1st therapeutic Xa.      Problem: Neuro Status  Goal: Neuro status will remain stable or improve  Outcome: Progressing  Note: Pt is currently A&O x 4, no c/o pain or N/T. Follows commands and GARCIA, 5/5 strength in BUE/BLE.        Patient is not progressing towards the following goals:

## 2025-08-02 NOTE — PROGRESS NOTES
Hospital Medicine Daily Progress Note    Date of Service  8/2/2025    Chief Complaint  Lucas Agee is a 54 y.o. male admitted 7/31/2025 with dyspnea, found to have pulmonary emboli    Hospital Course  This is a 54-year-old male with complaints of shortness of breath, presenting on 7/21/2025, as well as chest discomfort, chest pain for the last week, the patient has a history of systolic heart failure preserved ejection fraction, pulmonary hypertension, attributed to methamphetamine abuse, the patient stated that he used methamphetamines 4 days prior to admission, he is undomiciled, he reports that his dyspnea has been progressively gotten worse to the point where he is short of breath at rest and able to do much activity, reportedly patient has a 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, denies prior venous thrombosis, CT angiogram of the chest showed acute pulmonary emboli in the distal left main pulmonary artery and bilateral segmental pulmonary emboli, the patient was found tachycardic, chronically on carvedilol, the patient initially appeared ill, diaphoretic and tachypneic, and EKG showed sinus tachycardia, troponin was mildly elevated, the patient received half dose tPA and was admitted to the ICU level of care, in addition the patient was found to have an occlusive DVT in the left distal femoral/popliteal/posterior tibial and peroneal vein, the repeat echocardiogram shows ejection fraction of 40 to 45%, severely dilated right ventricle, estimated RVSP of 62 mmHg, moderate TR, the patient remains on a heparin drip.    Interval Problem Update  Patient seen and examined today.  Data, Medication data reviewed.  Case discussed with nursing as available.  Plan of Care reviewed with patient and notified of changes.  8/2 the patient is upset, tearful, states that he feels somewhat better, denies current pain  Afebrile, heart rate is 70s to 80s, respiration unlabored, patient is saturating  in the low 90s on 2 L nasal cannula oxygen, blood pressure in the 120s over 70s, laboratory data with a white scale of 17.2, sodium 133 glucose 154  alk phos 171  I have discussed this patient's plan of care and discharge plan at IDT rounds today with Case Management, Nursing, Nursing leadership, and other members of the IDT team.    Consultants/Specialty  critical care    Code Status  Full Code    Disposition  The patient is not medically cleared for discharge to home or a post-acute facility.  Anticipate discharge to: home with close outpatient follow-up    I have placed the appropriate orders for post-discharge needs.    Review of Systems  Review of Systems   Constitutional:  Positive for malaise/fatigue.   HENT: Negative.     Eyes: Negative.    Respiratory:  Positive for shortness of breath.    Cardiovascular:  Positive for chest pain and leg swelling.   Gastrointestinal: Negative.    Genitourinary: Negative.    Musculoskeletal: Negative.    Skin: Negative.    Neurological: Negative.    Endo/Heme/Allergies: Negative.    Psychiatric/Behavioral:  Positive for substance abuse. The patient is nervous/anxious.    All other systems reviewed and are negative.       Physical Exam  Temp:  [36.6 °C (97.8 °F)-37.1 °C (98.7 °F)] 36.9 °C (98.4 °F)  Pulse:  [79-97] 84  Resp:  [20-71] 67  BP: (103-140)/(58-95) 116/72  SpO2:  [89 %-98 %] 96 %    Physical Exam  Vitals and nursing note reviewed.   Constitutional:       Appearance: He is well-developed. He is obese. He is ill-appearing.   HENT:      Head: Normocephalic.   Eyes:      Pupils: Pupils are equal, round, and reactive to light.   Cardiovascular:      Rate and Rhythm: Normal rate and regular rhythm.      Heart sounds: Normal heart sounds.   Pulmonary:      Effort: Pulmonary effort is normal.      Breath sounds: Rhonchi present.   Abdominal:      General: Bowel sounds are normal.      Palpations: Abdomen is soft.   Genitourinary:     Penis: Normal.       Rectum:  Normal.   Musculoskeletal:         General: Swelling present. Normal range of motion.      Cervical back: Normal range of motion.      Right lower leg: Edema present.      Left lower leg: Edema present.   Skin:     General: Skin is warm.      Findings: Bruising, erythema and lesion present.   Neurological:      Mental Status: He is alert and oriented to person, place, and time.      Motor: Weakness present.         Fluids    Intake/Output Summary (Last 24 hours) at 8/2/2025 0738  Last data filed at 8/2/2025 0600  Gross per 24 hour   Intake 3408.02 ml   Output 5460 ml   Net -2051.98 ml        Laboratory  Recent Labs     07/31/25  1327 08/01/25  0433 08/02/25  0412   WBC 15.7* 16.0* 17.2*   RBC 5.07 4.88 4.78   HEMOGLOBIN 16.7 15.6 15.2   HEMATOCRIT 50.8 45.6 44.5   .2* 93.4 93.1   MCH 32.9 32.0 31.8   MCHC 32.9 34.2 34.2   RDW 50.0 44.5 44.6   PLATELETCT 193 180 179   MPV 9.6 9.5 9.5     Recent Labs     07/31/25  1545 08/01/25  0433 08/02/25  0412   SODIUM 135 137 133*   POTASSIUM 4.5 3.7 4.1   CHLORIDE 106 101 100   CO2 16* 22 22   GLUCOSE 142* 113* 154*   BUN 12 14 13   CREATININE 1.00 1.11 1.02   CALCIUM 8.6 8.6 8.5     Recent Labs     07/31/25  1725 08/01/25  0100   APTT 26.5 29.7   INR 1.23* 1.41*               Imaging  EC-ECHOCARDIOGRAM COMPLETE W/ CONT   Final Result      IR-US GUIDED PIV   Final Result    Ultrasound-guided PERIPHERAL IV INSERTION performed by    qualified nursing staff as above.      US-EXTREMITY VENOUS LOWER BILAT   Final Result      CT-CTA CHEST PULMONARY ARTERY W/ RECONS   Final Result      1.  Acute pulmonary emboli in the distal left main pulmonary artery. Bilateral segmental pulmonary emboli.   2.  Evidence of right heart strain.   3.  Small area of consolidation in the left lower lobe. This could represent pulmonary infarct or pneumonia.   4.  Scattered ground glass densities of the bilateral lower lobes. This could represent atelectasis or pneumonitis.   5.  Punctate,  nonobstructing right renal stone.   6.  Bilateral perinephric fat stranding. Correlate with urinalysis for acute infection.               Dr. Mckeon discussed these findings with Dr. Mendez at 7/31/2025 5:58 PM      DX-CHEST-PORTABLE (1 VIEW)   Final Result      Cardiomegaly.           Assessment/Plan  Acute pulmonary embolism with acute cor pulmonale, unspecified pulmonary embolism type (HCC)- (present on admission)  Assessment & Plan  Very difficult case with his pre-existing pulmonary hypertension from meth abuse seen on his prior echo 3/2025  Due to his poor reserve given half dose lytics since not an IR candidate due to the peripheral location.   Of note this is not CTEPH  Will need strict avoidance of methamphetamine and his homeless nature and medication compliance will unfortunately have poor outcome.   Heparin gtt during post TPA then go to [facility] for at least 3 months and could consider longer with his CHF hx.     Class 1 obesity in adult  Assessment & Plan  Diet and lifestyle modification  Body mass index is 31.93 kg/m².    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   Recheck trop and EKG  Start diuresis for right sided congestion    Methamphetamine abuse (HCC)  Assessment & Plan  Cessation counseling provided    Tobacco abuse counseling  Assessment & Plan  Cessation counseling provided    Hyperlipidemia- (present on admission)  Assessment & Plan  Statin    Hypertension- (present on admission)  Assessment & Plan  On CHF meds    Type 2 diabetes mellitus without complication, without long-term current use of insulin (HCC)- (present on admission)  Assessment & Plan  ISS while inpatient    Pulmonary hypertension (HCC)-  (present on admission)  Assessment & Plan  Diuresis as tolerated    Elevated troponin- (present on admission)  Assessment & Plan  Due to acute stress of PE serial monitor    Plan  8/2  Continue anticoagulation, will switch to oral anticoagulation at this time  Maintain slightly negative fluid balance, continue Lasix  GDMT, cardiac medication to continue for the patient's systolic heart failure  Monitor closely for withdrawal from illicit substances  The patient will need referral and counseling on discharge given his high risk medication regimen  Close hemodynamic and laboratory monitoring  See orders  Patient is has a high medical complexity, complex decision making and is at high risk for complication, morbidity, and mortality.  Patient is critically ill.   The patient continues to have: DVT and pulmonary emboli with cardiac compromise  The vital organ system that is affected is the: Vascular, cardiovascular  If untreated there is a high chance of deterioration into: Progressive cardiovascular compromise from pulmonary embolism  And eventually death.   The critical care that I am providing today is: Titration of heparin drip intravenously  The critical that has been undertaken is medically complex.   There has been no overlap in critical care time.   Critical Care Time not including procedures: 36 minutes    VTE prophylaxis: Heparin drip with titration    I have performed a physical exam and reviewed and updated ROS and Plan today (8/2/2025). In review of yesterday's note (8/1/2025), there are no changes except as documented above.        Please note that this dictation was created using voice recognition software. I have made every reasonable attempt to correct obvious errors, but I expect that there are errors of grammar and possibly context that I did not discover before finalizing the note.

## 2025-08-02 NOTE — CARE PLAN
The patient is Stable - Low risk of patient condition declining or worsening    Shift Goals  Clinical Goals: Q  Patient Goals: rest  Family Goals: ANNA    Progress made toward(s) clinical / shift goals:    Problem: Knowledge Deficit - Standard  Goal: Patient and family/care givers will demonstrate understanding of plan of care, disease process/condition, diagnostic tests and medications  Outcome: Progressing     Problem: Hemodynamics  Goal: Patient's hemodynamics, fluid balance and neurologic status will be stable or improve  Outcome: Progressing   Patient's vital signs within defined limits throughout shift.    Problem: Neuro Status  Goal: Neuro status will remain stable or improve  Outcome: Progressing   Patient finished Q1hr post thrombolytic neuro checks on 8/1/2025 at 2100.    Problem: Pain - Standard  Goal: Alleviation of pain or a reduction in pain to the patient’s comfort goal  Outcome: Progressing    Patient is not progressing towards the following goals:      Problem: Mobility  Goal: Risk for activity intolerance will decrease  Outcome: Not Met   Patient refusing mobilization and a CHG/bed bath. Patient educated that mobilization and bathing are essential interventions to prevent skin wounds and infections. Patient verbalized understanding and continues to refuse. Charge nurse notified.

## 2025-08-02 NOTE — ASSESSMENT & PLAN NOTE
[facility] CLINICAL PHARMACY REVIEW: ADHERENCE REVIEW  Identified care gap per [employer]; fills at [address]: ACE/ARB, Diabetes and Statin adherence    Last Visit: 3/2/21    Patient also appears to be prescribed: Atorvastatin 40mg,  Lisinopril 40mg, Glipizide er 10mg, Metformin 500mg    Patient not found in Outcomes MTM    ASSESSMENT  ACE/ARB ADHERENCE    Per Insurance Records through [employer] (2020 [facility] = 100%; YTD [place] = 100%; Potential Fail Date: 07/30/21):   Lisinopril last filled on 03/02/21 for 90 day supply. Next refill due: 05/31/21    Per Reconciled Dispense Report:  Lisinopril last filled on 03/02/21 for 90 day supply. BP Readings from Last 3 Encounters:   03/02/21 136/84   02/22/21 130/62   02/04/21 126/69     Estimated Creatinine Clearance: 82 mL/min (based on SCr of 0.74 mg/dL). DIABETES ADHERENCE    Per Insurance Records through [employer] (2020 [place] = 100%; YTD [place] = 85%; Potential Fail Date: 07/31/21):   Glipizide last filled on 003/02/21 for 90 day supply. Next refill due: 05/31/21    Per Reconciled Dispense Report:  glipizide last filled on 03/02/21 for 90 day supply. Lab Results   Component Value Date    LABA1C 8.7 (H) 03/02/2021    LABA1C 8.0 (H) 12/01/2020    LABA1C 7.5 (H) 08/31/2020     NOTE A1c <9%    STATIN ADHERENCE    Per Insurance Records through [employer] ([facility]%; YTD [place] = 100%; Potential Fail Date: 07/30/21):   ATORVASTATIN TAB 40MG last filled on 03/02/21 for 90 day supply. Next refill due: 05/31/21    Per Reconciled Dispense Report:  ATORVASTATIN TAB 40MG last filled on 03/02/21 for 90 day supply.        Lab Results   Component Value Date    CHOL 131 03/02/2021    TRIG 155 (H) 03/02/2021    HDL 37 03/02/2021    LDLCALC 71 03/02/2021     ALT   Date Value Ref Range Status   03/02/2021 16 U/L Final     AST   Date Value Ref Range Status   03/02/2021 18 U/L Final     The ASCVD Risk score (Mayte Rueda., et al., 2013) failed to calculate for the following Very difficult case with his pre-existing pulmonary hypertension from meth abuse seen on his prior echo 3/2025  Due to his poor reserve given half dose lytics since not an IR candidate due to the peripheral location.   Of note this is not CTEPH  Will need strict avoidance of methamphetamine and his homeless nature and medication compliance will unfortunately have poor outcome.   Heparin gtt during post TPA then go to [facility] for at least 3 months and could consider longer with his CHF hx.    reasons: The patient has a prior MI or stroke diagnosis     PLAN  The following are interventions that have been identified:   - Patient eligible for 90 day supply of Metformin    Attempting to reach patient to review.  Left message asking for return call. Called patient to see if she is taking her metformin    Future Appointments   Date Time Provider Gonzales Michaud   5/12/2021 10:30 AM Edie Titus MD [facility]   6/4/2021  9:00 AM DO Jason Montes   8/23/2021  2:45 PM Julia Navarro MD [facility]     CLINICAL PHARMACY CONSULT: MED RECONCILIATION/REVIEW ADDENDUM    For Pharmacy Admin Tracking Only    PHSO: Yes  Total # of Interventions Recommended: 1  - New Order #: 0 New Medication Order Reason(s):  Adherence  - Maintenance Safety Lab Monitoring #: 1  - New Therapy Lab Monitoring #: 0  Recommended intervention potential cost savings: 0  Total Interventions Accepted: 0  Time Spent (min): Melodie Robb

## 2025-08-03 ENCOUNTER — APPOINTMENT (OUTPATIENT)
Dept: RADIOLOGY | Facility: MEDICAL CENTER | Age: 55
DRG: 175 | End: 2025-08-03
Attending: STUDENT IN AN ORGANIZED HEALTH CARE EDUCATION/TRAINING PROGRAM
Payer: MEDICAID

## 2025-08-03 ENCOUNTER — PHARMACY VISIT (OUTPATIENT)
Dept: PHARMACY | Facility: MEDICAL CENTER | Age: 55
End: 2025-08-03
Payer: COMMERCIAL

## 2025-08-03 PROCEDURE — RXMED WILLOW AMBULATORY MEDICATION CHARGE: Performed by: STUDENT IN AN ORGANIZED HEALTH CARE EDUCATION/TRAINING PROGRAM

## 2025-08-03 PROCEDURE — 73600 X-RAY EXAM OF ANKLE: CPT | Mod: LT

## 2025-08-03 ASSESSMENT — FIBROSIS 4 INDEX: FIB4 SCORE: 0.89

## 2025-08-03 ASSESSMENT — PAIN DESCRIPTION - PAIN TYPE
TYPE: ACUTE PAIN

## 2025-08-03 NOTE — DISCHARGE SUMMARY
"Discharge Summary    CHIEF COMPLAINT ON ADMISSION  Chief Complaint   Patient presents with    Chest Pain     X2 days \"tightness\"     Shortness of Breath     X3 days   Worse with exertion   Hx of CHF     Denies cough        Reason for Admission  SOB; Chest Pain     Admission Date  7/31/2025    CODE STATUS  Full Code    HPI & HOSPITAL COURSE    uLcas Agee is a 54-year-old male with PMHx HTN, DMT2, methamphetamine abuse, tobacco dependence.  Admitted 7/31 for pulmonary emboli.    Per history: Patient developed chest pain over 1 week prior to admission.  This was associated with worsening dyspnea.    In the ED: CTA chest showing acute pulmonary emboli of the distal left main pulmonary artery.  Bilateral segmental pulmonary emboli.  Patient received a half dose of tPA.  He was admitted to ICU.  Patient had occlusive DVT to the left distal femoral, popliteal, posterior tibial and peroneal veins.    Echocardiogram: EF 40 to 45%.  Severely dilated right ventricle.  RVSP 62.  Patient was started on a heparin infusion and then transitioned to apixaban.    Patient did require supplemental O2 at rest.  Home oxygen was ordered and delivered to bedside prior to discharge.    At time of discharge, had a long conversation with patient regarding his ongoing lifestyle choices including methamphetamine abuse.  We discussed the importance of cessation in the setting of his heart failure and is now pulmonary emboli.  In no uncertain terms, we discussed the risk of cardiac death if patient continues to not take his medications and ongoing use of illicit substances.  Patient was tearful.  He endorses depression and chronic pain.  I have offered to start venlafaxine 75 mg twice daily; if patient tolerates this well I would recommend transitioning to 150 mg extended release daily.  He will follow-up with primary care physician in the next 1 week.  ACP time spent 25 minutes.    Therefore, he is discharged in good and stable condition to " home with close outpatient follow-up.    The patient met 2-midnight criteria for an inpatient stay at the time of discharge.    Discharge Date  8/3/2025    FOLLOW UP ITEMS POST DISCHARGE  Follow-up with primary care physician in 2 to 3 days    DISCHARGE DIAGNOSES  Principal Problem:    Acute saddle pulmonary embolism with acute cor pulmonale (HCC) (POA: Yes)  Active Problems:    Elevated troponin (POA: Yes)    Pulmonary hypertension (HCC) (POA: Yes)    Type 2 diabetes mellitus without complication, without long-term current use of insulin (HCC) (POA: Yes)    Hypertension (POA: Yes)    Hyperlipidemia (POA: Yes)    Tobacco abuse counseling (POA: Unknown)    Methamphetamine abuse (HCC) (POA: Unknown)    Acute on chronic heart failure with preserved ejection fraction (HFpEF, >= 50%) (HCC) (POA: Unknown)    Class 1 obesity in adult (POA: Unknown)    Acute pulmonary embolism with acute cor pulmonale, unspecified pulmonary embolism type (HCC) (POA: Yes)    Pulmonary embolism with acute cor pulmonale, unspecified chronicity, unspecified pulmonary embolism type (HCC) (POA: Yes)  Resolved Problems:    * No resolved hospital problems. *      FOLLOW UP  Future Appointments   Date Time Provider Department Center   8/6/2025  3:40 PM Ophelia John M.D. [facility]   10/21/2025  3:00 PM Ophelia John M.D. [facility]     Ophelia John M.D.  [address] [place] [id number]  [phone]    Follow up        MEDICATIONS ON DISCHARGE     Medication List        START taking these medications        Instructions   * apixaban 5 MG Tabs  Commonly known as: Eliquis   Take 2 Tablets by mouth 2 times a day for 6 days.  Dose: 10 mg     * apixaban 5 MG Tabs  Start taking on: August 10, 2025  Commonly known as: Eliquis   Take 2 Tablets by mouth 2 times a day for 6 days, THEN 1 Tablet 2 times a day for 30 days.     oxyCODONE immediate-release 5 MG Tabs  Commonly known as: Roxicodone   Take 1 Tablet by mouth  every 6 hours as needed for Severe Pain for up to 5 days.  Dose: 5 mg     tiotropium 2.5 MCG/ACT Aers  Start taking on: August 4, 2025  Commonly known as: Spiriva Respimat   Inhale 2 Inhalations every day.  Dose: 5 mcg     venlafaxine 75 MG Tabs  Commonly known as: Effexor   Take 1 Tablet by mouth 2 times a day.  Dose: 75 mg           * This list has 2 medication(s) that are the same as other medications prescribed for you. Read the directions carefully, and ask your doctor or other care provider to review them with you.                CONTINUE taking these medications        Instructions   aspirin 81 MG EC tablet   Take 81 mg by mouth every day.  Dose: 81 mg     atorvastatin 40 MG Tabs  Commonly known as: Lipitor   Take 1 Tablet by mouth every evening.  Dose: 40 mg     carvedilol 25 MG Tabs  Commonly known as: Coreg   Take 1 Tablet by mouth 2 times a day with meals.  Dose: 25 mg     lisinopril 20 MG Tabs  Commonly known as: Prinivil   Take 1 Tablet by mouth every day.  Dose: 20 mg            STOP taking these medications      acetaminophen 500 MG Tabs  Commonly known as: Tylenol              Allergies  Allergies[1]    DIET  Orders Placed This Encounter   Procedures    Diet Order Diet: Regular     Standing Status:   Standing     Number of Occurrences:   1     Diet::   Regular [1]       ACTIVITY  As tolerated.  Weight bearing as tolerated    CONSULTATIONS  Critical care  Pulmonary medicine    PROCEDURES  None    LABORATORY  Lab Results   Component Value Date    SODIUM 133 (L) 08/03/2025    POTASSIUM 4.2 08/03/2025    CHLORIDE 98 08/03/2025    CO2 22 08/03/2025    GLUCOSE 124 (H) 08/03/2025    BUN 15 08/03/2025    CREATININE 1.13 08/03/2025        Lab Results   Component Value Date    WBC 17.8 (H) 08/03/2025    HEMOGLOBIN 15.2 08/03/2025    HEMATOCRIT 44.1 08/03/2025    PLATELETCT 198 08/03/2025      DX-ANKLE 2- VIEWS LEFT   Final Result      1.  No fracture or dislocation of LEFT ankle.   2.  Diffuse soft tissue  swelling.      EC-ECHOCARDIOGRAM COMPLETE W/ CONT   Final Result      IR-US GUIDED PIV   Final Result    Ultrasound-guided PERIPHERAL IV INSERTION performed by    qualified nursing staff as above.      US-EXTREMITY VENOUS LOWER BILAT   Final Result      CT-CTA CHEST PULMONARY ARTERY W/ RECONS   Final Result      1.  Acute pulmonary emboli in the distal left main pulmonary artery. Bilateral segmental pulmonary emboli.   2.  Evidence of right heart strain.   3.  Small area of consolidation in the left lower lobe. This could represent pulmonary infarct or pneumonia.   4.  Scattered ground glass densities of the bilateral lower lobes. This could represent atelectasis or pneumonitis.   5.  Punctate, nonobstructing right renal stone.   6.  Bilateral perinephric fat stranding. Correlate with urinalysis for acute infection.               Dr. Mckeon discussed these findings with Dr. Mendez at 7/31/2025 5:58 PM      DX-CHEST-PORTABLE (1 VIEW)   Final Result      Cardiomegaly.            Total time of the discharge process exceeds 39 minutes.         [1] No Known Allergies

## 2025-08-03 NOTE — CARE PLAN
The patient is Stable - Low risk of patient condition declining or worsening    Shift Goals  Clinical Goals: monitor neuro status, anticoagulation  Patient Goals: comfort, rest  Family Goals: updates    Progress made toward(s) clinical / shift goals:    Problem: Knowledge Deficit - Standard  Goal: Patient and family/care givers will demonstrate understanding of plan of care, disease process/condition, diagnostic tests and medications  Outcome: Progressing     Problem: Hemodynamics  Goal: Patient's hemodynamics, fluid balance and neurologic status will be stable or improve  Outcome: Progressing     Problem: Respiratory  Goal: Patient will achieve/maintain optimum respiratory ventilation and gas exchange  Outcome: Progressing     Problem: Physical Regulation  Goal: Diagnostic test results will improve  Outcome: Progressing     Problem: Neuro Status  Goal: Neuro status will remain stable or improve  Outcome: Progressing     Problem: Skin Integrity  Goal: Skin integrity is maintained or improved  Outcome: Progressing     Problem: Fall Risk  Goal: Patient will remain free from falls  Outcome: Progressing     Problem: Mobility  Goal: Risk for activity intolerance will decrease  Outcome: Progressing     Problem: Pain - Standard  Goal: Alleviation of pain or a reduction in pain to the patient’s comfort goal  Outcome: Progressing       Patient is not progressing towards the following goals:

## 2025-08-03 NOTE — PROGRESS NOTES
Bedside report received, pt care assumed, tele box on.  Pt denies any additional needs at this time. Bed in lowest position,  pt educated on fall risk and verbalized understanding, call light within reach.

## 2025-08-03 NOTE — PROGRESS NOTES
Monitor summary:        Rhythm: SR   Rate: 82-91  Ectopy: (r)PVC, (r)PAC  Measurements: .16/.10/.38        12hr chart check

## 2025-08-03 NOTE — HOSPITAL COURSE
Lucas Agee is a 54-year-old male with PMHx HTN, DMT2, methamphetamine abuse, tobacco dependence.  Admitted 7/31 for pulmonary emboli.    Per history: Patient developed chest pain over 1 week prior to admission.  This was associated with worsening dyspnea.    In the ED: CTA chest showing acute pulmonary emboli of the distal left main pulmonary artery.  Bilateral segmental pulmonary emboli.  Patient received a half dose of tPA.  He was admitted to ICU.  Patient had occlusive DVT to the left distal femoral, popliteal, posterior tibial and peroneal veins.    Echocardiogram: EF 40 to 45%.  Severely dilated right ventricle.  RVSP 62.  Patient was started on a heparin infusion and then transitioned to apixaban.    Patient did require supplemental O2 at rest.  Home oxygen was ordered and delivered to bedside prior to discharge.    At time of discharge, had a long conversation with patient regarding his ongoing lifestyle choices including methamphetamine abuse.  We discussed the importance of cessation in the setting of his heart failure and is now pulmonary emboli.  In no uncertain terms, we discussed the risk of cardiac death if patient continues to not take his medications and ongoing use of illicit substances.  Patient was tearful.  He endorses depression and chronic pain.  I have offered to start venlafaxine 75 mg twice daily; if patient tolerates this well I would recommend transitioning to 150 mg extended release daily.  He will follow-up with primary care physician in the next 1 week.  ACP time spent 25 minutes.

## 2025-08-03 NOTE — PROGRESS NOTES
Patient has chosen to leave the hospital against medical advice. The attending physician has not discharged the patient. Patient is not a risk to himself or others. I have discussed with the patient the following:  Physician has not determined patient is ready for discharge, Risks and consequences of leaving the hospital too soon, and Benefit of continued hospitalization.      Discharge against medical advice form has been Signed.      Attending physician has been notified.         A&Ox4. IV's taken out, patient taken down via wheelchair and hospital escort. Monitor taken off; monitor room notified. Patient belongings discharged with patient, including [id number], personal meds. Pt refused to wait for Home O2 delivery. Discharge summary was reviewed with patient prior to AMA. RN provided education regarding follow up care, appointments, and medications. RN also provided education regarding when to call doctor and when to call 911.     HF booklet and core measures met.

## 2025-08-03 NOTE — DISCHARGE PLANNING
Case Management Discharge Planning    Admission Date: 7/31/2025  GMLOS: 3.8  ALOS: 3    6-Clicks ADL Score: 24  6-Clicks Mobility Score: 24      Anticipated Discharge Dispo: Discharge Disposition: Discharged to home/self care (01)  Discharge Address: [address] [address]    DME Needed: Yes    DME Ordered: Yes    Action(s) Taken: LMSW met with patient bedside to obtain choice for O2. Faxed to [facility].    Addendum @3954: Submitted for auth on Cover My Meds for Venlafaxine, [id number], tablets quantity of 60 for 30 days per request form Mikel Moody, Clinical Pharmacist.    Addendum @1506: Insurance has denied approval for the requested medication. LMSW voalted clinical pharmacist to advise.    Escalations Completed: None    Medically Clear: Yes    Next Steps: Case management to follow for further discharge needs.    Barriers to Discharge: Oxygen Delivery    Is the patient up for discharge tomorrow: No

## 2025-08-03 NOTE — FACE TO FACE
"Face to Face Note  -  Durable Medical Equipment    Eugenia Lovett M.D. - NPI: [id number]  I certify that this patient is under my care and that they had a durable medical equipment(DME)face to face encounter by myself that meets the physician DME face-to-face encounter requirements with this patient on:    Date of encounter:   Patient:                    MRN:                       YOB: 2023  Lucas Agee  [id number]  1970     The encounter with the patient was in whole, or in part, for the following medical condition, which is the primary reason for durable medical equipment:  Other - acute pulmonary embolism, CHF    I certify that, based on my findings, the following durable medical equipment is medically necessary:    Oxygen   HOME O2 Saturation Measurements:(Values must be present for Home Oxygen orders)  Room air sat at rest: 85  Room air sat with amb: 90  With liters of O2: 2, O2 sat at rest with O2: 92  With Liters of O2: NA, O2 sat with amb with O2 : NA  Is the patient mobile?: Yes  If patient feels more short of breath, they can go up to 6 liters per minute and contact healthcare provider.    Supporting Symptoms: The patient requires supplemental oxygen, as the following interventions have been tried with limited or no improvement: \"Positive expiratory pressure therapies, \"Ambulation with oximetry, and \"Incentive spirometry.    My Clinical findings support the need for the above equipment due to:  Hypoxia  "

## 2025-08-04 NOTE — PROGRESS NOTES
Patient expressing frustration throughout the afternoon regarding waiting for O2 delivery. Reached out to CM for update on delivery time. Patient updated on status.    1815: O2 still not delivered, patient became agitated and demanding to leave. Signed AMA form, MD notified, patient escorted off the unit.

## 2025-08-06 ENCOUNTER — OFFICE VISIT (OUTPATIENT)
Dept: MEDICAL GROUP | Facility: MEDICAL CENTER | Age: 55
End: 2025-08-06
Attending: FAMILY MEDICINE
Payer: MEDICAID

## 2025-08-06 DIAGNOSIS — I50.20 ACC/AHA STAGE C SYSTOLIC HEART FAILURE (HCC): ICD-10-CM

## 2025-08-06 DIAGNOSIS — F15.10 METHAMPHETAMINE ABUSE (HCC): ICD-10-CM

## 2025-08-06 DIAGNOSIS — F32.A DEPRESSION, UNSPECIFIED DEPRESSION TYPE: ICD-10-CM

## 2025-08-06 DIAGNOSIS — Z59.00 HOMELESS: ICD-10-CM

## 2025-08-06 DIAGNOSIS — E78.5 HYPERLIPIDEMIA, UNSPECIFIED HYPERLIPIDEMIA TYPE: ICD-10-CM

## 2025-08-06 DIAGNOSIS — R74.01 TRANSAMINITIS: ICD-10-CM

## 2025-08-06 DIAGNOSIS — Z74.8 ASSISTANCE NEEDED WITH TRANSPORTATION: ICD-10-CM

## 2025-08-06 DIAGNOSIS — I42.0 DILATED CARDIOMYOPATHY (HCC): ICD-10-CM

## 2025-08-06 DIAGNOSIS — I26.09 ACUTE PULMONARY EMBOLISM WITH ACUTE COR PULMONALE, UNSPECIFIED PULMONARY EMBOLISM TYPE (HCC): Primary | ICD-10-CM

## 2025-08-06 DIAGNOSIS — E11.9 TYPE 2 DIABETES MELLITUS WITHOUT COMPLICATION, WITHOUT LONG-TERM CURRENT USE OF INSULIN (HCC): ICD-10-CM

## 2025-08-06 PROCEDURE — 99214 OFFICE O/P EST MOD 30 MIN: CPT | Performed by: FAMILY MEDICINE

## 2025-08-06 SDOH — ECONOMIC STABILITY - HOUSING INSECURITY: HOMELESSNESS UNSPECIFIED: Z59.00

## 2025-08-06 ASSESSMENT — FIBROSIS 4 INDEX: FIB4 SCORE: 0.89

## 2025-08-07 ENCOUNTER — ANTICOAGULATION MONITORING (OUTPATIENT)
Dept: VASCULAR LAB | Facility: MEDICAL CENTER | Age: 55
End: 2025-08-07
Payer: MEDICAID

## 2025-08-08 ENCOUNTER — PATIENT OUTREACH (OUTPATIENT)
Dept: HEALTH INFORMATION MANAGEMENT | Facility: OTHER | Age: 55
End: 2025-08-08
Payer: MEDICAID

## 2025-08-12 VITALS
HEART RATE: 76 BPM | DIASTOLIC BLOOD PRESSURE: 62 MMHG | SYSTOLIC BLOOD PRESSURE: 108 MMHG | TEMPERATURE: 97.3 F | WEIGHT: 203 LBS | OXYGEN SATURATION: 92 % | BODY MASS INDEX: 30.77 KG/M2 | HEIGHT: 68 IN | RESPIRATION RATE: 20 BRPM

## 2025-08-13 ENCOUNTER — PATIENT OUTREACH (OUTPATIENT)
Dept: HEALTH INFORMATION MANAGEMENT | Facility: OTHER | Age: 55
End: 2025-08-13
Payer: MEDICAID

## 2025-08-13 ENCOUNTER — TELEPHONE (OUTPATIENT)
Dept: VASCULAR LAB | Facility: MEDICAL CENTER | Age: 55
End: 2025-08-13
Payer: MEDICAID

## 2025-08-15 ENCOUNTER — TELEPHONE (OUTPATIENT)
Dept: VASCULAR LAB | Facility: MEDICAL CENTER | Age: 55
End: 2025-08-15
Payer: MEDICAID

## 2025-08-22 ENCOUNTER — TELEPHONE (OUTPATIENT)
Dept: VASCULAR LAB | Facility: MEDICAL CENTER | Age: 55
End: 2025-08-22
Payer: MEDICAID

## 2025-08-26 DIAGNOSIS — I50.20 ACC/AHA STAGE C SYSTOLIC HEART FAILURE (HCC): ICD-10-CM

## 2025-08-26 RX ORDER — ATORVASTATIN CALCIUM 40 MG/1
40 TABLET, FILM COATED ORAL NIGHTLY
Qty: 30 TABLET | Refills: 5 | Status: SHIPPED | OUTPATIENT
Start: 2025-08-26 | End: 2026-09-30

## 2025-08-26 RX ORDER — CARVEDILOL 25 MG/1
25 TABLET ORAL 2 TIMES DAILY WITH MEALS
Qty: 60 TABLET | Refills: 3 | Status: SHIPPED | OUTPATIENT
Start: 2025-08-26

## 2025-08-26 RX ORDER — LISINOPRIL 20 MG/1
20 TABLET ORAL DAILY
Qty: 30 TABLET | Refills: 3 | Status: SHIPPED | OUTPATIENT
Start: 2025-08-26 | End: 2026-09-30

## 2025-08-29 ENCOUNTER — DOCUMENTATION (OUTPATIENT)
Dept: VASCULAR LAB | Facility: MEDICAL CENTER | Age: 55
End: 2025-08-29
Payer: MEDICAID

## (undated) DEVICE — KIT ANESTHESIA W/CIRCUIT & 3/LT BAG W/FILTER (20EA/CA)

## (undated) DEVICE — SUTURE 6-0 PROLENE BV-1 D/A 30 (36PK/BX)"

## (undated) DEVICE — SLEEVE, VASO, THIGH, MED

## (undated) DEVICE — BLADE SURGICAL CLIPPER - (50EA/CA)

## (undated) DEVICE — SUTURE 3-0 VICRYL PLUS SH - 8X 18 INCH (12/BX)

## (undated) DEVICE — PACK MINOR BASIN - (2EA/CA)

## (undated) DEVICE — HEAD HOLDER JUNIOR/ADULT

## (undated) DEVICE — BLADE SURGICAL #15 - (50/BX 3BX/CA)

## (undated) DEVICE — LACTATED RINGERS INJ 1000 ML - (14EA/CA 60CA/PF)

## (undated) DEVICE — CHLORAPREP 26 ML APPLICATOR - ORANGE TINT(25/CA)

## (undated) DEVICE — BLADE SURGICAL #11 - (50/BX)

## (undated) DEVICE — SODIUM CHL IRRIGATION 0.9% 1000ML (12EA/CA)

## (undated) DEVICE — SURGIFOAM (12X7) - (12EA/CA)

## (undated) DEVICE — SUTURE CV

## (undated) DEVICE — VESSELOOP MINI BLUE STERILE - SURG-I-LOOP (10EA/BX)

## (undated) DEVICE — SYRINGE SAFETY 10 ML 18 GA X 1 1/2 BLUNT LL (100/BX 4BX/CA)

## (undated) DEVICE — SUCTION INSTRUMENT YANKAUER OPEN TIP W/O VENT (50EA/CA)

## (undated) DEVICE — NEPTUNE 4 PORT MANIFOLD - (20/PK)

## (undated) DEVICE — SUCTION INSTRUMENT YANKAUER BULBOUS TIP W/O VENT (50EA/CA)

## (undated) DEVICE — ELECTRODE DUAL RETURN W/ CORD - (50/PK)

## (undated) DEVICE — CANISTER SUCTION 3000ML MECHANICAL FILTER AUTO SHUTOFF MEDI-VAC NONSTERILE LF DISP  (40EA/CA)

## (undated) DEVICE — GOWN WARMING STANDARD FLEX - (30/CA)

## (undated) DEVICE — GLOVE SZ 6.5 BIOGEL PI MICRO - PF LF (50PR/BX)

## (undated) DEVICE — GLOVE BIOGEL SZ 6.5 SURGICAL PF LTX (50PR/BX 4BX/CA)

## (undated) DEVICE — SUTURE GENERAL

## (undated) DEVICE — SYRINGE 10 ML CONTROL LL (25EA/BX 4BX/CA)

## (undated) DEVICE — SUTURE 4-0 SILK 12 X 18 INCH - (36/BX)

## (undated) DEVICE — SYRINGE SAFETY TB 1 ML 27 GA X 1/2 IN (100/BX 4BX/CA)

## (undated) DEVICE — SET EXTENSION WITH 2 PORTS (48EA/CA) ***PART #2C8610 IS A SUBSTITUTE*****

## (undated) DEVICE — KIT ROOM DECONTAMINATION

## (undated) DEVICE — ELECTRODE 850 FOAM ADHESIVE - HYDROGEL RADIOTRNSPRNT (50/PK)

## (undated) DEVICE — GLOVE BIOGEL PI INDICATOR SZ 6.5 SURGICAL PF LF - (50/BX 4BX/CA)

## (undated) DEVICE — DRAPE SURGICAL U 77X120 - (10/CA)

## (undated) DEVICE — SET LEADWIRE 5 LEAD BEDSIDE DISPOSABLE ECG (1SET OF 5/EA)

## (undated) DEVICE — SUTURE 6-0 PROLENE C-1 D/A 24 (36PK/BX)"

## (undated) DEVICE — CLIP SM INTNL HRZN TI ESCP LGT - (24EA/PK 25PK/BX)

## (undated) DEVICE — SYRINGE 20 ML LL (50EA/BX 4BX/CA)

## (undated) DEVICE — BAG DECANTER (50EA/CS)

## (undated) DEVICE — SUTURE 4-0 MONOCRYL PLUS PS-2 - 27 INCH (36/BX)

## (undated) DEVICE — SUTURE 7-0 PROLENE 24BV175-6 - EP8735H (36/BX)"

## (undated) DEVICE — GELAQUASONIC 100 ULTRASOUND - 48/BX 20GM STERILE FOIL POUCH

## (undated) DEVICE — SENSOR SPO2 NEO LNCS ADHESIVE (20/BX) SEE USER NOTES

## (undated) DEVICE — GLOVE SZ 6 BIOGEL PI MICRO - PF LF (50PR/BX 4BX/CA)

## (undated) DEVICE — PAD PREP 24 X 48 CUFFED - (100/CA)

## (undated) DEVICE — DRAPE LARGE 3 QUARTER - (20/CA)

## (undated) DEVICE — MASK ANESTHESIA ADULT  - (100/CA)

## (undated) DEVICE — CATHETER EMBOLECTOMY 2FR (5EA/CA)

## (undated) DEVICE — PROTECTOR ULNA NERVE - (36PR/CA)

## (undated) DEVICE — TUBE E-T HI-LO CUFF 8.0MM (10EA/PK)

## (undated) DEVICE — GOWN SURGEONS X-LARGE - DISP. (30/CA)

## (undated) DEVICE — SUTURE 3-0 SILK 12 X 18 IN - (36/BX)

## (undated) DEVICE — SPONGE GAUZESTER 4 X 4 4PLY - (128PK/CA)

## (undated) DEVICE — CLIP MED INTNL HRZN TI ESCP - (25/BX)

## (undated) DEVICE — SUTURE 4-0 VICRYL PLUS RB-1 - 27 INCH (36/BX)

## (undated) DEVICE — GLOVE SZ 7.5 BIOGEL PI MICRO - PF LF (50PR/BX)

## (undated) DEVICE — SUTURE 3-0 ETHILON FS-1 - (36/BX) 30 INCH

## (undated) DEVICE — STAPLER SKIN DISP - (6/BX 10BX/CA) VISISTAT

## (undated) DEVICE — NEEDLE NON SAFETY 25 GA X 1 1/2 IN HYPO (100EA/BX)

## (undated) DEVICE — SODIUM CHL. INJ. 0.9% 500ML (24EA/CA 50CA/PF)

## (undated) DEVICE — TUBING CLEARLINK DUO-VENT - C-FLO (48EA/CA)

## (undated) DEVICE — GLOVE BIOGEL PI INDICATOR SZ 8.0 SURGICAL PF LF -(50/BX 4BX/CA)

## (undated) DEVICE — CATHETER EMBOLECTOMY 3FR (1EA)

## (undated) DEVICE — DRAPE C-ARM LARGE 41IN X 74 IN - (10/BX 2BX/CA)